# Patient Record
Sex: FEMALE | Race: BLACK OR AFRICAN AMERICAN | NOT HISPANIC OR LATINO | Employment: UNEMPLOYED | URBAN - METROPOLITAN AREA
[De-identification: names, ages, dates, MRNs, and addresses within clinical notes are randomized per-mention and may not be internally consistent; named-entity substitution may affect disease eponyms.]

---

## 2017-03-24 ENCOUNTER — HISTORICAL (OUTPATIENT)
Dept: ADMINISTRATIVE | Facility: HOSPITAL | Age: 62
End: 2017-03-24

## 2017-05-11 ENCOUNTER — HISTORICAL (OUTPATIENT)
Dept: ADMINISTRATIVE | Facility: HOSPITAL | Age: 62
End: 2017-05-11

## 2017-05-16 ENCOUNTER — HISTORICAL (OUTPATIENT)
Dept: HEMATOLOGY/ONCOLOGY | Facility: CLINIC | Age: 62
End: 2017-05-16

## 2017-08-09 ENCOUNTER — HISTORICAL (OUTPATIENT)
Dept: INTERNAL MEDICINE | Facility: CLINIC | Age: 62
End: 2017-08-09

## 2017-08-09 LAB
APPEARANCE, UA: ABNORMAL
BACTERIA #/AREA URNS AUTO: ABNORMAL /[HPF]
BILIRUB UR QL STRIP: NEGATIVE
COLOR UR: YELLOW
GLUCOSE (UA): 500 MG/DL
HGB UR QL STRIP: NEGATIVE
HYALINE CASTS #/AREA URNS LPF: ABNORMAL /[LPF]
KETONES UR QL STRIP: NEGATIVE
LEUKOCYTE ESTERASE UR QL STRIP: 75 LEU/UL
MUCOUS THREADS URNS QL MICRO: SLIGHT
NITRITE UR QL STRIP: NEGATIVE
PH UR STRIP: 6.5 [PH] (ref 4.5–8)
PROT UR QL STRIP: 100 MG/DL
RBC #/AREA URNS AUTO: ABNORMAL /[HPF]
SP GR UR STRIP: 1.01 (ref 1–1.03)
SQUAMOUS #/AREA URNS LPF: >100 /[LPF]
UROBILINOGEN UR STRIP-ACNC: NORMAL
WBC #/AREA URNS AUTO: ABNORMAL /HPF

## 2017-08-16 ENCOUNTER — HISTORICAL (OUTPATIENT)
Dept: INTERNAL MEDICINE | Facility: CLINIC | Age: 62
End: 2017-08-16

## 2017-09-14 ENCOUNTER — HISTORICAL (OUTPATIENT)
Dept: INTERNAL MEDICINE | Facility: CLINIC | Age: 62
End: 2017-09-14

## 2017-10-04 ENCOUNTER — HISTORICAL (OUTPATIENT)
Dept: INTERNAL MEDICINE | Facility: CLINIC | Age: 62
End: 2017-10-04

## 2017-11-10 ENCOUNTER — HISTORICAL (OUTPATIENT)
Dept: INTERNAL MEDICINE | Facility: CLINIC | Age: 62
End: 2017-11-10

## 2017-11-10 LAB
ABS NEUT (OLG): 3.25 X10(3)/MCL (ref 2.1–9.2)
ALBUMIN SERPL-MCNC: 3 GM/DL (ref 3.4–5)
ALBUMIN/GLOB SERPL: 1 RATIO (ref 1–2)
ALP SERPL-CCNC: 129 UNIT/L (ref 45–117)
ALT SERPL-CCNC: 28 UNIT/L (ref 12–78)
APPEARANCE, UA: ABNORMAL
AST SERPL-CCNC: 28 UNIT/L (ref 15–37)
BACTERIA #/AREA URNS AUTO: ABNORMAL /[HPF]
BASOPHILS # BLD AUTO: 0.01 X10(3)/MCL
BASOPHILS NFR BLD AUTO: 0 % (ref 0–1)
BILIRUB SERPL-MCNC: 0.4 MG/DL (ref 0.2–1)
BILIRUB UR QL STRIP: NEGATIVE
BILIRUBIN DIRECT+TOT PNL SERPL-MCNC: 0.1 MG/DL
BILIRUBIN DIRECT+TOT PNL SERPL-MCNC: 0.3 MG/DL
BUN SERPL-MCNC: 14 MG/DL (ref 7–18)
CALCIUM SERPL-MCNC: 8.6 MG/DL (ref 8.5–10.1)
CHLORIDE SERPL-SCNC: 105 MMOL/L (ref 98–107)
CO2 SERPL-SCNC: 31 MMOL/L (ref 21–32)
COLOR UR: YELLOW
CREAT SERPL-MCNC: 1.5 MG/DL (ref 0.6–1.3)
CREAT UR-MCNC: 206 MG/DL
DEPRECATED CALCIDIOL+CALCIFEROL SERPL-MC: 30.52 NG/ML (ref 30–80)
EOSINOPHIL # BLD AUTO: 0.17 X10(3)/MCL
EOSINOPHIL NFR BLD AUTO: 3 % (ref 0–5)
ERYTHROCYTE [DISTWIDTH] IN BLOOD BY AUTOMATED COUNT: 13.9 % (ref 11.5–14.5)
GLOBULIN SER-MCNC: 4.6 GM/ML (ref 2.3–3.5)
GLUCOSE (UA): NORMAL
GLUCOSE SERPL-MCNC: 195 MG/DL (ref 74–106)
HCT VFR BLD AUTO: 39.9 % (ref 35–46)
HGB BLD-MCNC: 12.1 GM/DL (ref 12–16)
HGB UR QL STRIP: 0.06 MG/DL
HYALINE CASTS #/AREA URNS LPF: ABNORMAL /[LPF]
IMM GRANULOCYTES # BLD AUTO: 0.02 10*3/UL
IMM GRANULOCYTES NFR BLD AUTO: 0 %
KETONES UR QL STRIP: NEGATIVE
LEUKOCYTE ESTERASE UR QL STRIP: 500 LEU/UL
LYMPHOCYTES # BLD AUTO: 1.43 X10(3)/MCL
LYMPHOCYTES NFR BLD AUTO: 26 % (ref 15–40)
MAGNESIUM SERPL-MCNC: 1.5 MG/DL (ref 1.8–2.4)
MCH RBC QN AUTO: 23 PG (ref 26–34)
MCHC RBC AUTO-ENTMCNC: 30.3 GM/DL (ref 31–37)
MCV RBC AUTO: 76 FL (ref 80–100)
MONOCYTES # BLD AUTO: 0.56 X10(3)/MCL
MONOCYTES NFR BLD AUTO: 10 % (ref 4–12)
NEUTROPHILS # BLD AUTO: 3.25 X10(3)/MCL
NEUTROPHILS NFR BLD AUTO: 60 X10(3)/MCL
NITRITE UR QL STRIP: NEGATIVE
PH UR STRIP: 5.5 [PH] (ref 4.5–8)
PHOSPHATE SERPL-MCNC: 2.5 MG/DL (ref 2.5–4.9)
PLATELET # BLD AUTO: 165 X10(3)/MCL (ref 130–400)
PMV BLD AUTO: 10.3 FL (ref 7.4–10.4)
POTASSIUM SERPL-SCNC: 4.2 MMOL/L (ref 3.5–5.1)
PROT SERPL-MCNC: 7.6 GM/DL (ref 6.4–8.2)
PROT UR QL STRIP: 30 MG/DL
PROT UR STRIP-MCNC: 44.9 MG/DL
PROT/CREAT UR-RTO: 218 MG/GM
PTH-INTACT SERPL-MCNC: 129.7 PG/ML (ref 13.8–85)
RBC # BLD AUTO: 5.25 X10(6)/MCL (ref 4–5.2)
RBC #/AREA URNS AUTO: ABNORMAL /[HPF]
SODIUM SERPL-SCNC: 142 MMOL/L (ref 136–145)
SP GR UR STRIP: 1.01 (ref 1–1.03)
SQUAMOUS #/AREA URNS LPF: >100 /[LPF]
UROBILINOGEN UR STRIP-ACNC: NORMAL
WBC # SPEC AUTO: 5.4 X10(3)/MCL (ref 4.5–11)
WBC #/AREA URNS AUTO: ABNORMAL /HPF

## 2017-12-06 ENCOUNTER — HISTORICAL (OUTPATIENT)
Dept: INTERNAL MEDICINE | Facility: CLINIC | Age: 62
End: 2017-12-06

## 2017-12-06 LAB
CHOLEST SERPL-MCNC: 158 MG/DL
CHOLEST/HDLC SERPL: 2.7 {RATIO} (ref 0–4.4)
EST. AVERAGE GLUCOSE BLD GHB EST-MCNC: 272 MG/DL
HBA1C MFR BLD: 11.1 % (ref 4.2–6.3)
HDLC SERPL-MCNC: 58 MG/DL
INR PPP: 1.1 (ref 0.9–1.2)
LDLC SERPL CALC-MCNC: 74 MG/DL (ref 0–130)
PROTHROMBIN TIME: 14 SECOND(S) (ref 11.9–14.4)
TRIGL SERPL-MCNC: 130 MG/DL
VLDLC SERPL CALC-MCNC: 26 MG/DL

## 2017-12-19 ENCOUNTER — HISTORICAL (OUTPATIENT)
Dept: INTERNAL MEDICINE | Facility: CLINIC | Age: 62
End: 2017-12-19

## 2017-12-19 LAB
INR PPP: 1.61 (ref 0.9–1.2)
PROTHROMBIN TIME: 18.9 SECOND(S) (ref 11.9–14.4)

## 2017-12-22 ENCOUNTER — HOSPITAL ENCOUNTER (OUTPATIENT)
Dept: MEDSURG UNIT | Facility: HOSPITAL | Age: 62
End: 2017-12-24
Attending: INTERNAL MEDICINE | Admitting: INTERNAL MEDICINE

## 2017-12-22 LAB
ABS NEUT (OLG): 4.56 X10(3)/MCL (ref 2.1–9.2)
ALBUMIN SERPL-MCNC: 3.1 GM/DL (ref 3.4–5)
ALBUMIN/GLOB SERPL: 1 RATIO (ref 1–2)
ALP SERPL-CCNC: 117 UNIT/L (ref 45–117)
ALT SERPL-CCNC: 21 UNIT/L (ref 12–78)
AMYLASE SERPL-CCNC: 172 UNIT/L (ref 25–115)
APPEARANCE, UA: ABNORMAL
APTT PPP: 37.1 SECOND(S) (ref 23.3–37)
AST SERPL-CCNC: 19 UNIT/L (ref 15–37)
BACTERIA #/AREA URNS AUTO: ABNORMAL /[HPF]
BASOPHILS # BLD AUTO: 0.02 X10(3)/MCL
BASOPHILS NFR BLD AUTO: 0 % (ref 0–1)
BILIRUB SERPL-MCNC: 0.3 MG/DL (ref 0.2–1)
BILIRUB UR QL STRIP: NEGATIVE
BILIRUBIN DIRECT+TOT PNL SERPL-MCNC: 0.1 MG/DL
BILIRUBIN DIRECT+TOT PNL SERPL-MCNC: 0.2 MG/DL
BUN SERPL-MCNC: 18 MG/DL (ref 7–18)
CALCIUM SERPL-MCNC: 9.2 MG/DL (ref 8.5–10.1)
CHLORIDE SERPL-SCNC: 98 MMOL/L (ref 98–107)
CO2 SERPL-SCNC: 32 MMOL/L (ref 21–32)
COLOR UR: YELLOW
CREAT SERPL-MCNC: 1.5 MG/DL (ref 0.6–1.3)
EOSINOPHIL # BLD AUTO: 0.24 X10(3)/MCL
EOSINOPHIL NFR BLD AUTO: 3 % (ref 0–5)
ERYTHROCYTE [DISTWIDTH] IN BLOOD BY AUTOMATED COUNT: 13.2 % (ref 11.5–14.5)
GLOBULIN SER-MCNC: 5 GM/ML (ref 2.3–3.5)
GLUCOSE (UA): 1000 MG/DL
GLUCOSE SERPL-MCNC: 354 MG/DL (ref 74–106)
HCT VFR BLD AUTO: 40.3 % (ref 35–46)
HGB BLD-MCNC: 12.4 GM/DL (ref 12–16)
HGB UR QL STRIP: 0.03 MG/DL
HYALINE CASTS #/AREA URNS LPF: 0 /[LPF]
IMM GRANULOCYTES # BLD AUTO: 0.02 10*3/UL
IMM GRANULOCYTES NFR BLD AUTO: 0 %
INR PPP: 1.97 (ref 0.9–1.2)
KETONES UR QL STRIP: NEGATIVE
LEUKOCYTE ESTERASE UR QL STRIP: 500 LEU/UL
LIPASE SERPL-CCNC: 170 UNIT/L (ref 73–393)
LYMPHOCYTES # BLD AUTO: 2.39 X10(3)/MCL
LYMPHOCYTES NFR BLD AUTO: 31 % (ref 15–40)
MCH RBC QN AUTO: 23 PG (ref 26–34)
MCHC RBC AUTO-ENTMCNC: 30.8 GM/DL (ref 31–37)
MCV RBC AUTO: 74.8 FL (ref 80–100)
MONOCYTES # BLD AUTO: 0.52 X10(3)/MCL
MONOCYTES NFR BLD AUTO: 7 % (ref 4–12)
NEUTROPHILS # BLD AUTO: 4.56 X10(3)/MCL
NEUTROPHILS NFR BLD AUTO: 59 X10(3)/MCL
NITRITE UR QL STRIP: NEGATIVE
PH UR STRIP: 5.5 [PH] (ref 4.5–8)
PLATELET # BLD AUTO: 240 X10(3)/MCL (ref 130–400)
PMV BLD AUTO: 10.4 FL (ref 7.4–10.4)
POTASSIUM SERPL-SCNC: 3.7 MMOL/L (ref 3.5–5.1)
PROT SERPL-MCNC: 8.1 GM/DL (ref 6.4–8.2)
PROT UR QL STRIP: 30 MG/DL
PROTHROMBIN TIME: 22.1 SECOND(S) (ref 11.9–14.4)
RBC # BLD AUTO: 5.39 X10(6)/MCL (ref 4–5.2)
RBC #/AREA URNS AUTO: ABNORMAL /[HPF]
SODIUM SERPL-SCNC: 136 MMOL/L (ref 136–145)
SP GR UR STRIP: 1.02 (ref 1–1.03)
SQUAMOUS #/AREA URNS LPF: >100 /[LPF]
UROBILINOGEN UR STRIP-ACNC: NORMAL
WBC # SPEC AUTO: 7.8 X10(3)/MCL (ref 4.5–11)
WBC #/AREA URNS AUTO: ABNORMAL /HPF

## 2017-12-23 LAB
ABS NEUT (OLG): 2.82 X10(3)/MCL (ref 2.1–9.2)
ALBUMIN SERPL-MCNC: 2.7 GM/DL (ref 3.4–5)
ALBUMIN/GLOB SERPL: 1 RATIO (ref 1–2)
ALP SERPL-CCNC: 98 UNIT/L (ref 45–117)
ALT SERPL-CCNC: 16 UNIT/L (ref 12–78)
AST SERPL-CCNC: 14 UNIT/L (ref 15–37)
BASOPHILS # BLD AUTO: 0.02 X10(3)/MCL
BASOPHILS NFR BLD AUTO: 0 % (ref 0–1)
BILIRUB SERPL-MCNC: 0.2 MG/DL (ref 0.2–1)
BILIRUBIN DIRECT+TOT PNL SERPL-MCNC: 0.1 MG/DL
BILIRUBIN DIRECT+TOT PNL SERPL-MCNC: 0.1 MG/DL
BUN SERPL-MCNC: 16 MG/DL (ref 7–18)
CALCIUM SERPL-MCNC: 8.3 MG/DL (ref 8.5–10.1)
CHLORIDE SERPL-SCNC: 103 MMOL/L (ref 98–107)
CO2 SERPL-SCNC: 33 MMOL/L (ref 21–32)
COLOR STL: ABNORMAL
CONSISTENCY STL: ABNORMAL
CREAT SERPL-MCNC: 1.3 MG/DL (ref 0.6–1.3)
EOSINOPHIL # BLD AUTO: 0.27 X10(3)/MCL
EOSINOPHIL NFR BLD AUTO: 5 % (ref 0–5)
ERYTHROCYTE [DISTWIDTH] IN BLOOD BY AUTOMATED COUNT: 13.2 % (ref 11.5–14.5)
GLOBULIN SER-MCNC: 4 GM/ML (ref 2.3–3.5)
GLUCOSE SERPL-MCNC: 194 MG/DL (ref 74–106)
HCT VFR BLD AUTO: 35.6 % (ref 35–46)
HEMOCCULT SP1 STL QL: POSITIVE
HGB BLD-MCNC: 10.8 GM/DL (ref 12–16)
IMM GRANULOCYTES # BLD AUTO: 0.01 10*3/UL
IMM GRANULOCYTES NFR BLD AUTO: 0 %
LYMPHOCYTES # BLD AUTO: 2.03 X10(3)/MCL
LYMPHOCYTES NFR BLD AUTO: 36 % (ref 15–40)
MCH RBC QN AUTO: 23.1 PG (ref 26–34)
MCHC RBC AUTO-ENTMCNC: 30.3 GM/DL (ref 31–37)
MCV RBC AUTO: 76.1 FL (ref 80–100)
MONOCYTES # BLD AUTO: 0.49 X10(3)/MCL
MONOCYTES NFR BLD AUTO: 9 % (ref 4–12)
NEUTROPHILS # BLD AUTO: 2.82 X10(3)/MCL
NEUTROPHILS NFR BLD AUTO: 50 X10(3)/MCL
PLATELET # BLD AUTO: 210 X10(3)/MCL (ref 130–400)
PMV BLD AUTO: 11.1 FL (ref 7.4–10.4)
POTASSIUM SERPL-SCNC: 3.9 MMOL/L (ref 3.5–5.1)
PROT SERPL-MCNC: 6.7 GM/DL (ref 6.4–8.2)
RBC # BLD AUTO: 4.68 X10(6)/MCL (ref 4–5.2)
SODIUM SERPL-SCNC: 141 MMOL/L (ref 136–145)
WBC # SPEC AUTO: 5.6 X10(3)/MCL (ref 4.5–11)

## 2017-12-24 LAB
ABS NEUT (OLG): 3.19 X10(3)/MCL (ref 2.1–9.2)
ALBUMIN SERPL-MCNC: 2.5 GM/DL (ref 3.4–5)
ALBUMIN/GLOB SERPL: 1 RATIO (ref 1–2)
ALP SERPL-CCNC: 85 UNIT/L (ref 45–117)
ALT SERPL-CCNC: 15 UNIT/L (ref 12–78)
AST SERPL-CCNC: 17 UNIT/L (ref 15–37)
BASOPHILS # BLD AUTO: 0.02 X10(3)/MCL
BASOPHILS NFR BLD AUTO: 0 % (ref 0–1)
BILIRUB SERPL-MCNC: 0.3 MG/DL (ref 0.2–1)
BILIRUBIN DIRECT+TOT PNL SERPL-MCNC: 0.1 MG/DL
BILIRUBIN DIRECT+TOT PNL SERPL-MCNC: 0.2 MG/DL
BUN SERPL-MCNC: 14 MG/DL (ref 7–18)
CALCIUM SERPL-MCNC: 8 MG/DL (ref 8.5–10.1)
CHLORIDE SERPL-SCNC: 106 MMOL/L (ref 98–107)
CO2 SERPL-SCNC: 31 MMOL/L (ref 21–32)
CREAT SERPL-MCNC: 1.3 MG/DL (ref 0.6–1.3)
EOSINOPHIL # BLD AUTO: 0.3 X10(3)/MCL
EOSINOPHIL NFR BLD AUTO: 5 % (ref 0–5)
ERYTHROCYTE [DISTWIDTH] IN BLOOD BY AUTOMATED COUNT: 13.3 % (ref 11.5–14.5)
FERRITIN SERPL-MCNC: 406.6 NG/ML (ref 8–388)
GLOBULIN SER-MCNC: 4 GM/ML (ref 2.3–3.5)
GLUCOSE SERPL-MCNC: 119 MG/DL (ref 74–106)
HCT VFR BLD AUTO: 34 % (ref 35–46)
HGB BLD-MCNC: 10.1 GM/DL (ref 12–16)
IMM GRANULOCYTES # BLD AUTO: 0.02 10*3/UL
IMM GRANULOCYTES NFR BLD AUTO: 0 %
IRON SATN MFR SERPL: 28.1 % (ref 15–50)
IRON SERPL-MCNC: 55 MCG/DL (ref 50–170)
LYMPHOCYTES # BLD AUTO: 1.85 X10(3)/MCL
LYMPHOCYTES NFR BLD AUTO: 32 % (ref 15–40)
MCH RBC QN AUTO: 22.7 PG (ref 26–34)
MCHC RBC AUTO-ENTMCNC: 29.7 GM/DL (ref 31–37)
MCV RBC AUTO: 76.6 FL (ref 80–100)
MONOCYTES # BLD AUTO: 0.5 X10(3)/MCL
MONOCYTES NFR BLD AUTO: 8 % (ref 4–12)
NEUTROPHILS # BLD AUTO: 3.19 X10(3)/MCL
NEUTROPHILS NFR BLD AUTO: 54 X10(3)/MCL
PLATELET # BLD AUTO: 204 X10(3)/MCL (ref 130–400)
PMV BLD AUTO: 11.4 FL (ref 7.4–10.4)
POTASSIUM SERPL-SCNC: 4 MMOL/L (ref 3.5–5.1)
PROT SERPL-MCNC: 6.5 GM/DL (ref 6.4–8.2)
RBC # BLD AUTO: 4.44 X10(6)/MCL (ref 4–5.2)
SODIUM SERPL-SCNC: 144 MMOL/L (ref 136–145)
TIBC SERPL-MCNC: 196 MCG/DL (ref 250–450)
TRANSFERRIN SERPL-MCNC: 158 MG/DL (ref 200–360)
WBC # SPEC AUTO: 5.9 X10(3)/MCL (ref 4.5–11)

## 2018-01-02 ENCOUNTER — HISTORICAL (OUTPATIENT)
Dept: INTERNAL MEDICINE | Facility: CLINIC | Age: 63
End: 2018-01-02

## 2018-01-02 LAB
INR PPP: 1.39 (ref 0.9–1.2)
PROTHROMBIN TIME: 16.8 SECOND(S) (ref 11.9–14.4)

## 2018-01-29 ENCOUNTER — HISTORICAL (OUTPATIENT)
Dept: INTERNAL MEDICINE | Facility: CLINIC | Age: 63
End: 2018-01-29

## 2018-01-29 LAB
INR PPP: 1.89 (ref 0.9–1.2)
PROTHROMBIN TIME: 20.6 SECOND(S) (ref 11.9–14.4)

## 2018-02-16 ENCOUNTER — HISTORICAL (OUTPATIENT)
Dept: ADMINISTRATIVE | Facility: HOSPITAL | Age: 63
End: 2018-02-16

## 2018-02-16 LAB
ABS NEUT (OLG): 3.57 X10(3)/MCL (ref 2.1–9.2)
ALBUMIN SERPL-MCNC: 3 GM/DL (ref 3.4–5)
ALBUMIN/GLOB SERPL: 1 RATIO (ref 1–2)
ALP SERPL-CCNC: 121 UNIT/L (ref 45–117)
ALT SERPL-CCNC: 15 UNIT/L (ref 12–78)
APPEARANCE, UA: ABNORMAL
AST SERPL-CCNC: 15 UNIT/L (ref 15–37)
BACTERIA #/AREA URNS AUTO: ABNORMAL /[HPF]
BASOPHILS # BLD AUTO: 0.02 X10(3)/MCL
BASOPHILS NFR BLD AUTO: 0 % (ref 0–1)
BILIRUB SERPL-MCNC: 0.4 MG/DL (ref 0.2–1)
BILIRUB UR QL STRIP: NEGATIVE
BILIRUBIN DIRECT+TOT PNL SERPL-MCNC: 0.2 MG/DL
BILIRUBIN DIRECT+TOT PNL SERPL-MCNC: 0.2 MG/DL
BUN SERPL-MCNC: 13 MG/DL (ref 7–18)
CALCIUM SERPL-MCNC: 8.9 MG/DL (ref 8.5–10.1)
CHLORIDE SERPL-SCNC: 101 MMOL/L (ref 98–107)
CO2 SERPL-SCNC: 32 MMOL/L (ref 21–32)
COLOR UR: YELLOW
CREAT SERPL-MCNC: 1.2 MG/DL (ref 0.6–1.3)
CREAT UR-MCNC: 219 MG/DL
DEPRECATED CALCIDIOL+CALCIFEROL SERPL-MC: 31.58 NG/ML (ref 30–80)
EOSINOPHIL # BLD AUTO: 0.33 X10(3)/MCL
EOSINOPHIL NFR BLD AUTO: 5 % (ref 0–5)
ERYTHROCYTE [DISTWIDTH] IN BLOOD BY AUTOMATED COUNT: 13.9 % (ref 11.5–14.5)
GLOBULIN SER-MCNC: 5.1 GM/ML (ref 2.3–3.5)
GLUCOSE (UA): 50 MG/DL
GLUCOSE SERPL-MCNC: 145 MG/DL (ref 74–106)
HCT VFR BLD AUTO: 39 % (ref 35–46)
HGB BLD-MCNC: 11.9 GM/DL (ref 12–16)
HGB UR QL STRIP: 0.1 MG/DL
HYALINE CASTS #/AREA URNS LPF: ABNORMAL /[LPF]
IMM GRANULOCYTES # BLD AUTO: 0.03 10*3/UL
IMM GRANULOCYTES NFR BLD AUTO: 0 %
INR PPP: 1.66 (ref 0.9–1.2)
KETONES UR QL STRIP: NEGATIVE
LEUKOCYTE ESTERASE UR QL STRIP: 500 LEU/UL
LYMPHOCYTES # BLD AUTO: 2.49 X10(3)/MCL
LYMPHOCYTES NFR BLD AUTO: 36 % (ref 15–40)
MAGNESIUM SERPL-MCNC: 1.8 MG/DL (ref 1.8–2.4)
MCH RBC QN AUTO: 22.8 PG (ref 26–34)
MCHC RBC AUTO-ENTMCNC: 30.5 GM/DL (ref 31–37)
MCV RBC AUTO: 74.6 FL (ref 80–100)
MONOCYTES # BLD AUTO: 0.52 X10(3)/MCL
MONOCYTES NFR BLD AUTO: 8 % (ref 4–12)
NEUTROPHILS # BLD AUTO: 3.57 X10(3)/MCL
NEUTROPHILS NFR BLD AUTO: 51 X10(3)/MCL
NITRITE UR QL STRIP: NEGATIVE
PH UR STRIP: 5.5 [PH] (ref 4.5–8)
PHOSPHATE SERPL-MCNC: 3.5 MG/DL (ref 2.5–4.9)
PLATELET # BLD AUTO: 213 X10(3)/MCL (ref 130–400)
PMV BLD AUTO: 10.9 FL (ref 7.4–10.4)
POTASSIUM SERPL-SCNC: 4 MMOL/L (ref 3.5–5.1)
PROT SERPL-MCNC: 8.1 GM/DL (ref 6.4–8.2)
PROT UR QL STRIP: 50 MG/DL
PROT UR STRIP-MCNC: 46.8 MG/DL
PROT/CREAT UR-RTO: 213.7 MG/GM
PROTHROMBIN TIME: 18.6 SECOND(S) (ref 11.9–14.4)
PTH-INTACT SERPL-MCNC: 92 PG/ML (ref 18.4–80.1)
RBC # BLD AUTO: 5.23 X10(6)/MCL (ref 4–5.2)
RBC #/AREA URNS AUTO: ABNORMAL /[HPF]
SODIUM SERPL-SCNC: 139 MMOL/L (ref 136–145)
SP GR UR STRIP: 1.02 (ref 1–1.03)
SQUAMOUS #/AREA URNS LPF: >100 /[LPF]
UROBILINOGEN UR STRIP-ACNC: NORMAL
WBC # SPEC AUTO: 7 X10(3)/MCL (ref 4.5–11)
WBC #/AREA URNS AUTO: >=100 /HPF

## 2018-02-23 ENCOUNTER — HISTORICAL (OUTPATIENT)
Dept: ENDOSCOPY | Facility: HOSPITAL | Age: 63
End: 2018-02-23

## 2018-03-01 ENCOUNTER — HISTORICAL (OUTPATIENT)
Dept: INTERNAL MEDICINE | Facility: CLINIC | Age: 63
End: 2018-03-01

## 2018-03-01 LAB
INR PPP: 2.45 (ref 0.9–1.2)
PROTHROMBIN TIME: 25.3 SECOND(S) (ref 11.9–14.4)

## 2018-04-12 ENCOUNTER — HISTORICAL (OUTPATIENT)
Dept: INTERNAL MEDICINE | Facility: CLINIC | Age: 63
End: 2018-04-12

## 2018-04-12 LAB
INR PPP: 1.69 (ref 0.9–1.2)
PROTHROMBIN TIME: 18.9 SECOND(S) (ref 11.9–14.4)

## 2018-04-18 ENCOUNTER — HISTORICAL (OUTPATIENT)
Dept: INTERNAL MEDICINE | Facility: CLINIC | Age: 63
End: 2018-04-18

## 2018-04-18 LAB
ABS NEUT (OLG): 3.44 X10(3)/MCL (ref 2.1–9.2)
ALBUMIN SERPL-MCNC: 3.1 GM/DL (ref 3.4–5)
ALBUMIN/GLOB SERPL: 1 RATIO (ref 1–2)
ALP SERPL-CCNC: 119 UNIT/L (ref 45–117)
ALT SERPL-CCNC: 16 UNIT/L (ref 12–78)
APPEARANCE, UA: ABNORMAL
AST SERPL-CCNC: 19 UNIT/L (ref 15–37)
BACTERIA #/AREA URNS AUTO: ABNORMAL /[HPF]
BASOPHILS # BLD AUTO: 0.02 X10(3)/MCL
BASOPHILS NFR BLD AUTO: 0 %
BILIRUB SERPL-MCNC: 0.4 MG/DL (ref 0.2–1)
BILIRUB UR QL STRIP: NEGATIVE
BILIRUBIN DIRECT+TOT PNL SERPL-MCNC: 0.1 MG/DL
BILIRUBIN DIRECT+TOT PNL SERPL-MCNC: 0.3 MG/DL
BUN SERPL-MCNC: 16 MG/DL (ref 7–18)
CALCIUM SERPL-MCNC: 9.1 MG/DL (ref 8.5–10.1)
CHLORIDE SERPL-SCNC: 104 MMOL/L (ref 98–107)
CO2 SERPL-SCNC: 31 MMOL/L (ref 21–32)
COLOR UR: YELLOW
CREAT SERPL-MCNC: 1.3 MG/DL (ref 0.6–1.3)
CREAT UR-MCNC: 204 MG/DL
EOSINOPHIL # BLD AUTO: 0.13 X10(3)/MCL
EOSINOPHIL NFR BLD AUTO: 2 %
ERYTHROCYTE [DISTWIDTH] IN BLOOD BY AUTOMATED COUNT: 14.9 % (ref 11.5–14.5)
EST. AVERAGE GLUCOSE BLD GHB EST-MCNC: 286 MG/DL
GLOBULIN SER-MCNC: 4.7 GM/ML (ref 2.3–3.5)
GLUCOSE (UA): 30 MG/DL
GLUCOSE SERPL-MCNC: 207 MG/DL (ref 74–106)
HBA1C MFR BLD: 11.6 % (ref 4.2–6.3)
HCT VFR BLD AUTO: 39.5 % (ref 35–46)
HGB BLD-MCNC: 11.9 GM/DL (ref 12–16)
HGB UR QL STRIP: 0.06 MG/DL
HYALINE CASTS #/AREA URNS LPF: ABNORMAL /[LPF]
IMM GRANULOCYTES # BLD AUTO: 0.02 10*3/UL
IMM GRANULOCYTES NFR BLD AUTO: 0 %
KETONES UR QL STRIP: NEGATIVE
LEUKOCYTE ESTERASE UR QL STRIP: 75 LEU/UL
LYMPHOCYTES # BLD AUTO: 1.98 X10(3)/MCL
LYMPHOCYTES NFR BLD AUTO: 33 % (ref 13–40)
MCH RBC QN AUTO: 22.4 PG (ref 26–34)
MCHC RBC AUTO-ENTMCNC: 30.1 GM/DL (ref 31–37)
MCV RBC AUTO: 74.4 FL (ref 80–100)
MICROALBUMIN UR-MCNC: 83.1 MG/L (ref 0–19)
MICROALBUMIN/CREAT RATIO PNL UR: 40.7 MCG/MG CR (ref 0–29)
MONOCYTES # BLD AUTO: 0.4 X10(3)/MCL
MONOCYTES NFR BLD AUTO: 7 % (ref 4–12)
NEUTROPHILS # BLD AUTO: 3.44 X10(3)/MCL
NEUTROPHILS NFR BLD AUTO: 57 X10(3)/MCL
NITRITE UR QL STRIP: NEGATIVE
PH UR STRIP: 5.5 [PH] (ref 4.5–8)
PLATELET # BLD AUTO: 218 X10(3)/MCL (ref 130–400)
PMV BLD AUTO: 11 FL (ref 7.4–10.4)
POTASSIUM SERPL-SCNC: 4.1 MMOL/L (ref 3.5–5.1)
PROT SERPL-MCNC: 7.8 GM/DL (ref 6.4–8.2)
PROT UR QL STRIP: 20 MG/DL
RBC # BLD AUTO: 5.31 X10(6)/MCL (ref 4–5.2)
RBC #/AREA URNS AUTO: ABNORMAL /[HPF]
SODIUM SERPL-SCNC: 142 MMOL/L (ref 136–145)
SP GR UR STRIP: 1.02 (ref 1–1.03)
SQUAMOUS #/AREA URNS LPF: >100 /[LPF]
UROBILINOGEN UR STRIP-ACNC: NORMAL
WBC # SPEC AUTO: 6 X10(3)/MCL (ref 4.5–11)
WBC #/AREA URNS AUTO: ABNORMAL /HPF

## 2018-04-25 ENCOUNTER — HISTORICAL (OUTPATIENT)
Dept: ADMINISTRATIVE | Facility: HOSPITAL | Age: 63
End: 2018-04-25

## 2018-04-25 LAB
ALBUMIN SERPL-MCNC: 3.2 GM/DL (ref 3.4–5)
ALBUMIN/GLOB SERPL: 1 RATIO (ref 1–2)
ALP SERPL-CCNC: 121 UNIT/L (ref 45–117)
ALT SERPL-CCNC: 21 UNIT/L (ref 12–78)
AST SERPL-CCNC: 18 UNIT/L (ref 15–37)
BILIRUB SERPL-MCNC: 0.3 MG/DL (ref 0.2–1)
BILIRUBIN DIRECT+TOT PNL SERPL-MCNC: 0.1 MG/DL
BILIRUBIN DIRECT+TOT PNL SERPL-MCNC: 0.2 MG/DL
BUN SERPL-MCNC: 19 MG/DL (ref 7–18)
CALCIUM SERPL-MCNC: 8.8 MG/DL (ref 8.5–10.1)
CHLORIDE SERPL-SCNC: 102 MMOL/L (ref 98–107)
CO2 SERPL-SCNC: 31 MMOL/L (ref 21–32)
CREAT SERPL-MCNC: 1.4 MG/DL (ref 0.6–1.3)
EST. AVERAGE GLUCOSE BLD GHB EST-MCNC: 283 MG/DL
GLOBULIN SER-MCNC: 4.7 GM/ML (ref 2.3–3.5)
GLUCOSE SERPL-MCNC: 246 MG/DL (ref 74–106)
HBA1C MFR BLD: 11.5 % (ref 4.2–6.3)
INR PPP: 1.94 (ref 0.9–1.2)
POTASSIUM SERPL-SCNC: 4.3 MMOL/L (ref 3.5–5.1)
PROT SERPL-MCNC: 7.9 GM/DL (ref 6.4–8.2)
PROTHROMBIN TIME: 21.1 SECOND(S) (ref 11.9–14.4)
SODIUM SERPL-SCNC: 138 MMOL/L (ref 136–145)
T4 FREE SERPL-MCNC: 0.98 NG/DL (ref 0.76–1.46)
TSH SERPL-ACNC: 1.53 MIU/L (ref 0.36–3.74)

## 2018-05-09 ENCOUNTER — HISTORICAL (OUTPATIENT)
Dept: INTERNAL MEDICINE | Facility: CLINIC | Age: 63
End: 2018-05-09

## 2018-05-09 LAB
INR PPP: 2.04
INR PPP: 2.04 (ref 0.9–1.2)
PROTHROMBIN TIME: 21.9 SECOND(S) (ref 11.9–14.4)

## 2018-05-14 ENCOUNTER — HISTORICAL (OUTPATIENT)
Dept: RADIOLOGY | Facility: HOSPITAL | Age: 63
End: 2018-05-14

## 2018-05-16 ENCOUNTER — HISTORICAL (OUTPATIENT)
Dept: ADMINISTRATIVE | Facility: HOSPITAL | Age: 63
End: 2018-05-16

## 2018-05-16 LAB
ABS NEUT (OLG): 3.88 X10(3)/MCL (ref 2.1–9.2)
ALBUMIN SERPL-MCNC: 3 GM/DL (ref 3.4–5)
ALBUMIN/GLOB SERPL: 1 RATIO (ref 1–2)
ALP SERPL-CCNC: 118 UNIT/L (ref 45–117)
ALT SERPL-CCNC: 20 UNIT/L (ref 12–78)
AST SERPL-CCNC: 19 UNIT/L (ref 15–37)
BASOPHILS # BLD AUTO: 0.02 X10(3)/MCL
BASOPHILS NFR BLD AUTO: 0 %
BILIRUB SERPL-MCNC: 0.3 MG/DL (ref 0.2–1)
BILIRUBIN DIRECT+TOT PNL SERPL-MCNC: 0.1 MG/DL
BILIRUBIN DIRECT+TOT PNL SERPL-MCNC: 0.2 MG/DL
BUN SERPL-MCNC: 20 MG/DL (ref 7–18)
CALCIUM SERPL-MCNC: 8.5 MG/DL (ref 8.5–10.1)
CEA SERPL-MCNC: 1.7 NG/ML
CHLORIDE SERPL-SCNC: 105 MMOL/L (ref 98–107)
CO2 SERPL-SCNC: 30 MMOL/L (ref 21–32)
CREAT SERPL-MCNC: 1.4 MG/DL (ref 0.6–1.3)
EOSINOPHIL # BLD AUTO: 0.18 10*3/UL
EOSINOPHIL NFR BLD AUTO: 3 %
ERYTHROCYTE [DISTWIDTH] IN BLOOD BY AUTOMATED COUNT: 15 % (ref 11.5–14.5)
GLOBULIN SER-MCNC: 4.8 GM/ML (ref 2.3–3.5)
GLUCOSE SERPL-MCNC: 211 MG/DL (ref 74–106)
HCT VFR BLD AUTO: 38.2 % (ref 35–46)
HGB BLD-MCNC: 11.4 GM/DL (ref 12–16)
IMM GRANULOCYTES # BLD AUTO: 0.02 10*3/UL
IMM GRANULOCYTES NFR BLD AUTO: 0 %
LYMPHOCYTES # BLD AUTO: 1.38 X10(3)/MCL
LYMPHOCYTES NFR BLD AUTO: 23 % (ref 13–40)
MCH RBC QN AUTO: 22.6 PG (ref 26–34)
MCHC RBC AUTO-ENTMCNC: 29.8 GM/DL (ref 31–37)
MCV RBC AUTO: 75.6 FL (ref 80–100)
MONOCYTES # BLD AUTO: 0.49 X10(3)/MCL
MONOCYTES NFR BLD AUTO: 8 % (ref 4–12)
NEUTROPHILS # BLD AUTO: 3.88 X10(3)/MCL
NEUTROPHILS NFR BLD AUTO: 65 X10(3)/MCL
PLATELET # BLD AUTO: 198 X10(3)/MCL (ref 130–400)
PMV BLD AUTO: 10.5 FL (ref 7.4–10.4)
POTASSIUM SERPL-SCNC: 3.7 MMOL/L (ref 3.5–5.1)
PROT SERPL-MCNC: 7.8 GM/DL (ref 6.4–8.2)
RBC # BLD AUTO: 5.05 X10(6)/MCL (ref 4–5.2)
SODIUM SERPL-SCNC: 142 MMOL/L (ref 136–145)
WBC # SPEC AUTO: 6 X10(3)/MCL (ref 4.5–11)

## 2018-07-05 ENCOUNTER — HISTORICAL (OUTPATIENT)
Dept: INTERNAL MEDICINE | Facility: CLINIC | Age: 63
End: 2018-07-05

## 2018-07-09 ENCOUNTER — HISTORICAL (OUTPATIENT)
Dept: INTERNAL MEDICINE | Facility: CLINIC | Age: 63
End: 2018-07-09

## 2018-09-05 ENCOUNTER — HISTORICAL (OUTPATIENT)
Dept: ADMINISTRATIVE | Facility: HOSPITAL | Age: 63
End: 2018-09-05

## 2018-09-05 LAB
ABS NEUT (OLG): 3.32 X10(3)/MCL (ref 2.1–9.2)
ALBUMIN SERPL-MCNC: 3.2 GM/DL (ref 3.4–5)
ALBUMIN/GLOB SERPL: 1 RATIO (ref 1–2)
ALP SERPL-CCNC: 129 UNIT/L (ref 45–117)
ALT SERPL-CCNC: 20 UNIT/L (ref 12–78)
APPEARANCE, UA: ABNORMAL
AST SERPL-CCNC: 14 UNIT/L (ref 15–37)
BACTERIA #/AREA URNS AUTO: ABNORMAL /[HPF]
BASOPHILS # BLD AUTO: 0.02 X10(3)/MCL
BASOPHILS NFR BLD AUTO: 0 %
BILIRUB SERPL-MCNC: 0.3 MG/DL (ref 0.2–1)
BILIRUB UR QL STRIP: NEGATIVE
BILIRUBIN DIRECT+TOT PNL SERPL-MCNC: <0.1 MG/DL
BILIRUBIN DIRECT+TOT PNL SERPL-MCNC: ABNORMAL MG/DL
BUN SERPL-MCNC: 18 MG/DL (ref 7–18)
CALCIUM SERPL-MCNC: 9.2 MG/DL (ref 8.5–10.1)
CHLORIDE SERPL-SCNC: 103 MMOL/L (ref 98–107)
CO2 SERPL-SCNC: 34 MMOL/L (ref 21–32)
COLOR UR: YELLOW
CREAT SERPL-MCNC: 1.4 MG/DL (ref 0.6–1.3)
CREAT UR-MCNC: 148 MG/DL
DEPRECATED CALCIDIOL+CALCIFEROL SERPL-MC: 24.41 NG/ML (ref 30–80)
EOSINOPHIL # BLD AUTO: 0.22 10*3/UL
EOSINOPHIL NFR BLD AUTO: 3 %
ERYTHROCYTE [DISTWIDTH] IN BLOOD BY AUTOMATED COUNT: 14.5 % (ref 11.5–14.5)
GLOBULIN SER-MCNC: 5 GM/ML (ref 2.3–3.5)
GLUCOSE (UA): 300 MG/DL
GLUCOSE SERPL-MCNC: 178 MG/DL (ref 74–106)
HCT VFR BLD AUTO: 40 % (ref 35–46)
HGB BLD-MCNC: 11.8 GM/DL (ref 12–16)
HGB UR QL STRIP: 0.03 MG/DL
HYALINE CASTS #/AREA URNS LPF: ABNORMAL /[LPF]
IMM GRANULOCYTES # BLD AUTO: 0.03 10*3/UL
IMM GRANULOCYTES NFR BLD AUTO: 0 %
KETONES UR QL STRIP: NEGATIVE
LEUKOCYTE ESTERASE UR QL STRIP: 500 LEU/UL
LYMPHOCYTES # BLD AUTO: 2.28 X10(3)/MCL
LYMPHOCYTES NFR BLD AUTO: 36 % (ref 13–40)
MAGNESIUM SERPL-MCNC: 1.7 MG/DL (ref 1.8–2.4)
MCH RBC QN AUTO: 22.7 PG (ref 26–34)
MCHC RBC AUTO-ENTMCNC: 29.5 GM/DL (ref 31–37)
MCV RBC AUTO: 77.1 FL (ref 80–100)
MONOCYTES # BLD AUTO: 0.55 X10(3)/MCL
MONOCYTES NFR BLD AUTO: 9 % (ref 4–12)
NEUTROPHILS # BLD AUTO: 3.32 X10(3)/MCL
NEUTROPHILS NFR BLD AUTO: 52 X10(3)/MCL
NITRITE UR QL STRIP: NEGATIVE
PH UR STRIP: 5.5 [PH] (ref 4.5–8)
PHOSPHATE SERPL-MCNC: 3.5 MG/DL (ref 2.5–4.9)
PLATELET # BLD AUTO: 211 X10(3)/MCL (ref 130–400)
PMV BLD AUTO: 11 FL (ref 7.4–10.4)
POTASSIUM SERPL-SCNC: 3.8 MMOL/L (ref 3.5–5.1)
PROT SERPL-MCNC: 8.2 GM/DL (ref 6.4–8.2)
PROT UR QL STRIP: 30 MG/DL
PROT UR STRIP-MCNC: 70.1 MG/DL
PROT/CREAT UR-RTO: 473.6 MG/GM
PTH-INTACT SERPL-MCNC: 63.9 PG/ML (ref 18.4–80.1)
RBC # BLD AUTO: 5.19 X10(6)/MCL (ref 4–5.2)
RBC #/AREA URNS AUTO: ABNORMAL /[HPF]
SODIUM SERPL-SCNC: 141 MMOL/L (ref 136–145)
SP GR UR STRIP: 1.02 (ref 1–1.03)
SQUAMOUS #/AREA URNS LPF: ABNORMAL /[LPF]
UROBILINOGEN UR STRIP-ACNC: NORMAL
WBC # SPEC AUTO: 6.4 X10(3)/MCL (ref 4.5–11)
WBC #/AREA URNS AUTO: ABNORMAL /HPF

## 2018-10-16 ENCOUNTER — HISTORICAL (OUTPATIENT)
Dept: ADMINISTRATIVE | Facility: HOSPITAL | Age: 63
End: 2018-10-16

## 2018-10-16 LAB
EST. AVERAGE GLUCOSE BLD GHB EST-MCNC: 283 MG/DL
HBA1C MFR BLD: 11.5 % (ref 4.2–6.3)

## 2018-11-16 ENCOUNTER — HISTORICAL (OUTPATIENT)
Dept: ADMINISTRATIVE | Facility: HOSPITAL | Age: 63
End: 2018-11-16

## 2018-11-16 LAB
ABS NEUT (OLG): 4.06 X10(3)/MCL (ref 2.1–9.2)
ALBUMIN SERPL-MCNC: 3.3 GM/DL (ref 3.4–5)
ALBUMIN/GLOB SERPL: 1 RATIO (ref 1–2)
ALP SERPL-CCNC: 126 UNIT/L (ref 45–117)
ALT SERPL-CCNC: 17 UNIT/L (ref 12–78)
AST SERPL-CCNC: 13 UNIT/L (ref 15–37)
BASOPHILS # BLD AUTO: 0.01 X10(3)/MCL
BASOPHILS NFR BLD AUTO: 0 %
BILIRUB SERPL-MCNC: 0.4 MG/DL (ref 0.2–1)
BILIRUBIN DIRECT+TOT PNL SERPL-MCNC: 0.1 MG/DL
BILIRUBIN DIRECT+TOT PNL SERPL-MCNC: 0.3 MG/DL
BUN SERPL-MCNC: 13 MG/DL (ref 7–18)
CALCIUM SERPL-MCNC: 8.8 MG/DL (ref 8.5–10.1)
CEA SERPL-MCNC: 1.8 NG/ML
CHLORIDE SERPL-SCNC: 100 MMOL/L (ref 98–107)
CO2 SERPL-SCNC: 32 MMOL/L (ref 21–32)
CREAT SERPL-MCNC: 1.3 MG/DL (ref 0.6–1.3)
EOSINOPHIL # BLD AUTO: 0.33 X10(3)/MCL
EOSINOPHIL NFR BLD AUTO: 5 %
ERYTHROCYTE [DISTWIDTH] IN BLOOD BY AUTOMATED COUNT: 14 % (ref 11.5–14.5)
GLOBULIN SER-MCNC: 5.1 GM/ML (ref 2.3–3.5)
GLUCOSE SERPL-MCNC: 250 MG/DL (ref 74–106)
HCT VFR BLD AUTO: 41.8 % (ref 35–46)
HGB BLD-MCNC: 12.6 GM/DL (ref 12–16)
IMM GRANULOCYTES # BLD AUTO: 0.02 10*3/UL
IMM GRANULOCYTES NFR BLD AUTO: 0 %
LYMPHOCYTES # BLD AUTO: 1.78 X10(3)/MCL
LYMPHOCYTES NFR BLD AUTO: 27 % (ref 13–40)
MCH RBC QN AUTO: 22.6 PG (ref 26–34)
MCHC RBC AUTO-ENTMCNC: 30.1 GM/DL (ref 31–37)
MCV RBC AUTO: 75 FL (ref 80–100)
MONOCYTES # BLD AUTO: 0.42 X10(3)/MCL
MONOCYTES NFR BLD AUTO: 6 % (ref 4–12)
NEUTROPHILS # BLD AUTO: 4.06 X10(3)/MCL
NEUTROPHILS NFR BLD AUTO: 61 X10(3)/MCL
PLATELET # BLD AUTO: 209 X10(3)/MCL (ref 130–400)
PMV BLD AUTO: 10.4 FL (ref 7.4–10.4)
POTASSIUM SERPL-SCNC: 3.7 MMOL/L (ref 3.5–5.1)
PROT SERPL-MCNC: 8.4 GM/DL (ref 6.4–8.2)
RBC # BLD AUTO: 5.57 X10(6)/MCL (ref 4–5.2)
SODIUM SERPL-SCNC: 138 MMOL/L (ref 136–145)
WBC # SPEC AUTO: 6.6 X10(3)/MCL (ref 4.5–11)

## 2018-11-21 ENCOUNTER — HISTORICAL (OUTPATIENT)
Dept: INTERNAL MEDICINE | Facility: CLINIC | Age: 63
End: 2018-11-21

## 2019-01-11 ENCOUNTER — HISTORICAL (OUTPATIENT)
Dept: RADIOLOGY | Facility: HOSPITAL | Age: 64
End: 2019-01-11

## 2019-03-01 ENCOUNTER — HOSPITAL ENCOUNTER (OUTPATIENT)
Dept: MEDSURG UNIT | Facility: HOSPITAL | Age: 64
End: 2019-03-04
Attending: INTERNAL MEDICINE | Admitting: INTERNAL MEDICINE

## 2019-03-27 ENCOUNTER — HISTORICAL (OUTPATIENT)
Dept: ADMINISTRATIVE | Facility: HOSPITAL | Age: 64
End: 2019-03-27

## 2019-05-16 ENCOUNTER — HISTORICAL (OUTPATIENT)
Dept: RADIOLOGY | Facility: HOSPITAL | Age: 64
End: 2019-05-16

## 2019-07-31 ENCOUNTER — HISTORICAL (OUTPATIENT)
Dept: RADIOLOGY | Facility: HOSPITAL | Age: 64
End: 2019-07-31

## 2019-09-20 ENCOUNTER — HISTORICAL (OUTPATIENT)
Dept: NEPHROLOGY | Facility: CLINIC | Age: 64
End: 2019-09-20

## 2019-10-18 ENCOUNTER — HISTORICAL (OUTPATIENT)
Dept: RADIOLOGY | Facility: HOSPITAL | Age: 64
End: 2019-10-18

## 2019-12-06 ENCOUNTER — HISTORICAL (OUTPATIENT)
Dept: INTERNAL MEDICINE | Facility: CLINIC | Age: 64
End: 2019-12-06

## 2020-01-21 ENCOUNTER — HISTORICAL (OUTPATIENT)
Dept: RADIOLOGY | Facility: HOSPITAL | Age: 65
End: 2020-01-21

## 2020-03-11 ENCOUNTER — HISTORICAL (OUTPATIENT)
Dept: SLEEP MEDICINE | Facility: HOSPITAL | Age: 65
End: 2020-03-11

## 2020-05-22 ENCOUNTER — HISTORICAL (OUTPATIENT)
Dept: NEPHROLOGY | Facility: CLINIC | Age: 65
End: 2020-05-22

## 2020-06-15 ENCOUNTER — HISTORICAL (OUTPATIENT)
Dept: GASTROENTEROLOGY | Facility: CLINIC | Age: 65
End: 2020-06-15

## 2020-06-18 ENCOUNTER — HISTORICAL (OUTPATIENT)
Dept: ENDOSCOPY | Facility: HOSPITAL | Age: 65
End: 2020-06-18

## 2020-09-16 ENCOUNTER — HISTORICAL (OUTPATIENT)
Dept: INTERNAL MEDICINE | Facility: CLINIC | Age: 65
End: 2020-09-16

## 2020-09-16 ENCOUNTER — HISTORICAL (OUTPATIENT)
Dept: ADMINISTRATIVE | Facility: HOSPITAL | Age: 65
End: 2020-09-16

## 2020-09-30 ENCOUNTER — HISTORICAL (OUTPATIENT)
Dept: RADIOLOGY | Facility: HOSPITAL | Age: 65
End: 2020-09-30

## 2020-10-17 LAB
LEFT EYE DM RETINOPATHY: NEGATIVE
RIGHT EYE DM RETINOPATHY: NEGATIVE

## 2020-12-07 ENCOUNTER — HISTORICAL (OUTPATIENT)
Dept: INTERNAL MEDICINE | Facility: CLINIC | Age: 65
End: 2020-12-07

## 2021-03-23 ENCOUNTER — HISTORICAL (OUTPATIENT)
Dept: NEPHROLOGY | Facility: CLINIC | Age: 66
End: 2021-03-23

## 2021-03-25 ENCOUNTER — HISTORICAL (OUTPATIENT)
Dept: INTERNAL MEDICINE | Facility: CLINIC | Age: 66
End: 2021-03-25

## 2021-04-13 ENCOUNTER — HISTORICAL (OUTPATIENT)
Dept: RADIOLOGY | Facility: HOSPITAL | Age: 66
End: 2021-04-13

## 2021-04-27 ENCOUNTER — HISTORICAL (OUTPATIENT)
Dept: RADIOLOGY | Facility: HOSPITAL | Age: 66
End: 2021-04-27

## 2021-09-20 ENCOUNTER — HISTORICAL (OUTPATIENT)
Dept: CARDIOLOGY | Facility: HOSPITAL | Age: 66
End: 2021-09-20

## 2021-09-29 ENCOUNTER — HISTORICAL (OUTPATIENT)
Dept: CARDIOLOGY | Facility: HOSPITAL | Age: 66
End: 2021-09-29

## 2021-11-11 ENCOUNTER — HISTORICAL (OUTPATIENT)
Dept: RADIOLOGY | Facility: HOSPITAL | Age: 66
End: 2021-11-11

## 2021-11-11 ENCOUNTER — HISTORICAL (OUTPATIENT)
Dept: LAB | Facility: HOSPITAL | Age: 66
End: 2021-11-11

## 2022-02-11 ENCOUNTER — HISTORICAL (OUTPATIENT)
Dept: NEPHROLOGY | Facility: CLINIC | Age: 67
End: 2022-02-11

## 2022-02-11 LAB
ALBUMIN SERPL-MCNC: 3.4 G/DL (ref 3.4–4.8)
ALBUMIN/GLOB SERPL: 0.7 {RATIO} (ref 1.1–2)
ALP SERPL-CCNC: 125 U/L (ref 40–150)
ALT SERPL-CCNC: 12 U/L (ref 0–55)
APPEARANCE, UA: CLEAR
AST SERPL-CCNC: 21 U/L (ref 5–34)
BACTERIA SPEC CULT: NORMAL
BILIRUB SERPL-MCNC: 0.5 MG/DL
BILIRUB UR QL STRIP: NEGATIVE
BILIRUBIN DIRECT+TOT PNL SERPL-MCNC: 0.2 (ref 0–0.8)
BILIRUBIN DIRECT+TOT PNL SERPL-MCNC: 0.3 (ref 0–0.5)
BUN SERPL-MCNC: 16.4 MG/DL (ref 9.8–20.1)
CALCIUM SERPL-MCNC: 9.3 MG/DL (ref 8.7–10.5)
CHLORIDE SERPL-SCNC: 104 MMOL/L (ref 98–107)
CHOLEST SERPL-MCNC: 160 MG/DL
CHOLEST/HDLC SERPL: 3 {RATIO} (ref 0–5)
CO2 SERPL-SCNC: 31 MMOL/L (ref 23–31)
COLOR UR: NORMAL
CREAT SERPL-MCNC: 1.26 MG/DL (ref 0.55–1.02)
CREAT UR-MCNC: 42.4 MG/DL (ref 45–106)
GLOBULIN SER-MCNC: 4.8 G/DL (ref 2.4–3.5)
GLUCOSE (UA): NORMAL
GLUCOSE SERPL-MCNC: 116 MG/DL (ref 82–115)
HDLC SERPL-MCNC: 48 MG/DL (ref 35–60)
HGB UR QL STRIP: NORMAL
HYALINE CASTS #/AREA URNS LPF: NORMAL /[LPF]
ICTERIC INTERF INDEX SERPL-ACNC: 0
KETONES UR QL STRIP: NEGATIVE
LDLC SERPL CALC-MCNC: 90 MG/DL (ref 50–140)
LEUKOCYTE ESTERASE UR QL STRIP: NEGATIVE
LIPEMIC INTERF INDEX SERPL-ACNC: 3
NITRITE UR QL STRIP: NEGATIVE
PH UR STRIP: 6.5 [PH] (ref 4.5–8)
POTASSIUM SERPL-SCNC: 3.9 MMOL/L (ref 3.5–5.1)
PROT SERPL-MCNC: 8.2 G/DL (ref 5.8–7.6)
PROT UR QL STRIP: NORMAL
PROT UR STRIP-MCNC: 72.6 MG/DL
PROT/CREAT UR-RTO: 1712.3
RBC #/AREA URNS HPF: NORMAL /[HPF] (ref 0–5)
SODIUM SERPL-SCNC: 141 MMOL/L (ref 136–145)
SP GR UR STRIP: 1.01 (ref 1–1.03)
SQUAMOUS EPITHELIAL, UA: NORMAL
TRIGL SERPL-MCNC: 111 MG/DL (ref 37–140)
UROBILINOGEN UR STRIP-ACNC: NORMAL
VLDLC SERPL CALC-MCNC: 22 MG/DL
WBC #/AREA URNS HPF: NORMAL /[HPF] (ref 0–5)

## 2022-02-15 ENCOUNTER — HISTORICAL (OUTPATIENT)
Dept: ADMINISTRATIVE | Facility: HOSPITAL | Age: 67
End: 2022-02-15

## 2022-02-15 LAB
LEFT EYE DM RETINOPATHY: NEGATIVE
RIGHT EYE DM RETINOPATHY: NEGATIVE

## 2022-03-07 ENCOUNTER — HISTORICAL (OUTPATIENT)
Dept: RADIOLOGY | Facility: HOSPITAL | Age: 67
End: 2022-03-07

## 2022-03-07 ENCOUNTER — HISTORICAL (OUTPATIENT)
Dept: ADMINISTRATIVE | Facility: HOSPITAL | Age: 67
End: 2022-03-07

## 2022-04-07 ENCOUNTER — HISTORICAL (OUTPATIENT)
Dept: ADMINISTRATIVE | Facility: HOSPITAL | Age: 67
End: 2022-04-07
Payer: MEDICARE

## 2022-04-23 VITALS
WEIGHT: 293 LBS | OXYGEN SATURATION: 100 % | BODY MASS INDEX: 51.91 KG/M2 | DIASTOLIC BLOOD PRESSURE: 78 MMHG | SYSTOLIC BLOOD PRESSURE: 148 MMHG | HEIGHT: 63 IN

## 2022-05-01 NOTE — HISTORICAL [facility] CERNER
This is a historical note converted from Cernathan. Formatting and pictures may have been removed.  Please reference Cernathan for original formatting and attached multimedia. Chief Complaint  f/u, refills. had fall, went to ER earlier this month  History of Present Illness  62?year old  female presents to medicine clinic for follow up visit. PMH significant for renal cell ca s/p nephrectomy in 2011 and colon adenocarcinoma stage IIA s/p right hemicolectomy in 2014 w/ adjuvant chemo completed in 4/2015. Followed by oncology in [facility], last CT scans in April 2016 stable. Patient also has h/o PE found incidentally on a scheduled CT scan for cancer re-staging in April 2015 and has been on Coumadin since.  ?   Here for f/u visit. Admitted to hospital in Dec 2017, diagnosed w/ proctitis. Seen in [facility] in Jan 2018; Lantus increased to 40 U QHS. Normal c-scope in Feb 2018. Seen by heme/onc as well; planning for repeat CT scans in May. Doing well today, does admit to urinary frequency. A1c 11.6 in April 2018. Also had fall and was seen in ER earlier this month; X-rays normal. Said pain is much improved.  Review of Systems  Constitutional: no fevers, night sweats, chills or fatigue  HEENT: no acute vision changes or photophobia, no hearing loss  CVS: no chest pain, palpitations, or orthopnea  Resp: no SOB, cough, or wheezing  GI: no abd pain, nausea, vomiting or diarrhea  : no dysuria, urinary incontinence  Musculoskeletal: no joint stiffness, pain, swelling or weakness  Skin: no rashes, lesions  Neuro: no headaches, seizures, numbness or syncope  Psych: no depression or anxiety  Physical Exam  Vitals & Measurements  T:?36.7? ?C (Oral)? HR:?69(Peripheral)? RR:?20? BP:?116/71?  HT:?160?cm? HT:?160?cm? WT:?136.7?kg? WT:?136.7?kg? BMI:?53.4?  General: AAOx3, NAD, alert and cooperative  HEENT: PERRLA, EOMI, normal conjunctiva  Neck: no LAD, no JVD, supple, no masses or thyromegaly  CVS: S1/S2 nml, RRR, no  murmurs, rubs or gallops  Resp: CTA B/L, no rhonchi, rales, or wheezing  GI: Not distended, not tender, BS+, no guarding  : no CVA tenderness  Skin: not jaundiced, warm, no rashes  Musculoskeletal: normal ROM, no joint tenderness, normal muscular development  Extremities: no peripheral edema, peripheral pulses intact  Neuro: CN II-XII grossly intact, strength and sensation symmetric and intact throughout, no focal neurological deficits  Assessment/Plan  1) Chronic kidney disease  ??- Stage III, stable, will continue to follow  ?? - Avoid NSAIDs and nephrotoxic drugs  ?? - keep f/u in renal clinic  ?   2) Hypertension  ???- controlled w/ Hctz-Losartan, clonidine, norvasc  ?   3) Colon and renal cancer  ?? - patient is s/p hemicolectomy, nephrectomy, and chemotherapy  ??? - completed chemo in april 2015  ??? - continue f/u with onc,?scheduled for?repeat CTs in May 2018  ???  4) Diabetes mellitus  ??? - A1c rising to 11.6  ??? - increasae Lantus to 44 units QHS, add novolog 5 U TIDAC  ??? -?fundus photo referral  ??? - referral to diabetes?educator  ?   4) Hyperlipidemia  ????-?continue pravastatin  ???  5)?MOHSEN  ??? - Sleep study confirms MOHSEN; needs titration study, said she will call sleep lab  ?   6) Multinodular goiter  ??? - Biopsy results reveal benign thyroid nodules  ??? - obtain TSH/T4 today  ?   7) Pulmonary embolism  ???? - Continue f/u in coumadin clinic  ???? - Diagnosed in April 2015. Will likely continue coumadin indefinitely as patient is compliant with coumadin monitoring and INR has stayed in therapeutic range. Has h/o multiple cancers. Told me she discussed w/ heme/onc and they decided on life long anticoagulation  ?   UTI  - complains of urinary frequency x 2 weeks  - bacteria on urinalysis  - obtain urine culture and treat empirically w/ Macrobid  ?   Preventative  - h/o complete hysterectomy  - Last colonoscopy in Feb 2018 NML  -?MMG ordered  - PPSV23 given 2017  ?  RTC in?3-4 months   Problem  List/Past Medical History  Ongoing  Anxiety and depression  Cecal cancer  Colonic mass  Depression  Diabetic - cooperative patient  DM - Diabetes mellitus  HTN (hypertension)  Hypercholesteremia  Kidney disease  Morbid obesity  Neuropathy  Personal history of other malignant neoplasm of kidney  SOB (shortness of breath)  Historical  Anemia  Asthma  Hernia  Renal cancer  Procedure/Surgical History  Colonoscopy (02/23/2018), Colonoscopy, flexible; diagnostic, including collection of specimen(s) by brushing or washing, when performed (separate procedure) (02/23/2018), Inspection of Lower Intestinal Tract, Via Natural or Artificial Opening Endoscopic (02/23/2018), Colonoscopy (08/24/2015), Colonoscopy (08/24/2015), Colonoscopy, flexible; diagnostic, including collection of specimen(s) by brushing or washing, when performed (separate procedure) (08/24/2015), Venous catheterization, not elsewhere classified (08/30/2014), Colectomy (Right) (08/29/2014), Open and other right hemicolectomy (08/29/2014), Other appendectomy (08/29/2014), Biopsy Gastrointestional (08/15/2014), Closed [endoscopic] biopsy of large intestine (08/15/2014), Colon Tattoo (08/15/2014), Colonoscopy (08/15/2014), Colonoscopy, flexible, proximal to splenic flexure; with biopsy, single or multiple. (08/15/2014), Colonoscopy, flexible, proximal to splenic flexure; with removal of tumor(s), polyp(s), or other lesion(s) by hot biopsy forceps or bipolar cautery. (08/15/2014), Endoscopic polypectomy of large intestine (08/15/2014), Esophagogastroduodenoscopy (08/15/2014), Polypectomy (08/15/2014), Arm (2012), History of nephrectomy (08/01/2011), Kidney (2011), Hysterectomy (2000), Tubal ligation (1985), Gallbladder (1981), Hernia (1981), BSO - Total abdominal hysterectomy and bilateral salpingo-oophorectomy, Cholecystectomy, Cholecystectomy, Excision of lipoma, Excision of lipoma of subcutaneous tissue, Right hemicolectomy, Tubal  ligation.  Medications  amLODIPine 10 mg oral tablet, 10 mg= 1 tab(s), Oral, Daily, 2 refills  AMLODIPINE BESYLATE 5MG TABLETS, 5 mg= 1 tab(s), Oral, Daily,? ?Not Taking per Prescriber  baclofen 10 mg oral tablet, 10 mg= 1 tab(s), Oral, TID, PRN, 1 refills  baclofen 10 mg oral tablet, 10 mg= 1 tab(s), Oral, TID, PRN  cloniDINE 0.2 mg oral tablet, 0.2 mg= 1 tab(s), Oral, TID, 5 refills  DICLOFENAC SOD EC 75 MG TAB, 75 mg= 1 tab(s), Oral, BID,? ?Not taking  gabapentin 300 mg oral capsule, See Instructions, 4 refills  hydrochlorothiazide-losartan 12.5 mg-50 mg oral tablet, 1 tab(s), Oral, Daily, 5 refills  Januvia 25 mg oral tablet, 25 mg= 1 tab(s), Oral, Daily, 5 refills  Lantus 100 units/mL subcutaneous solution, 40 units, Subcutaneous, Once a day (at bedtime), 5 refills,? ?Not Taking per Prescriber  Levemir 100 units/mL subcutaneous solution, 40 units, Subcutaneous, Once a day (at bedtime), 6 refills  lidocaine 1% injectable solution, 0.021 gm= 2.1 mL, MISC, Once  Lidocaine Viscous 2% mucous membrane solution, 15 mL/EA, N/A, Once  METRONIDAZOL TAB 500MG, 500 mg= 1 tab(s), Oral, BID,? ?Not taking  Multi-Day Plus Minerals oral tablet, 1 tab(s), Oral, BID  POLYETHYLENE GLYCOL 3350POW 14S,? ?Not taking  pravastatin 20 mg oral tablet, 20 mg= 1 tab(s), Oral, Daily, 3 refills  primidone 50 mg oral tablet, 25 mg= 0.5 tab(s), Oral, Once a day (at bedtime), 5 refills  ProAir HFA 90 mcg/inh inhalation aerosol with adapter, 180 mcg= 2 puff(s), INH, q4hr, PRN, 4 refills  Vitamin D3 400 intl units oral tablet, 800 IntUnit= 2 tab(s), Oral, Daily, 3 refills  warfarin 2.5 mg oral tablet, See Instructions  warfarin 2.5 mg oral tablet, See Instructions, 1 refills  Zofran 4 mg oral tablet, 4 mg= 1 tab(s), Oral, q8hr, PRN, 1 refills  Allergies  No Known Allergies  Social History  Alcohol  Never, 10/06/2015  Employment/School  disability, 05/16/2017  Work/School description: applying for disability. Highest education level: High school.  Operates hazardous equipment: No. Other: graduated high school., 03/31/2015  Exercise - Does not exercise, 01/07/2015  Self assessment: Poor condition., 10/03/2016  Home/Environment  Lives with Alone, son lives with her. Living situation: Home/Independent. Home equipment: Glucose monitoring, Walker/Cane, blood pressure machine. Alcohol abuse in household: No. Substance abuse in household: No. Smoker in household: No. Injuries/Abuse/Neglect in household: No. Feels unsafe at home: Yes. Safe place to go: Yes. Family/Friends available for support: Yes. Concern for family members at home: No. Major illness in household: No. Financial concerns: Yes., 10/03/2016  Nutrition/Health  Type of diet: ada 1800 diabetic meals. Diabetic, Caffeine intake amount: caffeine free. Wants to lose weight: Yes. Sleeping concerns: Yes. Feels highly stressed: Yes., 03/31/2015  Other  Substance Abuse  Never, 10/06/2015  Tobacco  Never smoker, 10/06/2015  Family History  Hyperlipidemia.: Mother.  Hypertension.: Mother.  Metastatic cancer: Brother.  Stroke: Father.  Immunizations  Vaccine Date Status Comments   influenza virus vaccine, inactivated 11/17/2017 Given pt tolerated well   pneumococcal 23-polyvalent vaccine 04/04/2017 Given    influenza virus vaccine, inactivated 10/03/2016 Given        I have re assessed the historical, physical, laboratory, and radiologic findings.  I have discussed the case with the residents and made recommendations.

## 2022-05-01 NOTE — HISTORICAL [facility] CERNER
This is a historical note converted from Rishi. Formatting and pictures may have been removed.  Please reference Rishi for original formatting and attached multimedia. Chief Complaint  Surveillance colonoscopy  History of Present Illness  64-year-old woman with history of HTN, DM, PE?no longer on anticoagulation,?renal cell carcinoma status post?left radical nephrectomy in 2011,?stage IIa adenocarcinoma?of the colon?now status post right hemicolectomy?in 2014.? She has undergone surveillance with oncology over the past 6 years and has been discharged from their clinic with no further?follow-ups?indicated.? She presents today for?surveillance colonoscopy.? Most recent colonoscopy was February 2018 which was negative for any abnormality.  No nausea, vomiting, hemoptysis. ?She denies any unintentional weight loss.? She denies constipation, diarrhea, hematochezia, melena.  Review of Systems  Negative except for what is stated in the HPI  Physical Exam  General: No acute distress  Cardiovascular: Regular rate  Respiratory: Normal work of breathing, no shortness of breath  Abdomen: Soft, nontender, nondistended  Assessment/Plan  64-year-old woman with history of?adenocarcinoma?of the colon status post?extended right hemicolectomy in 2014?presenting for surveillance colonoscopy  ?  -Colonoscopy today  -Written consent obtained and placed in the patients chart  -Completed prep yesterday and this morning  ?  Tres Younger MD  Surgery, PGY-2   Problem List/Past Medical History  Ongoing  Anxiety and depression  Cecal cancer  Colonic mass  Depression  Diabetic - cooperative patient  DM - Diabetes mellitus  HTN (hypertension)  Hypercholesteremia  Kidney disease  Morbid obesity  Neuropathy  Personal history of other malignant neoplasm of kidney  PHT - Pulmonary hypertension  SOB (shortness of breath)  Historical  Anemia  Asthma  Cardiac ejection fraction 50%  Hernia  Renal cancer  Procedure/Surgical History  Colonoscopy  (02/23/2018)  Colonoscopy, flexible; diagnostic, including collection of specimen(s) by brushing or washing, when performed (separate procedure) (02/23/2018)  Inspection of Lower Intestinal Tract, Via Natural or Artificial Opening Endoscopic (02/23/2018)  Colonoscopy (08/24/2015)  Colonoscopy (08/24/2015)  Colonoscopy, flexible; diagnostic, including collection of specimen(s) by brushing or washing, when performed (separate procedure) (08/24/2015)  Venous catheterization, not elsewhere classified (08/30/2014)  Colectomy (Right) (08/29/2014)  Open and other right hemicolectomy (08/29/2014)  Other appendectomy (08/29/2014)  Biopsy Gastrointestional (08/15/2014)  Closed [endoscopic] biopsy of large intestine (08/15/2014)  Colon Tattoo (08/15/2014)  Colonoscopy (08/15/2014)  Colonoscopy, flexible, proximal to splenic flexure; with biopsy, single or multiple. (08/15/2014)  Colonoscopy, flexible, proximal to splenic flexure; with removal of tumor(s), polyp(s), or other lesion(s) by hot biopsy forceps or bipolar cautery. (08/15/2014)  Endoscopic polypectomy of large intestine (08/15/2014)  Esophagogastroduodenoscopy (08/15/2014)  Polypectomy (08/15/2014)  Arm (2012)  History of nephrectomy (08/01/2011)  Kidney (2011)  Hysterectomy (2000)  Tubal ligation (1985)  Gallbladder (1981)  Hernia (1981)  BSO - Total abdominal hysterectomy and bilateral salpingo-oophorectomy  Cholecystectomy  Cholecystectomy  Excision of lipoma  Excision of lipoma of subcutaneous tissue  Tubal ligation   Medications  Inpatient  buffered lidocaine 2% - 0.5 ml syringe, 10 mg= 0.5 mL, Subcutaneous, As Directed  Lidocaine Viscous 2% mucous membrane solution, 15 mL/EA, N/A, Once  AI0929 1,000 mL, 1000 mL, IV  Home  Alcohol Swabs, See Instructions, 3 refills  allopurinol 100 mg oral tablet, 50 mg= 0.5 tab(s), Oral, Daily, 1 refills  cloniDINE 0.2 mg oral tablet, 0.2 mg= 1 tab(s), Oral, TID, 6 refills  Cosopt 2.23%-0.68% ophthalmic solution, 1 drop(s),  OPTH, BID, 11 refills  diltiazem 120 mg/24 hours oral CAPsule extended release, 120 mg= 1 cap(s), Oral, Daily, 3 refills  DULoxetine 60 mg oral delayed release capsule, 60 mg= 1 cap(s), Oral, Daily, 3 refills  furosemide 20 mg oral tablet, See Instructions, 3 refills  gabapentin 300 mg oral capsule, See Instructions, 4 refills  hydrochlorothiazide-losartan 12.5 mg-50 mg oral tablet, See Instructions, 6 refills  insulin pen needles, See Instructions, 6 refills  Januvia 25 mg oral tablet, 25 mg= 1 tab(s), Oral, Daily, 5 refills  latanoprost 0.005% ophthalmic solution, 1 drop(s), Eye-Both, qPM, 11 refills  Levemir FlexPen 100 units/mL subcutaneous solution, 45 units, Subcutaneous, Once a day (at bedtime), 6 refills  NovoLOG FlexPen 100 units/mL injectable solution, 15 units, Subcutaneous, TIDAC, 5 refills  pravastatin 20 mg oral tablet, 20 mg= 1 tab(s), Oral, Daily, 6 refills  traMADol 50 mg oral tablet, 50 mg= 1 tab(s), Oral, q6hr,? ?Not taking  Ventolin HFA 90 mcg/inh inhalation aerosol, 1 puff(s), INH, q4hr, PRN, 5 refills  Vitamin D3 400 intl units oral tablet, 800 IntUnit= 2 tab(s), Oral, Daily, 3 refills  Allergies  No Known Allergies  Social History  Abuse/Neglect  No, No, Yes, 01/07/2020  Alcohol  Never, 04/22/2019  Employment/School  disability, 05/16/2017  Work/School description: applying for disability. Highest education level: High school. Operates hazardous equipment: No. Other: graduated high school., 03/31/2015  Exercise - Does not exercise, 01/07/2015  Self assessment: Poor condition., 10/03/2016  Home/Environment  Lives with Alone, son visit on occasions. Living situation: Home/Independent. Home equipment: Glucose monitoring, Walker/Cane, blood pressure machine. Alcohol abuse in household: No. Substance abuse in household: No. Smoker in household: No. Injuries/Abuse/Neglect in household: No. Feels unsafe at home: Yes. Safe place to go: Yes. Family/Friends available for support: Yes. Concern for family  members at home: No. Major illness in household: No. Financial concerns: Yes., 12/11/2018  Nutrition/Health  Type of diet: ada 1800 diabetic meals. Regular, Caffeine intake amount: caffeine free. Wants to lose weight: Yes. Sleeping concerns: Yes. Feels highly stressed: Yes., 12/11/2018  Other  Sexual  Gender Identity Identifies as female., 12/17/2019  Spiritual/Cultural  Sikhism, 09/27/2019  Substance Use  Never, 10/06/2015  Tobacco  Never (less than 100 in lifetime), N/A, 01/07/2020  Family History  Hyperlipidemia.: Mother.  Hypertension.: Mother.  Metastatic cancer: Brother.  Stroke: Father.  Immunizations  Vaccine Date Status Comments   influenza virus vaccine, inactivated 12/10/2019 Given    zoster vaccine, inactivated 05/29/2019 Given    zoster vaccine, inactivated 12/11/2018 Given    tetanus/diphtheria/pertussis, acel(Tdap) 12/11/2018 Given    influenza virus vaccine, inactivated 12/11/2018 Given    influenza virus vaccine, inactivated 11/17/2017 Given pt tolerated well   pneumococcal 23-polyvalent vaccine 04/04/2017 Given    influenza virus vaccine, inactivated 10/03/2016 Given    influenza virus vaccine, inactivated 10/13/2010 Recorded

## 2022-05-01 NOTE — HISTORICAL [facility]
This is a historical note converted from Cernathan. Formatting and pictures may have been removed.  Please reference Rishi for original formatting and attached multimedia. PROCEDURE:??Colonoscopy  ?  INDICATION:?64-year-old woman with history of HTN, DM, PE?no longer on anticoagulation,?renal cell carcinoma status post?left radical nephrectomy in 2011,?stage IIa adenocarcinoma?of the cecum?now status post extended?right hemicolectomy?in 2014 with side-to-side anastomosis.? She has undergone surveillance with oncology over the past 6 years and has been discharged from their clinic with no further?follow-ups?indicated.? She presents today for?surveillance colonoscopy.? Most recent colonoscopy was February 2018 which was negative for any abnormality.  ?  CONSENT:?  The benefits, risks, and alternatives to the procedure were discussed and informed consent was obtained from the patient.?  ?  PREPARATION:?  EKG, pulse, pulse oximetry, and blood pressure were monitored throughout the procedure.?  ?  MEDICATIONS:?  Monitored anesthesia care per anesthesiology  ?  ATTENDING SURGEON: Clarence Mcgovern MD  RESIDENT: Tres Younger MD  ?   RECTAL EXAM:?  Normal rectal exam, good tone, no masses, external hemorrhoids present  ?  PROCEDURE:?The colonoscope was passed through the anus under direct visualization and was?advanced with ease to the ileocolonic anastomosis with visualization of the small bowel.?The anastomosis was widely patent with no masses present. The scope was withdrawn and the mucosa was carefully examined. Retroflexion was performed in the rectum. The colonoscope was straightened out and?the rectum was desufflated upon exit.?The patient tolerated the procedure well. ?The preparation was good.?  ?  FINDINGS:?  Widely patent ileocolonic anastomosis  Normal mucosa  No masses or polyps  ?   See media section for intra-operative images.  ?  UNPLANNED EVENTS:?  There were no unplanned  events.?  ?  RECOMMENDATIONS:?  Discharge home  Repeat colonoscopy in?5 years  ?   Tres Younger MD  Surgery, PGY-II

## 2022-05-01 NOTE — HISTORICAL [facility] CERNER
This is a historical note converted from Cernathan. Formatting and pictures may have been removed.  Please reference Cernathan for original formatting and attached multimedia. Chief Complaint  lab results/concern regarding weight loss/neck shoulder and back pain  History of Present Illness  ? The patient is a 64 year old  female presents to medicine clinic for follow up visit. PMH significant for renal cell ca s/p nephrectomy in 2011 and colon adenocarcinoma stage IIA s/p right hemicolectomy in 2014 w/ adjuvant chemo completed in 4/2015. Followed by oncology in [facility], last CT scans?stable, most recently seen?May 2019, all surveillance imaging has been negative, patient is no longer required to follow-up in?oncology clinic. Last seen in Dec 2019.  ?   Patient feeling down today. ?She states that she has had trouble?with her weight,?has been gaining weight since last visit. ?She states that she continues to eat solids but admits that she has been?eating more fried foods when she goes out. ?She also has not exercised?because she has no one to walk with. ?We did discuss the importance of?exercise and wise food choices?at restaurants at length today, we have come up with a plan together for?things that she can change for next time. ?Additionally her?labs were reviewed with her?face, there is concern for her rising A1c. ?Spent?extensive face-to-face time,?30 minutes, to educate her regarding?insulin titration. ?Still complaining also of left ankle pain, now willing to do imaging. ?She also states that she has neck and back pain which is?similar in nature, both are achy without radiation. ?This is been going on for a while, was responsive to Tylenol with codeine previously. ?Noted that she has an elevated uric acid, however she was started on allopurinol by renal.??UA reviewed also reflects possible UTI, however the patient expresses that she wishes to wait until the cultures grow out?with final sensitivities prior  to being treated again since she was treated for UTI last time.??Otherwise, Denies any fever/chills, chest pain, lightheadedness,?numbness/tingling, palpitations or headaches.  Review of Systems  10 point ROS performed and?is unremarkable except for above.  Physical Exam  Vitals & Measurements  T:?36.7? ?C (Oral)? HR:?84(Peripheral)? RR:?18? BP:?152/87?  HT:?160.00?cm? WT:?141.500?kg? BMI:?55.27?  Gen: No acute distress, afebrile  HEENT: Normocephalic, No scleral icterus, Oral mucosa moist  Chest: CTAB  Heart: S1, S2, no appreciable murmur,?regular rate and rhythm  Abd: BS+, soft, non tender  Extremity: warm, no LE edema. Feet examined, skin is intact b/l, no abrasions or open ulcers seen, 2+ dorsalis pedis pulses noted b/l.?Noted that she has a cyst/knot like swelling on her L ankle that is painful to palpation. No areas of fluctuation or erythema noted. She has a similar knot on her R ankle that is asymptomatic  Neurologic: alert and oriented x 3, moving all extremity with good strength  MSK: normal musculature, no clubbing or cyanosis  Assessment/Plan  L ankle pain  Neck Pain  - XR ordered  - May be gout, uric acid is elevated. Has been on allopurinol per nephrology, continue  - Cannot take steroids due to uncontrolled DM  - Rx given for Tylenol #3 x?4 weeks as well as diclofenac gel PRN  - If still symptomatic, then will revisit potential for prednisone vs. anakinra  ?   1) Diabetes mellitus  ??? - A1c?8.7 in June, now up to 10.2?  ??? - Continue Levemir 45 units QHS, extensive discussion about titration. Pt instructed to increase her?Levemir by 3 units every 3 days?until she is?consistently between ?in the morning. ?Continue novolog, have adjusted dose to 15 units with breakfast and lunch and 20 units with dinner.?Pt to bring CBG logs next visit for review.  ??? -?Seen by ophtho on 8/19, mild diabetic retinopathy; referral placed for fundus exam. ?Patient states that she saw an eye doctor about 2 weeks  ago,?will try to obtain records.  ??? - Foot exam as above  ??? -?Discussed importance of?adhering to diabetic diet and good exercise. She voiced understanding. Has recently completed?diabetic education?  ???  2) Hypertension, elevated  ???- Continue current regimen, being followed by cards and renal  ?? - Elevated in clinic, but medication recently titrated by cardiology yesterday, now on diltiazem 180mg daily  ?   3) Colon and renal cancer  ?? ?- patient is s/p hemicolectomy, nephrectomy, and chemotherapy  ??? - completed chemo in april 2015  ??? - Repeat CTs in May 2018 showed stable appearance of the chest, abdomen and pelvis. No new or worsening sites of metastatic disease identified.  ??? - Seen by?Oncology?previously but discharged from clinic  ???  4) Chronic kidney disease  ???- Stage III, stable  ?? - Avoid NSAIDs and nephrotoxic drugs  ?? - keep f/u in renal clinic  ?   5) Hyperlipidemia  ????-?continue pravastatin  ??? - FLP June 2020 wnl  ???  6)?MOHSEN  7) Suspected Pulmonary HTN  ??? - Sleep study confirms MOHSEN;?Finally using CPAP and is benefitting from it. Continue  ??? - Following with Cardiology  ?   8) Multinodular goiter  ??? - Biopsy results reveal benign thyroid nodules  ??? - TSH/T4 in August 2019 wnl, will order TFTs for next visit  ?   9) Depression  ??? - Responding really well to Duloxetine, continue 60mg daily  ?  ?   Preventative  - h/o complete hysterectomy  - Last colonoscopy in June 2020 NML, repeat 5 years  -?MMG in 7/2019, WNL. Repeat ordered  - PPSV23 given 2017  -?UTD on all?vaccines  ?  RTC in?3 months with CMP, TFTs?and HbA1c   Problem List/Past Medical History  Ongoing  Anxiety and depression  Cecal cancer  Colonic mass  Depression  Diabetic - cooperative patient  DM - Diabetes mellitus  HTN (hypertension)  Hypercholesteremia  Kidney disease  Morbid obesity  Neuropathy  Personal history of other malignant neoplasm of kidney  PHT - Pulmonary hypertension  SOB (shortness of  breath)  Historical  Anemia  Asthma  Cardiac ejection fraction 50%  Hernia  Renal cancer  Procedure/Surgical History  Colonoscopy (None) (06/18/2020)  Colorectal cancer screening; colonoscopy on individual at high risk (06/18/2020)  Inspection of Lower Intestinal Tract, Via Natural or Artificial Opening Endoscopic (06/18/2020)  Colonoscopy (02/23/2018)  Colonoscopy, flexible; diagnostic, including collection of specimen(s) by brushing or washing, when performed (separate procedure) (02/23/2018)  Inspection of Lower Intestinal Tract, Via Natural or Artificial Opening Endoscopic (02/23/2018)  Colonoscopy (08/24/2015)  Colonoscopy (08/24/2015)  Colonoscopy, flexible; diagnostic, including collection of specimen(s) by brushing or washing, when performed (separate procedure) (08/24/2015)  Venous catheterization, not elsewhere classified (08/30/2014)  Colectomy (Right) (08/29/2014)  Open and other right hemicolectomy (08/29/2014)  Other appendectomy (08/29/2014)  Biopsy Gastrointestional (08/15/2014)  Closed [endoscopic] biopsy of large intestine (08/15/2014)  Colon Tattoo (08/15/2014)  Colonoscopy (08/15/2014)  Colonoscopy, flexible, proximal to splenic flexure; with biopsy, single or multiple. (08/15/2014)  Colonoscopy, flexible, proximal to splenic flexure; with removal of tumor(s), polyp(s), or other lesion(s) by hot biopsy forceps or bipolar cautery. (08/15/2014)  Endoscopic polypectomy of large intestine (08/15/2014)  Esophagogastroduodenoscopy (08/15/2014)  Polypectomy (08/15/2014)  Arm (2012)  History of nephrectomy (08/01/2011)  Kidney (2011)  Hysterectomy (2000)  Tubal ligation (1985)  Gallbladder (1981)  Hernia (1981)  BSO - Total abdominal hysterectomy and bilateral salpingo-oophorectomy  Cholecystectomy  Cholecystectomy  Excision of lipoma  Excision of lipoma of subcutaneous tissue  Tubal ligation   Medications  acetaminophen-codeine 300 mg-30 mg oral tablet., 1 tab(s), Oral, q6hr,? ?Not taking: pt  stated  Alcohol Swabs, See Instructions, 3 refills  allopurinol 100 mg oral tablet, 50 mg= 0.5 tab(s), Oral, Daily, 1 refills  BD Narda Ultr Fin Pen Needles, See Instructions, 2 refills  cephalexin 500 mg oral capsule, 500 mg= 1 cap(s), Oral, QID,? ?Not Taking, Completed Rx: pt stated  cloniDINE 0.2 mg oral tablet, 0.2 mg= 1 tab(s), Oral, TID, 6 refills  Cosopt 2.23%-0.68% ophthalmic solution, 1 drop(s), OPTH, BID, 11 refills  diclofenac 1% topical gel, 1 luz, TOP, QID, PRN,? ?Not taking: pt stated needs refills  diltiazem 120 mg/24 hours oral CAPsule extended release, 120 mg= 1 cap(s), Oral, Daily,? ?Not taking: pt stated  diltiazem 180 mg/24 hours oral CAPsule, extended release, 180 mg= 1 cap(s), Oral, Daily, 3 refills  DULoxetine 60 mg oral delayed release capsule, 60 mg= 1 cap(s), Oral, Daily  furosemide 20 mg oral tablet, See Instructions, 3 refills  gabapentin 300 mg oral capsule, See Instructions, 4 refills  HumaLOG KwikPen 100 units/mL injectable solution, 15 units, Subcutaneous, TIDAC, 3 refills,? ?Still taking, not as prescribed: pt stated 10 units before meals  hydrochlorothiazide-losartan 12.5 mg-50 mg oral tablet, See Instructions, 6 refills  insulin pen needles, See Instructions, 6 refills  Januvia 25 mg oral tablet, 25 mg= 1 tab(s), Oral, Daily, 5 refills  latanoprost 0.005% ophthalmic solution, 1 drop(s), Eye-Both, qPM, 11 refills  Levemir FlexPen 100 units/mL subcutaneous solution, 45 units, Subcutaneous, Once a day (at bedtime), 6 refills  Lidocaine Viscous 2% mucous membrane solution, 15 mL/EA, N/A, Once  MoviPrep oral powder for reconstitution  pravastatin 20 mg oral tablet, 20 mg= 1 tab(s), Oral, Daily, 6 refills  Ventolin HFA 90 mcg/inh inhalation aerosol, 1 puff(s), INH, q4hr, PRN, 5 refills  Vitamin D3 400 intl units oral tablet, 800 IntUnit= 2 tab(s), Oral, Daily, 3 refills  Allergies  No Known Allergies  Social History  Abuse/Neglect  No, No, Yes, 09/16/2020  Alcohol  Never,  09/16/2020  Employment/School  Disabled, 09/16/2020  Exercise - Does not exercise, 01/07/2015  Exercise type: Walking., 09/16/2020  Home/Environment  Lives with Alone. Living situation: Home/Independent. Glucose monitoring, blood pressure machine, 09/16/2020  Nutrition/Health  salads, Good, Diet restrictions: pt stated shes eating salads., 09/16/2020  Other  Sexual  Gender Identity Identifies as female., 12/17/2019  Spiritual/Cultural  Adventist, 09/27/2019  Substance Use  Never, 09/16/2020  Tobacco  Never (less than 100 in lifetime), N/A, 09/16/2020  Family History  Hyperlipidemia.: Mother.  Hypertension.: Mother.  Metastatic cancer: Brother.  Stroke: Father.  Immunizations  Vaccine Date Status Comments   influenza virus vaccine, inactivated 12/10/2019 Given    zoster vaccine, inactivated 05/29/2019 Given    zoster vaccine, inactivated 12/11/2018 Given    tetanus/diphtheria/pertussis, acel(Tdap) 12/11/2018 Given    influenza virus vaccine, inactivated 12/11/2018 Given    influenza virus vaccine, inactivated 11/17/2017 Given pt tolerated well   pneumococcal 23-polyvalent vaccine 04/04/2017 Given    influenza virus vaccine, inactivated 10/03/2016 Given    influenza virus vaccine, inactivated 10/13/2010 Recorded    Health Maintenance  Health Maintenance  ???Pending?(in the next year)  ??? ??OverDue  ??? ? ? ?Diabetes Maintenance-Microalbumin due??and every?  ??? ? ? ?Influenza Vaccine due??and every?  ??? ? ? ?Zoster Vaccine due??and every?  ??? ? ? ?Aspirin Therapy for CVD Prevention due??04/21/16??and every 1??year(s)  ??? ??Due?  ??? ? ? ?Hypertension Management-Education due??09/16/20??and every 1??year(s)  ??? ? ? ?Medicare Annual Wellness Exam due??09/16/20??and every 1??year(s)  ??? ??Due In Future?  ??? ? ? ?HF-LVEF not due until??10/17/20??and every 1??year(s)  ??? ? ? ?Diabetes Maintenance-Eye Exam not due until??12/16/20??and every 1??year(s)  ??? ? ? ?Obesity Screening not due until??01/01/21??and every  1??year(s)  ??? ? ? ?Alcohol Misuse Screening not due until??01/02/21??and every 1??year(s)  ??? ? ? ?Diabetes Maintenance-Fasting Lipid Profile not due until??06/18/21??and every 1??year(s)  ??? ? ? ?Diabetes Maintenance-Foot Exam not due until??06/24/21??and every 1??year(s)  ??? ? ? ?ADL Screening not due until??06/24/21??and every 1??year(s)  ??? ? ? ?Breast Cancer Screening not due until??07/30/21??and every 2??year(s)  ??? ? ? ?HF-Heart Failure Education not due until??09/15/21??and every 1??year(s)  ???Satisfied?(in the past 1 year)  ??? ??Satisfied?  ??? ? ? ?ADL Screening on??06/24/20.??Satisfied by Charu Montaño LPN  ??? ? ? ?Alcohol Misuse Screening on??06/24/20.??Satisfied by Alyson Goldman MD  ??? ? ? ?Asthma Management-Asthma Medication Prescribed on??12/10/19.??Satisfied by Alyson Goldman MD  ??? ? ? ?Blood Pressure Screening on??09/16/20.??Satisfied by Nori Rabago RN  ??? ? ? ?Body Mass Index Check on??09/16/20.??Satisfied by Nori Rabago RN  ??? ? ? ?Colorectal Screening on??06/18/20.  ??? ? ? ?Depression Screening on??09/16/20.??Satisfied by Nori Rabago RN  ??? ? ? ?Diabetes Maintenance-Serum Creatinine on??09/16/20.??Satisfied by Ike Garcia Jr.  ??? ? ? ?Diabetes Screening on??09/16/20.??Satisfied by Ike Garcia Jr.  ??? ? ? ?Hypertension Management-Blood Pressure on??09/16/20.??Satisfied by Nori Rabago RN  ??? ? ? ?Influenza Vaccine on??12/10/19.??Satisfied by Cassandra Beckett LPN  ??? ? ? ?Lipid Screening on??06/18/20.??Satisfied by Janae Marin  ??? ? ? ?Obesity Screening on??09/16/20.??Satisfied by Nori Rabago RN  ?      Patient discussed in clinic with medicine resident  ?  Agree with above assessment and plan

## 2022-05-03 ENCOUNTER — OFFICE VISIT (OUTPATIENT)
Dept: CARDIOLOGY | Facility: CLINIC | Age: 67
End: 2022-05-03
Payer: MEDICARE

## 2022-05-03 VITALS
RESPIRATION RATE: 18 BRPM | TEMPERATURE: 98 F | OXYGEN SATURATION: 98 % | HEART RATE: 69 BPM | HEIGHT: 63 IN | BODY MASS INDEX: 51.91 KG/M2 | WEIGHT: 293 LBS | SYSTOLIC BLOOD PRESSURE: 138 MMHG | DIASTOLIC BLOOD PRESSURE: 82 MMHG

## 2022-05-03 DIAGNOSIS — I10 PRIMARY HYPERTENSION: ICD-10-CM

## 2022-05-03 DIAGNOSIS — I10 HYPERTENSION, UNSPECIFIED TYPE: ICD-10-CM

## 2022-05-03 DIAGNOSIS — E78.5 HYPERLIPIDEMIA, UNSPECIFIED HYPERLIPIDEMIA TYPE: ICD-10-CM

## 2022-05-03 DIAGNOSIS — G47.33 OSA ON CPAP: ICD-10-CM

## 2022-05-03 DIAGNOSIS — I25.9 ISCHEMIC HEART DISEASE, CHRONIC: Primary | ICD-10-CM

## 2022-05-03 DIAGNOSIS — C18.9 ADENOCARCINOMA OF COLON: ICD-10-CM

## 2022-05-03 DIAGNOSIS — E11.69 DIABETES MELLITUS TYPE 2 IN OBESE: ICD-10-CM

## 2022-05-03 DIAGNOSIS — E66.9 DIABETES MELLITUS TYPE 2 IN OBESE: ICD-10-CM

## 2022-05-03 DIAGNOSIS — E78.5 HYPERLIPIDEMIA ASSOCIATED WITH TYPE 2 DIABETES MELLITUS: ICD-10-CM

## 2022-05-03 DIAGNOSIS — E11.9 DIABETES MELLITUS WITHOUT COMPLICATION: ICD-10-CM

## 2022-05-03 DIAGNOSIS — E11.69 HYPERLIPIDEMIA ASSOCIATED WITH TYPE 2 DIABETES MELLITUS: ICD-10-CM

## 2022-05-03 DIAGNOSIS — C64.2 RENAL CELL CARCINOMA OF LEFT KIDNEY: ICD-10-CM

## 2022-05-03 PROCEDURE — 99214 OFFICE O/P EST MOD 30 MIN: CPT | Mod: PBBFAC

## 2022-05-03 RX ORDER — PEN NEEDLE, DIABETIC 32GX 5/32"
NEEDLE, DISPOSABLE MISCELLANEOUS
COMMUNITY
Start: 2022-04-16 | End: 2022-12-30 | Stop reason: SDUPTHER

## 2022-05-03 RX ORDER — LATANOPROST 50 UG/ML
1 SOLUTION/ DROPS OPHTHALMIC 2 TIMES DAILY
COMMUNITY
Start: 2022-04-18 | End: 2022-05-13 | Stop reason: SDUPTHER

## 2022-05-03 RX ORDER — DILTIAZEM HYDROCHLORIDE 180 MG/1
180 CAPSULE, COATED, EXTENDED RELEASE ORAL DAILY
COMMUNITY
Start: 2022-02-27 | End: 2022-05-13 | Stop reason: SDUPTHER

## 2022-05-03 RX ORDER — GABAPENTIN 600 MG/1
600 TABLET ORAL DAILY
COMMUNITY
Start: 2022-04-16 | End: 2022-05-13 | Stop reason: SDUPTHER

## 2022-05-03 RX ORDER — CARVEDILOL 3.12 MG/1
3.12 TABLET ORAL 2 TIMES DAILY
COMMUNITY
Start: 2022-04-16 | End: 2022-05-13 | Stop reason: SDUPTHER

## 2022-05-03 RX ORDER — INSULIN DETEMIR 100 [IU]/ML
50 INJECTION, SOLUTION SUBCUTANEOUS NIGHTLY
COMMUNITY
Start: 2022-04-16 | End: 2022-05-13 | Stop reason: SDUPTHER

## 2022-05-03 RX ORDER — LOSARTAN POTASSIUM AND HYDROCHLOROTHIAZIDE 12.5; 5 MG/1; MG/1
1 TABLET ORAL DAILY
COMMUNITY
Start: 2021-11-28 | End: 2022-05-13 | Stop reason: SDUPTHER

## 2022-05-03 RX ORDER — ALBUTEROL SULFATE 90 UG/1
1 AEROSOL, METERED RESPIRATORY (INHALATION) EVERY 4 HOURS PRN
COMMUNITY
Start: 2022-03-24 | End: 2022-05-13 | Stop reason: SDUPTHER

## 2022-05-03 RX ORDER — CHOLECALCIFEROL (VITAMIN D3) 25 MCG
2000 TABLET ORAL DAILY
COMMUNITY
End: 2022-05-13 | Stop reason: SDUPTHER

## 2022-05-03 RX ORDER — DULOXETIN HYDROCHLORIDE 60 MG/1
CAPSULE, DELAYED RELEASE ORAL
COMMUNITY
Start: 2022-04-17 | End: 2022-05-13 | Stop reason: SDUPTHER

## 2022-05-03 RX ORDER — ATORVASTATIN CALCIUM 40 MG/1
40 TABLET, FILM COATED ORAL DAILY
COMMUNITY
Start: 2022-02-15 | End: 2022-05-13 | Stop reason: SDUPTHER

## 2022-05-03 RX ORDER — CLONIDINE HYDROCHLORIDE 0.2 MG/1
0.1 TABLET ORAL 3 TIMES DAILY
COMMUNITY
End: 2022-05-13 | Stop reason: SDUPTHER

## 2022-05-03 RX ORDER — INSULIN ASPART 100 [IU]/ML
INJECTION, SOLUTION INTRAVENOUS; SUBCUTANEOUS 2 TIMES DAILY
COMMUNITY
Start: 2022-01-13 | End: 2022-05-13 | Stop reason: SDUPTHER

## 2022-05-03 RX ORDER — EMPAGLIFLOZIN 10 MG/1
10 TABLET, FILM COATED ORAL DAILY
COMMUNITY
Start: 2022-02-14 | End: 2022-05-13 | Stop reason: SDUPTHER

## 2022-05-03 NOTE — PROGRESS NOTES
Chief Complaint  3 month Follow up     HPI  Elissa Freeman is a 66 y.o.  female with past medical history of hypertension, hyperlipidemia, diabetes mellitus, renal cell carcinoma disease s/p left nephrectomy 2011), adenocarcinoma of the colon (s/p hemicolectomy 2014), involving obesity who presents for follow-up.  Since her last visit patient has no acute complaints.  She continues to exhibit significant deconditioning.  She states that she is unable to perform house chores completely normally able to walk half a block before she has tried breath.  She states that this is not new and denies worsening of dyspnea on exertion.  Otherwise, she denies any recent episodes of chest pain or chest pain with exertion.  She also states that she is compliant with her medications.  She reports that her blood pressure normally is 130s over 70s at home.    ROS  Review of Systems   Constitutional: Negative for chills and fever.   HENT: Negative.    Eyes: Negative for blurred vision and double vision.   Respiratory: Negative for cough and shortness of breath.    Cardiovascular: Positive for leg swelling. Negative for chest pain and palpitations.   Gastrointestinal: Negative for abdominal pain, constipation, diarrhea, nausea and vomiting.   Skin: Negative for rash.   Neurological: Negative for weakness and headaches.        PE  Vitals:    05/03/22 1550   BP: 138/82   Pulse:    Resp:    Temp:       Physical Exam  Vitals and nursing note reviewed.   Constitutional:       Appearance: Normal appearance.   HENT:      Head: Normocephalic and atraumatic.      Right Ear: External ear normal.      Left Ear: External ear normal.      Nose: Nose normal.      Mouth/Throat:      Mouth: Mucous membranes are moist.      Pharynx: Oropharynx is clear.   Eyes:      Extraocular Movements: Extraocular movements intact.      Conjunctiva/sclera: Conjunctivae normal.      Pupils: Pupils are equal, round, and reactive to light.    Cardiovascular:      Rate and Rhythm: Normal rate and regular rhythm.      Pulses: Normal pulses.      Heart sounds: Normal heart sounds.   Pulmonary:      Effort: Pulmonary effort is normal.      Breath sounds: Normal breath sounds.   Abdominal:      General: Bowel sounds are normal.   Musculoskeletal:      Cervical back: Normal range of motion and neck supple.   Skin:     General: Skin is warm and dry.   Neurological:      Mental Status: She is alert and oriented to person, place, and time.          Assessment/Plan  Ischemic Heart Disease  -C 9/21:  No CAD  -LexiScan 4/21:  Anterolateral and inferior ischemia  -TTE 4/21:  Left ventricular ejection fraction 50-55%  -Patient denies any recent chest pain or visits to the emergency department s/t chest pain    Hypertension  -Well controlled /82    Hyperlipidemia  -LDL 90 as of 2/2022  -Stressed importance of diet and excercise    Diabetes mellitus  -Management per PCP    MOHSEN with CPAP  -Management per PCP    RCC of Left kidney s/p Nephrectomy (2011)  Adenocarcinoma of colon s/p hemicolectomy (2014)    Return to clinic in 4 months.     Errol Soto DO  [employer] [facility] PGY 1

## 2022-05-16 RX ORDER — DILTIAZEM HYDROCHLORIDE 180 MG/1
180 CAPSULE, COATED, EXTENDED RELEASE ORAL DAILY
Qty: 90 CAPSULE | Refills: 0 | Status: SHIPPED | OUTPATIENT
Start: 2022-05-16 | End: 2023-03-27

## 2022-05-16 RX ORDER — LATANOPROST 50 UG/ML
1 SOLUTION/ DROPS OPHTHALMIC 2 TIMES DAILY
Qty: 2.5 ML | Refills: 1 | Status: SHIPPED | OUTPATIENT
Start: 2022-05-16 | End: 2023-06-02 | Stop reason: SDUPTHER

## 2022-05-16 RX ORDER — INSULIN ASPART 100 [IU]/ML
20 INJECTION, SOLUTION INTRAVENOUS; SUBCUTANEOUS 2 TIMES DAILY
Qty: 12 ML | Refills: 5 | Status: SHIPPED | OUTPATIENT
Start: 2022-05-16 | End: 2022-08-17 | Stop reason: SDUPTHER

## 2022-05-16 RX ORDER — CARVEDILOL 3.12 MG/1
3.12 TABLET ORAL 2 TIMES DAILY
Qty: 180 TABLET | Refills: 0 | Status: SHIPPED | OUTPATIENT
Start: 2022-05-16 | End: 2022-08-12 | Stop reason: SDUPTHER

## 2022-05-16 RX ORDER — ATORVASTATIN CALCIUM 40 MG/1
40 TABLET, FILM COATED ORAL DAILY
Qty: 90 TABLET | Refills: 0 | Status: SHIPPED | OUTPATIENT
Start: 2022-05-16 | End: 2023-04-12 | Stop reason: SDUPTHER

## 2022-05-16 RX ORDER — LOSARTAN POTASSIUM AND HYDROCHLOROTHIAZIDE 12.5; 5 MG/1; MG/1
1 TABLET ORAL DAILY
Qty: 90 TABLET | Refills: 0 | Status: SHIPPED | OUTPATIENT
Start: 2022-05-16 | End: 2022-12-08 | Stop reason: SDUPTHER

## 2022-05-16 RX ORDER — INSULIN DETEMIR 100 [IU]/ML
50 INJECTION, SOLUTION SUBCUTANEOUS NIGHTLY
Qty: 3 ML | Refills: 2 | Status: SHIPPED | OUTPATIENT
Start: 2022-05-16 | End: 2022-08-02 | Stop reason: SDUPTHER

## 2022-05-16 RX ORDER — CHOLECALCIFEROL (VITAMIN D3) 25 MCG
2000 TABLET ORAL DAILY
Qty: 90 TABLET | Refills: 0 | Status: SHIPPED | OUTPATIENT
Start: 2022-05-16 | End: 2023-05-11 | Stop reason: SDUPTHER

## 2022-05-16 RX ORDER — CLONIDINE HYDROCHLORIDE 0.2 MG/1
0.1 TABLET ORAL 3 TIMES DAILY
Qty: 135 TABLET | Refills: 0 | Status: SHIPPED | OUTPATIENT
Start: 2022-05-16 | End: 2023-02-16 | Stop reason: SDUPTHER

## 2022-05-16 RX ORDER — GABAPENTIN 600 MG/1
600 TABLET ORAL DAILY
Qty: 90 TABLET | Refills: 0 | Status: SHIPPED | OUTPATIENT
Start: 2022-05-16 | End: 2022-11-11 | Stop reason: SDUPTHER

## 2022-05-16 RX ORDER — DULOXETIN HYDROCHLORIDE 60 MG/1
CAPSULE, DELAYED RELEASE ORAL
Qty: 90 CAPSULE | Refills: 0 | Status: SHIPPED | OUTPATIENT
Start: 2022-05-16 | End: 2023-03-27 | Stop reason: SDUPTHER

## 2022-05-16 RX ORDER — ALBUTEROL SULFATE 90 UG/1
1 AEROSOL, METERED RESPIRATORY (INHALATION) EVERY 4 HOURS PRN
Qty: 18 G | Refills: 3 | Status: SHIPPED | OUTPATIENT
Start: 2022-05-16 | End: 2022-09-20 | Stop reason: SDUPTHER

## 2022-05-16 RX ORDER — EMPAGLIFLOZIN 10 MG/1
10 TABLET, FILM COATED ORAL DAILY
Qty: 30 TABLET | Refills: 11 | Status: SHIPPED | OUTPATIENT
Start: 2022-05-16 | End: 2022-08-17

## 2022-07-29 DIAGNOSIS — N18.9 CHRONIC KIDNEY DISEASE, UNSPECIFIED CKD STAGE: Primary | ICD-10-CM

## 2022-08-01 ENCOUNTER — OFFICE VISIT (OUTPATIENT)
Dept: INTERNAL MEDICINE | Facility: CLINIC | Age: 67
End: 2022-08-01
Payer: MEDICARE

## 2022-08-01 VITALS
BODY MASS INDEX: 51.91 KG/M2 | HEIGHT: 63 IN | RESPIRATION RATE: 24 BRPM | DIASTOLIC BLOOD PRESSURE: 73 MMHG | HEART RATE: 96 BPM | SYSTOLIC BLOOD PRESSURE: 116 MMHG | TEMPERATURE: 98 F | WEIGHT: 293 LBS

## 2022-08-01 DIAGNOSIS — Z12.39 ENCOUNTER FOR SCREENING FOR MALIGNANT NEOPLASM OF BREAST, UNSPECIFIED SCREENING MODALITY: ICD-10-CM

## 2022-08-01 DIAGNOSIS — I10 HYPERTENSION, UNSPECIFIED TYPE: ICD-10-CM

## 2022-08-01 DIAGNOSIS — M10.9 GOUT, UNSPECIFIED CAUSE, UNSPECIFIED CHRONICITY, UNSPECIFIED SITE: ICD-10-CM

## 2022-08-01 DIAGNOSIS — E11.40 TYPE 2 DIABETES MELLITUS WITH DIABETIC NEUROPATHY, WITH LONG-TERM CURRENT USE OF INSULIN: Primary | ICD-10-CM

## 2022-08-01 DIAGNOSIS — Z79.4 TYPE 2 DIABETES MELLITUS WITH DIABETIC NEUROPATHY, WITH LONG-TERM CURRENT USE OF INSULIN: Primary | ICD-10-CM

## 2022-08-01 DIAGNOSIS — E55.9 VITAMIN D DEFICIENCY: ICD-10-CM

## 2022-08-01 DIAGNOSIS — L76.82 PAIN AT SURGICAL INCISION: ICD-10-CM

## 2022-08-01 DIAGNOSIS — R53.83 FATIGUE, UNSPECIFIED TYPE: ICD-10-CM

## 2022-08-01 DIAGNOSIS — F32.A DEPRESSION, UNSPECIFIED DEPRESSION TYPE: ICD-10-CM

## 2022-08-01 DIAGNOSIS — N18.31 STAGE 3A CHRONIC KIDNEY DISEASE: ICD-10-CM

## 2022-08-01 LAB
CREAT UR-MCNC: 86.5 MG/DL (ref 47–110)
MICROALBUMIN UR-MCNC: 122.8 UG/ML
MICROALBUMIN/CREAT RATIO PNL UR: 142 MG/GM CR (ref 0–30)

## 2022-08-01 PROCEDURE — 82043 UR ALBUMIN QUANTITATIVE: CPT

## 2022-08-01 PROCEDURE — 99215 OFFICE O/P EST HI 40 MIN: CPT | Mod: PBBFAC

## 2022-08-01 RX ORDER — FLASH GLUCOSE SENSOR
1 KIT MISCELLANEOUS
Qty: 1 KIT | Refills: 0 | Status: SHIPPED | OUTPATIENT
Start: 2022-08-01 | End: 2023-09-27 | Stop reason: SDUPTHER

## 2022-08-01 RX ORDER — FLASH GLUCOSE SCANNING READER
1 EACH MISCELLANEOUS
Qty: 1 EACH | Refills: 0 | Status: SHIPPED | OUTPATIENT
Start: 2022-08-01 | End: 2023-09-27 | Stop reason: SDUPTHER

## 2022-08-01 NOTE — PROGRESS NOTES
I have reviewed and concur with the resident's history, physical, assessment, and plan.  I have discussed with him all issues related to the diagnosis, workup and treatment plan.Care provided as reasonable and necessary.    Jitendra Potts MD  [facility]

## 2022-08-01 NOTE — PROGRESS NOTES
[facility] INTERNAL MEDICINE  OUTPATIENT OFFICE VISIT NOTE    SUBJECTIVE:      HPI: Ms. Freeman is a 66 year old  female with a PMH of renal cell ca s/p nephrectomy in 2011 and colon adenocarcinoma stage IIA s/p right hemicolectomy in 2014 w/ adjuvant chemo completed in 4/2015. She has no complaints today. Patient's Humalog at dinner increased to 30 units given elevated CBGs premeal. She is to remain on her other diabetic medications as prescribed. Patient was educated on hypertensive management with exercise and DASH diet. Patient was referred for Medicare annual wellness visit. Ordered diabetic funduscopic exam. Patient to have laboratory orders completed today. Patient is CAGE negative. Ordered mammogram today. Patient return to  clinic for follow-up in 3 months.       ROS:  CONSTITUTIONAL: No weight loss, subjective fever, chills, weakness or fatigue.  HEENT: Eyes: No visual loss, blurred vision, double vision or yellow sclerae. Ears, Nose, Throat: No hearing loss, sneezing, congestion, runny nose or sore throat.  SKIN: No rash or itching.  CARDIOVASCULAR: No chest pain, chest pressure or chest discomfort. No palpitations or edema.  RESPIRATORY: No shortness of breath, cough or sputum.  GASTROINTESTINAL: No anorexia, nausea, vomiting or diarrhea. No abdominal pain or blood.  GENITOURINARY: No dysuria, hematuria, or incontinence  NEUROLOGICAL: No headache, dizziness, syncope, paralysis, ataxia, numbness or tingling in the extremities. No change in bowel or bladder control.  MUSCULOSKELETAL: no joint stiffness.  HEMATOLOGIC: No anemia, bleeding or bruising.  LYMPHATICS: No enlarged nodes.  PSYCHIATRIC: No history of depression or anxiety.  ENDOCRINOLOGIC: No reports of sweating, cold or heat intolerance. No polyuria or polydipsia.  ALLERGIES: No history of asthma, hives, eczema or rhinitis.         OBJECTIVE:     Vital signs:   /73   Pulse 96   Temp 98.3 °F (36.8 °C)   Resp (!) 24   Ht 5'  "2.99" (1.6 m)   Wt (!) 143 kg (315 lb 4.1 oz)   BMI 55.86 kg/m²      Physical Examination:  Gen: She is alert and oriented x3. Well developed, obese, NAD.  Head: Normocephalic  Eyes: PERRL, EOMI, no conjunctival injection or pallor  Nose: No nasal discharge  Throat: No pharyngeal inflammation, erythema, discharge, mucous membranes moist  Neck: Supple. No carotid bruits. No lymphadenopathy or thyromegaly.  Lungs: Clear to auscultation bilaterally  CV: Normal S1 & S2, RRR, no murmurs appreciated  Abdomen: Soft, NT/ND, bowel sounds present. No hepatosplenomegaly  Ext: No cyanosis/clubbing/edema, pulses 2+  Neuro: CN II-XII grossly intact, strength 5/5 throughout, normal sensation  Psych: Flat affect, but denies suicidal or homicidal ideations  Skin: No rashes, lesions, ulceration or induration         ASSESSMENT & PLAN:     1) Diabetes mellitus  - A1c 8.9, will repeat A1c today (08/01/22)  - Logs reviewed, fasting CBG is within goal and is on average between   -Continue Levemir 50u QHS  -continue with novolog 20u with each meal, dinner novolog of 25 units, will increase to 30 units at dinner (08/01/22)  -patient was checking CBGs after dinner  - Instructed to keep log and check CBGs prior to meal, she voiced understanding  - Continue on Jardiance 10mg daily   - Fundus clinic referral today (08/01/22)  - Foot exam today, normal pinprick sensation, dorsal pedal pulses, no skin breakdown or calluses October 2021  - Discussed importance of adhering to diabetic diet and good exercise  -continue Gabapentin due to right hand finger numbness     2) Hypertension  - Well controlled today, /74mmHg (02/15/22)  - Continue current regimen, being followed by cards and renal  - educated on healthy DASH diet and exercise (02/15/22)    3) Colon and renal cancer  - patient is s/p hemicolectomy, nephrectomy, and chemotherapy  - completed chemo in april 2015  - Repeat CT's in May 2018 showed stable appearance of the chest, " abdomen and pelvis. No new or worsening sites of metastatic disease identified.  - Seen by Oncology previously but discharged from clinic  -no new symptoms today (08/01/22)    4) Chronic kidney disease  - Stage IIIa, stable  - Avoid NSAID's and nephrotoxic drugs  - continue to follow up nephrology clinic    5) Hyperlipidemia  - continue on lipitor 40mg daily   - FLP June 2020 wnl  - reordered lipid panel today (08/01/22)    6) MOHSEN  - Sleep study confirms MOHSEN  - continue CPAP, has not been using    7) Multinodular goiter  - Biopsy results reveal benign thyroid nodules  - TSH/T4 in November 2021 wnl  -repeat TSH today (08/01/22)    8) Depression  - Responding really well to Duloxetine, continue 60mg daily  -no SI/HI or symptoms today (08/01/22)    9) Gout  - On allopurinol by Nephrology  -reports no flares since last appt (08/01/22)    10) History of Hematuria  - followed by nephrology      Health Maintenance/ Wellness  Pneumococcal vaccine: Prevnar and Pneumovax given previously, will need repeat Pneumovax at age 67  TDAP: UTD  Influenza vaccine: UTD  Shingrix vaccine: UTD  COVID 19 Vaccine: UTD  Mammogram: Nov 2021, re-ordered today (08/01/22)  Pap smear: n/a, has hx of total hysterectomy, follow up with GYN, referral sent today (08/01/22)  Screening colonoscopy:Colonoscopy done in June 2020, results wnl - repeat 5 years  Lung Cancer Screening: n/a  Hepatitis Panel: Negative in June 2020  Flu vaccine Oct 2021  Medicare annual wellness visit referral today (08/01/22)  CAGE negative (08/01/22)  Fundus examination ordered (08/01/22)      Return to clinic in 3 months.      Juan Jose Urena, DO   [facility] Internal Medicine, PGY-3

## 2022-08-02 ENCOUNTER — TELEPHONE (OUTPATIENT)
Dept: INTERNAL MEDICINE | Facility: CLINIC | Age: 67
End: 2022-08-02
Payer: MEDICARE

## 2022-08-02 RX ORDER — INSULIN DETEMIR 100 [IU]/ML
55 INJECTION, SOLUTION SUBCUTANEOUS NIGHTLY
Qty: 3 ML | Refills: 2 | Status: SHIPPED | OUTPATIENT
Start: 2022-08-02 | End: 2023-01-04 | Stop reason: SDUPTHER

## 2022-08-02 NOTE — TELEPHONE ENCOUNTER
Spoke with patient to educate on results as Ayanna Butler advised. Repeated for clarity and pt able to verbalize recent changes to regimen. Pt states she will bring CBG log next visit

## 2022-08-02 NOTE — TELEPHONE ENCOUNTER
----- Message from Juan Jose Urena DO sent at 8/2/2022  8:51 AM CDT -----  Hello,    Please inform Ms. Freeman that her urine studies show some protein in the urine which is likely caused from her diabetes. The better we control her diabetes the better health her kidneys will be in. Continue on Losartan for kidney protection from her diabetes. Her A1c is 10.5. I have increased her dinner time insulin to 30 units and increased her Levemir to 55 units at bedtime. We will continue to closely monitor her diabetes. Please bring blood sugar logs with her next visit.    Thanks,  Juan Jose Urena DO

## 2022-08-11 RX ORDER — POTASSIUM CHLORIDE 1500 MG/1
20 TABLET, EXTENDED RELEASE ORAL DAILY
COMMUNITY
Start: 2022-05-21 | End: 2022-08-24 | Stop reason: SDUPTHER

## 2022-08-11 RX ORDER — FUROSEMIDE 20 MG/1
20 TABLET ORAL DAILY
COMMUNITY
Start: 2022-02-14 | End: 2022-12-08

## 2022-08-12 DIAGNOSIS — I10 PRIMARY HYPERTENSION: Primary | ICD-10-CM

## 2022-08-12 RX ORDER — CARVEDILOL 3.12 MG/1
3.12 TABLET ORAL 2 TIMES DAILY
Qty: 180 TABLET | Refills: 0 | Status: SHIPPED | OUTPATIENT
Start: 2022-08-12 | End: 2022-11-16 | Stop reason: SDUPTHER

## 2022-08-17 ENCOUNTER — OFFICE VISIT (OUTPATIENT)
Dept: NEPHROLOGY | Facility: CLINIC | Age: 67
End: 2022-08-17
Payer: MEDICARE

## 2022-08-17 VITALS
BODY MASS INDEX: 50.02 KG/M2 | SYSTOLIC BLOOD PRESSURE: 133 MMHG | HEIGHT: 64 IN | DIASTOLIC BLOOD PRESSURE: 68 MMHG | RESPIRATION RATE: 18 BRPM | HEART RATE: 65 BPM | OXYGEN SATURATION: 98 % | WEIGHT: 293 LBS | TEMPERATURE: 98 F

## 2022-08-17 DIAGNOSIS — N18.32 CKD STAGE G3B/A2, GFR 30-44 AND ALBUMIN CREATININE RATIO 30-299 MG/G: Primary | ICD-10-CM

## 2022-08-17 DIAGNOSIS — I10 PRIMARY HYPERTENSION: ICD-10-CM

## 2022-08-17 PROBLEM — E66.01 MORBID OBESITY DUE TO EXCESS CALORIES: Status: ACTIVE | Noted: 2019-08-06

## 2022-08-17 PROBLEM — Z85.528 HISTORY OF MALIGNANT NEOPLASM OF KIDNEY: Status: ACTIVE | Noted: 2022-08-17

## 2022-08-17 PROBLEM — K63.89 MASS OF COLON: Status: ACTIVE | Noted: 2022-08-17

## 2022-08-17 PROBLEM — F32.A DEPRESSION: Status: ACTIVE | Noted: 2022-08-17

## 2022-08-17 PROBLEM — E78.00 HYPERCHOLESTEROLEMIA: Status: ACTIVE | Noted: 2022-08-17

## 2022-08-17 PROBLEM — R06.00 DYSPNEA: Status: ACTIVE | Noted: 2019-08-06

## 2022-08-17 PROBLEM — G62.9 NEUROPATHY: Status: ACTIVE | Noted: 2022-08-17

## 2022-08-17 PROBLEM — I27.20 PULMONARY HYPERTENSION: Status: ACTIVE | Noted: 2022-08-17

## 2022-08-17 PROBLEM — N28.9 KIDNEY DISORDER: Status: ACTIVE | Noted: 2022-08-17

## 2022-08-17 PROBLEM — E11.9 DIABETES MELLITUS: Status: ACTIVE | Noted: 2022-08-17

## 2022-08-17 PROCEDURE — 3008F BODY MASS INDEX DOCD: CPT | Mod: CPTII,,, | Performed by: NURSE PRACTITIONER

## 2022-08-17 PROCEDURE — 3060F PR POS MICROALBUMINURIA RESULT DOCUMENTED/REVIEW: ICD-10-PCS | Mod: CPTII,,, | Performed by: NURSE PRACTITIONER

## 2022-08-17 PROCEDURE — 3008F PR BODY MASS INDEX (BMI) DOCUMENTED: ICD-10-PCS | Mod: CPTII,,, | Performed by: NURSE PRACTITIONER

## 2022-08-17 PROCEDURE — 99214 PR OFFICE/OUTPT VISIT, EST, LEVL IV, 30-39 MIN: ICD-10-PCS | Mod: S$PBB,,, | Performed by: NURSE PRACTITIONER

## 2022-08-17 PROCEDURE — 3066F NEPHROPATHY DOC TX: CPT | Mod: CPTII,,, | Performed by: NURSE PRACTITIONER

## 2022-08-17 PROCEDURE — 1159F MED LIST DOCD IN RCRD: CPT | Mod: CPTII,,, | Performed by: NURSE PRACTITIONER

## 2022-08-17 PROCEDURE — 3060F POS MICROALBUMINURIA REV: CPT | Mod: CPTII,,, | Performed by: NURSE PRACTITIONER

## 2022-08-17 PROCEDURE — 99214 OFFICE O/P EST MOD 30 MIN: CPT | Mod: S$PBB,,, | Performed by: NURSE PRACTITIONER

## 2022-08-17 PROCEDURE — 3078F DIAST BP <80 MM HG: CPT | Mod: CPTII,,, | Performed by: NURSE PRACTITIONER

## 2022-08-17 PROCEDURE — 3066F PR DOCUMENTATION OF TREATMENT FOR NEPHROPATHY: ICD-10-PCS | Mod: CPTII,,, | Performed by: NURSE PRACTITIONER

## 2022-08-17 PROCEDURE — 3075F SYST BP GE 130 - 139MM HG: CPT | Mod: CPTII,,, | Performed by: NURSE PRACTITIONER

## 2022-08-17 PROCEDURE — 3075F PR MOST RECENT SYSTOLIC BLOOD PRESS GE 130-139MM HG: ICD-10-PCS | Mod: CPTII,,, | Performed by: NURSE PRACTITIONER

## 2022-08-17 PROCEDURE — 1159F PR MEDICATION LIST DOCUMENTED IN MEDICAL RECORD: ICD-10-PCS | Mod: CPTII,,, | Performed by: NURSE PRACTITIONER

## 2022-08-17 PROCEDURE — 3078F PR MOST RECENT DIASTOLIC BLOOD PRESSURE < 80 MM HG: ICD-10-PCS | Mod: CPTII,,, | Performed by: NURSE PRACTITIONER

## 2022-08-17 PROCEDURE — 99215 OFFICE O/P EST HI 40 MIN: CPT | Mod: PBBFAC | Performed by: NURSE PRACTITIONER

## 2022-08-17 PROCEDURE — 99499 RISK ADDL DX/OHS AUDIT: ICD-10-PCS | Mod: S$PBB,,, | Performed by: NURSE PRACTITIONER

## 2022-08-17 PROCEDURE — 99499 UNLISTED E&M SERVICE: CPT | Mod: S$PBB,,, | Performed by: NURSE PRACTITIONER

## 2022-08-17 RX ORDER — ASPIRIN 81 MG/1
81 TABLET ORAL DAILY
Status: ON HOLD | COMMUNITY
End: 2023-11-19 | Stop reason: HOSPADM

## 2022-08-17 RX ORDER — EMPAGLIFLOZIN 10 MG/1
25 TABLET, FILM COATED ORAL DAILY
Qty: 90 TABLET | Refills: 11 | Status: CANCELLED | OUTPATIENT
Start: 2022-08-17 | End: 2023-08-12

## 2022-08-17 RX ORDER — INSULIN ASPART 100 [IU]/ML
20 INJECTION, SOLUTION INTRAVENOUS; SUBCUTANEOUS
Qty: 54 ML | Refills: 1 | Status: SHIPPED | OUTPATIENT
Start: 2022-08-17 | End: 2023-05-01

## 2022-08-17 NOTE — PROGRESS NOTES
"  [facility]  Nephrology Clinic Note   Chief Complaint   Patient presents with    Chronic Kidney Disease     RTC, took meds, edema ilda le      History of Present Illness  Ms Elissa Freeman is a 66 y.o. Black or  female with past medical history of CKD , hypertension, dyslipidemia, diabetes mellitus 2, renal cell carcinoma (status post left nephrectomy in 2011), adenocarcinoma of the colon (status post hemicolectomy in 2014), and morbid obesity.  Patient presents for follow-up appointment in Nephrology Clinic.    Review of Systems  Twelve point review of systems conducted, negative except as stated in history of present illness.    Review of patient's allergies indicates:  No Known Allergies    Past Medical History:   Past Medical History:   Diagnosis Date    Asthma     Cancer     CKD (chronic kidney disease)     Coronary artery disease     Diabetes mellitus     Hyperlipidemia     Hypertension     Obesity, unspecified     MOHSEN (obstructive sleep apnea)        Procedure History:   Past Surgical History:   Procedure Laterality Date    CHOLECYSTECTOMY      COLON SURGERY      excision of lipoma      HYSTERECTOMY, VAGINAL, WITH SALPINGO-OOPHORECTOMY      NEPHRECTOMY Left     TUBAL LIGATION         Family History: family history includes Cancer in her brother; Coronary artery disease in her mother; Hyperlipidemia in her mother; Hypertension in her mother; Stroke in her father.    Social History:  reports that she has never smoked. She has never used smokeless tobacco. She reports previous alcohol use. She reports that she does not use drugs.    Physical Exam:   /68 (BP Location: Right arm, Patient Position: Sitting, BP Method: Large (Automatic))   Pulse 65   Temp 98.3 °F (36.8 °C) (Oral)   Resp 18   Ht 5' 3.98" (1.625 m)   Wt (!) 145.4 kg (320 lb 9.6 oz)   SpO2 98%   BMI 55.07 kg/m²     General appearance: Patient is in no acute distress.  Skin: No " "rashes or wounds.  HEENT: PERRLA, EOMI, no scleral icterus, no JVD. Neck is supple.  Chest: Respirations are unlabored. Lungs sounds are clear.   Heart: S1, S2.   Abdomen: Benign.  : Deferred.  Extremities: No edema, peripheral pulses are palpable.   Neuro: No focal deficits.     Home Medications:    Current Outpatient Medications:     furosemide (LASIX) 20 MG tablet, Take 20 mg by mouth once daily., Disp: , Rfl:     albuterol (PROVENTIL/VENTOLIN HFA) 90 mcg/actuation inhaler, Inhale 1 puff into the lungs every 4 (four) hours as needed for Wheezing., Disp: 18 g, Rfl: 3    atorvastatin (LIPITOR) 40 MG tablet, Take 1 tablet (40 mg total) by mouth once daily., Disp: 90 tablet, Rfl: 0    BD DAVONTE 2ND GEN PEN NEEDLE 32 gauge x 5/32" Ndle, USE TO INJECT INSULIN FOUR TIMES DAILY AS DIRECTED, Disp: , Rfl:     carvediloL (COREG) 3.125 MG tablet, Take 1 tablet (3.125 mg total) by mouth 2 (two) times daily., Disp: 180 tablet, Rfl: 0    cloNIDine (CATAPRES) 0.2 MG tablet, Take 0.5 tablets (0.1 mg total) by mouth 3 (three) times daily., Disp: 135 tablet, Rfl: 0    diltiaZEM (CARDIZEM CD) 180 MG 24 hr capsule, Take 1 capsule (180 mg total) by mouth once daily., Disp: 90 capsule, Rfl: 0    DULoxetine (CYMBALTA) 60 MG capsule, TAKE 1 CAPSULE BY MOUTH DAILY. DO NOT CRUSH OR CHEW, Disp: 90 capsule, Rfl: 0    empagliflozin (JARDIANCE) 10 mg tablet, Take 1 tablet (10 mg total) by mouth once daily at 6am., Disp: 30 tablet, Rfl: 11    exenatide microspheres (BYDUREON BCISE) 2 mg/0.85 mL AtIn, Inject 2 mg into the skin every 7 days., Disp: 1 pen, Rfl: 2    flash glucose scanning reader (FREESTYLE SMITHA 2 READER) Misc, 1 each by Misc.(Non-Drug; Combo Route) route 4 (four) times daily with meals and nightly., Disp: 1 each, Rfl: 0    flash glucose sensor (FREESTYLE SMITHA 2 SENSOR) Kit, 1 each by Misc.(Non-Drug; Combo Route) route 2 hours after meals and at bedtime., Disp: 1 kit, Rfl: 0    gabapentin (NEURONTIN) 600 MG tablet, " Take 1 tablet (600 mg total) by mouth once daily., Disp: 90 tablet, Rfl: 0    insulin aspart U-100 (NOVOLOG) 100 unit/mL (3 mL) InPn pen, Inject 20 Units into the skin 2 (two) times a day. (Patient taking differently: Inject 20 Units into the skin 2 times daily 2 hours after meal. Plus 25 units at night), Disp: 12 mL, Rfl: 5    latanoprost 0.005 % ophthalmic solution, Place 1 drop into both eyes 2 (two) times daily., Disp: 2.5 mL, Rfl: 1    LEVEMIR FLEXTOUCH U-100 INSULN 100 unit/mL (3 mL) InPn pen, Inject 55 Units into the skin every evening., Disp: 3 mL, Rfl: 2    losartan-hydrochlorothiazide 50-12.5 mg (HYZAAR) 50-12.5 mg per tablet, Take 1 tablet by mouth once daily., Disp: 90 tablet, Rfl: 0    potassium chloride (K-TAB) 20 mEq, Take 20 mEq by mouth once daily., Disp: , Rfl:     vitamin D (VITAMIN D3) 1000 units Tab, Take 2 tablets (2,000 Units total) by mouth Daily., Disp: 90 tablet, Rfl: 0     Laboratory Data:   Recent Results (from the past 504 hour(s))   MICROALBUMIN / CREATININE RATIO URINE    Collection Time: 08/01/22 11:47 AM   Result Value Ref Range    Urine Microalbumin 122.8 (H) <=30.0 ug/ml    Urine Creatinine 86.5 47.0 - 110.0 mg/dL    Microalbumin Creatinine Ratio 142.0 (H) 0.0 - 30.0 mg/gm Cr   Comprehensive Metabolic Panel    Collection Time: 08/01/22 11:47 AM   Result Value Ref Range    Sodium Level 140 136 - 145 mmol/L    Potassium Level 4.1 3.5 - 5.1 mmol/L    Chloride 99 98 - 107 mmol/L    Carbon Dioxide 32 (H) 23 - 31 mmol/L    Glucose Level 320 (H) 82 - 115 mg/dL    Blood Urea Nitrogen 14.6 9.8 - 20.1 mg/dL    Creatinine 1.58 (H) 0.55 - 1.02 mg/dL    Calcium Level Total 9.5 8.4 - 10.2 mg/dL    Protein Total 8.0 (H) 5.8 - 7.6 gm/dL    Albumin Level 3.4 3.4 - 4.8 gm/dL    Globulin 4.6 (H) 2.4 - 3.5 gm/dL    Albumin/Globulin Ratio 0.7 (L) 1.1 - 2.0 ratio    Bilirubin Total 0.7 <=1.5 mg/dL    Alkaline Phosphatase 155 (H) 40 - 150 unit/L    Alanine Aminotransferase 17 0 - 55 unit/L     Aspartate Aminotransferase 15 5 - 34 unit/L    Estimated GFR- 42 mls/min/1.73/m2   Protein/Creatinine Ratio, Urine    Collection Time: 08/01/22 11:47 AM   Result Value Ref Range    Urine Protein Level 25.1 mg/dL    Urine Creatinine 85.4 47.0 - 110.0 mg/dL    Urine Protein/Creatinine Ratio 293.9 (H) <=200.0 mg/gm Cr   Urinalysis    Collection Time: 08/01/22 11:47 AM   Result Value Ref Range    Color, UA Light-Yellow (A) Yellow, Colorless, Other, Clear    Appearance, UA Clear Clear    Specific Gravity, UA 1.030     pH, UA 5.5 5.0, 5.5, 6.0, 6.5, 7.0, 7.5, 8.0, 8.5    Protein, UA Trace (A) Negative, 300  mg/dL    Glucose, UA 4+ (A) Negative, Normal mg/dL    Ketones, UA Negative Negative, +1, +2, +3, +4, +5, >=160, >=80 mg/dL    Blood, UA Trace (A) Negative unit/L    Bilirubin, UA Negative Negative mg/dL    Urobilinogen, UA Normal 0.2, 1.0, Normal mg/dL    Nitrites, UA Negative Negative    Leukocyte Esterase,   (A) Negative, 75  unit/L    WBC, UA 6-10 (A) None Seen, 0-2, 3-5, 0-5 /HPF    Bacteria, UA None Seen None Seen /HPF    Squamous Epithelial Cells, UA Few (A) None Seen /HPF    Hyaline Casts, UA None Seen None Seen /lpf    RBC, UA 6-10 (A) None Seen, 0-2, 3-5, 0-5 /HPF   Hemoglobin A1C    Collection Time: 08/01/22 11:47 AM   Result Value Ref Range    Hemoglobin A1c 10.5 (H) <=7.0 %    Estimated Average Glucose 254.7 mg/dL   Vitamin D    Collection Time: 08/01/22 11:47 AM   Result Value Ref Range    Vit D 25 OH 34.8 30.0 - 80.0 ng/mL   TSH    Collection Time: 08/01/22 11:47 AM   Result Value Ref Range    Thyroid Stimulating Hormone 2.1901 0.3500 - 4.9400 uIU/mL   Lipid Panel    Collection Time: 08/01/22 11:47 AM   Result Value Ref Range    Cholesterol Total 125 <=200 mg/dL    HDL Cholesterol 39 35 - 60 mg/dL    Triglyceride 153 (H) 37 - 140 mg/dL    Cholesterol/HDL Ratio 3 0 - 5    Very Low Density Lipoprotein 31     LDL Cholesterol 55.00 50.00 - 140.00 mg/dL   CBC with Differential     Collection Time: 08/01/22 11:47 AM   Result Value Ref Range    WBC 7.7 4.5 - 11.5 x10(3)/mcL    RBC 5.88 (H) 4.20 - 5.40 x10(6)/mcL    Hgb 13.0 12.0 - 16.0 gm/dL    Hct 47.1 (H) 37.0 - 47.0 %    MCV 80.1 80.0 - 94.0 fL    MCH 22.1 (L) 27.0 - 31.0 pg    MCHC 27.6 (L) 33.0 - 36.0 mg/dL    RDW 14.6 11.5 - 17.0 %    Platelet 217 130 - 400 x10(3)/mcL    MPV 10.9 (H) 7.4 - 10.4 fL    Neut % 57.9 %    Lymph % 29.6 %    Mono % 9.5 %    Eos % 2.5 %    Basophil % 0.4 %    Lymph # 2.27 0.6 - 4.6 x10(3)/mcL    Neut # 4.4 2.1 - 9.2 x10(3)/mcL    Mono # 0.73 0.1 - 1.3 x10(3)/mcL    Eos # 0.19 0 - 0.9 x10(3)/mcL    Baso # 0.03 0 - 0.2 x10(3)/mcL    IG# 0.01 0 - 0.04 x10(3)/mcL    IG% 0.1 %    NRBC% 0.0 %       Imaging:  Kidney ultrasound 03/07/2022:  Right Kidney:   Length: 10.7 x 6.4 x 6.2 cm  Appearance: Normal echogenicity.    Collecting system: No hydronephrosis  Stones: None  Cyst/Mass: None     Left Kidney:   Has been surgically removed    Impression and Plan     CKD stage G3b/A2, GFR 30-44 and albumin creatinine ratio  mg/g  -     Comprehensive Metabolic Panel; Future; Expected date: 02/03/2023  -     Protein/Creatinine Ratio, Urine; Future; Expected date: 02/03/2023  -     Urinalysis, Reflex to Urine Culture Urine, Clean Catch; Future; Expected date: 02/03/2023  -     Ambulatory referral/consult to Nutrition Services; Future; Expected date: 09/17/2022  -     empagliflozin (JARDIANCE) 25 mg tablet; Take 1 tablet (25 mg total) by mouth once daily.  Dispense: 90 tablet; Refill: 3  Reduced renal mass/diabetic kidney disease.  Continue risk factor management, MAHI blockade, and SGLT2i.   A1C is above goal. Will increase empagliflozin to 25 mg daily.   Continue:  -follow 2 g a day dietary sodium restriction  -control diabetes (goal A1c less than 7%)  -control high blood pressure (goal blood pressure is less than 130/80, please check blood pressure twice a week and bring blood pressure logs to office visit)  -exercise at  least 30 minutes a day, 5 days a week  -maintain healthy weight  -decrease or stop alcohol use  -do not smoke  -stay well hydrated (drink water only, avoid juices, sweet tea, and sodas)  -ask about staying up-to-date on vaccinations (flu vaccine, pneumonia vaccine, hepatitis B vaccine)  -avoid excessive use of NSAIDs (ibuprofen, naproxen, Aleve, Advil, Toradol, Mobic), take Tylenol as needed for headache or mild pain  -take cholesterol lowering medications if prescribed (LDL goal less than 100)    Follow-up with the primary care provider as scheduled.  Return to subspecialty nephrology (kidney) clinic with routine labs in 6 months      Primary hypertension  BP is at goal.    BMI 50.0-59.9, adult  -     Ambulatory referral/consult to Nutrition Services; Future; Expected date: 09/17/2022  Lifestyle and dietary interventions discussed, patient counseled on weight loss using portion control, non sedentary lifestyle, low-carbohydrate/low fat diet.    Other orders  -     insulin aspart U-100 (NOVOLOG) 100 unit/mL (3 mL) InPn pen; Inject 20 Units into the skin 3 (three) times daily with meals. Plus 25 units at night  Dispense: 54 mL; Refill: 1

## 2022-08-24 RX ORDER — POTASSIUM CHLORIDE 1500 MG/1
20 TABLET, EXTENDED RELEASE ORAL DAILY
Qty: 90 TABLET | Refills: 0 | Status: SHIPPED | OUTPATIENT
Start: 2022-08-24 | End: 2022-10-14 | Stop reason: SDUPTHER

## 2022-09-01 ENCOUNTER — HOSPITAL ENCOUNTER (EMERGENCY)
Facility: HOSPITAL | Age: 67
Discharge: HOME | End: 2022-09-01
Attending: EMERGENCY MEDICINE
Payer: MEDICARE

## 2022-09-01 VITALS
SYSTOLIC BLOOD PRESSURE: 193 MMHG | RESPIRATION RATE: 20 BRPM | TEMPERATURE: 98 F | DIASTOLIC BLOOD PRESSURE: 99 MMHG | OXYGEN SATURATION: 95 % | HEART RATE: 75 BPM

## 2022-09-01 DIAGNOSIS — N30.00 ACUTE CYSTITIS WITHOUT HEMATURIA: Primary | ICD-10-CM

## 2022-09-01 LAB
ALBUMIN SERPL-MCNC: 3.2 GM/DL (ref 3.4–4.8)
ALBUMIN/GLOB SERPL: 0.7 RATIO (ref 1.1–2)
ALP SERPL-CCNC: 130 UNIT/L (ref 40–150)
ALT SERPL-CCNC: 10 UNIT/L (ref 0–55)
APPEARANCE UR: CLEAR
AST SERPL-CCNC: 12 UNIT/L (ref 5–34)
BACTERIA #/AREA URNS AUTO: ABNORMAL /HPF
BASOPHILS # BLD AUTO: 0.02 X10(3)/MCL (ref 0–0.2)
BASOPHILS NFR BLD AUTO: 0.3 %
BILIRUB UR QL STRIP.AUTO: NEGATIVE MG/DL
BILIRUBIN DIRECT+TOT PNL SERPL-MCNC: 0.6 MG/DL
BUN SERPL-MCNC: 16.2 MG/DL (ref 9.8–20.1)
CALCIUM SERPL-MCNC: 9.7 MG/DL (ref 8.4–10.2)
CASTS URNS MICRO: ABNORMAL /LPF
CHLORIDE SERPL-SCNC: 104 MMOL/L (ref 98–107)
CO2 SERPL-SCNC: 32 MMOL/L (ref 23–31)
COLOR UR AUTO: ABNORMAL
CREAT SERPL-MCNC: 1.47 MG/DL (ref 0.55–1.02)
EOSINOPHIL # BLD AUTO: 0.25 X10(3)/MCL (ref 0–0.9)
EOSINOPHIL NFR BLD AUTO: 3.6 %
ERYTHROCYTE [DISTWIDTH] IN BLOOD BY AUTOMATED COUNT: 15.2 % (ref 11.5–17)
GFR SERPLBLD CREATININE-BSD FMLA CKD-EPI: 40 MLS/MIN/1.73/M2
GLOBULIN SER-MCNC: 4.4 GM/DL (ref 2.4–3.5)
GLUCOSE SERPL-MCNC: 139 MG/DL (ref 82–115)
GLUCOSE UR QL STRIP.AUTO: ABNORMAL MG/DL
HCT VFR BLD AUTO: 41.1 % (ref 37–47)
HGB BLD-MCNC: 11.9 GM/DL (ref 12–16)
HYALINE CASTS #/AREA URNS LPF: ABNORMAL /LPF
IMM GRANULOCYTES # BLD AUTO: 0.02 X10(3)/MCL (ref 0–0.04)
IMM GRANULOCYTES NFR BLD AUTO: 0.3 %
KETONES UR QL STRIP.AUTO: NEGATIVE MG/DL
LEUKOCYTE ESTERASE UR QL STRIP.AUTO: 250 UNIT/L
LIPASE SERPL-CCNC: 11 U/L
LYMPHOCYTES # BLD AUTO: 1.91 X10(3)/MCL (ref 0.6–4.6)
LYMPHOCYTES NFR BLD AUTO: 27.8 %
MCH RBC QN AUTO: 22.3 PG (ref 27–31)
MCHC RBC AUTO-ENTMCNC: 29 MG/DL (ref 33–36)
MCV RBC AUTO: 77.1 FL (ref 80–94)
MONOCYTES # BLD AUTO: 0.62 X10(3)/MCL (ref 0.1–1.3)
MONOCYTES NFR BLD AUTO: 9 %
MUCOUS THREADS URNS QL MICRO: ABNORMAL /LPF
NEUTROPHILS # BLD AUTO: 4.1 X10(3)/MCL (ref 2.1–9.2)
NEUTROPHILS NFR BLD AUTO: 59 %
NITRITE UR QL STRIP.AUTO: NEGATIVE
NRBC BLD AUTO-RTO: 0 %
PH UR STRIP.AUTO: 5.5 [PH]
PLATELET # BLD AUTO: 192 X10(3)/MCL (ref 130–400)
PMV BLD AUTO: 10.2 FL (ref 7.4–10.4)
POTASSIUM SERPL-SCNC: 4.2 MMOL/L (ref 3.5–5.1)
PROT SERPL-MCNC: 7.6 GM/DL (ref 5.8–7.6)
PROT UR QL STRIP.AUTO: ABNORMAL MG/DL
RBC # BLD AUTO: 5.33 X10(6)/MCL (ref 4.2–5.4)
RBC #/AREA URNS AUTO: ABNORMAL /HPF
RBC UR QL AUTO: NEGATIVE UNIT/L
SODIUM SERPL-SCNC: 146 MMOL/L (ref 136–145)
SP GR UR STRIP.AUTO: 1.02
SQUAMOUS #/AREA URNS LPF: ABNORMAL /HPF
UROBILINOGEN UR STRIP-ACNC: NORMAL MG/DL
WBC # SPEC AUTO: 6.9 X10(3)/MCL (ref 4.5–11.5)
WBC #/AREA URNS AUTO: ABNORMAL /HPF

## 2022-09-01 PROCEDURE — 83690 ASSAY OF LIPASE: CPT | Performed by: EMERGENCY MEDICINE

## 2022-09-01 PROCEDURE — 63600175 PHARM REV CODE 636 W HCPCS: Performed by: EMERGENCY MEDICINE

## 2022-09-01 PROCEDURE — 87088 URINE BACTERIA CULTURE: CPT | Performed by: EMERGENCY MEDICINE

## 2022-09-01 PROCEDURE — 85025 COMPLETE CBC W/AUTO DIFF WBC: CPT | Performed by: EMERGENCY MEDICINE

## 2022-09-01 PROCEDURE — 96365 THER/PROPH/DIAG IV INF INIT: CPT

## 2022-09-01 PROCEDURE — 94640 AIRWAY INHALATION TREATMENT: CPT | Mod: XB

## 2022-09-01 PROCEDURE — 25000242 PHARM REV CODE 250 ALT 637 W/ HCPCS: Performed by: EMERGENCY MEDICINE

## 2022-09-01 PROCEDURE — 99285 EMERGENCY DEPT VISIT HI MDM: CPT | Mod: 25

## 2022-09-01 PROCEDURE — 36415 COLL VENOUS BLD VENIPUNCTURE: CPT | Performed by: EMERGENCY MEDICINE

## 2022-09-01 PROCEDURE — 25000003 PHARM REV CODE 250: Performed by: EMERGENCY MEDICINE

## 2022-09-01 PROCEDURE — 87040 BLOOD CULTURE FOR BACTERIA: CPT | Performed by: EMERGENCY MEDICINE

## 2022-09-01 PROCEDURE — 81001 URINALYSIS AUTO W/SCOPE: CPT | Performed by: EMERGENCY MEDICINE

## 2022-09-01 PROCEDURE — 80053 COMPREHEN METABOLIC PANEL: CPT | Performed by: EMERGENCY MEDICINE

## 2022-09-01 RX ORDER — CEPHALEXIN 500 MG/1
500 CAPSULE ORAL EVERY 12 HOURS
Qty: 20 CAPSULE | Refills: 0 | Status: SHIPPED | OUTPATIENT
Start: 2022-09-01 | End: 2022-09-11

## 2022-09-01 RX ORDER — CLONIDINE HYDROCHLORIDE 0.1 MG/1
0.2 TABLET ORAL ONCE
Status: COMPLETED | OUTPATIENT
Start: 2022-09-01 | End: 2022-09-01

## 2022-09-01 RX ORDER — CARVEDILOL 3.12 MG/1
3.12 TABLET ORAL 2 TIMES DAILY
Status: DISCONTINUED | OUTPATIENT
Start: 2022-09-01 | End: 2022-09-01

## 2022-09-01 RX ORDER — CLONIDINE HYDROCHLORIDE 0.1 MG/1
0.2 TABLET ORAL
Status: DISCONTINUED | OUTPATIENT
Start: 2022-09-01 | End: 2022-09-01

## 2022-09-01 RX ORDER — CARVEDILOL 3.12 MG/1
3.12 TABLET ORAL 2 TIMES DAILY
Status: DISCONTINUED | OUTPATIENT
Start: 2022-09-01 | End: 2022-09-01 | Stop reason: HOSPADM

## 2022-09-01 RX ORDER — IPRATROPIUM BROMIDE AND ALBUTEROL SULFATE 2.5; .5 MG/3ML; MG/3ML
3 SOLUTION RESPIRATORY (INHALATION)
Status: COMPLETED | OUTPATIENT
Start: 2022-09-01 | End: 2022-09-01

## 2022-09-01 RX ADMIN — SODIUM CHLORIDE 1000 ML: 9 INJECTION, SOLUTION INTRAVENOUS at 03:09

## 2022-09-01 RX ADMIN — CEFTRIAXONE SODIUM 1 G: 1 INJECTION, POWDER, FOR SOLUTION INTRAMUSCULAR; INTRAVENOUS at 03:09

## 2022-09-01 RX ADMIN — CLONIDINE HYDROCHLORIDE 0.2 MG: 0.1 TABLET ORAL at 04:09

## 2022-09-01 RX ADMIN — IPRATROPIUM BROMIDE AND ALBUTEROL SULFATE 3 ML: .5; 3 SOLUTION RESPIRATORY (INHALATION) at 04:09

## 2022-09-01 RX ADMIN — CARVEDILOL 3.12 MG: 3.12 TABLET, FILM COATED ORAL at 04:09

## 2022-09-01 NOTE — ED PROVIDER NOTES
Encounter Date: 9/1/2022       History     Chief Complaint   Patient presents with    Hematuria    Rectal Bleeding     PT W CO HEMATURIA W URINARY FREQ ALONG W RECTAL BLEEDING AFTER STRAINING, BRITE RED X 4 DAYS AGO. HX OF HEMORRHOIDS.  CO LOWER ABD PAIN. NO NVD  OR FEVER.        Female  Problem  Primary symptoms include dysuria.     Primary symptoms include no discharge, no pelvic pain, no fever, no vaginal bleeding.   This is a new problem. The current episode started several days ago. The problem occurs intermittently. The problem has been waxing and waning. The symptoms occur during urination. She is not pregnant. Associated symptoms include abdominal pain. Pertinent negatives include no anorexia, no diaphoresis, no abdominal swelling, no nausea and no vomiting. She has tried nothing for the symptoms. Associated medical issues do not include STD, PID, vaginosis, hypermenorrhea, gynecological surgery or terminated pregnancy.   Review of patient's allergies indicates:  No Known Allergies  Past Medical History:   Diagnosis Date    Asthma     Cancer     CKD (chronic kidney disease)     Coronary artery disease     Diabetes mellitus     Hyperlipidemia     Hypertension     Obesity, unspecified     MOHSEN (obstructive sleep apnea)      Past Surgical History:   Procedure Laterality Date    CHOLECYSTECTOMY      COLON SURGERY      excision of lipoma      HYSTERECTOMY, VAGINAL, WITH SALPINGO-OOPHORECTOMY      NEPHRECTOMY Left     TUBAL LIGATION       Family History   Problem Relation Age of Onset    Hypertension Mother     Coronary artery disease Mother     Hyperlipidemia Mother     Stroke Father     Cancer Brother      Social History     Tobacco Use    Smoking status: Never    Smokeless tobacco: Never   Substance Use Topics    Alcohol use: Yes     Comment: frozen drinks once a month    Drug use: Never     Review of Systems   Constitutional:  Negative for diaphoresis and fever.   Gastrointestinal:  Positive for abdominal  pain. Negative for anorexia, nausea and vomiting.   Genitourinary:  Positive for dysuria. Negative for pelvic pain and vaginal bleeding.   All other systems reviewed and are negative.    Physical Exam     Initial Vitals [09/01/22 1222]   BP Pulse Resp Temp SpO2   (!) 174/94 73 16 97.9 °F (36.6 °C) 97 %      MAP       --         Physical Exam    Nursing note and vitals reviewed.  Constitutional: She appears well-developed and well-nourished. She is not diaphoretic. No distress.   HENT:   Head: Normocephalic and atraumatic.   Eyes: EOM are normal. Pupils are equal, round, and reactive to light.   Neck: Neck supple. No thyromegaly present. No tracheal deviation present. No JVD present.   Normal range of motion.  Cardiovascular:  Normal rate, regular rhythm and normal heart sounds.     Exam reveals no gallop and no friction rub.       No murmur heard.  Pulmonary/Chest: Breath sounds normal. No stridor. No respiratory distress. She has no wheezes. She has no rhonchi. She has no rales. She exhibits no tenderness.   Abdominal: Abdomen is soft. Bowel sounds are normal. She exhibits no distension and no mass. There is no abdominal tenderness. There is no rebound and no guarding.   Musculoskeletal:         General: No tenderness or edema. Normal range of motion.      Cervical back: Normal range of motion and neck supple.     Lymphadenopathy:     She has no cervical adenopathy.   Neurological: She is alert and oriented to person, place, and time. She has normal strength. She displays normal reflexes. No cranial nerve deficit or sensory deficit. GCS score is 15. GCS eye subscore is 4. GCS verbal subscore is 5. GCS motor subscore is 6.   Skin: Skin is warm and dry. Capillary refill takes less than 2 seconds. No abscess noted. No erythema. No pallor.   Psychiatric: She has a normal mood and affect. Thought content normal.       ED Course   Procedures  Labs Reviewed   COMPREHENSIVE METABOLIC PANEL - Abnormal; Notable for the  following components:       Result Value    Sodium Level 146 (*)     Carbon Dioxide 32 (*)     Glucose Level 139 (*)     Creatinine 1.47 (*)     Albumin Level 3.2 (*)     Globulin 4.4 (*)     Albumin/Globulin Ratio 0.7 (*)     All other components within normal limits   URINALYSIS, REFLEX TO URINE CULTURE - Abnormal; Notable for the following components:    Color, UA Light-Yellow (*)     Protein, UA Trace (*)     Glucose, UA 4+ (*)     Leukocyte Esterase,  (*)     WBC, UA 11-20 (*)     Bacteria, UA Trace (*)     Squamous Epithelial Cells, UA Moderate (*)     Mucous, UA Trace (*)     Unclassified Cast, UA 0-2 (*)     RBC, UA 6-10 (*)     All other components within normal limits   CBC WITH DIFFERENTIAL - Abnormal; Notable for the following components:    Hgb 11.9 (*)     MCV 77.1 (*)     MCH 22.3 (*)     MCHC 29.0 (*)     All other components within normal limits   LIPASE - Normal   CULTURE, URINE   BLOOD CULTURE [facility]   BLOOD CULTURE [facility]   CBC W/ AUTO DIFFERENTIAL    Narrative:     The following orders were created for panel order CBC auto differential.  Procedure                               Abnormality         Status                     ---------                               -----------         ------                     CBC with Differential[[id number]]        Abnormal            Final result                 Please view results for these tests on the individual orders.          Imaging Results    None          Medications   sodium chloride 0.9% bolus 1,000 mL (1,000 mLs Intravenous New Bag 9/1/22 1545)   cefTRIAXone (ROCEPHIN) 1 g in sodium chloride 0.9 % 50 mL IVPB (1 g Intravenous New Bag 9/1/22 1544)     Medical Decision Making:   History:   Old Medical Records: I decided to obtain old medical records.  Old Records Summarized: other records.       <> Summary of Records: Confirms history as related by patient  Initial Assessment:   Patient presents to the emergency department today with features most  compatible with a urinary tract infection, simple.  Risk found sufficient to warrant expanded evaluation with objective data.  Differential Diagnosis:   urinary tract infection.  Objective data found compatible with urinary tract infection.  Labs are otherwise unremarkable.  Patient is hemodynamically stable in the emergency department.  Clinical Tests:   Lab Tests: Ordered and Reviewed  ED Management:  Clinical course in the emergency department remains reassuring.  Patient is dosed with IV ceftriaxone.  She is discharged home with p.o. antibiotics, anticipatory guidance, return precautions, follow-up instructions.             ED Course as of 09/01/22 1548   Thu Sep 01, 2022   1447 Reassuring hemogram ; [CT]   1448 Ua contaminated though compatible with uti ; considerable contamination ; [CT]   1450 Creatinine(!): 1.47 [CT]   1451 Reassuring chemistries ; [CT]   1451 Reassuring lipase ; [CT]      ED Course User Index  [CT] Segundo Zamorano MD             Clinical Impression:   Final diagnoses:  [N30.00] Acute cystitis without hematuria (Primary)      ED Disposition Condition    Discharge Stable          ED Prescriptions       Medication Sig Dispense Start Date End Date Auth. Provider    cephALEXin (KEFLEX) 500 MG capsule Take 1 capsule (500 mg total) by mouth every 12 (twelve) hours. for 10 days 20 capsule 9/1/2022 9/11/2022 Segundo Zamorano MD          Follow-up Information       Follow up With Specialties Details Why Contact Info    [facility] Emergency Medicine  As needed, If symptoms worsen [address] [facility] [id number] [phone]             Segundo Zamorano MD  09/01/22 4220

## 2022-09-04 LAB — BACTERIA UR CULT: NORMAL

## 2022-09-07 LAB
BACTERIA BLD CULT: NORMAL
BACTERIA BLD CULT: NORMAL

## 2022-09-15 ENCOUNTER — HISTORICAL (OUTPATIENT)
Dept: ADMINISTRATIVE | Facility: HOSPITAL | Age: 67
End: 2022-09-15
Payer: MEDICARE

## 2022-09-22 RX ORDER — ALBUTEROL SULFATE 90 UG/1
1 AEROSOL, METERED RESPIRATORY (INHALATION) EVERY 4 HOURS PRN
Qty: 18 G | Refills: 3 | Status: SHIPPED | OUTPATIENT
Start: 2022-09-22 | End: 2023-01-24 | Stop reason: SDUPTHER

## 2022-09-28 ENCOUNTER — NUTRITION (OUTPATIENT)
Dept: NUTRITION | Facility: HOSPITAL | Age: 67
End: 2022-09-28
Payer: MEDICARE

## 2022-09-28 DIAGNOSIS — N28.9 KIDNEY DISORDER: Primary | ICD-10-CM

## 2022-09-28 NOTE — PROGRESS NOTES
RENAL NUTRITION CLASS      Date: 2022    Patient Name: Elissa Freeman    : 1955        Nutrition Diagnosis     Problem:   Food & Nutrition Related Knowledge Deficit  Related to:  Medical Diagnosis   As Evidenced by:  Limited prior knowledge / health professional referral      Nutrition Prescription / Intervention     MNT Education Completed on: Renal     Instructed patient on protein food groups (right amount and type); low sodium foods; low potassium foods; low phosphorus foods; portion sizes / measuring food; reading food labels; measuring fluid; cooking healthier meals. Pt provided with estimated needs via email. All questions answered; contact information provided.    Estimated Needs:  Calorie Needs: 1049-8324 kcals (11 kcal/kg of CBW)  Carbohydrate Needs: 190g/day  Protein Needs: 50-60g (1.0 g/kg of IBW -- 54.5kg)        Level of Understanding:  __x___ GOOD  /   _____ FAIR   /   _____ POOR      Expected Compliance:  __x___ GOOD  /   _____ FAIR   /   _____ POOR        Barriers to Learning: None Evident     Appropriate Handouts Given: Renal Nutrition Class Booklet         Nutrition Prescription:  Diet order prescribed per MD / RD          Goal:  Patient will adhere to prescribed MNT meal plan as instructed by RD          Monitoring / Evaluation     R.D. will monitor: PRN             If there are any questions or concerns, contact Nutrition Services staff at [phone].    Thank you,    Tari Tucker, MS, RDN, LDN

## 2022-10-14 DIAGNOSIS — N18.32 CKD STAGE G3B/A2, GFR 30-44 AND ALBUMIN CREATININE RATIO 30-299 MG/G: ICD-10-CM

## 2022-10-14 RX ORDER — POTASSIUM CHLORIDE 1500 MG/1
20 TABLET, EXTENDED RELEASE ORAL DAILY
Qty: 90 TABLET | Refills: 0 | Status: SHIPPED | OUTPATIENT
Start: 2022-10-14 | End: 2023-02-03 | Stop reason: SDUPTHER

## 2022-10-30 ENCOUNTER — HOSPITAL ENCOUNTER (EMERGENCY)
Facility: HOSPITAL | Age: 67
Discharge: HOME | End: 2022-10-30
Attending: INTERNAL MEDICINE
Payer: MEDICARE

## 2022-10-30 VITALS
HEART RATE: 64 BPM | OXYGEN SATURATION: 96 % | RESPIRATION RATE: 16 BRPM | SYSTOLIC BLOOD PRESSURE: 193 MMHG | WEIGHT: 293 LBS | HEIGHT: 64 IN | BODY MASS INDEX: 50.02 KG/M2 | TEMPERATURE: 98 F | DIASTOLIC BLOOD PRESSURE: 107 MMHG

## 2022-10-30 DIAGNOSIS — I10 UNCONTROLLED HYPERTENSION: ICD-10-CM

## 2022-10-30 DIAGNOSIS — R10.9 ABDOMINAL DISCOMFORT: ICD-10-CM

## 2022-10-30 DIAGNOSIS — N12 PYELONEPHRITIS: Primary | ICD-10-CM

## 2022-10-30 LAB
ALBUMIN SERPL-MCNC: 3.1 GM/DL (ref 3.4–4.8)
ALBUMIN/GLOB SERPL: 1 RATIO (ref 1.1–2)
ALP SERPL-CCNC: 159 UNIT/L (ref 40–150)
ALT SERPL-CCNC: 33 UNIT/L (ref 0–55)
APPEARANCE UR: ABNORMAL
AST SERPL-CCNC: 42 UNIT/L (ref 5–34)
BACTERIA #/AREA URNS AUTO: ABNORMAL /HPF
BASOPHILS # BLD AUTO: 0.03 X10(3)/MCL (ref 0–0.2)
BASOPHILS NFR BLD AUTO: 0.6 %
BILIRUB UR QL STRIP.AUTO: NEGATIVE MG/DL
BILIRUBIN DIRECT+TOT PNL SERPL-MCNC: 0.5 MG/DL
BUN SERPL-MCNC: 14.7 MG/DL (ref 9.8–20.1)
CALCIUM SERPL-MCNC: 8.7 MG/DL (ref 8.4–10.2)
CHLORIDE SERPL-SCNC: 109 MMOL/L (ref 98–107)
CO2 SERPL-SCNC: 25 MMOL/L (ref 23–31)
COLOR UR AUTO: YELLOW
CREAT SERPL-MCNC: 1.35 MG/DL (ref 0.55–1.02)
EOSINOPHIL # BLD AUTO: 0.12 X10(3)/MCL (ref 0–0.9)
EOSINOPHIL NFR BLD AUTO: 2.3 %
ERYTHROCYTE [DISTWIDTH] IN BLOOD BY AUTOMATED COUNT: 15.4 % (ref 11.5–17)
GFR SERPLBLD CREATININE-BSD FMLA CKD-EPI: 44 MLS/MIN/1.73/M2
GLOBULIN SER-MCNC: 3.2 GM/DL (ref 2.4–3.5)
GLUCOSE SERPL-MCNC: 102 MG/DL (ref 82–115)
GLUCOSE UR QL STRIP.AUTO: ABNORMAL MG/DL
HCT VFR BLD AUTO: 41.1 % (ref 37–47)
HGB BLD-MCNC: 11.3 GM/DL (ref 12–16)
HYALINE CASTS #/AREA URNS LPF: ABNORMAL /LPF
IMM GRANULOCYTES # BLD AUTO: 0.02 X10(3)/MCL (ref 0–0.04)
IMM GRANULOCYTES NFR BLD AUTO: 0.4 %
KETONES UR QL STRIP.AUTO: NEGATIVE MG/DL
LEUKOCYTE ESTERASE UR QL STRIP.AUTO: 500 UNIT/L
LIPASE SERPL-CCNC: 9 U/L
LYMPHOCYTES # BLD AUTO: 1.8 X10(3)/MCL (ref 0.6–4.6)
LYMPHOCYTES NFR BLD AUTO: 35.2 %
MCH RBC QN AUTO: 21.6 PG (ref 27–31)
MCHC RBC AUTO-ENTMCNC: 27.5 MG/DL (ref 33–36)
MCV RBC AUTO: 78.6 FL (ref 80–94)
MONOCYTES # BLD AUTO: 0.49 X10(3)/MCL (ref 0.1–1.3)
MONOCYTES NFR BLD AUTO: 9.6 %
MUCOUS THREADS URNS QL MICRO: ABNORMAL /LPF
NEUTROPHILS # BLD AUTO: 2.7 X10(3)/MCL (ref 2.1–9.2)
NEUTROPHILS NFR BLD AUTO: 51.9 %
NITRITE UR QL STRIP.AUTO: NEGATIVE
NRBC BLD AUTO-RTO: 0 %
PH UR STRIP.AUTO: 5.5 [PH]
PLATELET # BLD AUTO: 217 X10(3)/MCL (ref 130–400)
PMV BLD AUTO: 11.1 FL (ref 7.4–10.4)
POTASSIUM SERPL-SCNC: 4.6 MMOL/L (ref 3.5–5.1)
PROT SERPL-MCNC: 6.3 GM/DL (ref 5.8–7.6)
PROT UR QL STRIP.AUTO: ABNORMAL MG/DL
RBC # BLD AUTO: 5.23 X10(6)/MCL (ref 4.2–5.4)
RBC #/AREA URNS AUTO: ABNORMAL /HPF
RBC UR QL AUTO: ABNORMAL UNIT/L
SODIUM SERPL-SCNC: 142 MMOL/L (ref 136–145)
SP GR UR STRIP.AUTO: 1.03
SQUAMOUS #/AREA URNS LPF: ABNORMAL /HPF
TROPONIN I SERPL-MCNC: 0.02 NG/ML (ref 0–0.04)
UROBILINOGEN UR STRIP-ACNC: NORMAL MG/DL
WBC # SPEC AUTO: 5.1 X10(3)/MCL (ref 4.5–11.5)
WBC #/AREA URNS AUTO: ABNORMAL /HPF

## 2022-10-30 PROCEDURE — 80053 COMPREHEN METABOLIC PANEL: CPT | Performed by: INTERNAL MEDICINE

## 2022-10-30 PROCEDURE — 85025 COMPLETE CBC W/AUTO DIFF WBC: CPT | Performed by: INTERNAL MEDICINE

## 2022-10-30 PROCEDURE — 84484 ASSAY OF TROPONIN QUANT: CPT | Performed by: INTERNAL MEDICINE

## 2022-10-30 PROCEDURE — 99285 EMERGENCY DEPT VISIT HI MDM: CPT | Mod: 25

## 2022-10-30 PROCEDURE — 93005 ELECTROCARDIOGRAM TRACING: CPT

## 2022-10-30 PROCEDURE — 36415 COLL VENOUS BLD VENIPUNCTURE: CPT | Performed by: INTERNAL MEDICINE

## 2022-10-30 PROCEDURE — 96372 THER/PROPH/DIAG INJ SC/IM: CPT | Performed by: INTERNAL MEDICINE

## 2022-10-30 PROCEDURE — 83690 ASSAY OF LIPASE: CPT | Performed by: INTERNAL MEDICINE

## 2022-10-30 PROCEDURE — 63600175 PHARM REV CODE 636 W HCPCS: Performed by: INTERNAL MEDICINE

## 2022-10-30 PROCEDURE — 81001 URINALYSIS AUTO W/SCOPE: CPT | Performed by: INTERNAL MEDICINE

## 2022-10-30 RX ORDER — KETOROLAC TROMETHAMINE 30 MG/ML
30 INJECTION, SOLUTION INTRAMUSCULAR; INTRAVENOUS
Status: COMPLETED | OUTPATIENT
Start: 2022-10-30 | End: 2022-10-30

## 2022-10-30 RX ORDER — DICLOFENAC SODIUM 50 MG/1
50 TABLET, DELAYED RELEASE ORAL 2 TIMES DAILY PRN
Qty: 12 TABLET | Refills: 0 | Status: SHIPPED | OUTPATIENT
Start: 2022-10-30 | End: 2023-09-27 | Stop reason: SDUPTHER

## 2022-10-30 RX ORDER — CIPROFLOXACIN 500 MG/1
500 TABLET ORAL 2 TIMES DAILY
Qty: 14 TABLET | Refills: 0 | Status: SHIPPED | OUTPATIENT
Start: 2022-10-30 | End: 2022-11-06

## 2022-10-30 RX ADMIN — KETOROLAC TROMETHAMINE 30 MG: 30 INJECTION, SOLUTION INTRAMUSCULAR at 02:10

## 2022-10-30 NOTE — ED PROVIDER NOTES
Encounter Date: 10/30/2022       History     Chief Complaint   Patient presents with    Abdominal Pain     RUQ intermittent pain x 3 days, pt has had cholecystectomy.    Hypertension     Incidental htn SBP> 200 in triage, denies additional sx.     Presents with RUQ abdominal pain for few days    The history is provided by the patient.   Abdominal Pain  The current episode started two days ago. The onset of the illness was gradual. The abdominal pain is located in the RUQ and epigastric region. The abdominal pain does not radiate. The severity of the abdominal pain is 6/10. The abdominal pain is relieved by nothing. The other symptoms of the illness include nausea. The other symptoms of the illness do not include fever, jaundice, shortness of breath, vomiting or dysuria.   The patient states that she believes she is currently not pregnant. The patient has not had a change in bowel habit. Symptoms associated with the illness do not include chills, diaphoresis, constipation, hematuria or back pain. Significant associated medical issues include gallstones (s/p cholecystectomy).   Review of patient's allergies indicates:  No Known Allergies  Past Medical History:   Diagnosis Date    Asthma     Cancer     CKD (chronic kidney disease)     Coronary artery disease     Diabetes mellitus     Hyperlipidemia     Hypertension     Obesity, unspecified     MOHSEN (obstructive sleep apnea)      Past Surgical History:   Procedure Laterality Date    CHOLECYSTECTOMY      COLON SURGERY      excision of lipoma      HYSTERECTOMY, VAGINAL, WITH SALPINGO-OOPHORECTOMY      NEPHRECTOMY Left     TUBAL LIGATION       Family History   Problem Relation Age of Onset    Hypertension Mother     Coronary artery disease Mother     Hyperlipidemia Mother     Stroke Father     Cancer Brother      Social History     Tobacco Use    Smoking status: Never    Smokeless tobacco: Never   Substance Use Topics    Alcohol use: Yes     Comment: frozen drinks once a  month    Drug use: Never     Review of Systems   Constitutional:  Negative for chills, diaphoresis and fever.   HENT:  Negative for sore throat.    Respiratory:  Negative for shortness of breath.    Cardiovascular:  Negative for chest pain.   Gastrointestinal:  Positive for abdominal pain and nausea. Negative for constipation, jaundice and vomiting.   Genitourinary:  Negative for dysuria and hematuria.   Musculoskeletal:  Negative for back pain.   Skin:  Negative for rash.   Neurological:  Negative for weakness.   Hematological:  Does not bruise/bleed easily.   All other systems reviewed and are negative.    Physical Exam     Initial Vitals [10/30/22 1354]   BP Pulse Resp Temp SpO2   (!) 210/93 71 16 97.7 °F (36.5 °C) 96 %      MAP       --         Physical Exam    Nursing note and vitals reviewed.  Constitutional: She appears well-developed.   HENT:   Head: Normocephalic and atraumatic.   Mouth/Throat: Oropharynx is clear and moist.   Eyes: Conjunctivae are normal. Pupils are equal, round, and reactive to light.   Neck: Neck supple.   Normal range of motion.  Cardiovascular:  Normal rate, regular rhythm, normal heart sounds and intact distal pulses.           Pulmonary/Chest: Breath sounds normal.   Abdominal: Abdomen is soft. Bowel sounds are normal. She exhibits no distension. There is abdominal tenderness (epigastric, RUQ; Negative Cason). There is no rebound and no guarding.   Musculoskeletal:         General: No edema. Normal range of motion.      Cervical back: Normal range of motion and neck supple.     Neurological: She is alert and oriented to person, place, and time. She has normal strength.   Skin: Skin is warm and dry. No rash noted.   Psychiatric: Her behavior is normal.       ED Course   Procedures  Labs Reviewed   COMPREHENSIVE METABOLIC PANEL - Abnormal; Notable for the following components:       Result Value    Chloride 109 (*)     Creatinine 1.35 (*)     Albumin Level 3.1 (*)      Albumin/Globulin Ratio 1.0 (*)     Alkaline Phosphatase 159 (*)     Aspartate Aminotransferase 42 (*)     All other components within normal limits   URINALYSIS - Abnormal; Notable for the following components:    Appearance, UA Turbid (*)     Protein, UA 1+ (*)     Glucose, UA 4+ (*)     Blood, UA Trace (*)     Leukocyte Esterase,  (*)     Bacteria, UA Trace (*)     Squamous Epithelial Cells, UA Moderate (*)     Mucous, UA Trace (*)     All other components within normal limits   CBC WITH DIFFERENTIAL - Abnormal; Notable for the following components:    Hgb 11.3 (*)     MCV 78.6 (*)     MCH 21.6 (*)     MCHC 27.5 (*)     MPV 11.1 (*)     All other components within normal limits   LIPASE - Normal   TROPONIN I - Normal   CBC W/ AUTO DIFFERENTIAL    Narrative:     The following orders were created for panel order CBC auto differential.  Procedure                               Abnormality         Status                     ---------                               -----------         ------                     CBC with Differential[[id number]]        Abnormal            Final result                 Please view results for these tests on the individual orders.   EXTRA TUBES    Narrative:     The following orders were created for panel order EXTRA TUBES.  Procedure                               Abnormality         Status                     ---------                               -----------         ------                     Light Blue Top Hold[[id number]]                              In process                   Please view results for these tests on the individual orders.   LIGHT BLUE TOP HOLD     EKG Readings: (Independently Interpreted)   Initial Reading: No STEMI. Rhythm: Normal Sinus Rhythm. Heart Rate: 70. Ectopy: No Ectopy Rare PVCs. Conduction: Normal. ST Segments: Normal ST Segments. T Waves Flipped: I and AVL. Axis: Left Axis Deviation. Clinical Impression: Normal Sinus Rhythm   ECG Results               EKG 12-lead (Final result)  Result time 11/01/22 16:03:41      Final result by Interface, Lab In [facility] (11/01/22 16:03:41)                   Narrative:    Test Reason : R10.9,    Vent. Rate : 069 BPM     Atrial Rate : 069 BPM     P-R Int : 156 ms          QRS Dur : 096 ms      QT Int : 416 ms       P-R-T Axes : 037 -47 098 degrees     QTc Int : 445 ms    Sinus rhythm with occasional Premature ventricular complexes and Premature  atrial complexes  Low voltage QRS  Incomplete right bundle branch block  Left anterior fascicular block  Possible Anterolateral infarct ,age undetermined  Abnormal ECG  No previous ECGs available  Confirmed by Wu Taylor MD ([id number]) on 11/1/2022 4:03:34 PM    Referred By: [email or web address]   SELF           Confirmed By:Wu Taylor MD                                     EKG 12-LEAD (Final result)  Result time 11/03/22 15:28:14      Final result by Unknown User (11/03/22 15:28:14)                                      Imaging Results              X-Ray Chest PA And Lateral (Final result)  Result time 10/30/22 14:51:56      Final result by Epifanio Hoffmann MD (10/30/22 14:51:56)                   Impression:      No acute cardiopulmonary process.      Electronically signed by: Epifanio Hoffmann  Date:    10/30/2022  Time:    14:51               Narrative:    EXAMINATION:  XR CHEST PA AND LATERAL    CLINICAL HISTORY:  Unspecified abdominal pain    TECHNIQUE:  Two views of the chest    COMPARISON:  03/01/2019    FINDINGS:  No focal opacification, pleural effusion, or pneumothorax.    The cardiomediastinal silhouette remains enlarged.    No acute osseous abnormality.                                       Medications   ketorolac injection 30 mg (30 mg Intramuscular Given 10/30/22 1430)                              Clinical Impression:   Final diagnoses:  [R10.9] Abdominal discomfort  [N12] Pyelonephritis (Primary)  [I10] Uncontrolled hypertension      ED Disposition Condition    Discharge Stable           ED Prescriptions       Medication Sig Dispense Start Date End Date Auth. Provider    ciprofloxacin HCl (CIPRO) 500 MG tablet () Take 1 tablet (500 mg total) by mouth 2 (two) times daily. for 7 days 14 tablet 10/30/2022 2022 John Hassan MD    diclofenac (VOLTAREN) 50 MG EC tablet Take 1 tablet (50 mg total) by mouth 2 (two) times daily as needed (Pain). 12 tablet 10/30/2022 -- John Hassan MD          Follow-up Information       Follow up With Specialties Details Why Contact Info    Juan Jose Urena, DO Internal Medicine In 1 week  [address] [facility] [address]  [phone]      [facility] Emergency Medicine  If symptoms worsen [address] [facility] [address] [phone]             John Hassan MD  10/30/22 1516       John Hassan MD  10/30/22 4086       John Hassan MD  [phone]

## 2022-11-11 ENCOUNTER — OFFICE VISIT (OUTPATIENT)
Dept: INTERNAL MEDICINE | Facility: CLINIC | Age: 67
End: 2022-11-11
Payer: MEDICARE

## 2022-11-11 VITALS
WEIGHT: 293 LBS | BODY MASS INDEX: 50.02 KG/M2 | SYSTOLIC BLOOD PRESSURE: 165 MMHG | DIASTOLIC BLOOD PRESSURE: 85 MMHG | HEART RATE: 75 BPM | RESPIRATION RATE: 20 BRPM | TEMPERATURE: 98 F | HEIGHT: 64 IN

## 2022-11-11 DIAGNOSIS — G62.9 NEUROPATHY: Primary | ICD-10-CM

## 2022-11-11 DIAGNOSIS — H26.9 CATARACT, UNSPECIFIED CATARACT TYPE, UNSPECIFIED LATERALITY: Primary | ICD-10-CM

## 2022-11-11 PROCEDURE — 99215 OFFICE O/P EST HI 40 MIN: CPT | Mod: PBBFAC

## 2022-11-11 RX ORDER — KETOROLAC TROMETHAMINE 5 MG/ML
1 SOLUTION OPHTHALMIC NIGHTLY
COMMUNITY
Start: 2022-11-09 | End: 2023-06-06

## 2022-11-11 RX ORDER — NITROGLYCERIN 0.3 MG/1
0.3 TABLET SUBLINGUAL EVERY 5 MIN PRN
COMMUNITY
End: 2023-02-16 | Stop reason: SDUPTHER

## 2022-11-11 RX ORDER — PREDNISOLONE ACETATE 10 MG/ML
1 SUSPENSION/ DROPS OPHTHALMIC DAILY
COMMUNITY
Start: 2022-11-09 | End: 2023-06-06

## 2022-11-11 RX ORDER — OFLOXACIN 3 MG/ML
SOLUTION/ DROPS OPHTHALMIC
COMMUNITY
Start: 2022-11-09 | End: 2023-06-06

## 2022-11-11 NOTE — PROGRESS NOTES
[facility] Pre-op [facility]  OUTPATIENT OFFICE VISIT NOTE       Chief Complaint: Pre-op Exam (Need clearance for cataract surgery)       HPI: Elissa Freeman is a 67 y.o. yo female with  has a past medical history of Asthma, Cancer, CKD (chronic kidney disease), Coronary artery disease, Diabetes mellitus, Hyperlipidemia, Hypertension, Obesity, unspecified, and MOHSEN (obstructive sleep apnea)., who presents for  surgery clearance for cataract surgery.  Patient reports history of HTN and asthma. She was hypertensive in the clinic today 165/85. However, patient has had previous BP readings this high during her other clinic visits. She says she keeps track of her BP at home and that it is normally in the 130s/70s. She says her BP only runs high due to nervousness of being in the clinics. She reports compliance with all of her medications. Patient also has history of asthma. She is reporting glucose normally 130s at home. Most recent reading was 102 a week ago. Reports compliance with home insulin and glucose medications. Does not have any other complaints at this time.       Past Medical History:   has a past medical history of Asthma, Cancer, CKD (chronic kidney disease), Coronary artery disease, Diabetes mellitus, Hyperlipidemia, Hypertension, Obesity, unspecified, and MOHSEN (obstructive sleep apnea).     Past Surgical History:   has a past surgical history that includes Colon surgery; Nephrectomy (Left); Cholecystectomy; hysterectomy, vaginal, with salpingo-oophorectomy; Tubal ligation; and excision of lipoma.     Family History:  family history includes Cancer in her brother; Coronary artery disease in her mother; Hyperlipidemia in her mother; Hypertension in her mother; Stroke in her father.     Social History:   reports that she has never smoked. She has never used smokeless tobacco. She reports current alcohol use. She reports that she does not use drugs.     Allergies:  has No Known Allergies.     Home Medications:  Prior to  "Admission medications    Medication Sig Start Date End Date Taking? Authorizing Provider   albuterol (PROVENTIL/VENTOLIN HFA) 90 mcg/actuation inhaler Inhale 1 puff into the lungs every 4 (four) hours as needed for Wheezing. 9/22/22  Yes Juan Jose Urena, DO   aspirin (ECOTRIN) 81 MG EC tablet Take 81 mg by mouth once daily.   Yes Historical Provider   atorvastatin (LIPITOR) 40 MG tablet Take 1 tablet (40 mg total) by mouth once daily. 5/16/22  Yes Juan Jose Urena, DO   BD DAVONTE 2ND GEN PEN NEEDLE 32 gauge x 5/32" Ndle USE TO INJECT INSULIN FOUR TIMES DAILY AS DIRECTED 4/16/22  Yes Historical Provider   carvediloL (COREG) 3.125 MG tablet Take 1 tablet (3.125 mg total) by mouth 2 (two) times daily. 8/12/22  Yes Juan Jose Urena DO   cloNIDine (CATAPRES) 0.2 MG tablet Take 0.5 tablets (0.1 mg total) by mouth 3 (three) times daily.  Patient taking differently: Take 0.1 mg by mouth 3 (three) times daily. Takes 2 times per day. Takes whole tablet 5/16/22  Yes Juan Jose Urena DO   diclofenac (VOLTAREN) 50 MG EC tablet Take 1 tablet (50 mg total) by mouth 2 (two) times daily as needed (Pain). 10/30/22  Yes John Hassan MD   diltiaZEM (CARDIZEM CD) 180 MG 24 hr capsule Take 1 capsule (180 mg total) by mouth once daily. 5/16/22  Yes Juan Jose Urena DO   DULoxetine (CYMBALTA) 60 MG capsule TAKE 1 CAPSULE BY MOUTH DAILY. DO NOT CRUSH OR CHEW 5/16/22  Yes Juan Jose Urena DO   empagliflozin (JARDIANCE) 25 mg tablet Take 1 tablet (25 mg total) by mouth once daily. 10/14/22 10/14/23 Yes HEATHER Meraz   exenatide microspheres (BYDUREON BCISE) 2 mg/0.85 mL AtIn Inject 2 mg into the skin every 7 days. 8/31/22  Yes Juan Jose Urena DO   flash glucose scanning reader (FREESTYLE SMITHA 2 READER) Misc 1 each by Misc.(Non-Drug; Combo Route) route 4 (four) times daily with meals and nightly. 8/1/22  Yes Juan Jose Urena, DO   flash glucose sensor (FREESTYLE SMITHA 2 SENSOR) Kit 1 each by " Misc.(Non-Drug; Combo Route) route 2 hours after meals and at bedtime. 8/1/22  Yes Juan Jose Urena DO   furosemide (LASIX) 20 MG tablet Take 20 mg by mouth once daily. 2/14/22 2/9/23 Yes Historical Provider   gabapentin (NEURONTIN) 600 MG tablet Take 1 tablet (600 mg total) by mouth once daily. 5/16/22  Yes Juan Jose Urena DO   insulin aspart U-100 (NOVOLOG) 100 unit/mL (3 mL) InPn pen Inject 20 Units into the skin 3 (three) times daily with meals. Plus 25 units at night 8/17/22 2/13/23 Yes HEATHER Meraz   ketorolac 0.5% (ACULAR) 0.5 % Drop Place into both eyes. 11/9/22  Yes Historical Provider   latanoprost 0.005 % ophthalmic solution Place 1 drop into both eyes 2 (two) times daily.  Patient taking differently: Place 1 drop into both eyes 2 (two) times daily. Takes at night 5/16/22  Yes Juan Jose Urena DO   LEVEMIR FLEXTOUCH U-100 INSULN 100 unit/mL (3 mL) InPn pen Inject 55 Units into the skin every evening. 8/2/22  Yes Juan Jose Urena DO   losartan-hydrochlorothiazide 50-12.5 mg (HYZAAR) 50-12.5 mg per tablet Take 1 tablet by mouth once daily. 5/16/22  Yes JuanJ ose Urena DO   nitroGLYCERIN (NITROSTAT) 0.3 MG SL tablet Place 0.3 mg under the tongue every 5 (five) minutes as needed for Chest pain.   Yes Historical Provider   ofloxacin (OCUFLOX) 0.3 % ophthalmic solution  11/9/22  Yes Historical Provider   potassium chloride (K-TAB) 20 mEq Take 1 tablet (20 mEq total) by mouth once daily. 10/14/22  Yes HEATHER Meraz   prednisoLONE acetate (PRED FORTE) 1 % DrpS Place into both eyes. 11/9/22  Yes Historical Provider   vitamin D (VITAMIN D3) 1000 units Tab Take 2 tablets (2,000 Units total) by mouth Daily. 5/16/22  Yes Juan Jose Urena DO       ROS:  Constitutional: no fever, fatigue, weakness  Eye: no vision loss, eye redness, drainage, or pain  ENMT: no sore throat, ear pain, sinus pain/congestion, nasal congestion/drainage  Respiratory: no cough, no wheezing, no shortness of  "breath  Cardiovascular: no chest pain, no palpitations, no edema  Gastrointestinal: no nausea, vomiting, or diarrhea. No abdominal pain  Genitourinary: no dysuria, no urinary frequency or urgency, no hematuria  Hema/Lymph: no abnormal bruising or bleeding  Endocrine: no heat or cold intolerance, no excessive thirst or excessive urination  Musculoskeletal: no muscle or joint pain, no joint swelling  Integumentary: no skin rash or abnormal lesion  Neurologic: no headache, no dizziness, no weakness or numbness    OBJECTIVE:     Vital signs:   BP (!) 165/85 (BP Location: Left arm, Patient Position: Sitting, BP Method: Large (Automatic))   Pulse 75   Temp 98.3 °F (36.8 °C) (Oral)   Resp 20   Ht 5' 4" (1.626 m)   Wt (!) 145.2 kg (320 lb 3.2 oz)   BMI 54.96 kg/m²      Physical Examination:  Physical Exam  Constitutional:       Appearance: Normal appearance.   HENT:      Head: Normocephalic.      Mouth/Throat:      Mouth: Mucous membranes are moist.      Pharynx: Oropharynx is clear.   Eyes:      Conjunctiva/sclera: Conjunctivae normal.      Pupils: Pupils are equal, round, and reactive to light.   Cardiovascular:      Rate and Rhythm: Normal rate and regular rhythm.      Pulses: Normal pulses.      Heart sounds: No murmur heard.  Pulmonary:      Effort: Pulmonary effort is normal. No respiratory distress.   Chest:      Chest wall: No tenderness.   Abdominal:      General: Abdomen is flat. Bowel sounds are normal. There is no distension.      Palpations: Abdomen is soft.      Tenderness: There is no abdominal tenderness.   Musculoskeletal:         General: Normal range of motion.      Cervical back: Normal range of motion.   Skin:     General: Skin is warm.   Neurological:      General: No focal deficit present.      Mental Status: She is alert and oriented to person, place, and time.        Labs:  Lab Results   Component Value Date    WBC 5.1 10/30/2022    HGB 11.3 (L) 10/30/2022    HCT 41.1 10/30/2022     " 10/30/2022    MCV 78.6 (L) 10/30/2022    RDW 15.4 10/30/2022    Lab Results   Component Value Date     10/30/2022    K 4.6 10/30/2022    CO2 25 10/30/2022    BUN 14.7 10/30/2022    CREATININE 1.35 (H) 10/30/2022    CALCIUM 8.7 10/30/2022    MG 1.7 (L) 09/05/2018    PHOS 3.5 09/05/2018      Lab Results   Component Value Date    HGBA1C 10.5 (H) 08/01/2022    .7 08/01/2022    CREATININE 1.35 (H) 10/30/2022    CREATRANDUR 86.5 08/01/2022    CREATRANDUR 85.4 08/01/2022    PROTEINURINE 25.1 08/01/2022    Lab Results   Component Value Date    TSH 2.1901 08/01/2022    IIPSNT2AWNL 0.98 04/25/2018                  ASSESSMENT & PLAN:     Cataracts  -Presents to clinic for cataract surgery clearance  -Hx of HTN, Asthma, RCC s/p nephrectomy, colon cancer s/p hemicolectomy   -Revised Cardiac Risk index score 1; Class II risk with 6% risk of 30 adverse cardiac event after surgery  -Reports white coat HTN with good BP control at home; brings logs to PCP  -Reports compliance with home insulin and diabetes medications; continue to follow with PCP for control  -Patient is medium risk for low risk procedure  -Patient is cleared for surgery at this time      Labs ordered: N/A  Imaging: N/A  Medications: reconciled, discussed and refills given.      Fam Hurtado MD  [facility] Internal Medicine, HO-1

## 2022-11-13 RX ORDER — GABAPENTIN 600 MG/1
600 TABLET ORAL DAILY
Qty: 90 TABLET | Refills: 1 | Status: SHIPPED | OUTPATIENT
Start: 2022-11-13 | End: 2023-06-06 | Stop reason: SDUPTHER

## 2022-11-16 DIAGNOSIS — I10 PRIMARY HYPERTENSION: ICD-10-CM

## 2022-11-16 RX ORDER — CARVEDILOL 3.12 MG/1
3.12 TABLET ORAL 2 TIMES DAILY
Qty: 180 TABLET | Refills: 0 | Status: SHIPPED | OUTPATIENT
Start: 2022-11-16 | End: 2023-06-06 | Stop reason: SDUPTHER

## 2022-11-17 ENCOUNTER — HOSPITAL ENCOUNTER (OUTPATIENT)
Dept: RADIOLOGY | Facility: HOSPITAL | Age: 67
Discharge: HOME | End: 2022-11-17
Attending: STUDENT IN AN ORGANIZED HEALTH CARE EDUCATION/TRAINING PROGRAM
Payer: MEDICARE

## 2022-11-17 DIAGNOSIS — Z12.39 ENCOUNTER FOR SCREENING FOR MALIGNANT NEOPLASM OF BREAST, UNSPECIFIED SCREENING MODALITY: ICD-10-CM

## 2022-11-17 PROCEDURE — 77067 SCR MAMMO BI INCL CAD: CPT | Mod: 26,,, | Performed by: RADIOLOGY

## 2022-11-17 PROCEDURE — 77067 SCR MAMMO BI INCL CAD: CPT | Mod: TC

## 2022-11-17 PROCEDURE — 77067 MAMMO DIGITAL SCREENING BILAT: ICD-10-PCS | Mod: 26,,, | Performed by: RADIOLOGY

## 2022-11-22 ENCOUNTER — TELEPHONE (OUTPATIENT)
Dept: INTERNAL MEDICINE | Facility: CLINIC | Age: 67
End: 2022-11-22
Payer: MEDICARE

## 2022-11-22 NOTE — TELEPHONE ENCOUNTER
----- Message from Juan Jose Urena,  sent at 11/21/2022  7:05 PM CST -----  Hello,    Please inform Ms. Freeman her mammogram is negative and we will repeat screening in 1 year. Thank you!    Juan Jose Urena, DO

## 2022-11-22 NOTE — TELEPHONE ENCOUNTER
Pt called, name and  verified. Pt result of mammogram , negative. Pt verbalized 100% understanding. No further questions or concerns noted. Call ended.

## 2022-11-23 ENCOUNTER — OFFICE VISIT (OUTPATIENT)
Dept: INTERNAL MEDICINE | Facility: CLINIC | Age: 67
End: 2022-11-23
Payer: MEDICARE

## 2022-11-23 ENCOUNTER — CLINICAL SUPPORT (OUTPATIENT)
Dept: INTERNAL MEDICINE | Facility: CLINIC | Age: 67
End: 2022-11-23
Attending: STUDENT IN AN ORGANIZED HEALTH CARE EDUCATION/TRAINING PROGRAM
Payer: MEDICARE

## 2022-11-23 VITALS
DIASTOLIC BLOOD PRESSURE: 93 MMHG | SYSTOLIC BLOOD PRESSURE: 154 MMHG | TEMPERATURE: 99 F | WEIGHT: 293 LBS | BODY MASS INDEX: 50.02 KG/M2 | HEIGHT: 64 IN | HEART RATE: 71 BPM | RESPIRATION RATE: 18 BRPM

## 2022-11-23 DIAGNOSIS — K92.2 GASTROINTESTINAL HEMORRHAGE, UNSPECIFIED GASTROINTESTINAL HEMORRHAGE TYPE: ICD-10-CM

## 2022-11-23 DIAGNOSIS — M85.88 OTHER SPECIFIED DISORDERS OF BONE DENSITY AND STRUCTURE, OTHER SITE: ICD-10-CM

## 2022-11-23 DIAGNOSIS — N18.31 STAGE 3A CHRONIC KIDNEY DISEASE: ICD-10-CM

## 2022-11-23 DIAGNOSIS — Z79.4 TYPE 2 DIABETES MELLITUS WITH DIABETIC NEUROPATHY, WITH LONG-TERM CURRENT USE OF INSULIN: Primary | ICD-10-CM

## 2022-11-23 DIAGNOSIS — Z00.00 WELLNESS EXAMINATION: ICD-10-CM

## 2022-11-23 DIAGNOSIS — I10 HYPERTENSION, UNSPECIFIED TYPE: ICD-10-CM

## 2022-11-23 DIAGNOSIS — G47.33 OSA ON CPAP: ICD-10-CM

## 2022-11-23 DIAGNOSIS — E11.40 TYPE 2 DIABETES MELLITUS WITH DIABETIC NEUROPATHY, WITH LONG-TERM CURRENT USE OF INSULIN: Primary | ICD-10-CM

## 2022-11-23 DIAGNOSIS — E04.2 MULTINODULAR GOITER: ICD-10-CM

## 2022-11-23 DIAGNOSIS — E78.5 HYPERLIPIDEMIA, UNSPECIFIED HYPERLIPIDEMIA TYPE: ICD-10-CM

## 2022-11-23 DIAGNOSIS — Z85.528 HISTORY OF MALIGNANT NEOPLASM OF KIDNEY: ICD-10-CM

## 2022-11-23 DIAGNOSIS — M10.9 GOUT, UNSPECIFIED CAUSE, UNSPECIFIED CHRONICITY, UNSPECIFIED SITE: ICD-10-CM

## 2022-11-23 DIAGNOSIS — Z23 FLU VACCINE NEED: ICD-10-CM

## 2022-11-23 DIAGNOSIS — Z23 NEED FOR STREPTOCOCCUS PNEUMONIAE VACCINATION: ICD-10-CM

## 2022-11-23 DIAGNOSIS — E11.40 TYPE 2 DIABETES MELLITUS WITH DIABETIC NEUROPATHY, WITH LONG-TERM CURRENT USE OF INSULIN: ICD-10-CM

## 2022-11-23 DIAGNOSIS — Z79.4 TYPE 2 DIABETES MELLITUS WITH DIABETIC NEUROPATHY, WITH LONG-TERM CURRENT USE OF INSULIN: ICD-10-CM

## 2022-11-23 DIAGNOSIS — F32.A DEPRESSION, UNSPECIFIED DEPRESSION TYPE: ICD-10-CM

## 2022-11-23 PROCEDURE — 92228 IMG RTA DETC/MNTR DS PHY/QHP: CPT | Mod: PBBFAC

## 2022-11-23 PROCEDURE — 99215 OFFICE O/P EST HI 40 MIN: CPT | Mod: PBBFAC

## 2022-11-23 RX ORDER — GUAIFENESIN/DEXTROMETHORPHAN 100-10MG/5
5 SYRUP ORAL EVERY 6 HOURS PRN
Qty: 118 ML | Refills: 0 | Status: SHIPPED | OUTPATIENT
Start: 2022-11-23 | End: 2022-12-03

## 2022-11-23 RX ORDER — AZITHROMYCIN 250 MG/1
TABLET, FILM COATED ORAL
Qty: 6 TABLET | Refills: 0 | Status: SHIPPED | OUTPATIENT
Start: 2022-11-23 | End: 2022-11-28

## 2022-11-28 NOTE — PROGRESS NOTES
Elsisa Freeman is a 67 y.o. female here for a diabetic eye screening with non-dilated fundus photos per Dr. Juan Jose Urena.    Patient cooperative?: Yes  Small pupils?: No  Last eye exam: unknown    For exam results, see Encounter Report.

## 2022-11-30 ENCOUNTER — TELEPHONE (OUTPATIENT)
Dept: INTERNAL MEDICINE | Facility: CLINIC | Age: 67
End: 2022-11-30
Payer: MEDICARE

## 2022-11-30 NOTE — TELEPHONE ENCOUNTER
Spoke with patient to inform of normal eye exam and repeat in one year per Dr Gatica, patient verbalized understanding and pleased

## 2022-11-30 NOTE — TELEPHONE ENCOUNTER
----- Message from Juan Jose Urena DO sent at 11/30/2022  1:16 PM CST -----  Hello,    Please inform Ms. Freeman that her diabetic eye exam was normal. We will repeat screening in 1 year. Thanks!    Juan Jose Urena DO

## 2022-12-07 ENCOUNTER — CLINICAL SUPPORT (OUTPATIENT)
Dept: INTERNAL MEDICINE | Facility: CLINIC | Age: 67
End: 2022-12-07
Payer: MEDICARE

## 2022-12-07 VITALS
TEMPERATURE: 98 F | HEIGHT: 64 IN | SYSTOLIC BLOOD PRESSURE: 148 MMHG | DIASTOLIC BLOOD PRESSURE: 80 MMHG | WEIGHT: 293 LBS | HEART RATE: 65 BPM | BODY MASS INDEX: 50.02 KG/M2 | RESPIRATION RATE: 18 BRPM

## 2022-12-07 DIAGNOSIS — I10 PRIMARY HYPERTENSION: Primary | ICD-10-CM

## 2022-12-07 PROCEDURE — 99215 OFFICE O/P EST HI 40 MIN: CPT | Mod: PBBFAC

## 2022-12-07 NOTE — Clinical Note
Needs Refill Losartan-hydrochlorothiazide 50 mg-12.5 mg one tablet daily.Needs ASAP she is out completely

## 2022-12-07 NOTE — PROGRESS NOTES
"  Here for BP Check per Dr LAURA Urena    BP Readin/80 manual reading    ID: [id number]    Last dose of Medications: Carvedilol 3.125 mg, Clonidine   0.2 mg one whole tablet, Diltiazem (Cardizem CD) 180 mcg all taken 22 @  0800 am. Furosemide 20 mg taken 22 @ 0800 am. Is out of losartan-hydrochlorothiazide 50 mg-12.5 mg. She has been out of this for 1 to 2 months. Please send her a refill for this ASAP       Complaints:none.   Provider sent  Blood Pressure reading.        Patient needs to a prescription sent to pharmacy for Losartan-hydrochlorothiazide  50 mg-12.5 mg one daily. As soon as she restarts this medication , I will schedule her for a blood pressure check nurse visit  in 2 weeks. Patient was not feeling well so she refused Flu and pneumonia Vaccine at this time. She will take them when she is feeling better. Patient last took her furosemide yesterday because she did not want to "stop on the road to urinate due to fluid pill " Patient is not fasting so she will come back soon in the morning and get blood drawn for labs. Patient also asked to let you knoqw she still is coughing a lot . She said she is bringing up phlegm. She asked if you would order her some more antibiotics?She took some for 5 days.  Please let me know as soon as you order the blood pressure medication. I will call Ms Bowers .                   Discharged home with instructions and information to continue with all prescribed medications,follow up appointments, drink plenty of water daily, maintain low sodium intake and to walk/ exercise daily.Patient voiced understanding of discharge instructions.  "

## 2022-12-08 RX ORDER — LOSARTAN POTASSIUM AND HYDROCHLOROTHIAZIDE 12.5; 5 MG/1; MG/1
1 TABLET ORAL DAILY
Qty: 90 TABLET | Refills: 3 | Status: SHIPPED | OUTPATIENT
Start: 2022-12-08 | End: 2023-06-06 | Stop reason: SDUPTHER

## 2022-12-12 ENCOUNTER — PATIENT OUTREACH (OUTPATIENT)
Dept: ADMINISTRATIVE | Facility: HOSPITAL | Age: 67
End: 2022-12-12
Payer: MEDICARE

## 2022-12-21 ENCOUNTER — CLINICAL SUPPORT (OUTPATIENT)
Dept: INTERNAL MEDICINE | Facility: CLINIC | Age: 67
End: 2022-12-21
Payer: MEDICARE

## 2022-12-21 VITALS
SYSTOLIC BLOOD PRESSURE: 144 MMHG | BODY MASS INDEX: 50.02 KG/M2 | WEIGHT: 293 LBS | TEMPERATURE: 99 F | HEIGHT: 64 IN | HEART RATE: 67 BPM | RESPIRATION RATE: 18 BRPM | DIASTOLIC BLOOD PRESSURE: 86 MMHG

## 2022-12-21 DIAGNOSIS — I10 PRIMARY HYPERTENSION: Primary | ICD-10-CM

## 2022-12-21 PROCEDURE — 99215 OFFICE O/P EST HI 40 MIN: CPT | Mod: PBBFAC

## 2022-12-21 NOTE — PROGRESS NOTES
Here for BP Check per DR LAURA Urena    BP Readin/86 manual reading    LA:67    Last dose of Medications: Carvedilol 3.125 mg, clonidine 0.2 mg, Diltiazem 180 mg and losartan-hydrochlorothiazide 50 mg-12.5 mg all taken 22 @ 0900 am.    Complaints:none.   Provider sent  Blood Pressure reading.        Patient told nurse she just got her losartan-hydrochlorothiazide 50 mg -12.5 mg yesterday. Her first dose was today 22 @ 0900 am. She has been out of this for several weeks. She received it from the mail order pharmacy.          Dr LAURA Urena  notified /86 manual reading LA 67. Informed provider  patient just restarted her losartan-hydrochlorothiazide today . Patient has been out for several weeks. New orders  noted.  Continue Blood pressure medications the same. RTC in 1 to 2 weeks for BP check nurse visit appointment. Patient instructed do not miss any doses of medications. Patient voiced understanding of this information.                  Discharged home with instructions and information to continue with all prescribed medications,follow up appointments, drink plenty of water daily, maintain low sodium intake and to walk/ exercise daily.Patient voiced understanding of discharge instructions.

## 2022-12-26 ENCOUNTER — HOSPITAL ENCOUNTER (EMERGENCY)
Facility: HOSPITAL | Age: 67
Discharge: HOME | End: 2022-12-27
Attending: FAMILY MEDICINE
Payer: MEDICARE

## 2022-12-26 VITALS
OXYGEN SATURATION: 94 % | WEIGHT: 293 LBS | BODY MASS INDEX: 51.91 KG/M2 | DIASTOLIC BLOOD PRESSURE: 87 MMHG | SYSTOLIC BLOOD PRESSURE: 169 MMHG | TEMPERATURE: 98 F | HEIGHT: 63 IN | RESPIRATION RATE: 19 BRPM | HEART RATE: 100 BPM

## 2022-12-26 DIAGNOSIS — J11.1 INFLUENZA: ICD-10-CM

## 2022-12-26 DIAGNOSIS — J18.9 PNEUMONIA OF RIGHT LOWER LOBE DUE TO INFECTIOUS ORGANISM: Primary | ICD-10-CM

## 2022-12-26 DIAGNOSIS — R05.9 COUGH: ICD-10-CM

## 2022-12-26 LAB
ALBUMIN SERPL-MCNC: 3.2 G/DL (ref 3.4–4.8)
ALBUMIN/GLOB SERPL: 0.7 RATIO (ref 1.1–2)
ALP SERPL-CCNC: 144 UNIT/L (ref 40–150)
ALT SERPL-CCNC: 25 UNIT/L (ref 0–55)
AST SERPL-CCNC: 41 UNIT/L (ref 5–34)
BASOPHILS # BLD AUTO: 0.01 X10(3)/MCL (ref 0–0.2)
BASOPHILS NFR BLD AUTO: 0.2 %
BILIRUBIN DIRECT+TOT PNL SERPL-MCNC: 0.3 MG/DL
BNP BLD-MCNC: 30.7 PG/ML
BUN SERPL-MCNC: 16.2 MG/DL (ref 9.8–20.1)
CALCIUM SERPL-MCNC: 9.3 MG/DL (ref 8.4–10.2)
CHLORIDE SERPL-SCNC: 103 MMOL/L (ref 98–107)
CK MB SERPL-MCNC: 1.6 NG/ML
CK SERPL-CCNC: 531 U/L (ref 29–168)
CO2 SERPL-SCNC: 30 MMOL/L (ref 23–31)
CREAT SERPL-MCNC: 1.45 MG/DL (ref 0.55–1.02)
EOSINOPHIL # BLD AUTO: 0.04 X10(3)/MCL (ref 0–0.9)
EOSINOPHIL NFR BLD AUTO: 0.9 %
ERYTHROCYTE [DISTWIDTH] IN BLOOD BY AUTOMATED COUNT: 15.8 % (ref 11–14.5)
GFR SERPLBLD CREATININE-BSD FMLA CKD-EPI: 40 MLS/MIN/1.73/M2
GLOBULIN SER-MCNC: 4.4 GM/DL (ref 2.4–3.5)
GLUCOSE SERPL-MCNC: 155 MG/DL (ref 82–115)
HCT VFR BLD AUTO: 41.6 % (ref 37–47)
HGB BLD-MCNC: 12 GM/DL (ref 12–16)
IMM GRANULOCYTES # BLD AUTO: 0.01 X10(3)/MCL (ref 0–0.04)
IMM GRANULOCYTES NFR BLD AUTO: 0.2 %
LYMPHOCYTES # BLD AUTO: 1.54 X10(3)/MCL (ref 0.6–4.6)
LYMPHOCYTES NFR BLD AUTO: 32.8 %
MCH RBC QN AUTO: 21.8 PG
MCHC RBC AUTO-ENTMCNC: 28.8 MG/DL (ref 33–36)
MCV RBC AUTO: 75.6 FL (ref 80–94)
MONOCYTES # BLD AUTO: 0.96 X10(3)/MCL (ref 0.1–1.3)
MONOCYTES NFR BLD AUTO: 20.5 %
NEUTROPHILS # BLD AUTO: 2.13 X10(3)/MCL (ref 2.1–9.2)
NEUTROPHILS NFR BLD AUTO: 45.4 %
NRBC BLD AUTO-RTO: 0 % (ref 0–1)
PLATELET # BLD AUTO: 175 X10(3)/MCL (ref 140–371)
PMV BLD AUTO: 11.4 FL (ref 9.4–12.4)
POTASSIUM SERPL-SCNC: 4.4 MMOL/L (ref 3.5–5.1)
PROT SERPL-MCNC: 7.6 GM/DL (ref 5.8–7.6)
RBC # BLD AUTO: 5.5 X10(6)/MCL (ref 4.2–5.4)
SODIUM SERPL-SCNC: 144 MMOL/L (ref 136–145)
T4 FREE SERPL-MCNC: 0.97 NG/DL (ref 0.7–1.48)
TROPONIN I SERPL-MCNC: 0.04 NG/ML (ref 0–0.04)
TSH SERPL-ACNC: 2.25 UIU/ML (ref 0.35–4.94)
WBC # SPEC AUTO: 4.7 X10(3)/MCL (ref 4.5–11.5)

## 2022-12-26 PROCEDURE — 83880 ASSAY OF NATRIURETIC PEPTIDE: CPT | Performed by: NURSE PRACTITIONER

## 2022-12-26 PROCEDURE — 99285 EMERGENCY DEPT VISIT HI MDM: CPT | Mod: 25

## 2022-12-26 PROCEDURE — 93005 ELECTROCARDIOGRAM TRACING: CPT

## 2022-12-26 PROCEDURE — 82553 CREATINE MB FRACTION: CPT | Performed by: NURSE PRACTITIONER

## 2022-12-26 PROCEDURE — 84439 ASSAY OF FREE THYROXINE: CPT | Performed by: NURSE PRACTITIONER

## 2022-12-26 PROCEDURE — 85025 COMPLETE CBC W/AUTO DIFF WBC: CPT | Performed by: NURSE PRACTITIONER

## 2022-12-26 PROCEDURE — 84484 ASSAY OF TROPONIN QUANT: CPT | Performed by: NURSE PRACTITIONER

## 2022-12-26 PROCEDURE — 25000242 PHARM REV CODE 250 ALT 637 W/ HCPCS: Performed by: NURSE PRACTITIONER

## 2022-12-26 PROCEDURE — 82550 ASSAY OF CK (CPK): CPT | Performed by: NURSE PRACTITIONER

## 2022-12-26 PROCEDURE — 84443 ASSAY THYROID STIM HORMONE: CPT | Performed by: NURSE PRACTITIONER

## 2022-12-26 PROCEDURE — 25000003 PHARM REV CODE 250: Performed by: NURSE PRACTITIONER

## 2022-12-26 PROCEDURE — 80053 COMPREHEN METABOLIC PANEL: CPT | Performed by: NURSE PRACTITIONER

## 2022-12-26 RX ORDER — CLONIDINE HYDROCHLORIDE 0.1 MG/1
0.1 TABLET ORAL
Status: COMPLETED | OUTPATIENT
Start: 2022-12-26 | End: 2022-12-26

## 2022-12-26 RX ORDER — IPRATROPIUM BROMIDE AND ALBUTEROL SULFATE 2.5; .5 MG/3ML; MG/3ML
9 SOLUTION RESPIRATORY (INHALATION) ONCE
Status: COMPLETED | OUTPATIENT
Start: 2022-12-26 | End: 2022-12-26

## 2022-12-26 RX ADMIN — IPRATROPIUM BROMIDE AND ALBUTEROL SULFATE 9 ML: 2.5; .5 SOLUTION RESPIRATORY (INHALATION) at 09:12

## 2022-12-26 RX ADMIN — CLONIDINE HYDROCHLORIDE 0.1 MG: 0.1 TABLET ORAL at 09:12

## 2022-12-27 ENCOUNTER — TELEPHONE (OUTPATIENT)
Dept: INTERNAL MEDICINE | Facility: CLINIC | Age: 67
End: 2022-12-27
Payer: MEDICARE

## 2022-12-27 RX ORDER — CETIRIZINE HYDROCHLORIDE 10 MG/1
10 TABLET ORAL DAILY
Qty: 14 TABLET | Refills: 0 | Status: SHIPPED | OUTPATIENT
Start: 2022-12-26 | End: 2023-09-27 | Stop reason: SDUPTHER

## 2022-12-27 RX ORDER — LEVOFLOXACIN 750 MG/1
750 TABLET ORAL DAILY
Qty: 5 TABLET | Refills: 0 | Status: SHIPPED | OUTPATIENT
Start: 2022-12-27 | End: 2023-01-01

## 2022-12-27 RX ORDER — ALBUTEROL SULFATE 2.5 MG/.5ML
2.5 SOLUTION RESPIRATORY (INHALATION) EVERY 6 HOURS PRN
Qty: 40 EACH | Refills: 0 | Status: SHIPPED | OUTPATIENT
Start: 2022-12-27 | End: 2023-01-06

## 2022-12-27 RX ORDER — PROMETHAZINE HYDROCHLORIDE AND DEXTROMETHORPHAN HYDROBROMIDE 6.25; 15 MG/5ML; MG/5ML
5 SYRUP ORAL EVERY 6 HOURS PRN
Qty: 100 ML | Refills: 0 | Status: SHIPPED | OUTPATIENT
Start: 2022-12-26 | End: 2022-12-31

## 2022-12-27 NOTE — DISCHARGE INSTRUCTIONS
Follow up with your primary care physician in 3-5 days for follow up evaluation.  Take medication as directed.  Return to the [facility] ED immediately for worsening chest pain, SOB, or fever.

## 2022-12-27 NOTE — ED PROVIDER NOTES
Encounter Date: 12/26/2022       History     Chief Complaint   Patient presents with    Cough    Shortness of Breath    Influenza    pneumonia     Was seen at [facility] today and told has flu and pneumonia.  Told to come to the ED.       Pt is a 67 y.o. female who presents to the [facility] ED complaining of cough, shortness of breath. Pt seen at [facility] earlier today and was diagnosed with influenza and pneumonia. Instructed to present to the nearest ED for evaluation. Pt with Hx of HTN, DM, Asthma, CKD, and CAD. Denies chest pain, nausea, vomiting, or abdominal pain. Cough symptoms x 3-4 days per pt.    Review of patient's allergies indicates:  No Known Allergies  Past Medical History:   Diagnosis Date    Asthma     Cancer     CKD (chronic kidney disease)     Coronary artery disease     Diabetes mellitus     Hyperlipidemia     Hypertension     Obesity, unspecified     MOHSEN (obstructive sleep apnea)      Past Surgical History:   Procedure Laterality Date    CHOLECYSTECTOMY      COLON SURGERY      excision of lipoma      HYSTERECTOMY, VAGINAL, WITH SALPINGO-OOPHORECTOMY      NEPHRECTOMY Left     TUBAL LIGATION       Family History   Problem Relation Age of Onset    Hypertension Mother     Coronary artery disease Mother     Hyperlipidemia Mother     Stroke Father     Cancer Brother      Social History     Tobacco Use    Smoking status: Never    Smokeless tobacco: Never   Substance Use Topics    Alcohol use: Not Currently     Comment: frozen drinks once a month    Drug use: Never     Review of Systems   Constitutional:  Negative for chills, diaphoresis, fatigue and fever.   HENT:  Positive for rhinorrhea. Negative for facial swelling, sinus pressure, sinus pain, sore throat and trouble swallowing.    Respiratory:  Positive for cough and wheezing. Negative for chest tightness and shortness of breath.    Cardiovascular:  Negative for chest pain, palpitations and leg swelling.   Gastrointestinal:  Negative for  abdominal pain, diarrhea, nausea and vomiting.   Genitourinary:  Negative for dysuria, flank pain, frequency, hematuria and urgency.   Musculoskeletal:  Negative for arthralgias, back pain, joint swelling and myalgias.   Skin:  Negative for color change and rash.   Neurological:  Negative for dizziness, syncope, weakness and light-headedness.   Hematological:  Does not bruise/bleed easily.   All other systems reviewed and are negative.    Physical Exam     Initial Vitals [12/26/22 1910]   BP Pulse Resp Temp SpO2   (!) 210/110 80 17 98.1 °F (36.7 °C) (!) 93 %      MAP       --         Physical Exam    Nursing note and vitals reviewed.  Constitutional: She appears well-developed and well-nourished.   HENT:   Head: Normocephalic and atraumatic.   Nose: Mucosal edema present.   Mouth/Throat: Oropharynx is clear and moist.   Eyes: Conjunctivae and EOM are normal. Pupils are equal, round, and reactive to light.   Neck: Neck supple.   Normal range of motion.  Cardiovascular:  Normal rate, regular rhythm, normal heart sounds and intact distal pulses.           Pulmonary/Chest: Effort normal. No respiratory distress. She has wheezes in the right middle field and the left middle field. She has no rhonchi. She has no rales. She exhibits no tenderness.   Dry cough present.     Abdominal: Abdomen is soft and flat. Bowel sounds are normal. She exhibits no distension. There is no abdominal tenderness. There is no rebound, no guarding, no tenderness at McBurney's point and negative Cason's sign. negative psoas sign  Musculoskeletal:         General: Normal range of motion.      Cervical back: Normal range of motion and neck supple.     Neurological: She is alert and oriented to person, place, and time. She has normal strength and normal reflexes.   Skin: Skin is warm and dry. Capillary refill takes less than 2 seconds.   Psychiatric: She has a normal mood and affect. Her speech is normal and behavior is normal. Judgment and  thought content normal.       ED Course   Procedures  Labs Reviewed   CK - Abnormal; Notable for the following components:       Result Value    Creatine Kinase 531 (*)     All other components within normal limits   COMPREHENSIVE METABOLIC PANEL - Abnormal; Notable for the following components:    Glucose Level 155 (*)     Creatinine 1.45 (*)     Albumin Level 3.2 (*)     Globulin 4.4 (*)     Albumin/Globulin Ratio 0.7 (*)     Aspartate Aminotransferase 41 (*)     All other components within normal limits   CBC WITH DIFFERENTIAL - Abnormal; Notable for the following components:    RBC 5.50 (*)     MCV 75.6 (*)     MCHC 28.8 (*)     RDW 15.8 (*)     All other components within normal limits   T4, FREE - Normal   CK-MB - Normal   B-TYPE NATRIURETIC PEPTIDE - Normal   TSH - Normal   TROPONIN I - Normal   CBC W/ AUTO DIFFERENTIAL    Narrative:     The following orders were created for panel order CBC Auto Differential.  Procedure                               Abnormality         Status                     ---------                               -----------         ------                     CBC with Differential[[id number]]        Abnormal            Final result                 Please view results for these tests on the individual orders.   EXTRA TUBES    Narrative:     The following orders were created for panel order EXTRA TUBES.  Procedure                               Abnormality         Status                     ---------                               -----------         ------                     Light Blue Top Hold[[id number]]                              In process                   Please view results for these tests on the individual orders.   LIGHT BLUE TOP HOLD          Imaging Results              X-Ray Chest PA And Lateral (Preliminary result)  Result time 12/26/22 23:53:42      ED Interpretation by Augusto Stokes Jr., FNP (12/26/22 23:53:42, [facility], Emergency Medicine)    Rt  lower lobe infiltrate                                     Medications   albuterol-ipratropium 2.5 mg-0.5 mg/3 mL nebulizer solution 9 mL (9 mLs Nebulization Given 12/26/22 2105)   cloNIDine tablet 0.1 mg (0.1 mg Oral Given 12/26/22 2105)     Medical Decision Making:   Differential Diagnosis:   Pneumonia  CHF  AMI    Clinical Tests:   Lab Tests: Ordered and Reviewed  Radiological Study: Ordered and Reviewed  Medical Tests: Ordered and Reviewed  ED Management:  11:53 PM Reassessed patient at this time. Reports condition has improved. Discussed with patient all pertinent ED information and results. Discussed diagnosis and treatment plan with patient. Follow up instructions and return to ED instruction have been given. All questions and concerns were addressed at this time. Patient voices understanding of information and instructions. Patient is comfortable with plan and discharge. Patient is stable for discharge.     CURB 65 score:  1 points  Low risk group: 2.7% 30-day mortality.  Consider outpatient treatment.    54 mL/min  Creatinine clearance modified for overweight patient, using adjusted body weight of 88 kg (194 lbs).                        Clinical Impression:   Final diagnoses:  [R05.9] Cough  [J18.9] Pneumonia of right lower lobe due to infectious organism (Primary)  [J11.1] Influenza        ED Disposition Condition    Discharge Stable          ED Prescriptions       Medication Sig Dispense Start Date End Date Auth. Provider    promethazine-dextromethorphan (PROMETHAZINE-DM) 6.25-15 mg/5 mL Syrp Take 5 mLs by mouth every 6 (six) hours as needed (cough). 100 mL 12/26/2022 12/31/2022 Augusto Stokes Jr., HEATHER    cetirizine (ZYRTEC) 10 MG tablet Take 1 tablet (10 mg total) by mouth once daily. for 14 days 14 tablet 12/26/2022 1/9/2023 Augusto Stokes Jr., HEATHER    levoFLOXacin (LEVAQUIN) 750 MG tablet Take 1 tablet (750 mg total) by mouth once daily. for 5 days 5 tablet 12/27/2022 1/1/2023 Augusto Stokes Jr., HEATHER     albuterol sulfate 2.5 mg/0.5 mL Nebu Take 2.5 mg by nebulization every 6 (six) hours as needed (wheezing). Rescue 40 each 12/27/2022 1/6/2023 HEATHER Puga Jr.    nebulizer accessories Kit 1 each by Misc.(Non-Drug; Combo Route) route 4 (four) times daily as needed (wheezing). 1 kit 12/27/2022 -- HEATHER Puga Jr.          Follow-up Information       Follow up With Specialties Details Why Contact Info    Juan Jose Urena, DO Internal Medicine In 1 week  [address] [facility] [address]  [phone]      [facility] Emergency Medicine In 3 days As needed, If symptoms worsen [address] [facility] [address] [phone]             HEATHER Puga Jr.  12/27/22 0002

## 2022-12-27 NOTE — TELEPHONE ENCOUNTER
Patient called Blood pressure nurse to cancel BP check nurse visit appointment for today. Patient told nurse she has the flu. Patient asked nurse to call radiology and cancel Bne density test scheduled  for today.I phoned radiology department at [phone] and notified personnel that patient has the flu. Patient asked to cancel bone density  test scheduled for today. Patient will call and reschedule when she is feeling better.

## 2022-12-30 DIAGNOSIS — E11.69 DIABETES MELLITUS TYPE 2 IN OBESE: Primary | ICD-10-CM

## 2022-12-30 DIAGNOSIS — E66.9 DIABETES MELLITUS TYPE 2 IN OBESE: Primary | ICD-10-CM

## 2022-12-30 RX ORDER — PEN NEEDLE, DIABETIC 32GX 5/32"
NEEDLE, DISPOSABLE MISCELLANEOUS
Qty: 200 EACH | Refills: 2 | Status: SHIPPED | OUTPATIENT
Start: 2022-12-30 | End: 2023-06-06

## 2023-01-04 DIAGNOSIS — E11.69 DIABETES MELLITUS TYPE 2 IN OBESE: Primary | ICD-10-CM

## 2023-01-04 DIAGNOSIS — E66.9 DIABETES MELLITUS TYPE 2 IN OBESE: Primary | ICD-10-CM

## 2023-01-05 RX ORDER — INSULIN DETEMIR 100 [IU]/ML
55 INJECTION, SOLUTION SUBCUTANEOUS NIGHTLY
Qty: 15 ML | Refills: 2 | Status: SHIPPED | OUTPATIENT
Start: 2023-01-05 | End: 2023-06-06 | Stop reason: SDUPTHER

## 2023-01-17 ENCOUNTER — HOSPITAL ENCOUNTER (OUTPATIENT)
Dept: RADIOLOGY | Facility: HOSPITAL | Age: 68
Discharge: HOME | End: 2023-01-17
Attending: STUDENT IN AN ORGANIZED HEALTH CARE EDUCATION/TRAINING PROGRAM
Payer: MEDICARE

## 2023-01-17 ENCOUNTER — CLINICAL SUPPORT (OUTPATIENT)
Dept: INTERNAL MEDICINE | Facility: CLINIC | Age: 68
End: 2023-01-17
Attending: STUDENT IN AN ORGANIZED HEALTH CARE EDUCATION/TRAINING PROGRAM
Payer: MEDICARE

## 2023-01-17 VITALS
TEMPERATURE: 98 F | BODY MASS INDEX: 51.56 KG/M2 | DIASTOLIC BLOOD PRESSURE: 91 MMHG | SYSTOLIC BLOOD PRESSURE: 156 MMHG | WEIGHT: 291 LBS | HEIGHT: 63 IN | RESPIRATION RATE: 18 BRPM | HEART RATE: 62 BPM

## 2023-01-17 DIAGNOSIS — Z00.00 WELLNESS EXAMINATION: ICD-10-CM

## 2023-01-17 DIAGNOSIS — I10 PRIMARY HYPERTENSION: Primary | ICD-10-CM

## 2023-01-17 DIAGNOSIS — M85.88 OTHER SPECIFIED DISORDERS OF BONE DENSITY AND STRUCTURE, OTHER SITE: ICD-10-CM

## 2023-01-17 PROCEDURE — 77080 DXA BONE DENSITY AXIAL: CPT | Mod: TC

## 2023-01-17 PROCEDURE — 99215 OFFICE O/P EST HI 40 MIN: CPT | Mod: PBBFAC,25

## 2023-01-17 RX ORDER — ALBUTEROL SULFATE 5 MG/ML
SOLUTION RESPIRATORY (INHALATION)
COMMUNITY
Start: 2022-12-27 | End: 2023-09-27

## 2023-01-17 NOTE — PROGRESS NOTES
Here for BP Check per DR LAURA Urena     BP Readin/91 manual reading    ID:[id number]    Last dose of Medications: Carvedilol 3.125 and  Clonidine 0.1 mg  taken 23 @ 2000 pm.Diltiazem (Cardizem CD) 180 mg  and losartan-hydrochlorothiazide 50 mg-12.5 mg both taken 23 @ 0900 am. P jaswant forgot to take her blood pressure medications this morning.     Complaints:none.   Provider sent  Blood Pressure reading..         Patient presented to [facility] for BP check nurse visit appointment . After reviewing patient medication list with patient . She informed nurse she forgot to take her medications this morning . The last doses she took of her blood pressure medications was yesterday,2023. Patient did not bring her medications to this appointment. Patient will call nurse to reschedule for a BP check nurse visit, taking all her blood pressure medications as scheduled. Patient is going out of town to visit daughter so she cannot ae an appointment to return to clinic at this moment.  Voiced understanding of this information.                     Discharged home with instructions and information to continue with all prescribed medications,follow up appointments, drink plenty of water daily, maintain low sodium intake and to walk/ exercise daily.Patient voiced understanding of discharge instructions.

## 2023-01-18 ENCOUNTER — TELEPHONE (OUTPATIENT)
Dept: INTERNAL MEDICINE | Facility: CLINIC | Age: 68
End: 2023-01-18
Payer: MEDICARE

## 2023-01-18 NOTE — TELEPHONE ENCOUNTER
Spoke w/pt. Verified name and . Gave pt Dr. Urena's message regarding results. Pt verbalized understanding and had no other questions.

## 2023-01-18 NOTE — TELEPHONE ENCOUNTER
Spoke w/pt. Verified name and . Gave pt Dr. Urena's message regarding bone density results. Pt verbalized understanding and had no other questions.

## 2023-01-18 NOTE — TELEPHONE ENCOUNTER
----- Message from Juan Jose Urena DO sent at 1/18/2023  8:49 AM CST -----  Hello,    Please inform Ms. Freeman that her DEXA (bone density) scan is normal. There are no signs of decreased bone density. Thanks!    Juan Jose Urena DO

## 2023-01-18 NOTE — TELEPHONE ENCOUNTER
----- Message from Juan Jose Urena DO sent at 1/18/2023  8:24 AM CST -----  Hello,    Please inform Ms. Freeman that her lab work shows her kidney function remains stable, A1c is improved to 8.4 from 10.5. We will continue to work on diabetic control with a goal A1c of 7.0, but we are making good progress. She has a mild anemia which has been present in the past. Thyroid function is normal, HIV screening is negative, cholesterol is at goal of <70. Next visit we will recheck iron levels given her mild anemia. Thanks!    Juan Jose Urena DO

## 2023-01-24 DIAGNOSIS — I27.20 PULMONARY HYPERTENSION: Primary | ICD-10-CM

## 2023-01-24 RX ORDER — ALBUTEROL SULFATE 90 UG/1
1 AEROSOL, METERED RESPIRATORY (INHALATION) EVERY 4 HOURS PRN
Qty: 18 G | Refills: 3 | Status: SHIPPED | OUTPATIENT
Start: 2023-01-24 | End: 2023-05-09 | Stop reason: SDUPTHER

## 2023-02-03 RX ORDER — POTASSIUM CHLORIDE 1500 MG/1
20 TABLET, EXTENDED RELEASE ORAL DAILY
Qty: 90 TABLET | Refills: 0 | Status: SHIPPED | OUTPATIENT
Start: 2023-02-03 | End: 2023-04-18

## 2023-02-09 ENCOUNTER — LAB VISIT (OUTPATIENT)
Dept: LAB | Facility: HOSPITAL | Age: 68
End: 2023-02-09
Attending: NURSE PRACTITIONER
Payer: MEDICARE

## 2023-02-09 DIAGNOSIS — N18.32 CKD STAGE G3B/A2, GFR 30-44 AND ALBUMIN CREATININE RATIO 30-299 MG/G: ICD-10-CM

## 2023-02-09 LAB
APPEARANCE UR: ABNORMAL
BACTERIA #/AREA URNS AUTO: ABNORMAL /HPF
BILIRUB UR QL STRIP.AUTO: NEGATIVE MG/DL
COLOR UR AUTO: YELLOW
CREAT UR-MCNC: 224.1 MG/DL (ref 47–110)
GLUCOSE UR QL STRIP.AUTO: ABNORMAL MG/DL
HYALINE CASTS #/AREA URNS LPF: ABNORMAL /LPF
KETONES UR QL STRIP.AUTO: NEGATIVE MG/DL
LEUKOCYTE ESTERASE UR QL STRIP.AUTO: 500 UNIT/L
MUCOUS THREADS URNS QL MICRO: ABNORMAL /LPF
NITRITE UR QL STRIP.AUTO: NEGATIVE
PH UR STRIP.AUTO: 5.5 [PH]
PROT UR QL STRIP.AUTO: ABNORMAL MG/DL
PROT UR STRIP-MCNC: 78.6 MG/DL
RBC #/AREA URNS AUTO: ABNORMAL /HPF
RBC UR QL AUTO: ABNORMAL UNIT/L
SP GR UR STRIP.AUTO: 1.03
SQUAMOUS #/AREA URNS LPF: ABNORMAL /HPF
URINE PROTEIN/CREATININE RATIO (OHS): 0.4
UROBILINOGEN UR STRIP-ACNC: NORMAL MG/DL
WBC #/AREA URNS AUTO: ABNORMAL /HPF
YEAST BUDDING URNS QL: ABNORMAL /HPF

## 2023-02-09 PROCEDURE — 87088 URINE BACTERIA CULTURE: CPT

## 2023-02-09 PROCEDURE — 81001 URINALYSIS AUTO W/SCOPE: CPT

## 2023-02-09 PROCEDURE — 84156 ASSAY OF PROTEIN URINE: CPT

## 2023-02-09 RX ORDER — FUROSEMIDE 20 MG/1
20 TABLET ORAL DAILY
COMMUNITY
Start: 2023-02-03 | End: 2023-06-06

## 2023-02-11 LAB — BACTERIA UR CULT: NO GROWTH

## 2023-02-16 ENCOUNTER — OFFICE VISIT (OUTPATIENT)
Dept: NEPHROLOGY | Facility: CLINIC | Age: 68
End: 2023-02-16
Payer: MEDICARE

## 2023-02-16 VITALS
OXYGEN SATURATION: 98 % | SYSTOLIC BLOOD PRESSURE: 146 MMHG | WEIGHT: 293 LBS | TEMPERATURE: 99 F | HEART RATE: 77 BPM | DIASTOLIC BLOOD PRESSURE: 72 MMHG | RESPIRATION RATE: 18 BRPM | HEIGHT: 63 IN | BODY MASS INDEX: 51.91 KG/M2

## 2023-02-16 DIAGNOSIS — I10 PRIMARY HYPERTENSION: ICD-10-CM

## 2023-02-16 DIAGNOSIS — N18.32 CKD STAGE G3B/A2, GFR 30-44 AND ALBUMIN CREATININE RATIO 30-299 MG/G: Primary | ICD-10-CM

## 2023-02-16 PROCEDURE — 3077F PR MOST RECENT SYSTOLIC BLOOD PRESSURE >= 140 MM HG: ICD-10-PCS | Mod: CPTII,,, | Performed by: NURSE PRACTITIONER

## 2023-02-16 PROCEDURE — 3066F NEPHROPATHY DOC TX: CPT | Mod: CPTII,,, | Performed by: NURSE PRACTITIONER

## 2023-02-16 PROCEDURE — 1160F PR REVIEW ALL MEDS BY PRESCRIBER/CLIN PHARMACIST DOCUMENTED: ICD-10-PCS | Mod: CPTII,,, | Performed by: NURSE PRACTITIONER

## 2023-02-16 PROCEDURE — 1101F PT FALLS ASSESS-DOCD LE1/YR: CPT | Mod: CPTII,,, | Performed by: NURSE PRACTITIONER

## 2023-02-16 PROCEDURE — 1101F PR PT FALLS ASSESS DOC 0-1 FALLS W/OUT INJ PAST YR: ICD-10-PCS | Mod: CPTII,,, | Performed by: NURSE PRACTITIONER

## 2023-02-16 PROCEDURE — 99214 PR OFFICE/OUTPT VISIT, EST, LEVL IV, 30-39 MIN: ICD-10-PCS | Mod: S$PBB,,, | Performed by: NURSE PRACTITIONER

## 2023-02-16 PROCEDURE — 1159F PR MEDICATION LIST DOCUMENTED IN MEDICAL RECORD: ICD-10-PCS | Mod: CPTII,,, | Performed by: NURSE PRACTITIONER

## 2023-02-16 PROCEDURE — 3288F FALL RISK ASSESSMENT DOCD: CPT | Mod: CPTII,,, | Performed by: NURSE PRACTITIONER

## 2023-02-16 PROCEDURE — 1159F MED LIST DOCD IN RCRD: CPT | Mod: CPTII,,, | Performed by: NURSE PRACTITIONER

## 2023-02-16 PROCEDURE — 3008F PR BODY MASS INDEX (BMI) DOCUMENTED: ICD-10-PCS | Mod: CPTII,,, | Performed by: NURSE PRACTITIONER

## 2023-02-16 PROCEDURE — 3077F SYST BP >= 140 MM HG: CPT | Mod: CPTII,,, | Performed by: NURSE PRACTITIONER

## 2023-02-16 PROCEDURE — 99214 OFFICE O/P EST MOD 30 MIN: CPT | Mod: S$PBB,,, | Performed by: NURSE PRACTITIONER

## 2023-02-16 PROCEDURE — 1160F RVW MEDS BY RX/DR IN RCRD: CPT | Mod: CPTII,,, | Performed by: NURSE PRACTITIONER

## 2023-02-16 PROCEDURE — 3066F PR DOCUMENTATION OF TREATMENT FOR NEPHROPATHY: ICD-10-PCS | Mod: CPTII,,, | Performed by: NURSE PRACTITIONER

## 2023-02-16 PROCEDURE — 99499 RISK ADDL DX/OHS AUDIT: ICD-10-PCS | Mod: S$PBB,,, | Performed by: NURSE PRACTITIONER

## 2023-02-16 PROCEDURE — 1126F PR PAIN SEVERITY QUANTIFIED, NO PAIN PRESENT: ICD-10-PCS | Mod: CPTII,,, | Performed by: NURSE PRACTITIONER

## 2023-02-16 PROCEDURE — 99215 OFFICE O/P EST HI 40 MIN: CPT | Mod: PBBFAC | Performed by: NURSE PRACTITIONER

## 2023-02-16 PROCEDURE — 3078F DIAST BP <80 MM HG: CPT | Mod: CPTII,,, | Performed by: NURSE PRACTITIONER

## 2023-02-16 PROCEDURE — 1126F AMNT PAIN NOTED NONE PRSNT: CPT | Mod: CPTII,,, | Performed by: NURSE PRACTITIONER

## 2023-02-16 PROCEDURE — 99499 UNLISTED E&M SERVICE: CPT | Mod: S$PBB,,, | Performed by: NURSE PRACTITIONER

## 2023-02-16 PROCEDURE — 3288F PR FALLS RISK ASSESSMENT DOCUMENTED: ICD-10-PCS | Mod: CPTII,,, | Performed by: NURSE PRACTITIONER

## 2023-02-16 PROCEDURE — 3078F PR MOST RECENT DIASTOLIC BLOOD PRESSURE < 80 MM HG: ICD-10-PCS | Mod: CPTII,,, | Performed by: NURSE PRACTITIONER

## 2023-02-16 PROCEDURE — 3008F BODY MASS INDEX DOCD: CPT | Mod: CPTII,,, | Performed by: NURSE PRACTITIONER

## 2023-02-17 RX ORDER — CLONIDINE HYDROCHLORIDE 0.2 MG/1
0.1 TABLET ORAL 2 TIMES DAILY
Qty: 90 TABLET | Refills: 3 | Status: SHIPPED | OUTPATIENT
Start: 2023-02-17 | End: 2023-02-28

## 2023-02-17 RX ORDER — NITROGLYCERIN 0.3 MG/1
0.3 TABLET SUBLINGUAL EVERY 5 MIN PRN
Qty: 10 TABLET | Refills: 0 | Status: SHIPPED | OUTPATIENT
Start: 2023-02-17 | End: 2023-04-03

## 2023-02-17 NOTE — PROGRESS NOTES
"[facility]  Nephrology Clinic Note    Chief complaint: Chronic Kidney Disease (RTC, took meds, declined flu vaccine-had the flu, w/o complaints)    History of present illness:   Elissa Freeman is a 67 y.o. Black or  female with past medical history of CKD , hypertension, dyslipidemia, diabetes mellitus 2, renal cell carcinoma (status post left nephrectomy in 2011), adenocarcinoma of the colon (status post hemicolectomy in 2014), and morbid obesity.  Patient presents for follow-up appointment in Nephrology Clinic.    Review of Systems  12 point review of systems conducted, negative except as stated in the history of present illness.    Allergies: Patient has No Known Allergies.     Past Medical History:  has a past medical history of Asthma, Cancer, CKD (chronic kidney disease), Coronary artery disease, Diabetes mellitus, Hyperlipidemia, Hypertension, Obesity, unspecified, and MOHSEN (obstructive sleep apnea).    Procedure History:  has a past surgical history that includes Colon surgery; Nephrectomy (Left); Cholecystectomy; hysterectomy, vaginal, with salpingo-oophorectomy; Tubal ligation; and excision of lipoma.    Family History: family history includes Cancer in her brother; Coronary artery disease in her mother; Hyperlipidemia in her mother; Hypertension in her mother; Stroke in her father.    Social History:  reports that she has never smoked. She has never used smokeless tobacco. She reports that she does not currently use alcohol. She reports that she does not use drugs.    Physical exam  BP (!) 146/72 (BP Location: Right arm, Patient Position: Sitting, BP Method: Large (Automatic)) Comment (BP Method): right forearm  Pulse 77   Temp 98.8 °F (37.1 °C) (Oral)   Resp 18   Ht 5' 2.99" (1.6 m)   Wt (!) 140.3 kg (309 lb 3.2 oz)   SpO2 98%   BMI 54.79 kg/m²   General appearance: Patient is in no acute distress.  Skin: No rashes or wounds.  HEENT: TREVER RAYMOND, no " "scleral icterus, no JVD. Neck is supple.  Chest: Respirations are unlabored. Lungs sounds are clear.   Heart: S1, S2.   Abdomen: Benign. Obese  : Deferred.  Extremities: No edema, peripheral pulses are palpable.   Neuro: No focal deficits.     Home Medications:    Current Outpatient Medications:     albuterol (PROVENTIL) 5 mg/mL nebulizer solution, SMARTSI.5 Milligram(s) Via Nebulizer Every 6 Hours PRN, Disp: , Rfl:     albuterol (PROVENTIL/VENTOLIN HFA) 90 mcg/actuation inhaler, Inhale 1 puff into the lungs every 4 (four) hours as needed for Wheezing., Disp: 18 g, Rfl: 3    aspirin (ECOTRIN) 81 MG EC tablet, Take 81 mg by mouth once daily., Disp: , Rfl:     atorvastatin (LIPITOR) 40 MG tablet, Take 1 tablet (40 mg total) by mouth once daily., Disp: 90 tablet, Rfl: 0    BD DAVONTE 2ND GEN PEN NEEDLE 32 gauge x 5/32" Ndle, USE TO INJECT INSULIN FOUR TIMES DAILY AS DIRECTED, Disp: 200 each, Rfl: 2    carvediloL (COREG) 3.125 MG tablet, Take 1 tablet (3.125 mg total) by mouth 2 (two) times daily., Disp: 180 tablet, Rfl: 0    cloNIDine (CATAPRES) 0.2 MG tablet, Take 0.5 tablets (0.1 mg total) by mouth 3 (three) times daily. (Patient taking differently: Take 0.1 mg by mouth 3 (three) times daily. Takes 2 times per day. Takes whole tablet), Disp: 135 tablet, Rfl: 0    diclofenac (VOLTAREN) 50 MG EC tablet, Take 1 tablet (50 mg total) by mouth 2 (two) times daily as needed (Pain)., Disp: 12 tablet, Rfl: 0    diltiaZEM (CARDIZEM CD) 180 MG 24 hr capsule, Take 1 capsule (180 mg total) by mouth once daily., Disp: 90 capsule, Rfl: 0    DULoxetine (CYMBALTA) 60 MG capsule, TAKE 1 CAPSULE BY MOUTH DAILY. DO NOT CRUSH OR CHEW, Disp: 90 capsule, Rfl: 0    empagliflozin (JARDIANCE) 25 mg tablet, Take 1 tablet (25 mg total) by mouth once daily., Disp: 90 tablet, Rfl: 3    exenatide microspheres (BYDUREON BCISE) 2 mg/0.85 mL AtIn, Inject 2 mg into the skin every 7 days., Disp: 1 pen, Rfl: 2    flash glucose scanning reader " (FREESTYLE SMITHA 2 READER) Misc, 1 each by Misc.(Non-Drug; Combo Route) route 4 (four) times daily with meals and nightly., Disp: 1 each, Rfl: 0    flash glucose sensor (FREESTYLE SMITHA 2 SENSOR) Kit, 1 each by Misc.(Non-Drug; Combo Route) route 2 hours after meals and at bedtime., Disp: 1 kit, Rfl: 0    furosemide (LASIX) 20 MG tablet, Take 20 mg by mouth Daily., Disp: , Rfl:     gabapentin (NEURONTIN) 600 MG tablet, Take 1 tablet (600 mg total) by mouth once daily., Disp: 90 tablet, Rfl: 1    ketorolac 0.5% (ACULAR) 0.5 % Drop, Place into both eyes., Disp: , Rfl:     latanoprost 0.005 % ophthalmic solution, Place 1 drop into both eyes 2 (two) times daily., Disp: 2.5 mL, Rfl: 1    LEVEMIR FLEXTOUCH U-100 INSULN 100 unit/mL (3 mL) InPn pen, Inject 55 Units into the skin every evening., Disp: 15 mL, Rfl: 2    losartan-hydrochlorothiazide 50-12.5 mg (HYZAAR) 50-12.5 mg per tablet, Take 1 tablet by mouth once daily., Disp: 90 tablet, Rfl: 3    nebulizer accessories Kit, 1 each by Misc.(Non-Drug; Combo Route) route 4 (four) times daily as needed (wheezing)., Disp: 1 kit, Rfl: 0    nitroGLYCERIN (NITROSTAT) 0.3 MG SL tablet, Place 0.3 mg under the tongue every 5 (five) minutes as needed for Chest pain., Disp: , Rfl:     ofloxacin (OCUFLOX) 0.3 % ophthalmic solution, , Disp: , Rfl:     potassium chloride (K-TAB) 20 mEq, Take 1 tablet (20 mEq total) by mouth once daily., Disp: 90 tablet, Rfl: 0    prednisoLONE acetate (PRED FORTE) 1 % DrpS, Place into both eyes., Disp: , Rfl:     vitamin D (VITAMIN D3) 1000 units Tab, Take 2 tablets (2,000 Units total) by mouth Daily., Disp: 90 tablet, Rfl: 0    cetirizine (ZYRTEC) 10 MG tablet, Take 1 tablet (10 mg total) by mouth once daily. for 14 days, Disp: 14 tablet, Rfl: 0    insulin aspart U-100 (NOVOLOG) 100 unit/mL (3 mL) InPn pen, Inject 20 Units into the skin 3 (three) times daily with meals. Plus 25 units at night, Disp: 54 mL, Rfl: 1    Laboratory data    Serum  Lab Results    Component Value Date    WBC 6.1 01/17/2023    HGB 11.4 (L) 01/17/2023    HCT 40.2 01/17/2023     01/17/2023    IRON 55 12/24/2017    TIBC 196 (L) 12/24/2017    TRANS 158.0 (L) 12/24/2017    LABIRON 28.1 12/24/2017    FERRITIN 406.6 (H) 12/24/2017     02/09/2023    K 3.9 02/09/2023    CHLORIDE 105 02/09/2023    CO2 31 02/09/2023    BUN 14.3 02/09/2023    CREATININE 1.45 (H) 02/09/2023    EGFRNORACEVR 40 02/09/2023    GLUCOSE 89 02/09/2023    CALCIUM 9.2 02/09/2023    ALKPHOS 132 02/09/2023    LABPROT 7.6 02/09/2023    ALBUMIN 3.1 (L) 02/09/2023    BILIDIR 0.3 02/11/2022    IBILI 0.20 02/11/2022    AST 14 02/09/2023    ALT 12 02/09/2023    MG 1.7 (L) 09/05/2018    PHOS 3.5 09/05/2018      Lab Results   Component Value Date    HGBA1C 8.4 (H) 01/17/2023    PTH 63.9 09/05/2018    [id number] 34.8 08/01/2022    HIV Nonreactive 01/17/2023     Urine:  Lab Results   Component Value Date    COLORUA Yellow 02/09/2023    APPEARANCEUA Turbid (A) 02/09/2023    SGUA 1.029 02/09/2023    PHUA 5.5 02/09/2023    PROTEINUA 1+ (A) 02/09/2023    GLUCOSEUA 4+ (A) 02/09/2023    KETONESUA Negative 02/09/2023    BLOODUA 1+ (A) 02/09/2023    NITRITESUA Negative 02/09/2023    LEUKOCYTESUR 500 (A) 02/09/2023    RBCUA 21-50 (A) 02/09/2023    WBCUA 51-99 (A) 02/09/2023    BACTERIA Occ (A) 02/09/2023    SQEPUA Many (A) 02/09/2023    HYALINECASTS None Seen 02/09/2023    CREATRANDUR 224.1 (H) 02/09/2023    PROTEINURINE 78.6 02/09/2023    UPROTCREA 0.4 02/09/2023         Imaging  US Retroperitoneal Limited      Right Kidney:  Length: 10.7 x 6.4 x 6.2 cm  Appearance: Normal echogenicity.  Collecting system: No hydronephrosis  Stones: None  Cyst/Mass: None    Left Kidney:  Has been surgically removed      IMPRESSION: Status post left nephrectomy.    No significant abnormality of the right kidney  Electronically Signed By: Aniyah LEA Arsalan  Date/Time Signed: 03/07/2022 12:32      Impression and plan     CKD stage G3b/A2, GFR 30-44  and albumin creatinine ratio  mg/g  -     Comprehensive Metabolic Panel; Future; Expected date: 08/09/2023  -     Protein/Creatinine Ratio, Urine; Future; Expected date: 08/09/2023  -     Urinalysis, Reflex to Urine Culture; Future; Expected date: 08/09/2023  Reduced renal mass/diabetic kidney disease.  Continue risk factor management, MAHI blockade, and SGLT2i.   A1C is above goal. Will increase empagliflozin to 25 mg daily.   Continue:  -follow 2 g a day dietary sodium restriction  -control diabetes (goal A1c less than 7%)  -control high blood pressure (goal blood pressure is less than 130/80, please check blood pressure twice a week and bring blood pressure logs to office visit)  -exercise at least 30 minutes a day, 5 days a week  -maintain healthy weight  -decrease or stop alcohol use  -do not smoke  -stay well hydrated (drink water only, avoid juices, sweet tea, and sodas)  -ask about staying up-to-date on vaccinations (flu vaccine, pneumonia vaccine, hepatitis B vaccine)  -avoid excessive use of NSAIDs (ibuprofen, naproxen, Aleve, Advil, Toradol, Mobic), take Tylenol as needed for headache or mild pain  -take cholesterol lowering medications if prescribed (LDL goal less than 100)    Follow-up with the primary care provider as scheduled.  Return to subspecialty nephrology (kidney) clinic with routine labs in 6 months    Primary hypertension  Blood pressure reading is at goal, continue current antihypertensive regimen and 2 g a day dietary sodium restriction.      BMI 50.0-59.9, adult  Lifestyle and dietary interventions discussed, patient counseled on weight loss using portion control, non- sedentary lifestyle, low-carbohydrate/low fat diet.    Other orders  -     cloNIDine (CATAPRES) 0.2 MG tablet; Take 0.5 tablets (0.1 mg total) by mouth 2 (two) times daily. Takes 2 times per day. Takes whole tablet  Dispense: 90 tablet; Refill: 3  -     nitroGLYCERIN (NITROSTAT) 0.3 MG SL tablet; Place 1 tablet (0.3 mg  total) under the tongue every 5 (five) minutes as needed for Chest pain.  Dispense: 10 tablet; Refill: 0       2/17/2023  Miranda Viveros NP  [facility]

## 2023-02-28 ENCOUNTER — TELEPHONE (OUTPATIENT)
Dept: NEPHROLOGY | Facility: CLINIC | Age: 68
End: 2023-02-28
Payer: MEDICARE

## 2023-02-28 RX ORDER — CLONIDINE HYDROCHLORIDE 0.2 MG/1
0.2 TABLET ORAL 2 TIMES DAILY
Qty: 180 TABLET | Refills: 3 | Status: SHIPPED | OUTPATIENT
Start: 2023-02-28 | End: 2023-06-06 | Stop reason: SDUPTHER

## 2023-02-28 NOTE — TELEPHONE ENCOUNTER
Pharmacy called clinic to clarify clonidine order. Order is for 0.2mg take 1/2 tablet BID patient takes whole tablet. Is patient to take 0.2mg per dose or 0.1mg per dose? Please advise. Thank you.

## 2023-02-28 NOTE — TELEPHONE ENCOUNTER
----- Message from Vero Thurman sent at 2/28/2023  7:59 AM CST -----  [facility] in [place] left voicemail on 2/27 @ 3:59 pm needing clarification on cloNIDine (CATAPRES) 0.2 MG tablet.  [phone]

## 2023-03-03 ENCOUNTER — OFFICE VISIT (OUTPATIENT)
Dept: INTERNAL MEDICINE | Facility: CLINIC | Age: 68
End: 2023-03-03
Payer: MEDICARE

## 2023-03-03 VITALS
DIASTOLIC BLOOD PRESSURE: 84 MMHG | BODY MASS INDEX: 50.02 KG/M2 | WEIGHT: 293 LBS | HEART RATE: 101 BPM | SYSTOLIC BLOOD PRESSURE: 147 MMHG | RESPIRATION RATE: 18 BRPM | HEIGHT: 64 IN | TEMPERATURE: 98 F

## 2023-03-03 DIAGNOSIS — Z01.818 PREOPERATIVE CLEARANCE: Primary | ICD-10-CM

## 2023-03-03 PROCEDURE — 99213 OFFICE O/P EST LOW 20 MIN: CPT | Mod: PBBFAC

## 2023-03-03 NOTE — PROGRESS NOTES
I have reviewed and concur with the resident's history, physical, assessment, and plan.  I have discussed with him all issues related to the diagnosis, workup and treatment plan. Care provided as reasonable and necessary. Medically stable for surgery  Prior cataract surgery uneventful  Jitendra Potts MD  [facility]

## 2023-03-03 NOTE — PROGRESS NOTES
"[facility] INTERNAL MEDICINE  PREOPERATIVE CLINIC NOTE    SUBJECTIVE:      HPI: Ms. Freeman is a 67 year old  female with a PMH of renal cell ca s/p nephrectomy in 2011 and colon adenocarcinoma stage IIA s/p right hemicolectomy in 2014 w/ adjuvant chemo completed in 4/2015, hypertension, diabetes, MOHSEN (on CPAP), CKD stage IIIB presents to preoperative clinic for clearance to undergo cataract surgery.      Patient had undergone left eye cataract removal, and and now is going to proceed with right eye cataract surgery.  Patient has no complaints, she states that her diabetes is stable and indicates that her morning glucoses are in the 100s and her prandial glucoses are in the 140s and 150s.  Of note her previous A1c was in the 10s and now they are 8.4 on 1/17 showing some improvement.  Otherwise she has no chest pain, fever, shortness of breath, dizziness, syncope, weakness.  She is compliant with her CPAP for her MOHSEN.      ROS: Negative Except for Above Stated HPI         OBJECTIVE:     Vital signs:   BP (!) 147/84 (BP Location: Left arm, Patient Position: Sitting, BP Method: Large (Automatic))   Pulse 101   Temp 98.1 °F (36.7 °C) (Oral)   Resp 18   Ht 5' 4" (1.626 m)   Wt (!) 142.2 kg (313 lb 6.4 oz)   BMI 53.79 kg/m²      Physical Examination:  General: obese w/o distress  HEENT: NC/AT; PERRLA; nasal and oral mucosa moist and clear; no sinus tenderness; no thyromegaly  Neck: Full ROM; no lymphadenopathy  Pulm: bilateral rhonchi and mild wheezing, normal work of breathing, dry cough  CV: S1, S2 w/o murmurs or gallops; no edema noted  GI: Soft with normal bowel sounds in all quadrants, no masses on palpation  MSK: Full ROM of all extremities and spine w/o limitation or discomfort  Derm: No rashes, abnormal bruising, or skin lesions  Neuro: AAOx4; CN II-XII intact; motor/sensory function intact  Psych: Cooperative; appropriate mood and affect       ASSESSMENT & PLAN:   Preoperative clearance   RCRI:  1 "   Diabetes, hypertension, CKD    - underwent previous cataract surgery with no issues  - Okay to proceed with planned right cataract removal surgery, comorbidities stable  - to discontinue aspirin 1 day prior to procedure and resume day after procedure if with no complications   - trending Improvement on A1c, defer any further changes to PCP at this time      Tariq Weinstein MD  [facility] Internal Medicine PGY-1

## 2023-03-28 RX ORDER — DULOXETIN HYDROCHLORIDE 60 MG/1
CAPSULE, DELAYED RELEASE ORAL
Qty: 90 CAPSULE | Refills: 0 | Status: SHIPPED | OUTPATIENT
Start: 2023-03-28 | End: 2023-05-23 | Stop reason: SDUPTHER

## 2023-04-05 ENCOUNTER — DOCUMENTATION ONLY (OUTPATIENT)
Dept: ADMINISTRATIVE | Facility: HOSPITAL | Age: 68
End: 2023-04-05
Payer: MEDICARE

## 2023-04-12 ENCOUNTER — OFFICE VISIT (OUTPATIENT)
Dept: GASTROENTEROLOGY | Facility: CLINIC | Age: 68
End: 2023-04-12
Payer: MEDICARE

## 2023-04-12 VITALS
RESPIRATION RATE: 18 BRPM | WEIGHT: 293 LBS | HEIGHT: 64 IN | SYSTOLIC BLOOD PRESSURE: 131 MMHG | OXYGEN SATURATION: 97 % | BODY MASS INDEX: 50.02 KG/M2 | DIASTOLIC BLOOD PRESSURE: 74 MMHG | TEMPERATURE: 98 F | HEART RATE: 74 BPM

## 2023-04-12 DIAGNOSIS — K62.5 RECTAL BLEEDING: Primary | ICD-10-CM

## 2023-04-12 DIAGNOSIS — Z85.038 PERSONAL HISTORY OF COLON CANCER: ICD-10-CM

## 2023-04-12 DIAGNOSIS — E78.5 HYPERLIPIDEMIA ASSOCIATED WITH TYPE 2 DIABETES MELLITUS: Primary | ICD-10-CM

## 2023-04-12 DIAGNOSIS — D50.9 MICROCYTIC ANEMIA: ICD-10-CM

## 2023-04-12 DIAGNOSIS — E11.69 HYPERLIPIDEMIA ASSOCIATED WITH TYPE 2 DIABETES MELLITUS: Primary | ICD-10-CM

## 2023-04-12 PROCEDURE — 3075F PR MOST RECENT SYSTOLIC BLOOD PRESS GE 130-139MM HG: ICD-10-PCS | Mod: CPTII,,, | Performed by: NURSE PRACTITIONER

## 2023-04-12 PROCEDURE — 3066F NEPHROPATHY DOC TX: CPT | Mod: CPTII,,, | Performed by: NURSE PRACTITIONER

## 2023-04-12 PROCEDURE — 1160F RVW MEDS BY RX/DR IN RCRD: CPT | Mod: CPTII,,, | Performed by: NURSE PRACTITIONER

## 2023-04-12 PROCEDURE — 1160F PR REVIEW ALL MEDS BY PRESCRIBER/CLIN PHARMACIST DOCUMENTED: ICD-10-PCS | Mod: CPTII,,, | Performed by: NURSE PRACTITIONER

## 2023-04-12 PROCEDURE — 99204 OFFICE O/P NEW MOD 45 MIN: CPT | Mod: S$PBB,,, | Performed by: NURSE PRACTITIONER

## 2023-04-12 PROCEDURE — 99214 OFFICE O/P EST MOD 30 MIN: CPT | Mod: PBBFAC | Performed by: NURSE PRACTITIONER

## 2023-04-12 PROCEDURE — 1101F PR PT FALLS ASSESS DOC 0-1 FALLS W/OUT INJ PAST YR: ICD-10-PCS | Mod: CPTII,,, | Performed by: NURSE PRACTITIONER

## 2023-04-12 PROCEDURE — 3078F PR MOST RECENT DIASTOLIC BLOOD PRESSURE < 80 MM HG: ICD-10-PCS | Mod: CPTII,,, | Performed by: NURSE PRACTITIONER

## 2023-04-12 PROCEDURE — 1159F MED LIST DOCD IN RCRD: CPT | Mod: CPTII,,, | Performed by: NURSE PRACTITIONER

## 2023-04-12 PROCEDURE — 1126F PR PAIN SEVERITY QUANTIFIED, NO PAIN PRESENT: ICD-10-PCS | Mod: CPTII,,, | Performed by: NURSE PRACTITIONER

## 2023-04-12 PROCEDURE — 99204 PR OFFICE/OUTPT VISIT, NEW, LEVL IV, 45-59 MIN: ICD-10-PCS | Mod: S$PBB,,, | Performed by: NURSE PRACTITIONER

## 2023-04-12 PROCEDURE — 3078F DIAST BP <80 MM HG: CPT | Mod: CPTII,,, | Performed by: NURSE PRACTITIONER

## 2023-04-12 PROCEDURE — 1159F PR MEDICATION LIST DOCUMENTED IN MEDICAL RECORD: ICD-10-PCS | Mod: CPTII,,, | Performed by: NURSE PRACTITIONER

## 2023-04-12 PROCEDURE — 3288F PR FALLS RISK ASSESSMENT DOCUMENTED: ICD-10-PCS | Mod: CPTII,,, | Performed by: NURSE PRACTITIONER

## 2023-04-12 PROCEDURE — 1101F PT FALLS ASSESS-DOCD LE1/YR: CPT | Mod: CPTII,,, | Performed by: NURSE PRACTITIONER

## 2023-04-12 PROCEDURE — 3066F PR DOCUMENTATION OF TREATMENT FOR NEPHROPATHY: ICD-10-PCS | Mod: CPTII,,, | Performed by: NURSE PRACTITIONER

## 2023-04-12 PROCEDURE — 3008F BODY MASS INDEX DOCD: CPT | Mod: CPTII,,, | Performed by: NURSE PRACTITIONER

## 2023-04-12 PROCEDURE — 1126F AMNT PAIN NOTED NONE PRSNT: CPT | Mod: CPTII,,, | Performed by: NURSE PRACTITIONER

## 2023-04-12 PROCEDURE — 3075F SYST BP GE 130 - 139MM HG: CPT | Mod: CPTII,,, | Performed by: NURSE PRACTITIONER

## 2023-04-12 PROCEDURE — 3288F FALL RISK ASSESSMENT DOCD: CPT | Mod: CPTII,,, | Performed by: NURSE PRACTITIONER

## 2023-04-12 PROCEDURE — 3008F PR BODY MASS INDEX (BMI) DOCUMENTED: ICD-10-PCS | Mod: CPTII,,, | Performed by: NURSE PRACTITIONER

## 2023-04-12 RX ORDER — POLYETHYLENE GLYCOL 3350, SODIUM SULFATE, SODIUM CHLORIDE, POTASSIUM CHLORIDE, SODIUM ASCORBATE, AND ASCORBIC ACID 7.5-2.691G
2000 KIT ORAL ONCE
Qty: 1 KIT | Refills: 0 | Status: SHIPPED | OUTPATIENT
Start: 2023-04-12 | End: 2023-04-12

## 2023-04-12 RX ORDER — ATORVASTATIN CALCIUM 40 MG/1
40 TABLET, FILM COATED ORAL DAILY
Qty: 90 TABLET | Refills: 2 | Status: SHIPPED | OUTPATIENT
Start: 2023-04-12 | End: 2023-06-06 | Stop reason: SDUPTHER

## 2023-04-12 NOTE — TELEPHONE ENCOUNTER
----- Message from Magnolia Marcus LPN sent at 4/12/2023  1:08 PM CDT -----  Regarding: Medication refill  Pt in clinic for GI appt. Requesting refill on Atorvastatin 40 mg daily prescribed by Dr. Urena. Pt's pharmacy is [facility].

## 2023-04-12 NOTE — PROGRESS NOTES
Subjective:       Patient ID: Elissa Freeman is a 67 y.o. female.    Chief Complaint: Rectal Bleeding (2 episodes in November 2022, denies any blood with stool since then.)    This 67-year-old  female with a history of asthma, PE, renal cell carcinoma s/p left radical nephrectomy in 2011, CKD, CAD, DM, HLD, HTN, obesity, MOHSEN, stage IIa adenocarcinoma of the cecum s/p extended right hemicolectomy in 2014 with side-to-side anastomosis, and cholecystectomy is referred for rectal bleeding.  She presents unaccompanied.  She reports a few episodes of red blood with bowel movements noted on the tissue after wiping in November 2022.  She has had no further episodes of bleeding since that time.  She did note a few episodes of hematuria last month.  Bowel habits are described as formed stools once daily without melena, hematochezia, fecal urgency, fecal incontinence, or pain with defecation.  Her appetite is good and her weight is stable.  She denies fever, chills, nausea, vomiting, hematemesis, odynophagia, dysphagia, acid reflux, pyrosis, early satiety, or abdominal pain.    She underwent colonoscopy February 28, 2018 with findings of widely patent anastomosis and completely negative colonoscopy with a recommended repeat colonoscopy in 5 years.  She underwent colonoscopy June 18, 2020 with findings of widely patent ileocolonic anastomosis, normal mucosa, and no masses or polyps with a recommended repeat colonoscopy in 5 years.    She denies ever having an EGD done. She takes aspirin 81 mg daily. She denies tobacco or alcohol use. She denies illicit drug use. She denies a family history of IBD, colon polyps, or colon cancer.    Review of patient's allergies indicates:  No Known Allergies    Past Medical History:   Diagnosis Date    Asthma     Cancer     CKD (chronic kidney disease)     Coronary artery disease     Diabetes mellitus     Hyperlipidemia     Hypertension     Obesity, unspecified     MOHSEN  (obstructive sleep apnea)     Unspecified cataract      Past Surgical History:   Procedure Laterality Date    CATARACT EXTRACTION Left 11/28/2022    CHOLECYSTECTOMY      COLON SURGERY      excision of lipoma      HYSTERECTOMY, VAGINAL, WITH SALPINGO-OOPHORECTOMY      NEPHRECTOMY Left     TUBAL LIGATION       Family History:   family history includes Cancer in her brother; Coronary artery disease in her mother; Hyperlipidemia in her mother; Hypertension in her mother; Stroke in her father.    Social History:    reports that she has never smoked. She has never used smokeless tobacco. She reports that she does not currently use alcohol. She reports that she does not use drugs.    Review of Systems  Negative except as noted in the HPI.      Objective:      Physical Exam  Constitutional:       Appearance: Normal appearance.      Comments: Ambulates with cane   HENT:      Head: Normocephalic.      Mouth/Throat:      Mouth: Mucous membranes are moist.   Eyes:      Extraocular Movements: Extraocular movements intact.      Conjunctiva/sclera: Conjunctivae normal.      Pupils: Pupils are equal, round, and reactive to light.   Cardiovascular:      Rate and Rhythm: Normal rate and regular rhythm.      Pulses: Normal pulses.      Heart sounds: Normal heart sounds.   Pulmonary:      Effort: Pulmonary effort is normal.      Breath sounds: Normal breath sounds.   Abdominal:      General: Bowel sounds are normal.      Palpations: Abdomen is soft.   Musculoskeletal:         General: Normal range of motion.      Cervical back: Normal range of motion and neck supple.   Skin:     General: Skin is warm and dry.   Neurological:      General: No focal deficit present.      Mental Status: She is alert and oriented to person, place, and time.   Psychiatric:         Mood and Affect: Mood normal.         Behavior: Behavior normal.         Thought Content: Thought content normal.         Judgment: Judgment normal.       Home Medications:  "    Current Outpatient Medications   Medication Sig    albuterol (PROVENTIL) 5 mg/mL nebulizer solution SMARTSI.5 Milligram(s) Via Nebulizer Every 6 Hours PRN    albuterol (PROVENTIL/VENTOLIN HFA) 90 mcg/actuation inhaler Inhale 1 puff into the lungs every 4 (four) hours as needed for Wheezing.    aspirin (ECOTRIN) 81 MG EC tablet Take 81 mg by mouth once daily.    atorvastatin (LIPITOR) 40 MG tablet Take 1 tablet (40 mg total) by mouth once daily.    BD DAVONTE 2ND GEN PEN NEEDLE 32 gauge x 5/32" Ndle USE TO INJECT INSULIN FOUR TIMES DAILY AS DIRECTED    carvediloL (COREG) 3.125 MG tablet Take 1 tablet (3.125 mg total) by mouth 2 (two) times daily.    cetirizine (ZYRTEC) 10 MG tablet Take 1 tablet (10 mg total) by mouth once daily. for 14 days    cloNIDine (CATAPRES) 0.2 MG tablet Take 1 tablet (0.2 mg total) by mouth 2 (two) times daily. Takes 2 times per day. Takes whole tablet    diclofenac (VOLTAREN) 50 MG EC tablet Take 1 tablet (50 mg total) by mouth 2 (two) times daily as needed (Pain).    diltiaZEM (CARDIZEM CD) 180 MG 24 hr capsule TAKE 1 CAPSULE BY MOUTH DAILY    DULoxetine (CYMBALTA) 60 MG capsule TAKE 1 CAPSULE BY MOUTH DAILY. DO NOT CRUSH OR CHEW    empagliflozin (JARDIANCE) 25 mg tablet Take 1 tablet (25 mg total) by mouth once daily.    exenatide microspheres (BYDUREON BCISE) 2 mg/0.85 mL AtIn Inject 2 mg into the skin every 7 days.    flash glucose scanning reader (FREESTYLE SMITHA 2 READER) Misc 1 each by Misc.(Non-Drug; Combo Route) route 4 (four) times daily with meals and nightly.    flash glucose sensor (FREESTYLE SMITHA 2 SENSOR) Kit 1 each by Misc.(Non-Drug; Combo Route) route 2 hours after meals and at bedtime.    furosemide (LASIX) 20 MG tablet Take 20 mg by mouth Daily.    gabapentin (NEURONTIN) 600 MG tablet Take 1 tablet (600 mg total) by mouth once daily.    insulin aspart U-100 (NOVOLOG) 100 unit/mL (3 mL) InPn pen Inject 20 Units into the skin 3 (three) times daily with meals. Plus 25 " units at night    latanoprost 0.005 % ophthalmic solution Place 1 drop into both eyes 2 (two) times daily.    LEVEMIR FLEXTOUCH U-100 INSULN 100 unit/mL (3 mL) InPn pen Inject 55 Units into the skin every evening.    losartan-hydrochlorothiazide 50-12.5 mg (HYZAAR) 50-12.5 mg per tablet Take 1 tablet by mouth once daily.    nebulizer accessories Kit 1 each by Misc.(Non-Drug; Combo Route) route 4 (four) times daily as needed (wheezing).    nitroGLYCERIN (NITROSTAT) 0.4 MG SL tablet PLACE 1 TABLET UNDER THE TONGUE EVERY 5 MINUTES AS NEEDED FOR CHEST PAIN. GO TO ER AFTER THIRD DOSE    potassium chloride (K-TAB) 20 mEq Take 1 tablet (20 mEq total) by mouth once daily.    vitamin D (VITAMIN D3) 1000 units Tab Take 2 tablets (2,000 Units total) by mouth Daily.    ketorolac 0.5% (ACULAR) 0.5 % Drop Place 1 drop into both eyes nightly.    ofloxacin (OCUFLOX) 0.3 % ophthalmic solution     prednisoLONE acetate (PRED FORTE) 1 % DrpS Place 1 drop into the left eye Daily.     Laboratory Results:     Recent Results (from the past 4032 hour(s))   Comprehensive metabolic panel    Collection Time: 10/30/22  2:19 PM   Result Value Ref Range    Sodium Level 142 136 - 145 mmol/L    Potassium Level 4.6 3.5 - 5.1 mmol/L    Chloride 109 (H) 98 - 107 mmol/L    Carbon Dioxide 25 23 - 31 mmol/L    Glucose Level 102 82 - 115 mg/dL    Blood Urea Nitrogen 14.7 9.8 - 20.1 mg/dL    Creatinine 1.35 (H) 0.55 - 1.02 mg/dL    Calcium Level Total 8.7 8.4 - 10.2 mg/dL    Protein Total 6.3 5.8 - 7.6 gm/dL    Albumin Level 3.1 (L) 3.4 - 4.8 gm/dL    Globulin 3.2 2.4 - 3.5 gm/dL    Albumin/Globulin Ratio 1.0 (L) 1.1 - 2.0 ratio    Bilirubin Total 0.5 <=1.5 mg/dL    Alkaline Phosphatase 159 (H) 40 - 150 unit/L    Alanine Aminotransferase 33 0 - 55 unit/L    Aspartate Aminotransferase 42 (H) 5 - 34 unit/L    eGFR 44 mls/min/1.73/m2   Lipase    Collection Time: 10/30/22  2:19 PM   Result Value Ref Range    Lipase Level 9 <=60 U/L   Troponin I    Collection  Time: 10/30/22  2:19 PM   Result Value Ref Range    Troponin-I 0.021 0.000 - 0.045 ng/mL   CBC with Differential    Collection Time: 10/30/22  2:19 PM   Result Value Ref Range    WBC 5.1 4.5 - 11.5 x10(3)/mcL    RBC 5.23 4.20 - 5.40 x10(6)/mcL    Hgb 11.3 (L) 12.0 - 16.0 gm/dL    Hct 41.1 37.0 - 47.0 %    MCV 78.6 (L) 80.0 - 94.0 fL    MCH 21.6 (L) 27.0 - 31.0 pg    MCHC 27.5 (L) 33.0 - 36.0 mg/dL    RDW 15.4 11.5 - 17.0 %    Platelet 217 130 - 400 x10(3)/mcL    MPV 11.1 (H) 7.4 - 10.4 fL    Neut % 51.9 %    Lymph % 35.2 %    Mono % 9.6 %    Eos % 2.3 %    Basophil % 0.6 %    Lymph # 1.80 0.6 - 4.6 x10(3)/mcL    Neut # 2.7 2.1 - 9.2 x10(3)/mcL    Mono # 0.49 0.1 - 1.3 x10(3)/mcL    Eos # 0.12 0 - 0.9 x10(3)/mcL    Baso # 0.03 0 - 0.2 x10(3)/mcL    IG# 0.02 0 - 0.04 x10(3)/mcL    IG% 0.4 %    NRBC% 0.0 %   Urinalysis Clean Catch    Collection Time: 10/30/22  2:24 PM   Result Value Ref Range    Color, UA Yellow Yellow, Light-Yellow, Dark Yellow, Tammy, Straw    Appearance, UA Turbid (A) Clear    Specific Gravity, UA 1.029     pH, UA 5.5 5.0 - 8.5    Protein, UA 1+ (A) Negative mg/dL    Glucose, UA 4+ (A) Negative, Normal mg/dL    Ketones, UA Negative Negative mg/dL    Blood, UA Trace (A) Negative unit/L    Bilirubin, UA Negative Negative mg/dL    Urobilinogen, UA Normal 0.2, 1.0, Normal mg/dL    Nitrites, UA Negative Negative    Leukocyte Esterase,  (A) Negative unit/L    WBC, UA 0-5 None Seen, 0-2, 3-5, 0-5 /HPF    Bacteria, UA Trace (A) None Seen /HPF    Squamous Epithelial Cells, UA Moderate (A) None Seen /HPF    Mucous, UA Trace (A) None Seen /LPF    Hyaline Casts, UA None Seen None Seen /lpf    RBC, UA 0-5 None Seen, 0-2, 3-5, 0-5, >100 /HPF   T4, Free    Collection Time: 12/26/22  9:16 PM   Result Value Ref Range    Thyroxine Free 0.97 0.70 - 1.48 ng/dL   CK-MB    Collection Time: 12/26/22  9:16 PM   Result Value Ref Range    Creatine Kinase MB 1.6 <=3.4 ng/mL   CK    Collection Time: 12/26/22  9:16 PM    Result Value Ref Range    Creatine Kinase 531 (H) 29 - 168 U/L   BNP    Collection Time: 12/26/22  9:16 PM   Result Value Ref Range    Natriuretic Peptide 30.7 <=100.0 pg/mL   TSH    Collection Time: 12/26/22  9:16 PM   Result Value Ref Range    Thyroid Stimulating Hormone 2.248 0.350 - 4.940 uIU/mL   Comprehensive Metabolic Panel    Collection Time: 12/26/22  9:16 PM   Result Value Ref Range    Sodium Level 144 136 - 145 mmol/L    Potassium Level 4.4 3.5 - 5.1 mmol/L    Chloride 103 98 - 107 mmol/L    Carbon Dioxide 30 23 - 31 mmol/L    Glucose Level 155 (H) 82 - 115 mg/dL    Blood Urea Nitrogen 16.2 9.8 - 20.1 mg/dL    Creatinine 1.45 (H) 0.55 - 1.02 mg/dL    Calcium Level Total 9.3 8.4 - 10.2 mg/dL    Protein Total 7.6 5.8 - 7.6 gm/dL    Albumin Level 3.2 (L) 3.4 - 4.8 g/dL    Globulin 4.4 (H) 2.4 - 3.5 gm/dL    Albumin/Globulin Ratio 0.7 (L) 1.1 - 2.0 ratio    Bilirubin Total 0.3 <=1.5 mg/dL    Alkaline Phosphatase 144 40 - 150 unit/L    Alanine Aminotransferase 25 0 - 55 unit/L    Aspartate Aminotransferase 41 (H) 5 - 34 unit/L    eGFR 40 mls/min/1.73/m2   CBC with Differential    Collection Time: 12/26/22  9:16 PM   Result Value Ref Range    WBC 4.7 4.5 - 11.5 x10(3)/mcL    RBC 5.50 (H) 4.20 - 5.40 x10(6)/mcL    Hgb 12.0 12.0 - 16.0 gm/dL    Hct 41.6 37.0 - 47.0 %    MCV 75.6 (L) 80.0 - 94.0 fL    MCH 21.8 pg    MCHC 28.8 (L) 33.0 - 36.0 mg/dL    RDW 15.8 (H) 11.0 - 14.5 %    Platelet 175 140 - 371 x10(3)/mcL    MPV 11.4 9.4 - 12.4 fL    Neut % 45.4 %    Lymph % 32.8 %    Mono % 20.5 %    Eos % 0.9 %    Basophil % 0.2 %    Lymph # 1.54 0.6 - 4.6 x10(3)/mcL    Neut # 2.13 2.1 - 9.2 x10(3)/mcL    Mono # 0.96 0.1 - 1.3 x10(3)/mcL    Eos # 0.04 0 - 0.9 x10(3)/mcL    Baso # 0.01 0 - 0.2 x10(3)/mcL    IG# 0.01 0 - 0.04 x10(3)/mcL    IG% 0.2 %    NRBC% 0.0 0 - 1 %   Troponin I    Collection Time: 12/26/22  9:17 PM   Result Value Ref Range    Troponin-I 0.036 0.000 - 0.045 ng/mL   Hemoglobin A1C    Collection Time:  01/17/23 11:58 AM   Result Value Ref Range    Hemoglobin A1c 8.4 (H) <=7.0 %    Estimated Average Glucose 194.4 mg/dL   HIV 1/2 Ag/Ab (4th Gen)    Collection Time: 01/17/23 11:58 AM   Result Value Ref Range    HIV Nonreactive Nonreactive   Comprehensive Metabolic Panel    Collection Time: 01/17/23 11:58 AM   Result Value Ref Range    Sodium Level 141 136 - 145 mmol/L    Potassium Level 3.9 3.5 - 5.1 mmol/L    Chloride 105 98 - 107 mmol/L    Carbon Dioxide 29 23 - 31 mmol/L    Glucose Level 109 82 - 115 mg/dL    Blood Urea Nitrogen 15.2 9.8 - 20.1 mg/dL    Creatinine 1.49 (H) 0.55 - 1.02 mg/dL    Calcium Level Total 9.2 8.4 - 10.2 mg/dL    Protein Total 7.4 5.8 - 7.6 gm/dL    Albumin Level 2.9 (L) 3.4 - 4.8 g/dL    Globulin 4.5 (H) 2.4 - 3.5 gm/dL    Albumin/Globulin Ratio 0.6 (L) 1.1 - 2.0 ratio    Bilirubin Total 0.6 <=1.5 mg/dL    Alkaline Phosphatase 124 40 - 150 unit/L    Alanine Aminotransferase 8 0 - 55 unit/L    Aspartate Aminotransferase 15 5 - 34 unit/L    eGFR 39 mls/min/1.73/m2   Lipid Panel    Collection Time: 01/17/23 11:58 AM   Result Value Ref Range    Cholesterol Total 108 <=200 mg/dL    HDL Cholesterol 40 35 - 60 mg/dL    Triglyceride 75 37 - 140 mg/dL    Cholesterol/HDL Ratio 3 0 - 5    Very Low Density Lipoprotein 15     LDL Cholesterol 53.00 50.00 - 140.00 mg/dL   TSH    Collection Time: 01/17/23 11:58 AM   Result Value Ref Range    Thyroid Stimulating Hormone 2.426 0.350 - 4.940 uIU/mL   T4, Free    Collection Time: 01/17/23 11:58 AM   Result Value Ref Range    Thyroxine Free 0.95 0.70 - 1.48 ng/dL   CBC with Differential    Collection Time: 01/17/23 11:58 AM   Result Value Ref Range    WBC 6.1 4.5 - 11.5 x10(3)/mcL    RBC 5.38 4.20 - 5.40 x10(6)/mcL    Hgb 11.4 (L) 12.0 - 16.0 gm/dL    Hct 40.2 37.0 - 47.0 %    MCV 74.7 (L) 80.0 - 94.0 fL    MCH 21.2 pg    MCHC 28.4 (L) 33.0 - 36.0 mg/dL    RDW 15.9 11.5 - 17.0 %    Platelet 283 130 - 400 x10(3)/mcL    MPV 10.0 7.4 - 10.4 fL    Neut % 59.4 %     Lymph % 30.1 %    Mono % 7.7 %    Eos % 2.3 %    Basophil % 0.2 %    Lymph # 1.84 0.6 - 4.6 x10(3)/mcL    Neut # 3.63 2.1 - 9.2 x10(3)/mcL    Mono # 0.47 0.1 - 1.3 x10(3)/mcL    Eos # 0.14 0 - 0.9 x10(3)/mcL    Baso # 0.01 0 - 0.2 x10(3)/mcL    IG# 0.02 0 - 0.04 x10(3)/mcL    IG% 0.3 %    NRBC% 0.0 %   Protein/Creatinine Ratio, Urine    Collection Time: 02/09/23  1:36 PM   Result Value Ref Range    Urine Protein Level 78.6 mg/dL    Urine Creatinine 224.1 (H) 47.0 - 110.0 mg/dL    Urine Protein/Creatinine Ratio 0.4    Urinalysis, Reflex to Urine Culture Urine, Clean Catch    Collection Time: 02/09/23  1:36 PM    Specimen: Urine   Result Value Ref Range    Color, UA Yellow Yellow, Light-Yellow, Dark Yellow, Tammy, Straw    Appearance, UA Turbid (A) Clear    Specific Gravity, UA 1.029     pH, UA 5.5 5.0 - 8.5    Protein, UA 1+ (A) Negative mg/dL    Glucose, UA 4+ (A) Negative, Normal mg/dL    Ketones, UA Negative Negative mg/dL    Blood, UA 1+ (A) Negative unit/L    Bilirubin, UA Negative Negative mg/dL    Urobilinogen, UA Normal 0.2, 1.0, Normal mg/dL    Nitrites, UA Negative Negative    Leukocyte Esterase,  (A) Negative unit/L    WBC, UA 51-99 (A) None Seen, 0-2, 3-5, 0-5 /HPF    Bacteria, UA Occ (A) None Seen /HPF    Budding Yeast, UA Few (A) None Seen /HPF    Squamous Epithelial Cells, UA Many (A) None Seen /HPF    Mucous, UA Trace (A) None Seen /LPF    Hyaline Casts, UA None Seen None Seen /lpf    RBC, UA 21-50 (A) None Seen, 0-2, 3-5, 0-5 /HPF   Urine culture    Collection Time: 02/09/23  1:36 PM    Specimen: Urine   Result Value Ref Range    Urine Culture No Growth    Comprehensive Metabolic Panel    Collection Time: 02/09/23  3:36 PM   Result Value Ref Range    Sodium Level 144 136 - 145 mmol/L    Potassium Level 3.9 3.5 - 5.1 mmol/L    Chloride 105 98 - 107 mmol/L    Carbon Dioxide 31 23 - 31 mmol/L    Glucose Level 89 82 - 115 mg/dL    Blood Urea Nitrogen 14.3 9.8 - 20.1 mg/dL    Creatinine 1.45 (H)  0.55 - 1.02 mg/dL    Calcium Level Total 9.2 8.4 - 10.2 mg/dL    Protein Total 7.6 5.8 - 7.6 gm/dL    Albumin Level 3.1 (L) 3.4 - 4.8 g/dL    Globulin 4.5 (H) 2.4 - 3.5 gm/dL    Albumin/Globulin Ratio 0.7 (L) 1.1 - 2.0 ratio    Bilirubin Total 0.7 <=1.5 mg/dL    Alkaline Phosphatase 132 40 - 150 unit/L    Alanine Aminotransferase 12 0 - 55 unit/L    Aspartate Aminotransferase 14 5 - 34 unit/L    eGFR 40 mls/min/1.73/m2     Assessment/Plan:     Problem List Items Addressed This Visit          Oncology    Personal history of colon cancer     See plan for primary diagnosis           Relevant Orders    Case Request Endoscopy: COLONOSCOPY (Completed)    CBC Auto Differential    Ferritin    Iron and TIBC    Microcytic anemia     See plan for primary diagnosis           Relevant Orders    Case Request Endoscopy: COLONOSCOPY (Completed)    CBC Auto Differential    Ferritin    Iron and TIBC       GI    Rectal bleeding - Primary     She underwent colonoscopy February 28, 2018 with findings of widely patent anastomosis and completely negative colonoscopy with a recommended repeat colonoscopy in 5 years.    She underwent colonoscopy June 18, 2020 with findings of widely patent ileocolonic anastomosis, normal mucosa, and no masses or polyps with a recommended repeat colonoscopy in 5 years.  Recommend soluble fiber supplementation  Avoid straining or sitting on the toilet for long periods of time  Colonoscopy  CBC, iron profile, ferritin  Call with updates  ER precautions provided         Relevant Orders    Case Request Endoscopy: COLONOSCOPY (Completed)    CBC Auto Differential    Ferritin    Iron and TIBC

## 2023-04-12 NOTE — ASSESSMENT & PLAN NOTE
She underwent colonoscopy February 28, 2018 with findings of widely patent anastomosis and completely negative colonoscopy with a recommended repeat colonoscopy in 5 years.    She underwent colonoscopy June 18, 2020 with findings of widely patent ileocolonic anastomosis, normal mucosa, and no masses or polyps with a recommended repeat colonoscopy in 5 years.  Recommend soluble fiber supplementation  Avoid straining or sitting on the toilet for long periods of time  Colonoscopy  CBC, iron profile, ferritin  Call with updates  ER precautions provided

## 2023-04-21 ENCOUNTER — LAB VISIT (OUTPATIENT)
Dept: LAB | Facility: HOSPITAL | Age: 68
End: 2023-04-21
Attending: NURSE PRACTITIONER
Payer: MEDICARE

## 2023-04-21 DIAGNOSIS — Z85.038 PERSONAL HISTORY OF COLON CANCER: ICD-10-CM

## 2023-04-21 DIAGNOSIS — D50.9 MICROCYTIC ANEMIA: ICD-10-CM

## 2023-04-21 DIAGNOSIS — K62.5 RECTAL BLEEDING: ICD-10-CM

## 2023-04-21 LAB
BASOPHILS # BLD AUTO: 0.02 X10(3)/MCL (ref 0–0.2)
BASOPHILS NFR BLD AUTO: 0.3 %
EOSINOPHIL # BLD AUTO: 0.33 X10(3)/MCL (ref 0–0.9)
EOSINOPHIL NFR BLD AUTO: 5.7 %
ERYTHROCYTE [DISTWIDTH] IN BLOOD BY AUTOMATED COUNT: 15.6 % (ref 11.5–17)
FERRITIN SERPL-MCNC: 74.91 NG/ML (ref 4.63–204)
HCT VFR BLD AUTO: 41.6 % (ref 37–47)
HGB BLD-MCNC: 12 G/DL (ref 12–16)
IMM GRANULOCYTES # BLD AUTO: 0.02 X10(3)/MCL (ref 0–0.04)
IMM GRANULOCYTES NFR BLD AUTO: 0.3 %
IRON SATN MFR SERPL: 20 % (ref 20–50)
IRON SERPL-MCNC: 47 UG/DL (ref 50–170)
LYMPHOCYTES # BLD AUTO: 1.97 X10(3)/MCL (ref 0.6–4.6)
LYMPHOCYTES NFR BLD AUTO: 33.8 %
MCH RBC QN AUTO: 21.9 PG (ref 27–31)
MCHC RBC AUTO-ENTMCNC: 28.8 G/DL (ref 33–36)
MCV RBC AUTO: 75.9 FL (ref 80–94)
MONOCYTES # BLD AUTO: 0.61 X10(3)/MCL (ref 0.1–1.3)
MONOCYTES NFR BLD AUTO: 10.5 %
NEUTROPHILS # BLD AUTO: 2.87 X10(3)/MCL (ref 2.1–9.2)
NEUTROPHILS NFR BLD AUTO: 49.4 %
NRBC BLD AUTO-RTO: 0 %
PLATELET # BLD AUTO: 180 X10(3)/MCL (ref 130–400)
PMV BLD AUTO: 10.4 FL (ref 7.4–10.4)
RBC # BLD AUTO: 5.48 X10(6)/MCL (ref 4.2–5.4)
TIBC SERPL-MCNC: 189 UG/DL (ref 70–310)
TIBC SERPL-MCNC: 236 UG/DL (ref 250–450)
TRANSFERRIN SERPL-MCNC: 213 MG/DL (ref 173–360)
WBC # SPEC AUTO: 5.8 X10(3)/MCL (ref 4.5–11.5)

## 2023-04-21 PROCEDURE — 85025 COMPLETE CBC W/AUTO DIFF WBC: CPT

## 2023-04-21 PROCEDURE — 83550 IRON BINDING TEST: CPT

## 2023-04-21 PROCEDURE — 82728 ASSAY OF FERRITIN: CPT

## 2023-04-21 PROCEDURE — 36415 COLL VENOUS BLD VENIPUNCTURE: CPT

## 2023-04-24 ENCOUNTER — TELEPHONE (OUTPATIENT)
Dept: GASTROENTEROLOGY | Facility: CLINIC | Age: 68
End: 2023-04-24
Payer: MEDICARE

## 2023-04-24 NOTE — PROGRESS NOTES
Please notify CBC stable from previous.  Iron studies have decreased slightly.  She has an upcoming colonoscopy scheduled this week and we will follow results.  Thanks

## 2023-04-24 NOTE — TELEPHONE ENCOUNTER
Results to pt, verbalized understanding.    ----- Message from HEATHER Vanegas sent at 4/24/2023 12:58 PM CDT -----  Please notify CBC stable from previous.  Iron studies have decreased slightly.  She has an upcoming colonoscopy scheduled this week and we will follow results.  Thanks

## 2023-04-25 ENCOUNTER — ANESTHESIA EVENT (OUTPATIENT)
Dept: ENDOSCOPY | Facility: HOSPITAL | Age: 68
End: 2023-04-25
Payer: MEDICARE

## 2023-04-25 NOTE — ANESTHESIA PREPROCEDURE EVALUATION
04/25/2023  Elissa Freeman is a 67 y.o., female with PMHx of morbid obesity, HTN, HLD, CAD, MOHSEN, asthma, h/o PE, CKD, h/o colon CA, DM presents for colonoscopy secondary to rectal bleeding.    Carvedilol--last dose     Active Ambulatory Problems     Diagnosis Date Noted    Ischemic heart disease, chronic 05/03/2022    Primary hypertension 05/03/2022    Hyperlipidemia associated with type 2 diabetes mellitus 05/03/2022    Diabetes mellitus type 2 in obese 05/03/2022    MOHSEN on CPAP 05/03/2022    Renal cell carcinoma of left kidney 05/03/2022    Adenocarcinoma of colon 05/03/2022    History of malignant neoplasm of kidney 08/17/2022    Depression 08/17/2022    Diabetes mellitus 08/17/2022    Hypercholesterolemia 08/17/2022    Kidney disorder 08/17/2022    Mass of colon 08/17/2022    Morbid obesity due to excess calories 08/06/2019    Neuropathy 08/17/2022    Pulmonary hypertension 08/17/2022    Dyspnea 08/06/2019    Rectal bleeding 04/12/2023    Personal history of colon cancer 04/12/2023    Microcytic anemia 04/12/2023     Resolved Ambulatory Problems     Diagnosis Date Noted    No Resolved Ambulatory Problems     Past Medical History:   Diagnosis Date    Asthma     Cancer     CKD (chronic kidney disease)     Coronary artery disease     Hyperlipidemia     Hypertension     Obesity, unspecified     MOHSEN (obstructive sleep apnea)     Unspecified cataract        Pre-op Assessment    I have reviewed the Patient Summary Reports.     I have reviewed the Nursing Notes. I have reviewed the NPO Status.   I have reviewed the Medications.     Review of Systems  Anesthesia Hx:  No problems with previous Anesthesia  History of prior surgery of interest to airway management or planning: Denies Family Hx of Anesthesia complications.   Denies Personal Hx of Anesthesia complications.    Hematology/Oncology:  Hematology Normal   Oncology Normal     EENT/Dental:EENT/Dental Normal   Cardiovascular:  Cardiovascular Normal     Pulmonary:  Pulmonary Normal    Renal/:  Renal/ Normal     Hepatic/GI:  Hepatic/GI Normal    Musculoskeletal:  Musculoskeletal Normal    Neurological:  Neurology Normal    Endocrine:  Endocrine Normal    Dermatological:  Skin Normal    Psych:  Psychiatric Normal           Physical Exam  General: Alert      Lab Results   Component Value Date    WBC 5.8 04/21/2023    HGB 12.0 04/21/2023    HCT 41.6 04/21/2023    MCV 75.9 (L) 04/21/2023     04/21/2023       CMP  Sodium Level   Date Value Ref Range Status   02/09/2023 144 136 - 145 mmol/L Final     Potassium Level   Date Value Ref Range Status   02/09/2023 3.9 3.5 - 5.1 mmol/L Final     Carbon Dioxide   Date Value Ref Range Status   02/09/2023 31 23 - 31 mmol/L Final     Blood Urea Nitrogen   Date Value Ref Range Status   02/09/2023 14.3 9.8 - 20.1 mg/dL Final     Creatinine   Date Value Ref Range Status   02/09/2023 1.45 (H) 0.55 - 1.02 mg/dL Final     Calcium Level Total   Date Value Ref Range Status   02/09/2023 9.2 8.4 - 10.2 mg/dL Final     Albumin Level   Date Value Ref Range Status   02/09/2023 3.1 (L) 3.4 - 4.8 g/dL Final     Bilirubin Total   Date Value Ref Range Status   02/09/2023 0.7 <=1.5 mg/dL Final     Alkaline Phosphatase   Date Value Ref Range Status   02/09/2023 132 40 - 150 unit/L Final     Aspartate Aminotransferase   Date Value Ref Range Status   02/09/2023 14 5 - 34 unit/L Final     Alanine Aminotransferase   Date Value Ref Range Status   02/09/2023 12 0 - 55 unit/L Final     eGFR   Date Value Ref Range Status   02/09/2023 40 mls/min/1.73/m2 Final     Lab Results   Component Value Date    HGBA1C 8.4 (H) 01/17/2023               Anesthesia Plan  Type of Anesthesia, risks & benefits discussed:    Anesthesia Type: Gen Natural Airway  Intra-op Monitoring Plan: Standard ASA Monitors  Post Op Pain Control  Plan: IV/PO Opioids PRN  (medical reason for not using multimodal pain management)  Induction:  IV  Informed Consent: Informed consent signed with the Patient and all parties understand the risks and agree with anesthesia plan.  All questions answered. Patient consented to blood products? No  ASA Score: 3  Day of Surgery Review of History & Physical: H&P Update referred to the surgeon/provider.    Ready For Surgery From Anesthesia Perspective.     .

## 2023-04-27 ENCOUNTER — ANESTHESIA (OUTPATIENT)
Dept: ENDOSCOPY | Facility: HOSPITAL | Age: 68
End: 2023-04-27
Payer: MEDICARE

## 2023-04-27 ENCOUNTER — HOSPITAL ENCOUNTER (OUTPATIENT)
Facility: HOSPITAL | Age: 68
Discharge: HOME | End: 2023-04-27
Attending: INTERNAL MEDICINE | Admitting: INTERNAL MEDICINE
Payer: MEDICARE

## 2023-04-27 DIAGNOSIS — Z85.038 PERSONAL HISTORY OF COLON CANCER: Primary | ICD-10-CM

## 2023-04-27 DIAGNOSIS — Z01.810 PREOP CARDIOVASCULAR EXAM: ICD-10-CM

## 2023-04-27 DIAGNOSIS — K62.5 RECTAL BLEEDING: ICD-10-CM

## 2023-04-27 LAB
GLUCOSE SERPL-MCNC: 97 MG/DL (ref 70–110)
POCT GLUCOSE: 130 MG/DL (ref 70–110)
POCT GLUCOSE: 97 MG/DL (ref 70–110)

## 2023-04-27 PROCEDURE — 45378 DIAGNOSTIC COLONOSCOPY: CPT | Performed by: INTERNAL MEDICINE

## 2023-04-27 PROCEDURE — 63600175 PHARM REV CODE 636 W HCPCS: Performed by: NURSE ANESTHETIST, CERTIFIED REGISTERED

## 2023-04-27 PROCEDURE — 37000009 HC ANESTHESIA EA ADD 15 MINS: Performed by: INTERNAL MEDICINE

## 2023-04-27 PROCEDURE — 82962 GLUCOSE BLOOD TEST: CPT | Performed by: INTERNAL MEDICINE

## 2023-04-27 PROCEDURE — D9220A PRA ANESTHESIA: Mod: CRNA,,, | Performed by: NURSE ANESTHETIST, CERTIFIED REGISTERED

## 2023-04-27 PROCEDURE — 93005 ELECTROCARDIOGRAM TRACING: CPT | Mod: 59

## 2023-04-27 PROCEDURE — D9220A PRA ANESTHESIA: Mod: ANES,,, | Performed by: SPECIALIST

## 2023-04-27 PROCEDURE — 25000003 PHARM REV CODE 250: Performed by: NURSE ANESTHETIST, CERTIFIED REGISTERED

## 2023-04-27 PROCEDURE — 37000008 HC ANESTHESIA 1ST 15 MINUTES: Performed by: INTERNAL MEDICINE

## 2023-04-27 PROCEDURE — 45378 DIAGNOSTIC COLONOSCOPY: CPT | Mod: ,,, | Performed by: INTERNAL MEDICINE

## 2023-04-27 PROCEDURE — D9220A PRA ANESTHESIA: ICD-10-PCS | Mod: ANES,,, | Performed by: SPECIALIST

## 2023-04-27 PROCEDURE — 63600175 PHARM REV CODE 636 W HCPCS: Performed by: ANESTHESIOLOGY

## 2023-04-27 PROCEDURE — 25000003 PHARM REV CODE 250

## 2023-04-27 PROCEDURE — D9220A PRA ANESTHESIA: ICD-10-PCS | Mod: CRNA,,, | Performed by: NURSE ANESTHETIST, CERTIFIED REGISTERED

## 2023-04-27 PROCEDURE — 45378 PR COLONOSCOPY,DIAGNOSTIC: ICD-10-PCS | Mod: ,,, | Performed by: INTERNAL MEDICINE

## 2023-04-27 RX ORDER — LIDOCAINE HYDROCHLORIDE 10 MG/ML
1 INJECTION, SOLUTION EPIDURAL; INFILTRATION; INTRACAUDAL; PERINEURAL ONCE
Status: DISCONTINUED | OUTPATIENT
Start: 2023-04-27 | End: 2023-04-27 | Stop reason: HOSPADM

## 2023-04-27 RX ORDER — LIDOCAINE HYDROCHLORIDE 20 MG/ML
INJECTION, SOLUTION EPIDURAL; INFILTRATION; INTRACAUDAL; PERINEURAL
Status: DISCONTINUED | OUTPATIENT
Start: 2023-04-27 | End: 2023-04-27

## 2023-04-27 RX ORDER — PHENYLEPHRINE HYDROCHLORIDE 10 MG/ML
INJECTION INTRAVENOUS
Status: DISCONTINUED | OUTPATIENT
Start: 2023-04-27 | End: 2023-04-27

## 2023-04-27 RX ORDER — PROPOFOL 10 MG/ML
VIAL (ML) INTRAVENOUS
Status: DISCONTINUED | OUTPATIENT
Start: 2023-04-27 | End: 2023-04-27

## 2023-04-27 RX ORDER — DEXMEDETOMIDINE HYDROCHLORIDE 100 UG/ML
INJECTION, SOLUTION INTRAVENOUS
Status: DISCONTINUED | OUTPATIENT
Start: 2023-04-27 | End: 2023-04-27

## 2023-04-27 RX ORDER — SODIUM CHLORIDE, SODIUM LACTATE, POTASSIUM CHLORIDE, CALCIUM CHLORIDE 600; 310; 30; 20 MG/100ML; MG/100ML; MG/100ML; MG/100ML
INJECTION, SOLUTION INTRAVENOUS CONTINUOUS
Status: DISCONTINUED | OUTPATIENT
Start: 2023-04-27 | End: 2023-04-27 | Stop reason: HOSPADM

## 2023-04-27 RX ADMIN — SODIUM CHLORIDE, POTASSIUM CHLORIDE, SODIUM LACTATE AND CALCIUM CHLORIDE: 600; 310; 30; 20 INJECTION, SOLUTION INTRAVENOUS at 10:04

## 2023-04-27 RX ADMIN — DEXMEDETOMIDINE 20 MCG: 200 INJECTION, SOLUTION INTRAVENOUS at 10:04

## 2023-04-27 RX ADMIN — PROPOFOL 30 MG: 10 INJECTION, EMULSION INTRAVENOUS at 10:04

## 2023-04-27 RX ADMIN — PROPOFOL 20 MG: 10 INJECTION, EMULSION INTRAVENOUS at 10:04

## 2023-04-27 RX ADMIN — SODIUM CHLORIDE, POTASSIUM CHLORIDE, SODIUM LACTATE AND CALCIUM CHLORIDE: 600; 310; 30; 20 INJECTION, SOLUTION INTRAVENOUS at 09:04

## 2023-04-27 RX ADMIN — PHENYLEPHRINE HYDROCHLORIDE 200 MCG: 10 INJECTION INTRAVENOUS at 10:04

## 2023-04-27 RX ADMIN — PROPOFOL 90 MG: 10 INJECTION, EMULSION INTRAVENOUS at 10:04

## 2023-04-27 RX ADMIN — LIDOCAINE HYDROCHLORIDE 50 MG: 20 INJECTION, SOLUTION INTRAVENOUS at 10:04

## 2023-04-27 NOTE — H&P
Colonoscopy History and Physical    Patient Name: Elissa Freeman  MRN: [id number]  : 1955  Date of Procedure:  2023  Referring Physician: Marlin Oropeza FNP  Primary Physician: Juan Jose Urena DO  Procedure Physician: Jose Miguel Rosales MD    Procedure - Colonoscopy  ASA - per anesthesia  Mallampati - per anesthesia  History of Anesthesia problems - no  Family history Anesthesia problems -  no   Plan of anesthesia - per anesthesia    Diagnosis: rectal bleeding  Chief Complaint: Same as above    HPI: Patient is an 67 y.o. female is here for the above. Has History of R sided stage IIA adenocarcinoma s/p extended R hemicolectomy.    Last colonoscopy: -normal   Family history: none  Anticoagulation: none    ROS:  Constitutional: No fevers, chills, No weight loss  CV: No chest pain  Pulm: No cough, No shortness of breath  GI: see HPI    Medical History:   Past Medical History:   Diagnosis Date    Asthma     Cancer     CKD (chronic kidney disease)     Coronary artery disease     Diabetes mellitus     Hyperlipidemia     Hypertension     Obesity, unspecified     MOHSEN (obstructive sleep apnea)     Unspecified cataract        Surgical History:   Past Surgical History:   Procedure Laterality Date    CATARACT EXTRACTION Left 2022    CHOLECYSTECTOMY      COLON SURGERY      excision of lipoma      HYSTERECTOMY, VAGINAL, WITH SALPINGO-OOPHORECTOMY      NEPHRECTOMY Left     TUBAL LIGATION         Family History:   Family History   Problem Relation Age of Onset    Hypertension Mother     Coronary artery disease Mother     Hyperlipidemia Mother     Stroke Father     Cancer Brother    .    Social History:   Social History     Socioeconomic History    Marital status:    Tobacco Use    Smoking status: Never    Smokeless tobacco: Never   Substance and Sexual Activity    Alcohol use: Not Currently     Comment: frozen drinks once a month, giovanni    Drug use: Never    Sexual activity: Not Currently      Social Determinants of Health     Financial Resource Strain: Medium Risk    Difficulty of Paying Living Expenses: Somewhat hard   Food Insecurity: Food Insecurity Present    Worried About Running Out of Food in the Last Year: Sometimes true    Ran Out of Food in the Last Year: Sometimes true   Transportation Needs: No Transportation Needs    Lack of Transportation (Medical): No    Lack of Transportation (Non-Medical): No   Physical Activity: Inactive    Days of Exercise per Week: 0 days    Minutes of Exercise per Session: 0 min   Stress: No Stress Concern Present    Feeling of Stress : Not at all   Social Connections: Moderately Isolated    Frequency of Communication with Friends and Family: More than three times a week    Frequency of Social Gatherings with Friends and Family: Once a week    Attends Ten[employer] Services: More than 4 times per year    Active Member of Clubs or Organizations: No    Attends Club or Organization Meetings: Never    Marital Status:    Housing Stability: Low Risk     Unable to Pay for Housing in the Last Year: No    Number of Places Lived in the Last Year: 1    Unstable Housing in the Last Year: No       Review of patient's allergies indicates:  No Known Allergies    Medications:   Medications Prior to Admission   Medication Sig Dispense Refill Last Dose    carvediloL (COREG) 3.125 MG tablet Take 1 tablet (3.125 mg total) by mouth 2 (two) times daily. 180 tablet 0 Taking    cloNIDine (CATAPRES) 0.2 MG tablet Take 1 tablet (0.2 mg total) by mouth 2 (two) times daily. Takes 2 times per day. Takes whole tablet 180 tablet 3 Taking    diltiaZEM (CARDIZEM CD) 180 MG 24 hr capsule TAKE 1 CAPSULE BY MOUTH DAILY 90 capsule 0 Taking    albuterol (PROVENTIL) 5 mg/mL nebulizer solution SMARTSI.5 Milligram(s) Via Nebulizer Every 6 Hours PRN       albuterol (PROVENTIL/VENTOLIN HFA) 90 mcg/actuation inhaler Inhale 1 puff into the lungs every 4 (four) hours as needed for Wheezing. 18 g 3      "aspirin (ECOTRIN) 81 MG EC tablet Take 81 mg by mouth once daily.       atorvastatin (LIPITOR) 40 MG tablet Take 1 tablet (40 mg total) by mouth once daily. 90 tablet 2     BD DAVONTE 2ND GEN PEN NEEDLE 32 gauge x 5/32" Ndle USE TO INJECT INSULIN FOUR TIMES DAILY AS DIRECTED 200 each 2     cetirizine (ZYRTEC) 10 MG tablet Take 1 tablet (10 mg total) by mouth once daily. for 14 days 14 tablet 0     diclofenac (VOLTAREN) 50 MG EC tablet Take 1 tablet (50 mg total) by mouth 2 (two) times daily as needed (Pain). 12 tablet 0     DULoxetine (CYMBALTA) 60 MG capsule TAKE 1 CAPSULE BY MOUTH DAILY. DO NOT CRUSH OR CHEW 90 capsule 0     empagliflozin (JARDIANCE) 25 mg tablet Take 1 tablet (25 mg total) by mouth once daily. 90 tablet 3     exenatide microspheres (BYDUREON BCISE) 2 mg/0.85 mL AtIn Inject 2 mg into the skin every 7 days. 1 pen 2     flash glucose scanning reader (FREESTYLE SMITHA 2 READER) Misc 1 each by Misc.(Non-Drug; Combo Route) route 4 (four) times daily with meals and nightly. 1 each 0     flash glucose sensor (FREESTYLE SMITHA 2 SENSOR) Kit 1 each by Misc.(Non-Drug; Combo Route) route 2 hours after meals and at bedtime. 1 kit 0     furosemide (LASIX) 20 MG tablet Take 20 mg by mouth Daily.       gabapentin (NEURONTIN) 600 MG tablet Take 1 tablet (600 mg total) by mouth once daily. 90 tablet 1     insulin aspart U-100 (NOVOLOG) 100 unit/mL (3 mL) InPn pen Inject 20 Units into the skin 3 (three) times daily with meals. Plus 25 units at night 54 mL 1     ketorolac 0.5% (ACULAR) 0.5 % Drop Place 1 drop into both eyes nightly.       latanoprost 0.005 % ophthalmic solution Place 1 drop into both eyes 2 (two) times daily. 2.5 mL 1     LEVEMIR FLEXTOUCH U-100 INSULN 100 unit/mL (3 mL) InPn pen Inject 55 Units into the skin every evening. 15 mL 2     losartan-hydrochlorothiazide 50-12.5 mg (HYZAAR) 50-12.5 mg per tablet Take 1 tablet by mouth once daily. 90 tablet 3     nebulizer accessories Kit 1 each by " Misc.(Non-Drug; Combo Route) route 4 (four) times daily as needed (wheezing). 1 kit 0     nitroGLYCERIN (NITROSTAT) 0.4 MG SL tablet PLACE 1 TABLET UNDER THE TONGUE EVERY 5 MINUTES AS NEEDED FOR CHEST PAIN. GO TO ER AFTER THIRD DOSE 10 tablet 0     ofloxacin (OCUFLOX) 0.3 % ophthalmic solution        potassium chloride (K-TAB) 20 mEq Take 1 tablet (20 mEq total) by mouth once daily. 90 tablet 1     prednisoLONE acetate (PRED FORTE) 1 % DrpS Place 1 drop into the left eye Daily.       vitamin D (VITAMIN D3) 1000 units Tab Take 2 tablets (2,000 Units total) by mouth Daily. 90 tablet 0        Physical Exam:    Vital Signs: There were no vitals filed for this visit.  There were no vitals taken for this visit.    General: Well appearing in no acute distress  Lungs: Clear to auscultation bilaterally, respirations unlabored  Heart: Regular rate and rhythm, S1 and S2 normal, no obvious murmurs  Abdomen: Soft, non-tender, bowel sounds normal, no masses, no organomegaly    Labs:  Lab Results   Component Value Date    WBC 5.8 04/21/2023    HGB 12.0 04/21/2023    HCT 41.6 04/21/2023    MCV 75.9 (L) 04/21/2023     04/21/2023     Lab Results   Component Value Date    INR 1.84 (H) 10/30/2018     Lab Results   Component Value Date     02/09/2023    K 3.9 02/09/2023    CO2 31 02/09/2023    BUN 14.3 02/09/2023    CREATININE 1.45 (H) 02/09/2023    LABPROT 7.6 02/09/2023    ALBUMIN 3.1 (L) 02/09/2023    BILITOT 0.7 02/09/2023    ALKPHOS 132 02/09/2023    ALT 12 02/09/2023    AST 14 02/09/2023       Assessment and Plan:  Elissa Freeman is a 67 y.o. year old female here for colonoscopy.     History reviewed, vital signs satisfactory, cardiopulmonary status satisfactory.  I have explained the sedation options, risks, benefits, and alternatives of this endoscopic procedure to the patient including but not limited to bleeding, inflammation, infection, perforation, and death.  All questions were answered and the patient  consented to proceed with procedure as planned. The patient is deemed an appropriate candidate for the sedation as planned.      Epifanio Cross MD  [employer] General Surgery, PGY-2  4/27/2023  9:14 AM

## 2023-04-27 NOTE — PLAN OF CARE
Dr. Tinoco notified of EKG and CBG results. No acute distress noted. Meets discharge criteria. Outpatient cardiology appointment set up upon MD request.

## 2023-04-27 NOTE — ANESTHESIA POSTPROCEDURE EVALUATION
Anesthesia Post Evaluation    Patient: Elissa Freeman    Procedure(s) Performed: Procedure(s) (LRB):  COLONOSCOPY (N/A)    Final Anesthesia Type: MAC      Patient location during evaluation: PACU  Patient participation: Yes- Able to Participate  Level of consciousness: awake and responds to stimulation  Post-procedure vital signs: reviewed and stable  Pain management: adequate  Airway patency: patent    PONV status at discharge: No PONV  Anesthetic complications: yes (AFIB - no treatment needed, rate controlled )      Cardiovascular status: blood pressure returned to baseline  Respiratory status: unassisted  Hydration status: euvolemic  Follow-up not needed.          Vitals Value Taken Time   /86 04/27/23 1132   Temp 36.6 04/27/23 1251   Pulse 74 04/27/23 1132   Resp 18 04/27/23 1102   SpO2 91 % 04/27/23 1132       Patient with noted AFIB on EKG monitoring intra procedure, No noted PMHx of AFIB; BP stable at all times during and post procedure.  At length discussion with patient and family members; Patient understands need for immediate follow up in event of any CP, dizziness, syncope, weakness, palpitations - Cards clinic contacted and provided outpatient follow up next week, patient would prefer this rather than immediate post procedure ED visit as she is asymptomatic .    All questions addressed and patient verbalized understanding of need for compliance and follow up .       No case tracking events are documented in the log.      Pain/Jeri Score: Jeri Score: 9 (4/27/2023 10:58 AM)

## 2023-04-27 NOTE — PLAN OF CARE
Ekg done. Dr rai reviewed. Awaiting cardio to make appt. Pt still sleepy on/off.when ready to dress asked to call for assistance.

## 2023-04-27 NOTE — TRANSFER OF CARE
"Anesthesia Transfer of Care Note    Patient: Elissa Freeman    Procedure(s) Performed: Procedure(s) (LRB):  COLONOSCOPY (N/A)    Patient location: GI    Anesthesia Type: general    Post pain: adequate analgesia    Post assessment: no apparent anesthetic complications    Post vital signs: stable    Level of consciousness: sedated    Nausea/Vomiting: no nausea/vomiting    Complications: none    Transfer of care protocol was followed      Last vitals:   Visit Vitals  BP (!) 194/107 (BP Location: Left arm, Patient Position: Lying)   Pulse 94   Temp 36.8 °C (98.2 °F) (Oral)   Resp 20   Ht 5' 4" (1.626 m)   Wt (!) 139.5 kg (307 lb 9.6 oz)   SpO2 95%   BMI 52.80 kg/m²     "

## 2023-04-27 NOTE — ANESTHESIA POSTPROCEDURE EVALUATION
Anesthesia Post Evaluation    Patient: Elissa Freeman    Procedure(s) Performed: Procedure(s) (LRB):  COLONOSCOPY (N/A)    Final Anesthesia Type: general      Patient location during evaluation: GI PACU  Patient participation: Yes- Able to Participate  Level of consciousness: awake and alert  Post-procedure vital signs: reviewed and stable  Pain management: adequate  Airway patency: patent  MOHSEN mitigation strategies: Intraoperative administration of CPAP, nasopharyngeal airway, or oral appliance during sedation and Preoperative initiation of continuous positive airway pressure (CPAP) or non-invasive positive pressure ventilation (NIPPV)  PONV status at discharge: No PONV  Anesthetic complications: no      Cardiovascular status: hemodynamically stable  Respiratory status: room air and spontaneous ventilation  Hydration status: euvolemic  Follow-up not needed.          Vitals Value Taken Time   /107 04/27/23 0926   Temp 36.8 °C (98.2 °F) 04/27/23 0926   Pulse 94 04/27/23 0926   Resp 20 04/27/23 0926   SpO2 95 % 04/27/23 0926         No case tracking events are documented in the log.      Pain/Jeri Score: No data recorded

## 2023-04-27 NOTE — PROVATION PATIENT INSTRUCTIONS
Discharge Summary/Instructions after an Endoscopic Procedure  Patient Name: Elissa Freeman  Patient MRN: [id number]  Patient YOB: 1955  Thursday, April 27, 2023  Jose Miguel Rosales MD  Dear patient,  As a result of recent federal legislation (The Federal Cures Act), you may   receive lab or pathology results from your procedure in your [facility]   account before your physician is able to contact you. Your physician or   their representative will relay the results to you with their   recommendations at their soonest availability.  Thank you,  RESTRICTIONS:  During your procedure today, you received medications for sedation.  These   medications may affect your judgment, balance and coordination.  Therefore,   for 24 hours, you have the following restrictions:   - DO NOT drive a car, operate machinery, make legal/financial decisions,   sign important papers or drink alcohol.    ACTIVITY:  Today: no heavy lifting, straining or running due to procedural   sedation/anesthesia.  The following day: return to full activity including work.  DIET:  Eat and drink normally unless instructed otherwise.     TREATMENT FOR COMMON SIDE EFFECTS:  - Mild abdominal pain, nausea, belching, bloating or excessive gas:  rest,   eat lightly and use a heating pad.  - Sore Throat: treat with throat lozenges and/or gargle with warm salt   water.  - Because air was used during the procedure, expelling large amounts of air   from your rectum or belching is normal.  - If a bowel prep was taken, you may not have a bowel movement for 1-3 days.    This is normal.  SYMPTOMS TO WATCH FOR AND REPORT TO YOUR PHYSICIAN:  1. Abdominal pain or bloating, other than gas cramps.  2. Chest pain.  3. Back pain.  4. Signs of infection such as: chills or fever occurring within 24 hours   after the procedure.  5. Rectal bleeding, which would show as bright red, maroon, or black stools.   (A tablespoon of blood from the rectum is not serious, especially  if   hemorrhoids are present.)  6. Vomiting.  7. Weakness or dizziness.  GO DIRECTLY TO THE NEAREST EMERGENCY ROOM IF YOU HAVE ANY OF THE FOLLOWING:      Difficulty breathing              Chills and/or fever over 101 F   Persistent vomiting and/or vomiting blood   Severe abdominal pain   Severe chest pain   Black, tarry stools   Bleeding- more than one tablespoon   Any other symptom or condition that you feel may need urgent attention  Your doctor recommends these additional instructions:  If any biopsies were taken, your doctors clinic will contact you in 1 to 2   weeks with any results.  - Discharge patient to home (ambulatory).   - Resume previous diet today.   - Repeat colonoscopy in 5 years for surveillance.  For questions, problems or results please call your physician - Jose Miguel Rosales MD at Work:  [phone].  [facility] , EMERGENCY ROOM PHONE NUMBER: [phone]  IF A COMPLICATION OR EMERGENCY SITUATION ARISES AND YOU ARE UNABLE TO REACH   YOUR PHYSICIAN - GO DIRECTLY TO THE EMERGENCY ROOM.  MD Jose Miguel Gaytan MD  4/27/2023 10:59:15 AM  This report has been verified and signed electronically.  Dear patient,  As a result of recent federal legislation (The Federal Cures Act), you may   receive lab or pathology results from your procedure in your [facility]   account before your physician is able to contact you. Your physician or   their representative will relay the results to you with their   recommendations at their soonest availability.  Thank you,  [employer]ATION

## 2023-04-27 NOTE — ADDENDUM NOTE
Addendum  created 04/27/23 1156 by Ariella Tinoco MD    Clinical Note Signed, Order list changed, Pharmacy for encounter modified

## 2023-04-27 NOTE — DISCHARGE SUMMARY
[facility] - Endoscopy  Discharge Note  Short Stay    Procedure(s) (LRB):  COLONOSCOPY (N/A)  The procedure of colonoscopy was explained to the patient and consent obtained.  The patient was transferred to the endoscopy suite, general IV anesthesia was provided by anesthesia Services.  Rectal exam was performed and unremarkable.  The Olympus videocolonoscope was introduced per rectum advanced around the colon to the ileocolostomy which was present proximally to the level of the hepatic flexure.  The anastomosis was normal in appearance with no obvious bleeding site identified in the 15-20 cm of the small bowel was inspected.  Careful examination of the colon also revealed no abnormalities.  The residual transverse colon, descending colon sigmoid colon examined with no obvious abnormality noted and no explanation for bleeding identified.  The same was true of the rectum.  The endoscope was withdrawn.  The procedure was well tolerated and the patient returned to the recovery area for observation.      Discharge plan the patient can resume her normal diet today and normal activities tomorrow.  A follow-up colonoscopy in 5 years is recommended.    OUTCOME: Patient tolerated treatment/procedure well without complication and is now ready for discharge.    DISPOSITION: Home or Self Care    FINAL DIAGNOSIS:  <principal problem not specified>    FOLLOWUP: In clinic    DISCHARGE INSTRUCTIONS:    Discharge Procedure Orders   Diet Adult Regular     Diet general     Call MD for:  temperature >100.4     Call MD for:  persistent nausea and vomiting     Call MD for:  severe uncontrolled pain     Call MD for:  difficulty breathing, headache or visual disturbances     Activity as tolerated     Activity as tolerated         Clinical Reference Documents Added to Patient Instructions         Document    COLONOSCOPY DISCHARGE INSTRUCTIONS (ENGLISH)            TIME SPENT ON DISCHARGE: 10 minutes

## 2023-04-28 ENCOUNTER — TELEPHONE (OUTPATIENT)
Dept: INTERNAL MEDICINE | Facility: CLINIC | Age: 68
End: 2023-04-28
Payer: MEDICARE

## 2023-04-28 VITALS
HEIGHT: 64 IN | TEMPERATURE: 98 F | SYSTOLIC BLOOD PRESSURE: 122 MMHG | DIASTOLIC BLOOD PRESSURE: 86 MMHG | RESPIRATION RATE: 18 BRPM | OXYGEN SATURATION: 91 % | BODY MASS INDEX: 50.02 KG/M2 | WEIGHT: 293 LBS | HEART RATE: 74 BPM

## 2023-04-28 NOTE — TELEPHONE ENCOUNTER
----- Message from Juan Jose Urena DO sent at 4/27/2023 11:07 AM CDT -----  Hello,    Please inform Ms. Freeman her colonoscopy was clean and it will be repeated in 5 years for screening. Thanks!    Juan Jose Urena DO

## 2023-04-28 NOTE — TELEPHONE ENCOUNTER
Contacted patient ID and  verified. Patient informed of normal colonoscopy results and recommendation to repeat colon cancer screening in 5 years. Patient verbalized complete understanding,

## 2023-05-01 RX ORDER — INSULIN ASPART 100 [IU]/ML
INJECTION, SOLUTION INTRAVENOUS; SUBCUTANEOUS
Qty: 15 ML | Refills: 11 | Status: SHIPPED | OUTPATIENT
Start: 2023-05-01 | End: 2023-06-14

## 2023-05-09 DIAGNOSIS — I27.20 PULMONARY HYPERTENSION: ICD-10-CM

## 2023-05-09 RX ORDER — ALBUTEROL SULFATE 90 UG/1
1 AEROSOL, METERED RESPIRATORY (INHALATION) EVERY 4 HOURS PRN
Qty: 18 G | Refills: 3 | Status: SHIPPED | OUTPATIENT
Start: 2023-05-09 | End: 2023-08-11 | Stop reason: SDUPTHER

## 2023-05-11 ENCOUNTER — TELEPHONE (OUTPATIENT)
Dept: INTERNAL MEDICINE | Facility: CLINIC | Age: 68
End: 2023-05-11
Payer: MEDICARE

## 2023-05-11 ENCOUNTER — OFFICE VISIT (OUTPATIENT)
Dept: CARDIOLOGY | Facility: CLINIC | Age: 68
End: 2023-05-11
Payer: MEDICARE

## 2023-05-11 VITALS
RESPIRATION RATE: 20 BRPM | BODY MASS INDEX: 50.02 KG/M2 | WEIGHT: 293 LBS | TEMPERATURE: 98 F | HEIGHT: 64 IN | DIASTOLIC BLOOD PRESSURE: 75 MMHG | OXYGEN SATURATION: 97 % | HEART RATE: 67 BPM | SYSTOLIC BLOOD PRESSURE: 121 MMHG

## 2023-05-11 DIAGNOSIS — E66.9 DIABETES MELLITUS TYPE 2 IN OBESE: ICD-10-CM

## 2023-05-11 DIAGNOSIS — E66.01 MORBID OBESITY DUE TO EXCESS CALORIES: ICD-10-CM

## 2023-05-11 DIAGNOSIS — E55.9 VITAMIN D DEFICIENCY: Primary | ICD-10-CM

## 2023-05-11 DIAGNOSIS — I25.9 ISCHEMIC HEART DISEASE, CHRONIC: ICD-10-CM

## 2023-05-11 DIAGNOSIS — E78.00 HYPERCHOLESTEROLEMIA: ICD-10-CM

## 2023-05-11 DIAGNOSIS — I10 PRIMARY HYPERTENSION: ICD-10-CM

## 2023-05-11 DIAGNOSIS — I27.20 PULMONARY HYPERTENSION: Primary | ICD-10-CM

## 2023-05-11 DIAGNOSIS — E11.69 HYPERLIPIDEMIA ASSOCIATED WITH TYPE 2 DIABETES MELLITUS: ICD-10-CM

## 2023-05-11 DIAGNOSIS — E11.69 DIABETES MELLITUS TYPE 2 IN OBESE: ICD-10-CM

## 2023-05-11 DIAGNOSIS — R06.00 DYSPNEA, UNSPECIFIED TYPE: ICD-10-CM

## 2023-05-11 DIAGNOSIS — E78.5 HYPERLIPIDEMIA ASSOCIATED WITH TYPE 2 DIABETES MELLITUS: ICD-10-CM

## 2023-05-11 PROCEDURE — 99213 OFFICE O/P EST LOW 20 MIN: CPT | Mod: PBBFAC | Performed by: INTERNAL MEDICINE

## 2023-05-11 NOTE — PROGRESS NOTES
05/11/2023 8:51 AM    Subjective:     CHIEF COMPLAINT:   Chief Complaint   Patient presents with    initial visit denies chest pain has DUBOIS                            HPI:    Ms. Elissa Freeman is a 67 y.o. female.      Today the patient comes for a follow-up appointment.    CP:  The patient has no chest discomfort.      SOB:  The patient has shortness of breath Has DUBOIS    EDEMA:  The patient has edema.  Has ankle edema      ORTHOPNEA:  The patient denies orthopnea.  No PND.      SYNCOPE:  The patient denies near syncope.  No syncope.   Denies getting lightheaded.    PALPITATIONS:  The patient has no palpitations.    LEVEL OF EXERTION:  The patient does ADL.  The patient has symptoms with this level of exertion.  The patient's level of exertion is poor.  METS:  The patient does the following activities:    watch TV (1 MET)     DATA FOR RCRI:  The patient:   Denies any upcoming procedure/surgery  Denies CAD.  (MI, Abnormal stress test, Angina, Use of NTG, MI on EKG)   Denies CHF  Denies TIA or CVA.  Is being treated with insulin.   Creatinine is not > 2 mg/dL   Lab Results   Component Value Date    CREATININE 1.45 (H) 02/09/2023       The patient's RCRI is 1.  Based on this score the patient's 30-day risk of death, MI, or cardiac arrest with surgery is 6.0%    Past Medical History    Patient Active Problem List   Diagnosis    Ischemic heart disease, chronic    Primary hypertension    Hyperlipidemia associated with type 2 diabetes mellitus    Diabetes mellitus type 2 in obese    MOHSEN on CPAP    Renal cell carcinoma of left kidney    Adenocarcinoma of colon    History of malignant neoplasm of kidney    Depression    Diabetes mellitus    Hypercholesterolemia    Kidney disorder    Mass of colon    Morbid obesity due to excess calories    Neuropathy    Pulmonary hypertension    Dyspnea    Rectal bleeding    Personal history of colon cancer    Microcytic anemia       Surgical History    Past Surgical History:   Procedure  Laterality Date    CATARACT EXTRACTION Left 11/28/2022    CHOLECYSTECTOMY      COLON SURGERY      COLONOSCOPY N/A 4/27/2023    Procedure: COLONOSCOPY;  Surgeon: Jose Miguel Rosales MD;  Location: [facility] ENDOSCOPY;  Service: Endoscopy;  Laterality: N/A;    excision of lipoma      HYSTERECTOMY, VAGINAL, WITH SALPINGO-OOPHORECTOMY      NEPHRECTOMY Left     TUBAL LIGATION         Social History     Socioeconomic History    Marital status:    Tobacco Use    Smoking status: Never    Smokeless tobacco: Never   Substance and Sexual Activity    Alcohol use: Not Currently     Comment: frozen drinks once a month, giovanni    Drug use: Never    Sexual activity: Not Currently     Social Determinants of Health     Financial Resource Strain: Medium Risk    Difficulty of Paying Living Expenses: Somewhat hard   Food Insecurity: Food Insecurity Present    Worried About Running Out of Food in the Last Year: Sometimes true    Ran Out of Food in the Last Year: Sometimes true   Transportation Needs: No Transportation Needs    Lack of Transportation (Medical): No    Lack of Transportation (Non-Medical): No   Physical Activity: Inactive    Days of Exercise per Week: 0 days    Minutes of Exercise per Session: 0 min   Stress: No Stress Concern Present    Feeling of Stress : Not at all   Social Connections: Moderately Isolated    Frequency of Communication with Friends and Family: More than three times a week    Frequency of Social Gatherings with Friends and Family: Once a week    Attends Confucianist Services: More than 4 times per year    Active Member of Clubs or Organizations: No    Attends Club or Organization Meetings: Never    Marital Status:    Housing Stability: Low Risk     Unable to Pay for Housing in the Last Year: No    Number of Places Lived in the Last Year: 1    Unstable Housing in the Last Year: No       Family History   Problem Relation Age of Onset    Hypertension Mother     Coronary artery disease Mother      "Hyperlipidemia Mother     Stroke Father     Cancer Brother      Review of patient's allergies indicates:  No Known Allergies    Current Medications    Current Outpatient Medications   Medication Instructions    albuterol (PROVENTIL) 5 mg/mL nebulizer solution SMARTSI.5 Milligram(s) Via Nebulizer Every 6 Hours PRN    albuterol (PROVENTIL/VENTOLIN HFA) 90 mcg/actuation inhaler 1 puff, Inhalation, Every 4 hours PRN    aspirin (ECOTRIN) 81 mg, Oral, Daily    atorvastatin (LIPITOR) 40 mg, Oral, Daily    BD DAVONTE 2ND GEN PEN NEEDLE 32 gauge x 5/32" Ndle USE TO INJECT INSULIN FOUR TIMES DAILY AS DIRECTED    carvediloL (COREG) 3.125 mg, Oral, 2 times daily    cetirizine (ZYRTEC) 10 mg, Oral, Daily    cloNIDine (CATAPRES) 0.2 mg, Oral, 2 times daily, Takes 2 times per day. Takes whole tablet    diclofenac (VOLTAREN) 50 mg, Oral, 2 times daily PRN    diltiaZEM (CARDIZEM CD) 180 MG 24 hr capsule TAKE 1 CAPSULE BY MOUTH DAILY    DULoxetine (CYMBALTA) 60 MG capsule TAKE 1 CAPSULE BY MOUTH DAILY. DO NOT CRUSH OR CHEW    empagliflozin (JARDIANCE) 25 mg, Oral, Daily    exenatide microspheres (BYDUREON BCISE) 2 mg, Subcutaneous, Every 7 days    flash glucose scanning reader (FREESTYLE SMITHA 2 READER) Misc 1 each, Misc.(Non-Drug; Combo Route), 4 times daily with meals & nightly    flash glucose sensor (FREESTYLE SMITHA 2 SENSOR) Kit 1 each, Misc.(Non-Drug; Combo Route), 4 times daily after meals & nightly    furosemide (LASIX) 20 mg, Oral, Daily    gabapentin (NEURONTIN) 600 mg, Oral, Daily    ketorolac 0.5% (ACULAR) 0.5 % Drop 1 drop, Both Eyes, Nightly    latanoprost 0.005 % ophthalmic solution 1 drop, Both Eyes, 2 times daily    LEVEMIR FLEXTOUCH U100 INSULIN 55 Units, Subcutaneous, Nightly    losartan-hydrochlorothiazide 50-12.5 mg (HYZAAR) 50-12.5 mg per tablet 1 tablet, Oral, Daily    nebulizer accessories Kit 1 each, Misc.(Non-Drug; Combo Route), 4 times daily PRN    nitroGLYCERIN (NITROSTAT) 0.4 MG SL tablet PLACE 1 TABLET " "UNDER THE TONGUE EVERY 5 MINUTES AS NEEDED FOR CHEST PAIN. GO TO ER AFTER THIRD DOSE    NOVOLOG FLEXPEN U-100 INSULIN 100 unit/mL (3 mL) InPn pen INJECT 20 UNITS UNDER THE SKIN BEFORE MEALS    ofloxacin (OCUFLOX) 0.3 % ophthalmic solution No dose, route, or frequency recorded.    potassium chloride (K-TAB) 20 mEq 20 mEq, Oral, Daily    prednisoLONE acetate (PRED FORTE) 1 % DrpS 1 drop, Left Eye, Daily    vitamin D (VITAMIN D3) 2,000 Units, Oral, Daily         ROS:   refer to HPI   GEN   No fever  No weakness  RESP  + SOB  + wheezing  SKIN  + pruritus  No rash  CARD  No CP  No palpitations        VASC  No cyanosis  No claudication  HEM   No adenopathy  + easy bruising   GI  No heart burn  No melena    NEURO  No dizziness  No syncope    Objective:     PE:  Blood pressure 121/75, pulse 67, temperature 98.2 °F (36.8 °C), temperature source Oral, resp. rate 20, height 5' 4" (1.626 m), weight (!) 143.2 kg (315 lb 9.6 oz), SpO2 97 %.     BP Readings from Last 3 Encounters:   05/11/23 121/75   04/27/23 122/86   04/12/23 131/74        Pulse Readings from Last 3 Encounters:   05/11/23 67   04/27/23 74   04/12/23 74        Temp Readings from Last 3 Encounters:   05/11/23 98.2 °F (36.8 °C) (Oral)   04/27/23 98.2 °F (36.8 °C) (Oral)   04/12/23 98.1 °F (36.7 °C) (Oral)       Wt Readings from Last 3 Encounters:   05/11/23 (!) 143.2 kg (315 lb 9.6 oz)   04/27/23 (!) 139.5 kg (307 lb 9.6 oz)   04/12/23 (!) 144.8 kg (319 lb 3.2 oz)         GENERAL:  Uses cane  CONSTITUTIONAL:  No acute distress.  Not ill appearing.  NECK:  No cervical adenopathy.  No carotid bruit.  CARDIOVASCULAR:  Normal rate.  Regular rhythm.  No murmur.  PULMONARY:  No respiratory distress.  No wheezing.  No crackles.  ABDOMINAL:  No distention.  No tenderness.  MUSCULOSKELETAL:  No deformity.  No edema  SKIN:  No bruising.  No rash.  NEUROLOGICAL:  Oriented x 3.  No weakness.                                                                                         "                                                                                                                                                                                                                                                                                                                                                                                       CARDIAC TESTING:  Echocardiogram  No results found for this or any previous visit.      Stress Test  No results found for this or any previous visit.      Coronary Angiogram  Results for orders placed in visit on 09/29/21    CATH LAB PROCEDURE    Last BMP  Lab Results   Component Value Date     02/09/2023    K 3.9 02/09/2023    CO2 31 02/09/2023    BUN 14.3 02/09/2023    CREATININE 1.45 (H) 02/09/2023    CALCIUM 9.2 02/09/2023    EGFRNORACEVR 40 02/09/2023       Lab Results   Component Value Date    CREATININE 1.45 (H) 02/09/2023    CREATININE 1.49 (H) 01/17/2023    CREATININE 1.45 (H) 12/26/2022     Last CBC     Lab Results   Component Value Date    WBC 5.8 04/21/2023    HGB 12.0 04/21/2023    HCT 41.6 04/21/2023    MCV 75.9 (L) 04/21/2023     04/21/2023           Last lipids    Lab Results   Component Value Date    CHOL 108 01/17/2023    CHOL 125 08/01/2022    CHOL 160 02/11/2022    HDL 40 01/17/2023    HDL 39 08/01/2022    HDL 48 02/11/2022    LDL 53.00 01/17/2023    LDL 55.00 08/01/2022    LDL 90.00 02/11/2022    TRIG 75 01/17/2023    TRIG 153 (H) 08/01/2022    TRIG 111 02/11/2022    TOTALCHOLEST 3 01/17/2023    TOTALCHOLEST 3 08/01/2022    TOTALCHOLEST 3 02/11/2022       LFT   No components found for: LFT    Assessment:     1. Pulmonary hypertension    2. Dyspnea, unspecified type    3. Ischemic heart disease, chronic    4. Primary hypertension    5. Hyperlipidemia associated with type 2 diabetes mellitus    6. Hypercholesterolemia    7. Diabetes mellitus type 2 in obese    8. Morbid obesity due to excess calories      10 Year  Cardiovascular Risk:  The ASCVD Risk score (Charanjit HAIRSTON, et al., 2019) failed to calculate for the following reasons:    The valid total cholesterol range is 130 to 320 mg/dL    BMI:  Body mass index is 54.17 kg/m².  Patient has super morbid obesity (BMI >39.9)  Tobacco:  denied  Alcohol:  social drinker  Substance abuse:  none   Last PCP visit:  Patient does not have a PCP or has not yet seen their PCP    Plan:     Medications:  refills are done by PCP     Diet:  Avoid processed foods and drinks, sugar, salt, fried/greasy foods, and fast foods    Recommend 10 servings of fruits and vegetables per day    Exercise:  activities as tolerated    Follow up:  12 months    Wu Taylor MD  Cardiology Attending     Future Appointments   Date Time Provider Department Center   6/6/2023 12:50 PM RESIDENT 9, [facility] INTERNAL MEDICINE [facility] IM SHANTANU Waters   8/16/2023  1:15 PM HEATHER Meraz [facility] NEPHHAZEL Foy Un

## 2023-05-15 RX ORDER — CHOLECALCIFEROL (VITAMIN D3) 25 MCG
2000 TABLET ORAL DAILY
Qty: 90 TABLET | Refills: 0 | Status: SHIPPED | OUTPATIENT
Start: 2023-05-15 | End: 2023-05-30 | Stop reason: SDUPTHER

## 2023-05-23 RX ORDER — DULOXETIN HYDROCHLORIDE 60 MG/1
CAPSULE, DELAYED RELEASE ORAL
Qty: 90 CAPSULE | Refills: 0 | Status: SHIPPED | OUTPATIENT
Start: 2023-05-23 | End: 2023-09-27 | Stop reason: SDUPTHER

## 2023-05-30 DIAGNOSIS — E55.9 VITAMIN D DEFICIENCY: ICD-10-CM

## 2023-05-30 RX ORDER — CHOLECALCIFEROL (VITAMIN D3) 25 MCG
2000 TABLET ORAL DAILY
Qty: 90 TABLET | Refills: 0 | Status: SHIPPED | OUTPATIENT
Start: 2023-05-30 | End: 2023-06-20 | Stop reason: SDUPTHER

## 2023-06-02 DIAGNOSIS — H26.9 CATARACT, UNSPECIFIED CATARACT TYPE, UNSPECIFIED LATERALITY: Primary | ICD-10-CM

## 2023-06-05 RX ORDER — LATANOPROST 50 UG/ML
1 SOLUTION/ DROPS OPHTHALMIC DAILY
Qty: 2.5 ML | Refills: 1 | Status: SHIPPED | OUTPATIENT
Start: 2023-06-05

## 2023-06-06 ENCOUNTER — OFFICE VISIT (OUTPATIENT)
Dept: INTERNAL MEDICINE | Facility: CLINIC | Age: 68
End: 2023-06-06
Payer: MEDICARE

## 2023-06-06 ENCOUNTER — APPOINTMENT (OUTPATIENT)
Dept: LAB | Facility: HOSPITAL | Age: 68
End: 2023-06-06
Attending: STUDENT IN AN ORGANIZED HEALTH CARE EDUCATION/TRAINING PROGRAM
Payer: MEDICARE

## 2023-06-06 VITALS
RESPIRATION RATE: 20 BRPM | SYSTOLIC BLOOD PRESSURE: 156 MMHG | BODY MASS INDEX: 50.02 KG/M2 | HEART RATE: 75 BPM | HEIGHT: 64 IN | TEMPERATURE: 99 F | WEIGHT: 293 LBS | DIASTOLIC BLOOD PRESSURE: 100 MMHG

## 2023-06-06 DIAGNOSIS — Z00.00 WELLNESS EXAMINATION: ICD-10-CM

## 2023-06-06 DIAGNOSIS — E66.9 DIABETES MELLITUS TYPE 2 IN OBESE: ICD-10-CM

## 2023-06-06 DIAGNOSIS — G62.9 NEUROPATHY: ICD-10-CM

## 2023-06-06 DIAGNOSIS — E78.5 HYPERLIPIDEMIA ASSOCIATED WITH TYPE 2 DIABETES MELLITUS: ICD-10-CM

## 2023-06-06 DIAGNOSIS — N18.32 CKD STAGE G3B/A2, GFR 30-44 AND ALBUMIN CREATININE RATIO 30-299 MG/G: ICD-10-CM

## 2023-06-06 DIAGNOSIS — I10 PRIMARY HYPERTENSION: Primary | ICD-10-CM

## 2023-06-06 DIAGNOSIS — E11.69 HYPERLIPIDEMIA ASSOCIATED WITH TYPE 2 DIABETES MELLITUS: ICD-10-CM

## 2023-06-06 DIAGNOSIS — E11.69 DIABETES MELLITUS TYPE 2 IN OBESE: ICD-10-CM

## 2023-06-06 DIAGNOSIS — Z23 PNEUMOCOCCAL VACCINATION ADMINISTERED AT CURRENT VISIT: ICD-10-CM

## 2023-06-06 LAB
CREAT UR-MCNC: 96.2 MG/DL (ref 47–110)
MICROALBUMIN UR-MCNC: 83.1 UG/ML
MICROALBUMIN/CREAT RATIO PNL UR: 86.4 MG/GM CR (ref 0–30)

## 2023-06-06 PROCEDURE — 99215 OFFICE O/P EST HI 40 MIN: CPT | Mod: PBBFAC

## 2023-06-06 PROCEDURE — 82043 UR ALBUMIN QUANTITATIVE: CPT

## 2023-06-06 PROCEDURE — 90677 PCV20 VACCINE IM: CPT | Mod: PBBFAC

## 2023-06-06 RX ORDER — TALC
6 POWDER (GRAM) TOPICAL NIGHTLY PRN
Qty: 60 TABLET | Refills: 1 | Status: SHIPPED | OUTPATIENT
Start: 2023-06-06 | End: 2023-07-14 | Stop reason: SDUPTHER

## 2023-06-06 RX ORDER — ATORVASTATIN CALCIUM 40 MG/1
40 TABLET, FILM COATED ORAL DAILY
Qty: 90 TABLET | Refills: 3 | Status: SHIPPED | OUTPATIENT
Start: 2023-06-06 | End: 2023-06-14 | Stop reason: SDUPTHER

## 2023-06-06 RX ORDER — INSULIN DETEMIR 100 [IU]/ML
55 INJECTION, SOLUTION SUBCUTANEOUS NIGHTLY
Qty: 15 ML | Refills: 2 | Status: SHIPPED | OUTPATIENT
Start: 2023-06-06 | End: 2023-06-14

## 2023-06-06 RX ORDER — DILTIAZEM HYDROCHLORIDE 180 MG/1
180 CAPSULE, COATED, EXTENDED RELEASE ORAL DAILY
Qty: 90 CAPSULE | Refills: 3 | Status: SHIPPED | OUTPATIENT
Start: 2023-06-06 | End: 2023-09-27 | Stop reason: SDUPTHER

## 2023-06-06 RX ORDER — CARVEDILOL 3.12 MG/1
3.12 TABLET ORAL 2 TIMES DAILY
Qty: 180 TABLET | Refills: 3 | Status: SHIPPED | OUTPATIENT
Start: 2023-06-06 | End: 2023-09-27 | Stop reason: SDUPTHER

## 2023-06-06 RX ORDER — CLONIDINE HYDROCHLORIDE 0.2 MG/1
0.2 TABLET ORAL 2 TIMES DAILY
Qty: 180 TABLET | Refills: 3 | Status: SHIPPED | OUTPATIENT
Start: 2023-06-06 | End: 2024-02-15 | Stop reason: SDUPTHER

## 2023-06-06 RX ORDER — LOSARTAN POTASSIUM AND HYDROCHLOROTHIAZIDE 12.5; 5 MG/1; MG/1
1 TABLET ORAL DAILY
Qty: 90 TABLET | Refills: 3 | Status: SHIPPED | OUTPATIENT
Start: 2023-06-06 | End: 2023-09-27 | Stop reason: SDUPTHER

## 2023-06-06 RX ORDER — GABAPENTIN 600 MG/1
600 TABLET ORAL DAILY
Qty: 90 TABLET | Refills: 3 | Status: SHIPPED | OUTPATIENT
Start: 2023-06-06 | End: 2023-09-27 | Stop reason: SDUPTHER

## 2023-06-06 NOTE — PROGRESS NOTES
"[facility] INTERNAL MEDICINE  OUTPATIENT OFFICE VISIT NOTE    SUBJECTIVE:      HPI: Ms. Freeman is a 67 year old  female with a PMH of renal cell ca s/p nephrectomy in 2011 and colon adenocarcinoma stage IIA s/p right hemicolectomy in 2014 w/ adjuvant chemo completed in 4/2015.  Patient to have labs completed today. Patient will bring DM logs with her next visit. Patient is CAGE negative. Patient return to IM clinic for follow-up in 3 months.      ROS:  CONSTITUTIONAL: No weight loss, subjective fever, chills, weakness or fatigue.  HEENT: Eyes: No visual loss, blurred vision, double vision or yellow sclerae. Ears, Nose, Throat: No hearing loss, + sneezing, + congestion, no runny nose or sore throat.  SKIN: No rash or itching.  CARDIOVASCULAR: No chest pain, chest pressure or chest discomfort. No palpitations or edema.  RESPIRATORY: + cough no sputum.  GASTROINTESTINAL: No anorexia, nausea, vomiting or diarrhea. No abdominal pain or blood.  GENITOURINARY: No dysuria, hematuria, or incontinence  NEUROLOGICAL: No headache, dizziness, syncope, paralysis, ataxia, numbness or tingling in the extremities. No change in bowel or bladder control.  MUSCULOSKELETAL: + left knee stiffness.  HEMATOLOGIC: No anemia, bleeding or bruising.  LYMPHATICS: No enlarged nodes.  PSYCHIATRIC: No history of depression or anxiety.  ENDOCRINOLOGIC: No reports of sweating, cold or heat intolerance. No polyuria or polydipsia.  ALLERGIES: No history of asthma, hives, eczema or rhinitis.       OBJECTIVE:     Vital signs:   BP (!) 156/100 (BP Location: Left arm, Patient Position: Sitting, BP Method: Medium (Manual))   Pulse 75   Temp 98.6 °F (37 °C) (Oral)   Resp 20   Ht 5' 4" (1.626 m)   Wt (!) 139.2 kg (306 lb 12.8 oz)   BMI 52.66 kg/m²      Physical Examination:  General: obese w/o distress  HEENT: NC/AT; PERRLA; nasal and oral mucosa moist and clear; no sinus tenderness; no thyromegaly  Neck: Full ROM; no lymphadenopathy  Pulm: " bilateral rhonchi and mild wheezing, normal work of breathing, dry cough  CV: S1, S2 w/o murmurs or gallops; no edema noted  GI: Soft with normal bowel sounds in all quadrants, no masses on palpation  MSK: Full ROM of all extremities and spine w/o limitation or discomfort  Derm: No rashes, abnormal bruising, or skin lesions  Neuro: AAOx4; CN II-XII intact; motor/sensory function intact  Psych: Cooperative; appropriate mood and affect       ASSESSMENT & PLAN:     1) Diabetes mellitus  - A1c 10.5, will repeat A1c today  - Logs reviewed, fasting CBG is within goal and is on average between   -Continue Levemir 50u QHS  -continue with novolog 20u with each meal, dinner novolog of 25 units  -patient was checking CBGs after dinner  - Instructed to keep log and check CBGs prior to meal, she voiced understanding, will need to bring logs next appt   - Continue on Jardiance 10mg daily   - Fundus exam due November 2023  - Foot exam today, normal pinprick sensation, dorsal pedal pulses, no skin breakdown or calluses November 2022  - Discussed importance of adhering to diabetic diet and good exercise  -continue Gabapentin due to right hand finger numbness     2) Hypertension  - /100mmHg today, did not take HTN meds today  - Continue current regimen, being followed by cards and renal  - educated on healthy DASH diet and exercise     3) Colon and renal cancer  - patient is s/p hemicolectomy, nephrectomy, and chemotherapy  - completed chemo in april 2015  - Repeat CT's in May 2018 showed stable appearance of the chest, abdomen and pelvis. No new or worsening sites of metastatic disease identified.  - Seen by Oncology previously but discharged from clinic    4) Chronic kidney disease  - Stage IIIa, stable  - Avoid NSAID's and nephrotoxic drugs  - continue to follow up with nephrology clinic     5) Hyperlipidemia  - continue on lipitor 40mg daily   - FLP June 2020 wnl  - reordered lipid panel today    6) MOHSEN on CPAP  -  Sleep study confirms MOHSEN  - continue CPAP    7) Multinodular goiter  - Biopsy results reveal benign thyroid nodules  - TSH/T4 in November 2021 wnl  -repeat TFTs today     8) Depression  - Responding really well to Duloxetine, continue 60mg daily  -no SI/HI or symptoms today     9) Gout  - On allopurinol by Nephrology  -reports no flares since last appt     10) History of Hematuria  - followed by nephrology      Health Maintenance:  Pneumococcal vaccine: Prevnar and Pneumovax up to date  TDAP: UTD  Influenza vaccine: UTD  Shingrix vaccine: UTD  COVID 19 Vaccine: UTD  AAA U/S screening:n/a  Bone Density Screening: November 2022  Mammogram: Nov 2022, repeat 1 year  Pap smear: n/a, has hx of total hysterectomy, follow up with GYN  Screening colonoscopy:Colonoscopy done in June 2020, results wnl - repeat 5 years  Lung Cancer Screening: n/a  Hepatitis Panel: Negative in June 2020  HIV Screening negative    Return to clinic in 3 months.     Juan Jose Urena, DO   [employer] [facility], PGY-3

## 2023-06-14 DIAGNOSIS — E11.69 DIABETES MELLITUS TYPE 2 IN OBESE: ICD-10-CM

## 2023-06-14 DIAGNOSIS — E11.69 HYPERLIPIDEMIA ASSOCIATED WITH TYPE 2 DIABETES MELLITUS: ICD-10-CM

## 2023-06-14 DIAGNOSIS — E78.5 HYPERLIPIDEMIA ASSOCIATED WITH TYPE 2 DIABETES MELLITUS: ICD-10-CM

## 2023-06-14 DIAGNOSIS — E66.9 DIABETES MELLITUS TYPE 2 IN OBESE: ICD-10-CM

## 2023-06-14 RX ORDER — INSULIN ASPART 100 [IU]/ML
18 INJECTION, SOLUTION INTRAVENOUS; SUBCUTANEOUS
Qty: 15 ML | Refills: 11 | Status: SHIPPED | OUTPATIENT
Start: 2023-06-14 | End: 2023-07-31 | Stop reason: SDUPTHER

## 2023-06-14 RX ORDER — ATORVASTATIN CALCIUM 40 MG/1
80 TABLET, FILM COATED ORAL DAILY
Qty: 90 TABLET | Refills: 3 | Status: SHIPPED | OUTPATIENT
Start: 2023-06-14 | End: 2023-09-27 | Stop reason: SDUPTHER

## 2023-06-14 RX ORDER — INSULIN DETEMIR 100 [IU]/ML
60 INJECTION, SOLUTION SUBCUTANEOUS NIGHTLY
Qty: 15 ML | Refills: 2 | Status: SHIPPED | OUTPATIENT
Start: 2023-06-14 | End: 2023-06-16 | Stop reason: SDUPTHER

## 2023-06-16 ENCOUNTER — TELEPHONE (OUTPATIENT)
Dept: INTERNAL MEDICINE | Facility: CLINIC | Age: 68
End: 2023-06-16
Payer: MEDICARE

## 2023-06-16 DIAGNOSIS — E66.9 DIABETES MELLITUS TYPE 2 IN OBESE: ICD-10-CM

## 2023-06-16 DIAGNOSIS — E11.69 DIABETES MELLITUS TYPE 2 IN OBESE: ICD-10-CM

## 2023-06-16 RX ORDER — INSULIN DETEMIR 100 [IU]/ML
60 INJECTION, SOLUTION SUBCUTANEOUS NIGHTLY
Qty: 15 ML | Refills: 2 | Status: SHIPPED | OUTPATIENT
Start: 2023-06-16 | End: 2023-06-22

## 2023-06-16 NOTE — TELEPHONE ENCOUNTER
----- Message from Juan Jose Urena DO sent at 6/14/2023 10:52 AM CDT -----  Hello,    Please inform Ms. Freeman that her lab work shows her A1c is 10.3. I have increased her Levemir from 55 units to 60 units at night and mealtime insulin to 18 units. Cholesterol is above goal of 70, I have increased her atorvastatin to 80mg from 40mg. Her chronic kidney disease is fairly stable. Thyroid function is normal. Thanks!    Juan Jose Urena DO

## 2023-06-16 NOTE — TELEPHONE ENCOUNTER
----- Message from Juan Jose Urena DO sent at 6/14/2023 10:48 AM CDT -----  Hello,    Please inform Ms. Freeman that her lab work shows there is some protein in her urine, but this is improved from last year. Continue on Losartan. Thanks!    Juan Jose Urena DO

## 2023-06-16 NOTE — TELEPHONE ENCOUNTER
Spoke w/pt and gave her message regarding lab results. Pt verbalized understanding and had no questions.

## 2023-06-16 NOTE — TELEPHONE ENCOUNTER
Spoke w/pt. Gave her message regarding lab results and changes in meds. Pt verbalized understanding and had no questions.

## 2023-06-20 DIAGNOSIS — E55.9 VITAMIN D DEFICIENCY: ICD-10-CM

## 2023-06-20 NOTE — TELEPHONE ENCOUNTER
GOOD MORNING [facility] CALLED TO LET YOU KNOW THAT LEVEMIR FLEX TOUCH IS DISCONTINUED.  A NEW MED NEED TO BE ORDERED.  AN ALTERNATIVE WAS NOT GIVEN.  PLEASE ADVISE.

## 2023-06-22 DIAGNOSIS — Z79.4 TYPE 2 DIABETES MELLITUS WITHOUT COMPLICATION, WITH LONG-TERM CURRENT USE OF INSULIN: Primary | ICD-10-CM

## 2023-06-22 DIAGNOSIS — E11.9 TYPE 2 DIABETES MELLITUS WITHOUT COMPLICATION, WITH LONG-TERM CURRENT USE OF INSULIN: Primary | ICD-10-CM

## 2023-06-22 RX ORDER — CHOLECALCIFEROL (VITAMIN D3) 25 MCG
2000 TABLET ORAL DAILY
Qty: 90 TABLET | Refills: 0 | Status: SHIPPED | OUTPATIENT
Start: 2023-06-22 | End: 2023-07-14 | Stop reason: SDUPTHER

## 2023-06-22 RX ORDER — INSULIN GLARGINE 100 [IU]/ML
60 INJECTION, SOLUTION SUBCUTANEOUS NIGHTLY
Qty: 18 ML | Refills: 11 | Status: SHIPPED | OUTPATIENT
Start: 2023-06-22 | End: 2023-09-27 | Stop reason: SDUPTHER

## 2023-07-13 RX ORDER — FUROSEMIDE 20 MG/1
TABLET ORAL
Qty: 90 TABLET | Refills: 5 | Status: SHIPPED | OUTPATIENT
Start: 2023-07-13 | End: 2024-02-15 | Stop reason: SDUPTHER

## 2023-07-14 DIAGNOSIS — G47.00 INSOMNIA, UNSPECIFIED TYPE: Primary | ICD-10-CM

## 2023-07-14 DIAGNOSIS — E55.9 VITAMIN D DEFICIENCY: ICD-10-CM

## 2023-07-14 RX ORDER — TALC
6 POWDER (GRAM) TOPICAL NIGHTLY PRN
Qty: 60 TABLET | Refills: 1 | Status: SHIPPED | OUTPATIENT
Start: 2023-07-14 | End: 2023-10-13 | Stop reason: SDUPTHER

## 2023-07-14 RX ORDER — CHOLECALCIFEROL (VITAMIN D3) 25 MCG
2000 TABLET ORAL DAILY
Qty: 90 TABLET | Refills: 0 | Status: SHIPPED | OUTPATIENT
Start: 2023-07-14 | End: 2023-09-27 | Stop reason: SDUPTHER

## 2023-07-17 PROBLEM — K62.5 RECTAL BLEEDING: Status: RESOLVED | Noted: 2023-04-12 | Resolved: 2023-07-17

## 2023-07-31 RX ORDER — INSULIN ASPART 100 [IU]/ML
18 INJECTION, SOLUTION INTRAVENOUS; SUBCUTANEOUS
Qty: 15 ML | Refills: 11 | Status: SHIPPED | OUTPATIENT
Start: 2023-07-31 | End: 2023-08-04 | Stop reason: SDUPTHER

## 2023-08-04 RX ORDER — INSULIN ASPART 100 [IU]/ML
18 INJECTION, SOLUTION INTRAVENOUS; SUBCUTANEOUS
Qty: 15 ML | Refills: 11 | Status: SHIPPED | OUTPATIENT
Start: 2023-08-04 | End: 2023-09-27 | Stop reason: SDUPTHER

## 2023-08-04 NOTE — TELEPHONE ENCOUNTER
Pt requesting to speak with whomever called her and told her about her med change. Pt not sure of the nurse name she spoke with but she is requesting an urgent call back concerning her medication change.

## 2023-08-11 DIAGNOSIS — I27.20 PULMONARY HYPERTENSION: ICD-10-CM

## 2023-08-11 RX ORDER — ALBUTEROL SULFATE 90 UG/1
1 AEROSOL, METERED RESPIRATORY (INHALATION) EVERY 4 HOURS PRN
Qty: 18 G | Refills: 3 | Status: SHIPPED | OUTPATIENT
Start: 2023-08-11 | End: 2023-09-01 | Stop reason: SDUPTHER

## 2023-08-16 DIAGNOSIS — E66.9 DIABETES MELLITUS TYPE 2 IN OBESE: ICD-10-CM

## 2023-08-16 DIAGNOSIS — E11.69 DIABETES MELLITUS TYPE 2 IN OBESE: ICD-10-CM

## 2023-08-24 ENCOUNTER — LAB VISIT (OUTPATIENT)
Dept: LAB | Facility: HOSPITAL | Age: 68
End: 2023-08-24
Attending: NURSE PRACTITIONER
Payer: MEDICARE

## 2023-08-24 DIAGNOSIS — N18.32 CKD STAGE G3B/A2, GFR 30-44 AND ALBUMIN CREATININE RATIO 30-299 MG/G: ICD-10-CM

## 2023-08-24 LAB
APPEARANCE UR: ABNORMAL
BACTERIA #/AREA URNS AUTO: ABNORMAL /HPF
BILIRUB UR QL STRIP.AUTO: NEGATIVE
COLOR UR: YELLOW
CREAT UR-MCNC: 240.3 MG/DL (ref 45–106)
GLUCOSE UR QL STRIP.AUTO: ABNORMAL
HYALINE CASTS #/AREA URNS LPF: ABNORMAL /LPF
KETONES UR QL STRIP.AUTO: NEGATIVE
LEUKOCYTE ESTERASE UR QL STRIP.AUTO: 75
MUCOUS THREADS URNS QL MICRO: ABNORMAL /LPF
NITRITE UR QL STRIP.AUTO: NEGATIVE
PH UR STRIP.AUTO: 5 [PH]
PROT UR QL STRIP.AUTO: ABNORMAL
PROT UR STRIP-MCNC: 33.2 MG/DL
RBC #/AREA URNS AUTO: ABNORMAL /HPF
RBC UR QL AUTO: NEGATIVE
SP GR UR STRIP.AUTO: 1.02
SQUAMOUS #/AREA URNS LPF: ABNORMAL /HPF
URINE PROTEIN/CREATININE RATIO (OHS): 0.1
UROBILINOGEN UR STRIP-ACNC: NORMAL
WBC #/AREA URNS AUTO: ABNORMAL /HPF

## 2023-08-24 PROCEDURE — 80053 COMPREHEN METABOLIC PANEL: CPT

## 2023-08-24 PROCEDURE — 36415 COLL VENOUS BLD VENIPUNCTURE: CPT

## 2023-08-24 PROCEDURE — 81001 URINALYSIS AUTO W/SCOPE: CPT

## 2023-08-24 PROCEDURE — 84156 ASSAY OF PROTEIN URINE: CPT

## 2023-08-25 LAB
ALBUMIN SERPL-MCNC: 3.2 G/DL (ref 3.4–4.8)
ALBUMIN/GLOB SERPL: 0.7 RATIO (ref 1.1–2)
ALP SERPL-CCNC: 116 UNIT/L (ref 40–150)
ALT SERPL-CCNC: 12 UNIT/L (ref 0–55)
AST SERPL-CCNC: 14 UNIT/L (ref 5–34)
BILIRUB SERPL-MCNC: 0.4 MG/DL
BUN SERPL-MCNC: 24.5 MG/DL (ref 9.8–20.1)
CALCIUM SERPL-MCNC: 9.2 MG/DL (ref 8.4–10.2)
CHLORIDE SERPL-SCNC: 106 MMOL/L (ref 98–107)
CO2 SERPL-SCNC: 28 MMOL/L (ref 23–31)
CREAT SERPL-MCNC: 1.76 MG/DL (ref 0.55–1.02)
GFR SERPLBLD CREATININE-BSD FMLA CKD-EPI: 32 MLS/MIN/1.73/M2
GLOBULIN SER-MCNC: 4.3 GM/DL (ref 2.4–3.5)
GLUCOSE SERPL-MCNC: 74 MG/DL (ref 82–115)
POTASSIUM SERPL-SCNC: 3.7 MMOL/L (ref 3.5–5.1)
PROT SERPL-MCNC: 7.5 GM/DL (ref 5.8–7.6)
SODIUM SERPL-SCNC: 144 MMOL/L (ref 136–145)

## 2023-08-30 ENCOUNTER — OFFICE VISIT (OUTPATIENT)
Dept: NEPHROLOGY | Facility: CLINIC | Age: 68
End: 2023-08-30
Payer: MEDICARE

## 2023-08-30 VITALS
RESPIRATION RATE: 18 BRPM | BODY MASS INDEX: 50.02 KG/M2 | DIASTOLIC BLOOD PRESSURE: 80 MMHG | TEMPERATURE: 98 F | OXYGEN SATURATION: 100 % | SYSTOLIC BLOOD PRESSURE: 128 MMHG | HEIGHT: 64 IN | HEART RATE: 61 BPM | WEIGHT: 293 LBS

## 2023-08-30 DIAGNOSIS — N18.32 CKD STAGE G3B/A2, GFR 30-44 AND ALBUMIN CREATININE RATIO 30-299 MG/G: Primary | ICD-10-CM

## 2023-08-30 DIAGNOSIS — I10 PRIMARY HYPERTENSION: ICD-10-CM

## 2023-08-30 PROCEDURE — 3074F SYST BP LT 130 MM HG: CPT | Mod: CPTII,,, | Performed by: NURSE PRACTITIONER

## 2023-08-30 PROCEDURE — 1125F AMNT PAIN NOTED PAIN PRSNT: CPT | Mod: CPTII,,, | Performed by: NURSE PRACTITIONER

## 2023-08-30 PROCEDURE — 3066F NEPHROPATHY DOC TX: CPT | Mod: CPTII,,, | Performed by: NURSE PRACTITIONER

## 2023-08-30 PROCEDURE — 1125F PR PAIN SEVERITY QUANTIFIED, PAIN PRESENT: ICD-10-PCS | Mod: CPTII,,, | Performed by: NURSE PRACTITIONER

## 2023-08-30 PROCEDURE — 3008F BODY MASS INDEX DOCD: CPT | Mod: CPTII,,, | Performed by: NURSE PRACTITIONER

## 2023-08-30 PROCEDURE — 99215 OFFICE O/P EST HI 40 MIN: CPT | Mod: PBBFAC | Performed by: NURSE PRACTITIONER

## 2023-08-30 PROCEDURE — 3060F POS MICROALBUMINURIA REV: CPT | Mod: CPTII,,, | Performed by: NURSE PRACTITIONER

## 2023-08-30 PROCEDURE — 3079F DIAST BP 80-89 MM HG: CPT | Mod: CPTII,,, | Performed by: NURSE PRACTITIONER

## 2023-08-30 PROCEDURE — 1159F PR MEDICATION LIST DOCUMENTED IN MEDICAL RECORD: ICD-10-PCS | Mod: CPTII,,, | Performed by: NURSE PRACTITIONER

## 2023-08-30 PROCEDURE — 1159F MED LIST DOCD IN RCRD: CPT | Mod: CPTII,,, | Performed by: NURSE PRACTITIONER

## 2023-08-30 PROCEDURE — 1101F PT FALLS ASSESS-DOCD LE1/YR: CPT | Mod: CPTII,,, | Performed by: NURSE PRACTITIONER

## 2023-08-30 PROCEDURE — 3288F FALL RISK ASSESSMENT DOCD: CPT | Mod: CPTII,,, | Performed by: NURSE PRACTITIONER

## 2023-08-30 PROCEDURE — 3079F PR MOST RECENT DIASTOLIC BLOOD PRESSURE 80-89 MM HG: ICD-10-PCS | Mod: CPTII,,, | Performed by: NURSE PRACTITIONER

## 2023-08-30 PROCEDURE — 3046F HEMOGLOBIN A1C LEVEL >9.0%: CPT | Mod: CPTII,,, | Performed by: NURSE PRACTITIONER

## 2023-08-30 PROCEDURE — 1101F PR PT FALLS ASSESS DOC 0-1 FALLS W/OUT INJ PAST YR: ICD-10-PCS | Mod: CPTII,,, | Performed by: NURSE PRACTITIONER

## 2023-08-30 PROCEDURE — 99214 OFFICE O/P EST MOD 30 MIN: CPT | Mod: S$PBB,,, | Performed by: NURSE PRACTITIONER

## 2023-08-30 PROCEDURE — 3074F PR MOST RECENT SYSTOLIC BLOOD PRESSURE < 130 MM HG: ICD-10-PCS | Mod: CPTII,,, | Performed by: NURSE PRACTITIONER

## 2023-08-30 PROCEDURE — 3008F PR BODY MASS INDEX (BMI) DOCUMENTED: ICD-10-PCS | Mod: CPTII,,, | Performed by: NURSE PRACTITIONER

## 2023-08-30 PROCEDURE — 3046F PR MOST RECENT HEMOGLOBIN A1C LEVEL > 9.0%: ICD-10-PCS | Mod: CPTII,,, | Performed by: NURSE PRACTITIONER

## 2023-08-30 PROCEDURE — 99214 PR OFFICE/OUTPT VISIT, EST, LEVL IV, 30-39 MIN: ICD-10-PCS | Mod: S$PBB,,, | Performed by: NURSE PRACTITIONER

## 2023-08-30 PROCEDURE — 3066F PR DOCUMENTATION OF TREATMENT FOR NEPHROPATHY: ICD-10-PCS | Mod: CPTII,,, | Performed by: NURSE PRACTITIONER

## 2023-08-30 PROCEDURE — 3060F PR POS MICROALBUMINURIA RESULT DOCUMENTED/REVIEW: ICD-10-PCS | Mod: CPTII,,, | Performed by: NURSE PRACTITIONER

## 2023-08-30 PROCEDURE — 3288F PR FALLS RISK ASSESSMENT DOCUMENTED: ICD-10-PCS | Mod: CPTII,,, | Performed by: NURSE PRACTITIONER

## 2023-08-30 RX ORDER — PEN NEEDLE, DIABETIC 30 GX3/16"
1 NEEDLE, DISPOSABLE MISCELLANEOUS 4 TIMES DAILY
Qty: 100 EACH | Refills: 5 | Status: SHIPPED | OUTPATIENT
Start: 2023-08-30 | End: 2023-09-27 | Stop reason: SDUPTHER

## 2023-08-30 RX ORDER — MELOXICAM 7.5 MG/1
7.5 TABLET ORAL DAILY
Qty: 15 TABLET | Refills: 0 | Status: SHIPPED | OUTPATIENT
Start: 2023-08-30 | End: 2023-09-27

## 2023-08-30 NOTE — PROGRESS NOTES
"[facility]  Nephrology Clinic Note    Chief complaint: Chronic Kidney Disease (RTC, took meds, c/o left hand wrist pain since 8/29, edema)    History of present illness:   Elissa Freeman is a 67 y.o. Black or  female with past medical history of CKD , hypertension, dyslipidemia, diabetes mellitus 2, renal cell carcinoma (status post left nephrectomy in 2011), adenocarcinoma of the colon (status post hemicolectomy in 2014), and morbid obesity.  Patient presents for follow-up appointment in Nephrology Clinic. C/o left wrist pain.    Review of Systems  12 point review of systems conducted, negative except as stated in the history of present illness.    Allergies: Patient has No Known Allergies.     Past Medical History:  has a past medical history of Asthma, Cancer, CKD (chronic kidney disease), Coronary artery disease, Diabetes mellitus, Hyperlipidemia, Hypertension, Obesity, unspecified, MOHSEN (obstructive sleep apnea), and Unspecified cataract.    Procedure History:  has a past surgical history that includes Colon surgery; Nephrectomy (Left); Cholecystectomy; hysterectomy, vaginal, with salpingo-oophorectomy; Tubal ligation; excision of lipoma; Cataract extraction (Left, 11/28/2022); and Colonoscopy (N/A, 4/27/2023).    Family History: family history includes Cancer in her brother; Coronary artery disease in her mother; Hyperlipidemia in her mother; Hypertension in her mother; Stroke in her father.    Social History:  reports that she has never smoked. She has never used smokeless tobacco. She reports current alcohol use. She reports that she does not use drugs.    Physical exam  /80 (BP Location: Right arm, Patient Position: Sitting, BP Method: Large (Automatic))   Pulse 61   Temp 98.3 °F (36.8 °C) (Oral)   Resp 18   Ht 5' 3.78" (1.62 m)   Wt (!) 141.2 kg (311 lb 3.2 oz)   SpO2 100%   BMI 53.79 kg/m²   General appearance: Patient is in no acute " distress.  Skin: No rashes or wounds.  HEENT: PERRLA, EOMI, no scleral icterus, no JVD. Neck is supple.  Chest: Respirations are unlabored. Lungs sounds are clear.   Heart: S1, S2.   Abdomen: Benign. Obese.  : Deferred.  Extremities: No edema, peripheral pulses are palpable.   Neuro: No focal deficits.     Home Medications:    Current Outpatient Medications:     albuterol (PROVENTIL) 5 mg/mL nebulizer solution, SMARTSI.5 Milligram(s) Via Nebulizer Every 6 Hours PRN, Disp: , Rfl:     albuterol (PROVENTIL/VENTOLIN HFA) 90 mcg/actuation inhaler, Inhale 1 puff into the lungs every 4 (four) hours as needed for Wheezing., Disp: 18 g, Rfl: 3    aspirin (ECOTRIN) 81 MG EC tablet, Take 81 mg by mouth once daily., Disp: , Rfl:     atorvastatin (LIPITOR) 40 MG tablet, Take 2 tablets (80 mg total) by mouth once daily., Disp: 90 tablet, Rfl: 3    carvediloL (COREG) 3.125 MG tablet, Take 1 tablet (3.125 mg total) by mouth 2 (two) times daily., Disp: 180 tablet, Rfl: 3    cetirizine (ZYRTEC) 10 MG tablet, Take 1 tablet (10 mg total) by mouth once daily. for 14 days, Disp: 14 tablet, Rfl: 0    cloNIDine (CATAPRES) 0.2 MG tablet, Take 1 tablet (0.2 mg total) by mouth 2 (two) times daily. Takes 2 times per day. Takes whole tablet, Disp: 180 tablet, Rfl: 3    diclofenac (VOLTAREN) 50 MG EC tablet, Take 1 tablet (50 mg total) by mouth 2 (two) times daily as needed (Pain)., Disp: 12 tablet, Rfl: 0    diltiaZEM (CARDIZEM CD) 180 MG 24 hr capsule, Take 1 capsule (180 mg total) by mouth once daily., Disp: 90 capsule, Rfl: 3    DULoxetine (CYMBALTA) 60 MG capsule, TAKE 1 CAPSULE BY MOUTH DAILY. DO NOT CRUSH OR CHEW, Disp: 90 capsule, Rfl: 0    empagliflozin (JARDIANCE) 25 mg tablet, Take 1 tablet (25 mg total) by mouth once daily., Disp: 90 tablet, Rfl: 3    exenatide microspheres (BYDUREON BCISE) 2 mg/0.85 mL AtIn, Inject 2 mg into the skin every 7 days., Disp: 4 pen , Rfl: 3    flash glucose scanning reader ([employer] SMITHA 2 READER)  "Misc, 1 each by Misc.(Non-Drug; Combo Route) route 4 (four) times daily with meals and nightly., Disp: 1 each, Rfl: 0    flash glucose sensor (FREESTYLE SMITHA 2 SENSOR) Kit, 1 each by Misc.(Non-Drug; Combo Route) route 2 hours after meals and at bedtime., Disp: 1 kit, Rfl: 0    furosemide (LASIX) 20 MG tablet, TAKE ONE TABLET BY MOUTH DAILY AT 9 AM (VIAL), Disp: 90 tablet, Rfl: 5    gabapentin (NEURONTIN) 600 MG tablet, Take 1 tablet (600 mg total) by mouth once daily., Disp: 90 tablet, Rfl: 3    insulin (LANTUS SOLOSTAR U-100 INSULIN) glargine 100 units/mL SubQ pen, Inject 60 Units into the skin every evening., Disp: 18 mL, Rfl: 11    insulin aspart U-100 (NOVOLOG FLEXPEN U-100 INSULIN) 100 unit/mL (3 mL) InPn pen, Inject 18 Units into the skin 3 (three) times daily with meals., Disp: 15 mL, Rfl: 11    latanoprost 0.005 % ophthalmic solution, Place 1 drop into both eyes once daily., Disp: 2.5 mL, Rfl: 1    losartan-hydrochlorothiazide 50-12.5 mg (HYZAAR) 50-12.5 mg per tablet, Take 1 tablet by mouth once daily., Disp: 90 tablet, Rfl: 3    melatonin (MELATIN) 3 mg tablet, Take 2 tablets (6 mg total) by mouth nightly as needed for Insomnia., Disp: 60 tablet, Rfl: 1    nebulizer accessories Kit, 1 each by Misc.(Non-Drug; Combo Route) route 4 (four) times daily as needed (wheezing)., Disp: 1 kit, Rfl: 0    nitroGLYCERIN (NITROSTAT) 0.4 MG SL tablet, PLACE 1 TABLET UNDER THE TONGUE EVERY 5 MINUTES AS NEEDED FOR CHEST PAIN. GO TO ER AFTER THIRD DOSE, Disp: 10 tablet, Rfl: 0    potassium chloride (K-TAB) 20 mEq, Take 1 tablet (20 mEq total) by mouth once daily., Disp: 90 tablet, Rfl: 1    vitamin D (VITAMIN D3) 1000 units Tab, Take 2 tablets (2,000 Units total) by mouth Daily., Disp: 90 tablet, Rfl: 0    meloxicam (MOBIC) 7.5 MG tablet, Take 1 tablet (7.5 mg total) by mouth once daily. As needed for pain for 15 days, Disp: 15 tablet, Rfl: 0    pen needle, diabetic 32 gauge x 5/32" Ndle, 1 each by Misc.(Non-Drug; Combo " Route) route 4 (four) times daily., Disp: 100 each, Rfl: 5    Laboratory data    Serum  Lab Results   Component Value Date    WBC 6.33 06/06/2023    HGB 13.7 06/06/2023    HCT 47.1 (H) 06/06/2023     06/06/2023    IRON 47 (L) 04/21/2023    TIBC 236 (L) 04/21/2023    TRANS 213 04/21/2023    LABIRON 20 04/21/2023    FERRITIN 74.91 04/21/2023     08/24/2023    K 3.7 08/24/2023    CHLORIDE 106 08/24/2023    CO2 28 08/24/2023    BUN 24.5 (H) 08/24/2023    CREATININE 1.76 (H) 08/24/2023    EGFRNORACEVR 32 08/24/2023    GLUCOSE 74 (L) 08/24/2023    CALCIUM 9.2 08/24/2023    ALKPHOS 116 08/24/2023    LABPROT 7.5 08/24/2023    ALBUMIN 3.2 (L) 08/24/2023    BILIDIR 0.3 02/11/2022    IBILI 0.20 02/11/2022    AST 14 08/24/2023    ALT 12 08/24/2023    MG 1.7 (L) 09/05/2018    PHOS 3.5 09/05/2018      Lab Results   Component Value Date    HGBA1C 10.3 (H) 06/06/2023    PTH 63.9 09/05/2018    WEUGIPOM67OW 34.8 08/01/2022    HIV Nonreactive 01/17/2023    HEPCAB Nonreactive 06/06/2023     Urine:  Lab Results   Component Value Date    COLORUA Yellow 08/24/2023    APPEARANCEUA Turbid (A) 08/24/2023    SGUA 1.021 08/24/2023    PHUA 5.0 08/24/2023    PROTEINUA 1+ (A) 08/24/2023    GLUCOSEUA 4+ (A) 08/24/2023    KETONESUA Negative 08/24/2023    BLOODUA Negative 08/24/2023    NITRITESUA Negative 08/24/2023    LEUKOCYTESUR 75 (A) 08/24/2023    RBCUA 0-5 08/24/2023    WBCUA 0-5 08/24/2023    BACTERIA Trace (A) 08/24/2023    SQEPUA Many (A) 08/24/2023    HYALINECASTS None Seen 08/24/2023    CREATRANDUR 240.3 (H) 08/24/2023    PROTEINURINE 33.2 08/24/2023    UPROTCREA 0.1 08/24/2023         Imaging  US Retroperitoneal Limited      Length: 10.7 x 6.4 x 6.2 cm  Appearance: Normal echogenicity.  Collecting system: No hydronephrosis  Stones: None  Cyst/Mass: None    Left Kidney:  Has been surgically removed      IMPRESSION: Status post left nephrectomy.    No significant abnormality of the right kidney  Electronically Signed By:  Arsalan Dill MD  Date/Time Signed: 03/07/2022 12:32      Impression and plan     CKD stage G3b/A2, GFR 30-44 and albumin creatinine ratio  mg/g  -     Comprehensive Metabolic Panel; Future; Expected date: 02/20/2024  -     Protein/Creatinine Ratio, Urine; Future; Expected date: 02/20/2024  -     Urinalysis, Reflex to Urine Culture; Future; Expected date: 02/20/2024  -     PTH, Intact; Future; Expected date: 02/20/2024  -     Phosphorus; Future; Expected date: 02/20/2024  Diabetic kidney disease/reduced renal mass.  Continue aggressive risk factor management, MAHI blockade, and SGLT 2 inhibitor.  Consider GLP-1 for better glycemic control.    Continue renal sparing activities:  -2 g a day dietary sodium restriction  -optimize glycemic control (goal A1c is less than 7%)  -control high blood pressure (goal blood pressure is less than 130/80, patient was advised to check blood pressure once or twice a week and bring blood pressure logs to next office visit)  -exercise at least 30 minutes a day, 5 days a week  -maintain healthy weight  -stay well hydrated (drink water only, avoid juices, sweet tea, and sodas)  -ask about staying up-to-date on vaccinations (flu vaccine, pneumonia vaccine, hepatitis B vaccine)  -avoid excessive use of NSAIDs (ibuprofen, naproxen, Aleve, Advil, Toradol, Mobic), take Tylenol as needed for headache or mild pain  -take cholesterol lowering medications if prescribed (LDL goal less than 100)  Follow up in about 6 months (around 2/29/2024).     Primary hypertension  Blood pressure reading is at goal, continue current antihypertensive regimen and 2 g a day dietary sodium restriction.      BMI 50.0-59.9, adult  Lifestyle and dietary interventions discussed, patient encouraged to maintain non-sedentary lifestyle and well-balanced diet.     Other orders  -     meloxicam (MOBIC) 7.5 MG tablet; Take 1 tablet (7.5 mg total) by mouth once daily. As needed for pain for 15 days  Dispense: 15  "tablet; Refill: 0  -     pen needle, diabetic 32 gauge x 5/32" Ndle; 1 each by Misc.(Non-Drug; Combo Route) route 4 (four) times daily.  Dispense: 100 each; Refill: 5          8/30/2023  Miranda Viveros NP  [facility]      "

## 2023-09-01 DIAGNOSIS — I27.20 PULMONARY HYPERTENSION: ICD-10-CM

## 2023-09-01 RX ORDER — ALBUTEROL SULFATE 90 UG/1
1 AEROSOL, METERED RESPIRATORY (INHALATION) EVERY 4 HOURS PRN
Qty: 18 G | Refills: 3 | Status: SHIPPED | OUTPATIENT
Start: 2023-09-01 | End: 2023-09-27

## 2023-09-27 ENCOUNTER — OFFICE VISIT (OUTPATIENT)
Dept: INTERNAL MEDICINE | Facility: CLINIC | Age: 68
End: 2023-09-27
Payer: MEDICARE

## 2023-09-27 VITALS
OXYGEN SATURATION: 100 % | TEMPERATURE: 98 F | RESPIRATION RATE: 18 BRPM | DIASTOLIC BLOOD PRESSURE: 81 MMHG | HEIGHT: 63 IN | SYSTOLIC BLOOD PRESSURE: 128 MMHG | HEART RATE: 87 BPM | WEIGHT: 293 LBS | BODY MASS INDEX: 51.91 KG/M2

## 2023-09-27 DIAGNOSIS — J45.909 ASTHMA, UNSPECIFIED ASTHMA SEVERITY, UNSPECIFIED WHETHER COMPLICATED, UNSPECIFIED WHETHER PERSISTENT: ICD-10-CM

## 2023-09-27 DIAGNOSIS — N18.32 CKD STAGE G3B/A2, GFR 30-44 AND ALBUMIN CREATININE RATIO 30-299 MG/G: ICD-10-CM

## 2023-09-27 DIAGNOSIS — K59.00 CONSTIPATION, UNSPECIFIED CONSTIPATION TYPE: ICD-10-CM

## 2023-09-27 DIAGNOSIS — Z79.4 TYPE 2 DIABETES MELLITUS WITHOUT COMPLICATION, WITH LONG-TERM CURRENT USE OF INSULIN: ICD-10-CM

## 2023-09-27 DIAGNOSIS — I10 PRIMARY HYPERTENSION: ICD-10-CM

## 2023-09-27 DIAGNOSIS — E04.2 MULTINODULAR GOITER: ICD-10-CM

## 2023-09-27 DIAGNOSIS — Z00.00 HEALTHCARE MAINTENANCE: ICD-10-CM

## 2023-09-27 DIAGNOSIS — K92.1 HEMATOCHEZIA: ICD-10-CM

## 2023-09-27 DIAGNOSIS — E66.9 DIABETES MELLITUS TYPE 2 IN OBESE: ICD-10-CM

## 2023-09-27 DIAGNOSIS — Z79.4 TYPE 2 DIABETES MELLITUS WITH STAGE 3 CHRONIC KIDNEY DISEASE, WITH LONG-TERM CURRENT USE OF INSULIN, UNSPECIFIED WHETHER STAGE 3A OR 3B CKD: Primary | ICD-10-CM

## 2023-09-27 DIAGNOSIS — N18.30 TYPE 2 DIABETES MELLITUS WITH STAGE 3 CHRONIC KIDNEY DISEASE, WITH LONG-TERM CURRENT USE OF INSULIN, UNSPECIFIED WHETHER STAGE 3A OR 3B CKD: Primary | ICD-10-CM

## 2023-09-27 DIAGNOSIS — E55.9 VITAMIN D DEFICIENCY: ICD-10-CM

## 2023-09-27 DIAGNOSIS — E78.5 HYPERLIPIDEMIA ASSOCIATED WITH TYPE 2 DIABETES MELLITUS: ICD-10-CM

## 2023-09-27 DIAGNOSIS — E11.9 TYPE 2 DIABETES MELLITUS WITHOUT COMPLICATION, WITH LONG-TERM CURRENT USE OF INSULIN: ICD-10-CM

## 2023-09-27 DIAGNOSIS — E11.69 HYPERLIPIDEMIA ASSOCIATED WITH TYPE 2 DIABETES MELLITUS: ICD-10-CM

## 2023-09-27 DIAGNOSIS — T78.40XD ALLERGY, SUBSEQUENT ENCOUNTER: ICD-10-CM

## 2023-09-27 DIAGNOSIS — I27.20 PULMONARY HYPERTENSION: ICD-10-CM

## 2023-09-27 DIAGNOSIS — E11.22 TYPE 2 DIABETES MELLITUS WITH STAGE 3 CHRONIC KIDNEY DISEASE, WITH LONG-TERM CURRENT USE OF INSULIN, UNSPECIFIED WHETHER STAGE 3A OR 3B CKD: Primary | ICD-10-CM

## 2023-09-27 DIAGNOSIS — E11.69 DIABETES MELLITUS TYPE 2 IN OBESE: ICD-10-CM

## 2023-09-27 DIAGNOSIS — G62.9 NEUROPATHY: ICD-10-CM

## 2023-09-27 LAB — HBA1C MFR BLD: 10.8 %

## 2023-09-27 PROCEDURE — 99214 OFFICE O/P EST MOD 30 MIN: CPT | Mod: PBBFAC,25

## 2023-09-27 PROCEDURE — 90694 VACC AIIV4 NO PRSRV 0.5ML IM: CPT | Mod: PBBFAC

## 2023-09-27 PROCEDURE — G0008 ADMIN INFLUENZA VIRUS VAC: HCPCS | Mod: PBBFAC

## 2023-09-27 PROCEDURE — 83036 HEMOGLOBIN GLYCOSYLATED A1C: CPT | Mod: PBBFAC

## 2023-09-27 RX ORDER — LOSARTAN POTASSIUM AND HYDROCHLOROTHIAZIDE 12.5; 5 MG/1; MG/1
1 TABLET ORAL DAILY
Qty: 90 TABLET | Refills: 3 | Status: ON HOLD | OUTPATIENT
Start: 2023-09-27 | End: 2023-11-19 | Stop reason: HOSPADM

## 2023-09-27 RX ORDER — INSULIN ASPART 100 [IU]/ML
20 INJECTION, SOLUTION INTRAVENOUS; SUBCUTANEOUS
Qty: 15 ML | Refills: 11 | Status: SHIPPED | OUTPATIENT
Start: 2023-09-27

## 2023-09-27 RX ORDER — DICLOFENAC SODIUM 50 MG/1
50 TABLET, DELAYED RELEASE ORAL 2 TIMES DAILY PRN
Qty: 12 TABLET | Refills: 0 | Status: SHIPPED | OUTPATIENT
Start: 2023-09-27 | End: 2024-02-15

## 2023-09-27 RX ORDER — CETIRIZINE HYDROCHLORIDE 10 MG/1
10 TABLET ORAL DAILY
Qty: 14 TABLET | Refills: 0 | Status: SHIPPED | OUTPATIENT
Start: 2023-09-27 | End: 2023-10-19 | Stop reason: SDUPTHER

## 2023-09-27 RX ORDER — CHOLECALCIFEROL (VITAMIN D3) 25 MCG
2000 TABLET ORAL DAILY
Qty: 90 TABLET | Refills: 0 | Status: SHIPPED | OUTPATIENT
Start: 2023-09-27 | End: 2023-10-12 | Stop reason: SDUPTHER

## 2023-09-27 RX ORDER — MELOXICAM 15 MG/1
15 TABLET ORAL DAILY
COMMUNITY
End: 2023-09-27

## 2023-09-27 RX ORDER — DILTIAZEM HYDROCHLORIDE 180 MG/1
180 CAPSULE, COATED, EXTENDED RELEASE ORAL DAILY
Qty: 90 CAPSULE | Refills: 3 | Status: ON HOLD | OUTPATIENT
Start: 2023-09-27 | End: 2023-11-19 | Stop reason: HOSPADM

## 2023-09-27 RX ORDER — DULOXETIN HYDROCHLORIDE 60 MG/1
CAPSULE, DELAYED RELEASE ORAL
Qty: 90 CAPSULE | Refills: 0 | Status: SHIPPED | OUTPATIENT
Start: 2023-09-27 | End: 2023-10-12 | Stop reason: SDUPTHER

## 2023-09-27 RX ORDER — ALBUTEROL SULFATE 90 UG/1
1 AEROSOL, METERED RESPIRATORY (INHALATION) EVERY 4 HOURS PRN
Qty: 18 G | Refills: 3 | Status: SHIPPED | OUTPATIENT
Start: 2023-09-27

## 2023-09-27 RX ORDER — PEN NEEDLE, DIABETIC 30 GX3/16"
1 NEEDLE, DISPOSABLE MISCELLANEOUS 4 TIMES DAILY
Qty: 100 EACH | Refills: 5 | Status: SHIPPED | OUTPATIENT
Start: 2023-09-27 | End: 2024-09-26

## 2023-09-27 RX ORDER — NITROGLYCERIN 0.4 MG/1
TABLET SUBLINGUAL
Qty: 10 TABLET | Refills: 0 | Status: SHIPPED | OUTPATIENT
Start: 2023-09-27 | End: 2023-12-20 | Stop reason: SDUPTHER

## 2023-09-27 RX ORDER — GABAPENTIN 600 MG/1
600 TABLET ORAL DAILY
Qty: 90 TABLET | Refills: 3 | Status: SHIPPED | OUTPATIENT
Start: 2023-09-27 | End: 2024-02-15

## 2023-09-27 RX ORDER — INSULIN GLARGINE 100 [IU]/ML
65 INJECTION, SOLUTION SUBCUTANEOUS NIGHTLY
Qty: 18 ML | Refills: 11 | Status: SHIPPED | OUTPATIENT
Start: 2023-09-27 | End: 2024-09-26

## 2023-09-27 RX ORDER — ATORVASTATIN CALCIUM 40 MG/1
80 TABLET, FILM COATED ORAL DAILY
Qty: 90 TABLET | Refills: 3 | Status: SHIPPED | OUTPATIENT
Start: 2023-09-27 | End: 2024-01-09 | Stop reason: SDUPTHER

## 2023-09-27 RX ORDER — CARVEDILOL 3.12 MG/1
3.12 TABLET ORAL 2 TIMES DAILY
Qty: 180 TABLET | Refills: 3 | Status: ON HOLD | OUTPATIENT
Start: 2023-09-27 | End: 2023-11-19 | Stop reason: HOSPADM

## 2023-09-27 RX ORDER — FLASH GLUCOSE SCANNING READER
1 EACH MISCELLANEOUS
Qty: 1 EACH | Refills: 0 | Status: SHIPPED | OUTPATIENT
Start: 2023-09-27

## 2023-09-27 RX ORDER — POLYETHYLENE GLYCOL 3350 17 G/17G
17 POWDER, FOR SOLUTION ORAL DAILY
Qty: 100 EACH | Refills: 0 | Status: SHIPPED | OUTPATIENT
Start: 2023-09-27 | End: 2024-02-15

## 2023-09-27 RX ORDER — FLASH GLUCOSE SENSOR
1 KIT MISCELLANEOUS
Qty: 1 KIT | Refills: 0 | Status: SHIPPED | OUTPATIENT
Start: 2023-09-27

## 2023-09-27 RX ADMIN — INFLUENZA A VIRUS A/VICTORIA/4897/2022 IVR-238 (H1N1) ANTIGEN (FORMALDEHYDE INACTIVATED), INFLUENZA A VIRUS A/DARWIN/6/2021 IVR-227 (H3N2) ANTIGEN (FORMALDEHYDE INACTIVATED), INFLUENZA B VIRUS B/AUSTRIA/1359417/2021 BVR-26 ANTIGEN (FORMALDEHYDE INACTIVATED), INFLUENZA B VIRUS B/PHUKET/3073/2013 BVR-1B ANTIGEN (FORMALDEHYDE INACTIVATED) 0.5 ML: 15; 15; 15; 15 INJECTION, SUSPENSION INTRAMUSCULAR at 03:09

## 2023-09-27 NOTE — PROGRESS NOTES
[facility] INTERNAL MEDICINE CLINIC NOTE    Patient Name: Elissa Freeman  YOB: 1955   MRN: [id number]  Appointment Date: 9/27/2023  AppointmentTime: 12:55 PM    Subjective     HPI: Elissa Freeman is a 67 y.o.  female with PMH positive for obesity, MOHSEN, multinodular goiter, hypertension, hyperlipidemia, diabetes mellitus type 2, CKD III, adenocarcinoma of the colon stage IIA status post hemicolectomy (2014) and adjuvant chemotherapy (2015), renal cell carcinoma status post nephrectomy (2011), who presented to  clinic today for follow-up.  Patient today notes a single episode of painless, large volume bright red blood per rectum approximally 1 month ago which she associates with passing firm stool after experiencing constipation for multiple days.  Patient states she is had episodes like this in the past but recently underwent colonoscopy (04/2023) which was normal.  Denies abdominal pain, nausea, vomiting, diarrhea.  Denies interaction with any sick contacts with similar symptoms. Denies any other acute complaints at this time.    Past Medical History:  Past Medical History:   Diagnosis Date    Asthma     Cancer     CKD (chronic kidney disease)     Coronary artery disease     Diabetes mellitus     Hyperlipidemia     Hypertension     Obesity, unspecified     MOHSEN (obstructive sleep apnea)     Unspecified cataract      Past Surgical History:  Past Surgical History:   Procedure Laterality Date    CATARACT EXTRACTION Left 11/28/2022    CHOLECYSTECTOMY      COLON SURGERY      COLONOSCOPY N/A 4/27/2023    Procedure: COLONOSCOPY;  Surgeon: Jose Miguel Rosales MD;  Location: [facility] ENDOSCOPY;  Service: Endoscopy;  Laterality: N/A;    excision of lipoma      HYSTERECTOMY, VAGINAL, WITH SALPINGO-OOPHORECTOMY      NEPHRECTOMY Left     TUBAL LIGATION       Family History:  Family History   Problem Relation Age of Onset    Hypertension Mother     Coronary artery disease Mother     Hyperlipidemia Mother      Stroke Father     Cancer Brother      Social History:  Social History     Tobacco Use    Smoking status: Never    Smokeless tobacco: Never   Substance Use Topics    Alcohol use: Yes     Comment: frozen drinks once a month, giovanni    Drug use: Never     Allergies:  Review of patient's allergies indicates:  No Known Allergies  Home Medications:  Prior to Admission medications    Medication Sig Start Date End Date Taking? Authorizing Provider   albuterol (PROVENTIL) 5 mg/mL nebulizer solution SMARTSI.5 Milligram(s) Via Nebulizer Every 6 Hours PRN 22  Yes Provider, Historical   albuterol (PROVENTIL/VENTOLIN HFA) 90 mcg/actuation inhaler Inhale 1 puff into the lungs every 4 (four) hours as needed for Wheezing. 23  Yes Jeronimo Bird MD   aspirin (ECOTRIN) 81 MG EC tablet Take 81 mg by mouth once daily.   Yes Provider, Historical   atorvastatin (LIPITOR) 40 MG tablet Take 2 tablets (80 mg total) by mouth once daily. 23  Yes Juan Jose Urena, DO   carvediloL (COREG) 3.125 MG tablet Take 1 tablet (3.125 mg total) by mouth 2 (two) times daily. 23  Yes Juan Jose Urena, DO   cetirizine (ZYRTEC) 10 MG tablet Take 1 tablet (10 mg total) by mouth once daily. for 14 days 22 Yes Augusto Stokes Jr., ARSENIOP   cloNIDine (CATAPRES) 0.2 MG tablet Take 1 tablet (0.2 mg total) by mouth 2 (two) times daily. Takes 2 times per day. Takes whole tablet 23 Yes Juan Jose Urena, DO   diclofenac (VOLTAREN) 50 MG EC tablet Take 1 tablet (50 mg total) by mouth 2 (two) times daily as needed (Pain). 10/30/22  Yes John Plaza MD   diltiaZEM (CARDIZEM CD) 180 MG 24 hr capsule Take 1 capsule (180 mg total) by mouth once daily. 23  Yes Juan Jose Urena, DO   DULoxetine (CYMBALTA) 60 MG capsule TAKE 1 CAPSULE BY MOUTH DAILY. DO NOT CRUSH OR CHEW 23  Yes Juan Jose Urena, DO   empagliflozin (JARDIANCE) 25 mg tablet Take 1 tablet (25 mg total) by mouth once daily.  6/6/23 6/5/24 Yes Juan Jose Urena, DO   exenatide microspheres (BYDUREON BCISE) 2 mg/0.85 mL AtIn Inject 2 mg into the skin every 7 days. 8/16/23  Yes Estrella Burnette MD   flash glucose scanning reader ([employer] SMITHA 2 READER) Misc 1 each by Misc.(Non-Drug; Combo Route) route 4 (four) times daily with meals and nightly. 8/1/22  Yes Juan Jose Urena, DO   flash glucose sensor (FREESTYLE SMITHA 2 SENSOR) Kit 1 each by Misc.(Non-Drug; Combo Route) route 2 hours after meals and at bedtime. 8/1/22  Yes Juan Jose Urena, DO   furosemide (LASIX) 20 MG tablet TAKE ONE TABLET BY MOUTH DAILY AT 9 AM (VIAL) 7/13/23  Yes Miranda Viveros FNP   gabapentin (NEURONTIN) 600 MG tablet Take 1 tablet (600 mg total) by mouth once daily. 6/6/23  Yes Juan Jose Urena,    insulin (LANTUS SOLOSTAR U-100 INSULIN) glargine 100 units/mL SubQ pen Inject 60 Units into the skin every evening. 6/22/23 6/21/24 Yes Juan Jose Urena, DO   insulin aspart U-100 (NOVOLOG FLEXPEN U-100 INSULIN) 100 unit/mL (3 mL) InPn pen Inject 18 Units into the skin 3 (three) times daily with meals. 8/4/23  Yes Estrella Burnette MD   latanoprost 0.005 % ophthalmic solution Place 1 drop into both eyes once daily. 6/5/23  Yes Juan Jose Urena,    losartan-hydrochlorothiazide 50-12.5 mg (HYZAAR) 50-12.5 mg per tablet Take 1 tablet by mouth once daily. 6/6/23  Yes Juan Jose Urena, DO   melatonin (MELATIN) 3 mg tablet Take 2 tablets (6 mg total) by mouth nightly as needed for Insomnia. 7/14/23  Yes Estrella Burnette MD   meloxicam (MOBIC) 15 MG tablet Take 15 mg by mouth once daily.   Yes Provider, Historical   nebulizer accessories Kit 1 each by Misc.(Non-Drug; Combo Route) route 4 (four) times daily as needed (wheezing). 12/27/22  Yes Augusto Stokes Jr., HEATHER   nitroGLYCERIN (NITROSTAT) 0.4 MG SL tablet PLACE 1 TABLET UNDER THE TONGUE EVERY 5 MINUTES AS NEEDED FOR CHEST PAIN. GO TO ER AFTER THIRD DOSE 4/3/23 9/27/23 Yes Miranda Viveros FNP  "  pen needle, diabetic 32 gauge x 5/32" Ndle 1 each by Misc.(Non-Drug; Combo Route) route 4 (four) times daily. 8/30/23 8/29/24 Yes Miranda Viveros FNP   potassium chloride (K-TAB) 20 mEq Take 1 tablet (20 mEq total) by mouth once daily. 4/18/23 10/15/23 Yes Miranda Viveros FNP   vitamin D (VITAMIN D3) 1000 units Tab Take 2 tablets (2,000 Units total) by mouth Daily. 7/14/23  Yes Estrella Burnette MD     Review of Systems:  Review of Systems   Constitutional:  Negative for chills, diaphoresis, fatigue and fever.   HENT:  Negative for ear pain, hearing loss, rhinorrhea, sore throat, tinnitus and trouble swallowing.    Eyes:  Negative for pain, redness and visual disturbance.   Respiratory:  Negative for cough, chest tightness and shortness of breath.    Cardiovascular:  Negative for chest pain and palpitations.   Gastrointestinal:  Positive for blood in stool. Negative for abdominal pain, constipation, diarrhea, nausea and vomiting.   Genitourinary:  Negative for difficulty urinating, dysuria, frequency, hematuria and urgency.   Musculoskeletal:  Negative for arthralgias and myalgias.   Skin:  Negative for rash and wound.   Neurological:  Negative for dizziness, seizures, syncope, weakness, light-headedness, numbness and headaches.   Psychiatric/Behavioral:  Negative for confusion.        Objective     Vitals:   Vitals:    09/27/23 1316   BP: 128/81   Pulse: 87   Resp: 18   Temp: 98 °F (36.7 °C)       Physical Examination:   Physical Exam  Vitals reviewed.   Constitutional:       General: She is not in acute distress.     Appearance: Normal appearance. She is obese. She is not ill-appearing, toxic-appearing or diaphoretic.   HENT:      Head: Normocephalic and atraumatic.      Right Ear: External ear normal.      Left Ear: External ear normal.   Eyes:      General: No scleral icterus.        Right eye: No discharge.         Left eye: No discharge.      Extraocular Movements: Extraocular movements intact.      " Conjunctiva/sclera: Conjunctivae normal.      Pupils: Pupils are equal, round, and reactive to light.   Cardiovascular:      Rate and Rhythm: Normal rate and regular rhythm.      Pulses: Normal pulses.      Heart sounds: Normal heart sounds. No murmur heard.     No friction rub. No gallop.   Pulmonary:      Effort: Pulmonary effort is normal. No respiratory distress.      Breath sounds: Normal breath sounds. No wheezing, rhonchi or rales.   Abdominal:      General: Abdomen is flat. Bowel sounds are normal. There is no distension.      Palpations: Abdomen is soft.      Tenderness: There is no abdominal tenderness. There is no guarding.   Musculoskeletal:         General: No swelling, tenderness, deformity or signs of injury. Normal range of motion.      Right lower leg: No edema.      Left lower leg: No edema.   Skin:     General: Skin is warm and dry.      Capillary Refill: Capillary refill takes less than 2 seconds.      Coloration: Skin is not jaundiced.      Findings: No bruising, erythema or rash.   Neurological:      Mental Status: She is alert.      Comments: AAO to person, place, time, and situation; CN II-XII grossly intact         PREVENTIVE CARE:  Lab Work:    DM (age>45 or HTN):  Lab Results   Component Value Date    HGBA1C 10.3 (H) 06/06/2023    MICALBCREAT 86.4 (H) 06/06/2023     Lipid :  Lab Results   Component Value Date    CHOL 216 (H) 06/06/2023    TRIG 178 (H) 06/06/2023    HDL 46 06/06/2023    .00 06/06/2023     Thyroid:  Lab Results   Component Value Date    TSH 1.993 06/06/2023     Screening:    Mammo:   DEXA (age>65 or FRAX > 9.3%):  DEXA (01/17/2023) shows no sign of decreased bone density  Lung Ca (30PY smoker age 55-79 or quit in last 15y):  Not indicated  Colon Ca: (age 45-75-annual FOBT, 5y flex + FOBTq3 or 10y c-scope):  Colonoscopy (04/28/2023) normal  AAA (smoker 65-76):  Not indicated    ID Titers and Vaccinations:    Immunization History   Administered Date(s) Administered     "COVID-19, MRNA, LN-S, PF (Pfizer) (Purple Cap) 02/10/2021, 03/03/2021, 12/17/2021    Influenza 10/13/2010, 11/17/2017    Influenza - Quadrivalent 10/03/2016, 12/10/2019, 12/07/2020, 10/27/2021    Influenza - Quadrivalent - PF (6-35 months) 12/11/2018    Influenza - Quadrivalent - PF *Preferred* (6 months and older) 12/07/2020    Influenza - Trivalent - PF (ADULT) 10/13/2010    Pneumococcal Conjugate - 13 Valent 12/07/2020    Pneumococcal Conjugate - 20 Valent 06/06/2023    Pneumococcal Polysaccharide - 23 Valent 04/04/2017    Tdap 12/11/2018    Zoster Recombinant 12/11/2018, 05/29/2019      HIV (once age 15-65):  No results found for: "HIV1X2", "[id number]"     Hepatitis Screening:   Lab Results   Component Value Date    HEPCAB Nonreactive 06/06/2023      Pneumococcal vaccine (65 and over or high risk): UTD  Influenza Vaccine: UTD  Tetanus: UTD  Zoster vaccination (> 50y.o):  UTD  Covid vaccine:  UTD    ASSESSMENT/PLAN:    Obesity  Diabetes mellitus type 2  Diabetic neuropathy  POCT A1c 10.8  -patient did not bring logs this visit  -instructed patient to keep log and check CBGs prior to meal, she voiced understanding, will need to bring logs next appt  -discussed importance of adhering to diabetic diet and good exercise  -diabetic foot exam showed palpable dorsalis pedis pulses, normal sensation to bilateral feet, in no evidence of skin breakdown/ulceration/infection  -fundus exam due November 2023  -increase Levemir to 60 units q.h.s.  -continue NovoLog 20 units t.i.d. with meals, exenatide microspheres, and Jardiance 25 mg q.day  -continue Gabapentin 600 mg t.i.d. and duloxetine 60 mg q.day    Hypertension  /81  -educated on healthy DASH diet and exercise  -continue carvedilol 3.125 b.i.d., diltiazem 180 mg q.d., clonidine 0.2 mg b.i.d., losartan-hydrochlorothiazide 50-12.5 q.d.  -will plan to wean patient off clonidine next visit    Chronic kidney disease  BUN 24.5  Creatinine 1.76  EGFR 32  -kidney function " slowly declining since earlier this year  -patient reports being recently started on NSAIDs which are now contraindicated at this time  -discontinued Mobic  -will repeat CMP before next visit to evaluate kidney function after discontinuation of NSAID  -continue losartan-hydrochlorothiazide for renal protection  -continue to avoid excessive use of NSAIDs (ibuprofen, naproxen, Aleve, Advil, Toradol, Mobic) and nephrotoxic drugs  -continue to follow up with nephrology clinic    Renal cell carcinoma status post nephrectomy (2011)   Adenocarcinoma of the colon stage IIA status post hemicolectomy (2014) and adjuvant chemotherapy (2015)  -patient is s/p hemicolectomy, nephrectomy, and chemotherapy  -completed chemo in April 2015  -seen by Oncology previously but discharged from clinic    Hyperlipidemia  CAD  Ischemic heart disease  Total cholesterol 216    Triglycerides 170  -continue on lipitor 80 mg q.d.    Constipation  Hematochezia  -initiate MiraLax q.d. p.r.n. for constipation  -referred to GI for evaluation of hematochezia as patient may need EGD to evaluate for upper GI bleed in the setting of possible rapid colonic transport secondary to hemicolectomy    MOHSEN on CPAP  - continue CPAP    Multinodular goiter  TSH 1.993  Free T4 0.95  -thyroid biopsy results showed benign thyroid nodules    Depression  -no SI/HI or symptoms today   -continue duloxetine 60 mg q.d.    Gout  -on allopurinol by Nephrology  -reports no flares since last appt     History of Hematuria  -followed by nephrology     Health Maintenance:  -we will repeat CMP, CBC, lipid panel, TSH, diabetic urine studies,    Follow-up in 1 month    Future Appointments   Date Time Provider Department Center   2/29/2024  1:00 PM Miranda Viveros FNP [facility] NEPHR [place]   5/9/2024  8:30 AM Danuta Harrell MD [facility] CARD [place]        The patient was seen with and plan was discussed with Dr. Fan, who agrees.     Jeronimo Bird MD  [employer] [facility]  Medicine PGY-1

## 2023-10-05 ENCOUNTER — OFFICE VISIT (OUTPATIENT)
Dept: GASTROENTEROLOGY | Facility: CLINIC | Age: 68
End: 2023-10-05
Payer: MEDICARE

## 2023-10-05 VITALS
OXYGEN SATURATION: 100 % | WEIGHT: 293 LBS | HEART RATE: 59 BPM | SYSTOLIC BLOOD PRESSURE: 112 MMHG | BODY MASS INDEX: 50.02 KG/M2 | DIASTOLIC BLOOD PRESSURE: 63 MMHG | HEIGHT: 64 IN | TEMPERATURE: 99 F

## 2023-10-05 DIAGNOSIS — Z85.038 PERSONAL HISTORY OF COLON CANCER: ICD-10-CM

## 2023-10-05 DIAGNOSIS — K62.5 RECTAL BLEEDING: Primary | ICD-10-CM

## 2023-10-05 DIAGNOSIS — D50.9 MICROCYTIC ANEMIA: ICD-10-CM

## 2023-10-05 PROCEDURE — 3066F NEPHROPATHY DOC TX: CPT | Mod: CPTII,,, | Performed by: NURSE PRACTITIONER

## 2023-10-05 PROCEDURE — 3008F BODY MASS INDEX DOCD: CPT | Mod: CPTII,,, | Performed by: NURSE PRACTITIONER

## 2023-10-05 PROCEDURE — 3288F PR FALLS RISK ASSESSMENT DOCUMENTED: ICD-10-PCS | Mod: CPTII,,, | Performed by: NURSE PRACTITIONER

## 2023-10-05 PROCEDURE — 3066F PR DOCUMENTATION OF TREATMENT FOR NEPHROPATHY: ICD-10-PCS | Mod: CPTII,,, | Performed by: NURSE PRACTITIONER

## 2023-10-05 PROCEDURE — 3074F SYST BP LT 130 MM HG: CPT | Mod: CPTII,,, | Performed by: NURSE PRACTITIONER

## 2023-10-05 PROCEDURE — 1101F PT FALLS ASSESS-DOCD LE1/YR: CPT | Mod: CPTII,,, | Performed by: NURSE PRACTITIONER

## 2023-10-05 PROCEDURE — 1160F RVW MEDS BY RX/DR IN RCRD: CPT | Mod: CPTII,,, | Performed by: NURSE PRACTITIONER

## 2023-10-05 PROCEDURE — 3060F PR POS MICROALBUMINURIA RESULT DOCUMENTED/REVIEW: ICD-10-PCS | Mod: CPTII,,, | Performed by: NURSE PRACTITIONER

## 2023-10-05 PROCEDURE — 99215 OFFICE O/P EST HI 40 MIN: CPT | Mod: PBBFAC | Performed by: NURSE PRACTITIONER

## 2023-10-05 PROCEDURE — 3060F POS MICROALBUMINURIA REV: CPT | Mod: CPTII,,, | Performed by: NURSE PRACTITIONER

## 2023-10-05 PROCEDURE — 99214 OFFICE O/P EST MOD 30 MIN: CPT | Mod: S$PBB,,, | Performed by: NURSE PRACTITIONER

## 2023-10-05 PROCEDURE — 3078F DIAST BP <80 MM HG: CPT | Mod: CPTII,,, | Performed by: NURSE PRACTITIONER

## 2023-10-05 PROCEDURE — 3288F FALL RISK ASSESSMENT DOCD: CPT | Mod: CPTII,,, | Performed by: NURSE PRACTITIONER

## 2023-10-05 PROCEDURE — 3074F PR MOST RECENT SYSTOLIC BLOOD PRESSURE < 130 MM HG: ICD-10-PCS | Mod: CPTII,,, | Performed by: NURSE PRACTITIONER

## 2023-10-05 PROCEDURE — 3008F PR BODY MASS INDEX (BMI) DOCUMENTED: ICD-10-PCS | Mod: CPTII,,, | Performed by: NURSE PRACTITIONER

## 2023-10-05 PROCEDURE — 1126F AMNT PAIN NOTED NONE PRSNT: CPT | Mod: CPTII,,, | Performed by: NURSE PRACTITIONER

## 2023-10-05 PROCEDURE — 99214 PR OFFICE/OUTPT VISIT, EST, LEVL IV, 30-39 MIN: ICD-10-PCS | Mod: S$PBB,,, | Performed by: NURSE PRACTITIONER

## 2023-10-05 PROCEDURE — 1101F PR PT FALLS ASSESS DOC 0-1 FALLS W/OUT INJ PAST YR: ICD-10-PCS | Mod: CPTII,,, | Performed by: NURSE PRACTITIONER

## 2023-10-05 PROCEDURE — 1159F MED LIST DOCD IN RCRD: CPT | Mod: CPTII,,, | Performed by: NURSE PRACTITIONER

## 2023-10-05 PROCEDURE — 3046F HEMOGLOBIN A1C LEVEL >9.0%: CPT | Mod: CPTII,,, | Performed by: NURSE PRACTITIONER

## 2023-10-05 PROCEDURE — 1159F PR MEDICATION LIST DOCUMENTED IN MEDICAL RECORD: ICD-10-PCS | Mod: CPTII,,, | Performed by: NURSE PRACTITIONER

## 2023-10-05 PROCEDURE — 3078F PR MOST RECENT DIASTOLIC BLOOD PRESSURE < 80 MM HG: ICD-10-PCS | Mod: CPTII,,, | Performed by: NURSE PRACTITIONER

## 2023-10-05 PROCEDURE — 1160F PR REVIEW ALL MEDS BY PRESCRIBER/CLIN PHARMACIST DOCUMENTED: ICD-10-PCS | Mod: CPTII,,, | Performed by: NURSE PRACTITIONER

## 2023-10-05 PROCEDURE — 1126F PR PAIN SEVERITY QUANTIFIED, NO PAIN PRESENT: ICD-10-PCS | Mod: CPTII,,, | Performed by: NURSE PRACTITIONER

## 2023-10-05 PROCEDURE — 3046F PR MOST RECENT HEMOGLOBIN A1C LEVEL > 9.0%: ICD-10-PCS | Mod: CPTII,,, | Performed by: NURSE PRACTITIONER

## 2023-10-05 NOTE — ASSESSMENT & PLAN NOTE
History of stage IIa adenocarcinoma of the cecum s/p extended right hemicolectomy in 2014 with side-to-side anastomosis.  She underwent colonoscopy February 28, 2018 with findings of widely patent anastomosis and completely negative colonoscopy with a recommended repeat colonoscopy in 5 years.    She underwent colonoscopy June 18, 2020 with findings of widely patent ileocolonic anastomosis, normal mucosa, and no masses or polyps with a recommended repeat colonoscopy in 5 years.  She underwent colonoscopy April 27, 2023 which was normal and no specimens were collected with a recommended recall of 5 years.

## 2023-10-05 NOTE — ASSESSMENT & PLAN NOTE
History of stage IIa adenocarcinoma of the cecum s/p extended right hemicolectomy in 2014 with side-to-side anastomosis.  She underwent colonoscopy February 28, 2018 with findings of widely patent anastomosis and completely negative colonoscopy with a recommended repeat colonoscopy in 5 years.    She underwent colonoscopy June 18, 2020 with findings of widely patent ileocolonic anastomosis, normal mucosa, and no masses or polyps with a recommended repeat colonoscopy in 5 years.  She underwent colonoscopy April 27, 2023 which was normal and no specimens were collected with a recommended recall of 5 years.  Recommend soluble fiber supplementation  Avoid straining or sitting on the toilet for long periods of time  Hemorrhoidal banding discussed  CBC, iron profile, ferritin  Call with updates  ER precautions provided  F/u clinic visit or telemedicine visit with NP in 6 months

## 2023-10-05 NOTE — PROGRESS NOTES
Subjective:       Patient ID: Elissa Freeman is a 67 y.o. female.    Chief Complaint: Rectal Bleeding (States the last time she had blood with her BM was on 09/08/23. Denies any pain with any BM. She is having BM on a regular. )    This 67-year-old  female with a history of asthma, PE, renal cell carcinoma s/p left radical nephrectomy in 2011, CKD, CAD, DM, HLD, HTN, obesity, MOHSEN, stage IIa adenocarcinoma of the cecum s/p extended right hemicolectomy in 2014 with side-to-side anastomosis, and cholecystectomy presents unaccompanied for a follow-up visit.  She was referred for rectal bleeding and seen April 12, 2023.  She reported a few episodes of red blood with bowel movements noted on the tissue after wiping in November 2022.  She had no further episodes of bleeding since that time.  She did note a few episodes of hematuria the previous month.  Bowel habits were described as formed stools once daily without melena, hematochezia, fecal urgency, fecal incontinence, or pain with defecation.  Her appetite was good and her weight was stable.  She denied fever, chills, nausea, vomiting, hematemesis, odynophagia, dysphagia, acid reflux, pyrosis, early satiety, or abdominal pain.     She underwent colonoscopy February 28, 2018 with findings of widely patent anastomosis and completely negative colonoscopy with a recommended repeat colonoscopy in 5 years.  She underwent colonoscopy June 18, 2020 with findings of widely patent ileocolonic anastomosis, normal mucosa, and no masses or polyps with a recommended repeat colonoscopy in 5 years.    She underwent colonoscopy April 27, 2023 which was normal and no specimens were collected with a recommended recall of 5 years.    She presents for a follow-up visit.  She reports two episodes of rectal bleeding June 4, 2023 and September 8, 2023.  She reports feeling well at this time.  She denies nocturnal symptoms, appetite changes, unintentional weight loss, fever,  chills, nausea, vomiting, hematemesis, odynophagia, dysphagia, acid reflux, pyrosis, early satiety, or abdominal pain.  Bowel habits are described as formed stools once daily without melena, hematochezia, fecal urgency, fecal incontinence, or pain with defecation.  She is not on oral iron supplementation.     She denies ever having an EGD done. She takes aspirin 81 mg daily. She denies tobacco or alcohol use. She denies illicit drug use. She denies a family history of IBD, colon polyps, or colon cancer.    Review of patient's allergies indicates:  No Known Allergies    Past Medical History:   Diagnosis Date    Asthma     Cancer     CKD (chronic kidney disease)     Coronary artery disease     Diabetes mellitus     Hyperlipidemia     Hypertension     Obesity, unspecified     MOHSEN (obstructive sleep apnea)     Unspecified cataract      Past Surgical History:   Procedure Laterality Date    CATARACT EXTRACTION Left 11/28/2022    CHOLECYSTECTOMY      COLON SURGERY      COLONOSCOPY N/A 4/27/2023    Procedure: COLONOSCOPY;  Surgeon: Jose Miguel Rosales MD;  Location: [facility] ENDOSCOPY;  Service: Endoscopy;  Laterality: N/A;    excision of lipoma      HYSTERECTOMY, VAGINAL, WITH SALPINGO-OOPHORECTOMY      NEPHRECTOMY Left     TUBAL LIGATION       Family History:   family history includes Cancer in her brother; Coronary artery disease in her mother; Hyperlipidemia in her mother; Hypertension in her mother; Stroke in her father.    Social History:    reports that she has never smoked. She has never used smokeless tobacco. She reports current alcohol use. She reports that she does not use drugs.    Review of Systems  Negative except as noted in the HPI.      Objective:      Physical Exam  Constitutional:       Appearance: Normal appearance.   HENT:      Head: Normocephalic.      Mouth/Throat:      Mouth: Mucous membranes are moist.   Eyes:      Extraocular Movements: Extraocular movements intact.      Conjunctiva/sclera:  Conjunctivae normal.      Pupils: Pupils are equal, round, and reactive to light.   Cardiovascular:      Rate and Rhythm: Normal rate and regular rhythm.      Pulses: Normal pulses.      Heart sounds: Normal heart sounds.   Pulmonary:      Effort: Pulmonary effort is normal.      Breath sounds: Normal breath sounds.   Abdominal:      General: Bowel sounds are normal.      Palpations: Abdomen is soft.   Musculoskeletal:         General: Normal range of motion.      Cervical back: Normal range of motion and neck supple.   Skin:     General: Skin is warm and dry.   Neurological:      General: No focal deficit present.      Mental Status: She is alert and oriented to person, place, and time.   Psychiatric:         Mood and Affect: Mood normal.         Behavior: Behavior normal.         Thought Content: Thought content normal.         Judgment: Judgment normal.         Home Medications:     Current Outpatient Medications   Medication Sig    albuterol (PROVENTIL/VENTOLIN HFA) 90 mcg/actuation inhaler Inhale 1 puff into the lungs every 4 (four) hours as needed for Wheezing.    aspirin (ECOTRIN) 81 MG EC tablet Take 81 mg by mouth once daily.    atorvastatin (LIPITOR) 40 MG tablet Take 2 tablets (80 mg total) by mouth once daily.    carvediloL (COREG) 3.125 MG tablet Take 1 tablet (3.125 mg total) by mouth 2 (two) times daily.    cetirizine (ZYRTEC) 10 MG tablet Take 1 tablet (10 mg total) by mouth once daily. for 14 days    cloNIDine (CATAPRES) 0.2 MG tablet Take 1 tablet (0.2 mg total) by mouth 2 (two) times daily. Takes 2 times per day. Takes whole tablet    diclofenac (VOLTAREN) 50 MG EC tablet Take 1 tablet (50 mg total) by mouth 2 (two) times daily as needed (Pain).    diltiaZEM (CARDIZEM CD) 180 MG 24 hr capsule Take 1 capsule (180 mg total) by mouth once daily.    DULoxetine (CYMBALTA) 60 MG capsule TAKE 1 CAPSULE BY MOUTH DAILY. DO NOT CRUSH OR CHEW    empagliflozin (JARDIANCE) 25 mg tablet Take 1 tablet (25 mg  "total) by mouth once daily.    exenatide microspheres (BYDUREON BCISE) 2 mg/0.85 mL AtIn Inject 2 mg into the skin every 7 days.    flash glucose scanning reader (FREESTYLE SMITHA 2 READER) Misc 1 each by Misc.(Non-Drug; Combo Route) route 4 (four) times daily with meals and nightly.    flash glucose sensor (FREESTYLE SMITHA 2 SENSOR) Kit 1 each by Misc.(Non-Drug; Combo Route) route 2 hours after meals and at bedtime.    furosemide (LASIX) 20 MG tablet TAKE ONE TABLET BY MOUTH DAILY AT 9 AM (VIAL)    gabapentin (NEURONTIN) 600 MG tablet Take 1 tablet (600 mg total) by mouth once daily.    insulin (LANTUS SOLOSTAR U-100 INSULIN) glargine 100 units/mL SubQ pen Inject 65 Units into the skin every evening.    insulin aspart U-100 (NOVOLOG FLEXPEN U-100 INSULIN) 100 unit/mL (3 mL) InPn pen Inject 20 Units into the skin 3 (three) times daily with meals.    latanoprost 0.005 % ophthalmic solution Place 1 drop into both eyes once daily.    losartan-hydrochlorothiazide 50-12.5 mg (HYZAAR) 50-12.5 mg per tablet Take 1 tablet by mouth once daily.    melatonin (MELATIN) 3 mg tablet Take 2 tablets (6 mg total) by mouth nightly as needed for Insomnia.    nebulizer accessories Kit 1 each by Misc.(Non-Drug; Combo Route) route 4 (four) times daily as needed (wheezing).    nitroGLYCERIN (NITROSTAT) 0.4 MG SL tablet PLACE 1 TABLET UNDER THE TONGUE EVERY 5 MINUTES AS NEEDED FOR CHEST PAIN. GO TO ER AFTER THIRD DOSE    pen needle, diabetic 32 gauge x 5/32" Ndle 1 each by Misc.(Non-Drug; Combo Route) route 4 (four) times daily.    polyethylene glycol (GLYCOLAX) 17 gram PwPk Take 17 g by mouth once daily.    potassium chloride (K-TAB) 20 mEq Take 1 tablet (20 mEq total) by mouth once daily.    vitamin D (VITAMIN D3) 1000 units Tab Take 2 tablets (2,000 Units total) by mouth Daily.     Laboratory Results:     Recent Results (from the past 2016 hour(s))   Comprehensive Metabolic Panel    Collection Time: 08/24/23  1:16 PM   Result Value Ref " Range    Sodium Level 144 136 - 145 mmol/L    Potassium Level 3.7 3.5 - 5.1 mmol/L    Chloride 106 98 - 107 mmol/L    Carbon Dioxide 28 23 - 31 mmol/L    Glucose Level 74 (L) 82 - 115 mg/dL    Blood Urea Nitrogen 24.5 (H) 9.8 - 20.1 mg/dL    Creatinine 1.76 (H) 0.55 - 1.02 mg/dL    Calcium Level Total 9.2 8.4 - 10.2 mg/dL    Protein Total 7.5 5.8 - 7.6 gm/dL    Albumin Level 3.2 (L) 3.4 - 4.8 g/dL    Globulin 4.3 (H) 2.4 - 3.5 gm/dL    Albumin/Globulin Ratio 0.7 (L) 1.1 - 2.0 ratio    Bilirubin Total 0.4 <=1.5 mg/dL    Alkaline Phosphatase 116 40 - 150 unit/L    Alanine Aminotransferase 12 0 - 55 unit/L    Aspartate Aminotransferase 14 5 - 34 unit/L    eGFR 32 mls/min/1.73/m2   Protein/Creatinine Ratio, Urine    Collection Time: 08/24/23  1:21 PM   Result Value Ref Range    Urine Protein Level 33.2 mg/dL    Urine Creatinine 240.3 (H) 45.0 - 106.0 mg/dL    Urine Protein/Creatinine Ratio 0.1    Urinalysis, Reflex to Urine Culture    Collection Time: 08/24/23  1:21 PM    Specimen: Urine   Result Value Ref Range    Color, UA Yellow Yellow, Light-Yellow, Dark Yellow, Tammy, Straw    Appearance, UA Turbid (A) Clear    Specific Gravity, UA 1.021     pH, UA 5.0 5.0 - 8.5    Protein, UA 1+ (A) Negative    Glucose, UA 4+ (A) Negative, Normal    Ketones, UA Negative Negative    Blood, UA Negative Negative    Bilirubin, UA Negative Negative    Urobilinogen, UA Normal 0.2, 1.0, Normal    Nitrites, UA Negative Negative    Leukocyte Esterase, UA 75 (A) Negative    WBC, UA 0-5 None Seen, 0-2, 3-5, 0-5 /HPF    Bacteria, UA Trace (A) None Seen /HPF    Squamous Epithelial Cells, UA Many (A) None Seen /HPF    Mucous, UA Trace (A) None Seen /LPF    Hyaline Casts, UA None Seen None Seen /lpf    RBC, UA 0-5 None Seen, 0-2, 3-5, 0-5 /HPF   POCT HEMOGLOBIN A1C    Collection Time: 09/27/23  2:35 PM   Result Value Ref Range    Hemoglobin A1C, POC 10.8 %     Assessment/Plan:     Problem List Items Addressed This Visit          Oncology     Personal history of colon cancer     History of stage IIa adenocarcinoma of the cecum s/p extended right hemicolectomy in 2014 with side-to-side anastomosis.  She underwent colonoscopy February 28, 2018 with findings of widely patent anastomosis and completely negative colonoscopy with a recommended repeat colonoscopy in 5 years.    She underwent colonoscopy June 18, 2020 with findings of widely patent ileocolonic anastomosis, normal mucosa, and no masses or polyps with a recommended repeat colonoscopy in 5 years.  She underwent colonoscopy April 27, 2023 which was normal and no specimens were collected with a recommended recall of 5 years.         Microcytic anemia     See plan for primary diagnosis         Relevant Orders    CBC Auto Differential    Ferritin    Iron and TIBC       GI    Rectal bleeding - Primary     History of stage IIa adenocarcinoma of the cecum s/p extended right hemicolectomy in 2014 with side-to-side anastomosis.  She underwent colonoscopy February 28, 2018 with findings of widely patent anastomosis and completely negative colonoscopy with a recommended repeat colonoscopy in 5 years.    She underwent colonoscopy June 18, 2020 with findings of widely patent ileocolonic anastomosis, normal mucosa, and no masses or polyps with a recommended repeat colonoscopy in 5 years.  She underwent colonoscopy April 27, 2023 which was normal and no specimens were collected with a recommended recall of 5 years.  Recommend soluble fiber supplementation  Avoid straining or sitting on the toilet for long periods of time  Hemorrhoidal banding discussed  CBC, iron profile, ferritin  Call with updates  ER precautions provided  F/u clinic visit or telemedicine visit with NP in 6 months         Relevant Orders    CBC Auto Differential    Ferritin    Iron and TIBC

## 2023-10-06 ENCOUNTER — TELEPHONE (OUTPATIENT)
Dept: INTERNAL MEDICINE | Facility: CLINIC | Age: 68
End: 2023-10-06
Payer: MEDICARE

## 2023-10-06 NOTE — TELEPHONE ENCOUNTER
----- Message from Jeronimo Bird MD sent at 10/5/2023  4:55 PM CDT -----  Please call to inform patient that I have reviewed her lab work. Lab results have improved since her last set of labs were drawn. Her kidney function is beginning to improve after discontinuing the mobic. Please continue to take her medication regimen as prescribed and I will see again at our next visit. Should she need an earlier appointment for any reason please feel free to call our office. Thank you.

## 2023-10-11 RX ORDER — POTASSIUM CHLORIDE 1500 MG/1
TABLET, EXTENDED RELEASE ORAL
Qty: 90 TABLET | Refills: 11 | Status: ON HOLD | OUTPATIENT
Start: 2023-10-11 | End: 2023-11-19 | Stop reason: HOSPADM

## 2023-10-12 DIAGNOSIS — G47.00 INSOMNIA, UNSPECIFIED TYPE: ICD-10-CM

## 2023-10-12 DIAGNOSIS — E55.9 VITAMIN D DEFICIENCY: ICD-10-CM

## 2023-10-12 DIAGNOSIS — E11.9 TYPE 2 DIABETES MELLITUS WITHOUT COMPLICATION, WITH LONG-TERM CURRENT USE OF INSULIN: ICD-10-CM

## 2023-10-12 DIAGNOSIS — Z79.4 TYPE 2 DIABETES MELLITUS WITHOUT COMPLICATION, WITH LONG-TERM CURRENT USE OF INSULIN: ICD-10-CM

## 2023-10-13 NOTE — TELEPHONE ENCOUNTER
----- Message from Aurora Steinberg sent at 10/13/2023  2:13 PM CDT -----  Regarding: Dr. Jeronimo Bird       Provider:  Dr. Jeronimo Bird    Preferred Pharmacy:     Last Visit:  Sept. 27, 2023  Next Visit:  Nov. 14, 2023  Patient's Contact Number:  [phone]       1. Name of Medication: melatonin (MELATIN)   Dosage:  3 mg tablet    Comments: Sammi with [facility] requesting refill for Pt.                      Phone: [phone]                      Fax: [phone]    Please Advise.    Thanks  Aurora

## 2023-10-18 RX ORDER — TALC
6 POWDER (GRAM) TOPICAL NIGHTLY PRN
Qty: 60 TABLET | Refills: 1 | Status: SHIPPED | OUTPATIENT
Start: 2023-10-18 | End: 2024-01-02 | Stop reason: SDUPTHER

## 2023-10-18 RX ORDER — DULOXETIN HYDROCHLORIDE 60 MG/1
CAPSULE, DELAYED RELEASE ORAL
Qty: 90 CAPSULE | Refills: 0 | Status: SHIPPED | OUTPATIENT
Start: 2023-10-18 | End: 2024-02-15 | Stop reason: SDUPTHER

## 2023-10-18 RX ORDER — CHOLECALCIFEROL (VITAMIN D3) 25 MCG
2000 TABLET ORAL DAILY
Qty: 90 TABLET | Refills: 0 | Status: SHIPPED | OUTPATIENT
Start: 2023-10-18 | End: 2023-11-10 | Stop reason: SDUPTHER

## 2023-10-19 DIAGNOSIS — T78.40XD ALLERGY, SUBSEQUENT ENCOUNTER: ICD-10-CM

## 2023-10-22 RX ORDER — CETIRIZINE HYDROCHLORIDE 10 MG/1
10 TABLET ORAL DAILY
Qty: 14 TABLET | Refills: 0 | Status: SHIPPED | OUTPATIENT
Start: 2023-10-22 | End: 2023-11-08 | Stop reason: SDUPTHER

## 2023-11-08 DIAGNOSIS — T78.40XD ALLERGY, SUBSEQUENT ENCOUNTER: ICD-10-CM

## 2023-11-08 RX ORDER — CETIRIZINE HYDROCHLORIDE 10 MG/1
10 TABLET ORAL DAILY
Qty: 30 TABLET | Refills: 0 | Status: SHIPPED | OUTPATIENT
Start: 2023-11-08 | End: 2023-11-30 | Stop reason: SDUPTHER

## 2023-11-10 DIAGNOSIS — E55.9 VITAMIN D DEFICIENCY: ICD-10-CM

## 2023-11-13 RX ORDER — CHOLECALCIFEROL (VITAMIN D3) 25 MCG
2000 TABLET ORAL DAILY
Qty: 60 TABLET | Refills: 11 | Status: SHIPPED | OUTPATIENT
Start: 2023-11-13 | End: 2024-01-29 | Stop reason: SDUPTHER

## 2023-11-15 ENCOUNTER — HOSPITAL ENCOUNTER (EMERGENCY)
Facility: HOSPITAL | Age: 68
Discharge: ANOTHER HOSPITAL | End: 2023-11-16
Attending: FAMILY MEDICINE
Payer: MEDICARE

## 2023-11-15 DIAGNOSIS — I62.9 INTRACRANIAL HEMORRHAGE: Primary | ICD-10-CM

## 2023-11-15 DIAGNOSIS — I10 UNCONTROLLED HYPERTENSION: ICD-10-CM

## 2023-11-15 DIAGNOSIS — I10 HYPERTENSION: ICD-10-CM

## 2023-11-15 LAB
ALBUMIN SERPL-MCNC: 3.1 G/DL (ref 3.4–4.8)
ALBUMIN/GLOB SERPL: 0.7 RATIO (ref 1.1–2)
ALP SERPL-CCNC: 126 UNIT/L (ref 40–150)
ALT SERPL-CCNC: 23 UNIT/L (ref 0–55)
AMMONIA PLAS-MSCNC: 28.5 UMOL/L (ref 18–72)
AST SERPL-CCNC: 30 UNIT/L (ref 5–34)
BASOPHILS # BLD AUTO: 0.02 X10(3)/MCL
BASOPHILS NFR BLD AUTO: 0.3 %
BILIRUB SERPL-MCNC: 0.7 MG/DL
BNP BLD-MCNC: 78.5 PG/ML
BUN SERPL-MCNC: 15.9 MG/DL (ref 9.8–20.1)
CALCIUM SERPL-MCNC: 9 MG/DL (ref 8.4–10.2)
CHLORIDE SERPL-SCNC: 101 MMOL/L (ref 98–107)
CK MB SERPL-MCNC: 8.7 NG/ML
CK SERPL-CCNC: 876 U/L (ref 29–168)
CO2 SERPL-SCNC: 30 MMOL/L (ref 23–31)
CREAT SERPL-MCNC: 1.43 MG/DL (ref 0.55–1.02)
EOSINOPHIL # BLD AUTO: 0.09 X10(3)/MCL (ref 0–0.9)
EOSINOPHIL NFR BLD AUTO: 1.3 %
ERYTHROCYTE [DISTWIDTH] IN BLOOD BY AUTOMATED COUNT: 15.6 % (ref 11.5–17)
GFR SERPLBLD CREATININE-BSD FMLA CKD-EPI: 41 MLS/MIN/1.73/M2
GLOBULIN SER-MCNC: 4.3 GM/DL (ref 2.4–3.5)
GLUCOSE SERPL-MCNC: 71 MG/DL (ref 82–115)
HCT VFR BLD AUTO: 42.9 % (ref 37–47)
HGB BLD-MCNC: 12.3 G/DL (ref 12–16)
IMM GRANULOCYTES # BLD AUTO: 0.02 X10(3)/MCL (ref 0–0.04)
IMM GRANULOCYTES NFR BLD AUTO: 0.3 %
LYMPHOCYTES # BLD AUTO: 1.52 X10(3)/MCL (ref 0.6–4.6)
LYMPHOCYTES NFR BLD AUTO: 22.3 %
MAGNESIUM SERPL-MCNC: 1.8 MG/DL (ref 1.6–2.6)
MCH RBC QN AUTO: 21.7 PG (ref 27–31)
MCHC RBC AUTO-ENTMCNC: 28.7 G/DL (ref 33–36)
MCV RBC AUTO: 75.7 FL (ref 80–94)
MONOCYTES # BLD AUTO: 0.66 X10(3)/MCL (ref 0.1–1.3)
MONOCYTES NFR BLD AUTO: 9.7 %
NEUTROPHILS # BLD AUTO: 4.5 X10(3)/MCL (ref 2.1–9.2)
NEUTROPHILS NFR BLD AUTO: 66.1 %
NRBC BLD AUTO-RTO: 0 %
PLATELET # BLD AUTO: 221 X10(3)/MCL (ref 130–400)
PMV BLD AUTO: 10.5 FL (ref 7.4–10.4)
POTASSIUM SERPL-SCNC: 3.1 MMOL/L (ref 3.5–5.1)
PROT SERPL-MCNC: 7.4 GM/DL (ref 5.8–7.6)
RBC # BLD AUTO: 5.67 X10(6)/MCL (ref 4.2–5.4)
SODIUM SERPL-SCNC: 143 MMOL/L (ref 136–145)
TROPONIN I SERPL-MCNC: 0.03 NG/ML (ref 0–0.04)
WBC # SPEC AUTO: 6.81 X10(3)/MCL (ref 4.5–11.5)

## 2023-11-15 PROCEDURE — 85025 COMPLETE CBC W/AUTO DIFF WBC: CPT | Performed by: FAMILY MEDICINE

## 2023-11-15 PROCEDURE — 83880 ASSAY OF NATRIURETIC PEPTIDE: CPT | Performed by: FAMILY MEDICINE

## 2023-11-15 PROCEDURE — 93005 ELECTROCARDIOGRAM TRACING: CPT

## 2023-11-15 PROCEDURE — 82553 CREATINE MB FRACTION: CPT | Performed by: FAMILY MEDICINE

## 2023-11-15 PROCEDURE — 99291 CRITICAL CARE FIRST HOUR: CPT

## 2023-11-15 PROCEDURE — 82140 ASSAY OF AMMONIA: CPT | Performed by: FAMILY MEDICINE

## 2023-11-15 PROCEDURE — 80053 COMPREHEN METABOLIC PANEL: CPT | Performed by: FAMILY MEDICINE

## 2023-11-15 PROCEDURE — 83735 ASSAY OF MAGNESIUM: CPT | Performed by: FAMILY MEDICINE

## 2023-11-15 PROCEDURE — 63600175 PHARM REV CODE 636 W HCPCS: Performed by: FAMILY MEDICINE

## 2023-11-15 PROCEDURE — 84484 ASSAY OF TROPONIN QUANT: CPT | Performed by: FAMILY MEDICINE

## 2023-11-15 PROCEDURE — 82550 ASSAY OF CK (CPK): CPT | Performed by: FAMILY MEDICINE

## 2023-11-15 RX ADMIN — SODIUM CHLORIDE, POTASSIUM CHLORIDE, SODIUM LACTATE AND CALCIUM CHLORIDE 500 ML: 600; 310; 30; 20 INJECTION, SOLUTION INTRAVENOUS at 11:11

## 2023-11-16 ENCOUNTER — HOSPITAL ENCOUNTER (INPATIENT)
Facility: HOSPITAL | Age: 68
LOS: 3 days | Discharge: HOME | DRG: 304 | End: 2023-11-19
Attending: EMERGENCY MEDICINE | Admitting: INTERNAL MEDICINE
Payer: MEDICARE

## 2023-11-16 VITALS
SYSTOLIC BLOOD PRESSURE: 176 MMHG | HEART RATE: 79 BPM | OXYGEN SATURATION: 91 % | HEIGHT: 64 IN | BODY MASS INDEX: 50.02 KG/M2 | RESPIRATION RATE: 23 BRPM | DIASTOLIC BLOOD PRESSURE: 108 MMHG | WEIGHT: 293 LBS

## 2023-11-16 DIAGNOSIS — I61.8 OTHER NONTRAUMATIC INTRACEREBRAL HEMORRHAGE, UNSPECIFIED LATERALITY: ICD-10-CM

## 2023-11-16 DIAGNOSIS — I16.1 HYPERTENSIVE EMERGENCY: ICD-10-CM
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
## 2023-11-16 DIAGNOSIS — I62.9 INTRACRANIAL HEMORRHAGE: Primary | ICD-10-CM
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
## 2023-11-16 LAB
ANION GAP SERPL CALC-SCNC: 10 MEQ/L
ANION GAP SERPL CALC-SCNC: 11 MEQ/L
BUN SERPL-MCNC: 15.7 MG/DL (ref 9.8–20.1)
BUN SERPL-MCNC: 17.2 MG/DL (ref 9.8–20.1)
CALCIUM SERPL-MCNC: 9.2 MG/DL (ref 8.4–10.2)
CALCIUM SERPL-MCNC: 9.2 MG/DL (ref 8.4–10.2)
CHLORIDE SERPL-SCNC: 101 MMOL/L (ref 98–107)
CHLORIDE SERPL-SCNC: 101 MMOL/L (ref 98–107)
CO2 SERPL-SCNC: 28 MMOL/L (ref 23–31)
CO2 SERPL-SCNC: 30 MMOL/L (ref 23–31)
CREAT SERPL-MCNC: 1.32 MG/DL (ref 0.55–1.02)
CREAT SERPL-MCNC: 1.59 MG/DL (ref 0.55–1.02)
CREAT/UREA NIT SERPL: 11
CREAT/UREA NIT SERPL: 12
EST. AVERAGE GLUCOSE BLD GHB EST-MCNC: 171.4 MG/DL
GFR SERPLBLD CREATININE-BSD FMLA CKD-EPI: 36 MLS/MIN/1.73/M2
GFR SERPLBLD CREATININE-BSD FMLA CKD-EPI: 45 MLS/MIN/1.73/M2
GLUCOSE SERPL-MCNC: 212 MG/DL (ref 82–115)
GLUCOSE SERPL-MCNC: 83 MG/DL (ref 82–115)
HBA1C MFR BLD: 7.6 %
MAGNESIUM SERPL-MCNC: 1.9 MG/DL (ref 1.6–2.6)
POCT GLUCOSE: 199 MG/DL (ref 70–110)
POCT GLUCOSE: 81 MG/DL (ref 70–110)
POCT GLUCOSE: 82 MG/DL (ref 70–110)
POTASSIUM SERPL-SCNC: 3.1 MMOL/L (ref 3.5–5.1)
POTASSIUM SERPL-SCNC: 3.7 MMOL/L (ref 3.5–5.1)
SODIUM SERPL-SCNC: 139 MMOL/L (ref 136–145)
SODIUM SERPL-SCNC: 142 MMOL/L (ref 136–145)

## 2023-11-16 PROCEDURE — C9248 INJ, CLEVIDIPINE BUTYRATE: HCPCS | Mod: JZ,JG | Performed by: FAMILY MEDICINE

## 2023-11-16 PROCEDURE — 93010 EKG 12-LEAD: ICD-10-PCS | Mod: ,,, | Performed by: INTERNAL MEDICINE

## 2023-11-16 PROCEDURE — 63600175 PHARM REV CODE 636 W HCPCS: Mod: JZ,JG | Performed by: STUDENT IN AN ORGANIZED HEALTH CARE EDUCATION/TRAINING PROGRAM

## 2023-11-16 PROCEDURE — 83036 HEMOGLOBIN GLYCOSYLATED A1C: CPT | Performed by: STUDENT IN AN ORGANIZED HEALTH CARE EDUCATION/TRAINING PROGRAM

## 2023-11-16 PROCEDURE — 80048 BASIC METABOLIC PNL TOTAL CA: CPT | Performed by: INTERNAL MEDICINE

## 2023-11-16 PROCEDURE — 80048 BASIC METABOLIC PNL TOTAL CA: CPT | Performed by: STUDENT IN AN ORGANIZED HEALTH CARE EDUCATION/TRAINING PROGRAM

## 2023-11-16 PROCEDURE — 96365 THER/PROPH/DIAG IV INF INIT: CPT

## 2023-11-16 PROCEDURE — 63600175 PHARM REV CODE 636 W HCPCS: Performed by: INTERNAL MEDICINE

## 2023-11-16 PROCEDURE — 25000003 PHARM REV CODE 250: Performed by: STUDENT IN AN ORGANIZED HEALTH CARE EDUCATION/TRAINING PROGRAM

## 2023-11-16 PROCEDURE — C9248 INJ, CLEVIDIPINE BUTYRATE: HCPCS | Mod: JZ,JG | Performed by: STUDENT IN AN ORGANIZED HEALTH CARE EDUCATION/TRAINING PROGRAM

## 2023-11-16 PROCEDURE — 63600175 PHARM REV CODE 636 W HCPCS: Mod: JZ,JG | Performed by: FAMILY MEDICINE

## 2023-11-16 PROCEDURE — 11000001 HC ACUTE MED/SURG PRIVATE ROOM

## 2023-11-16 PROCEDURE — 93005 ELECTROCARDIOGRAM TRACING: CPT

## 2023-11-16 PROCEDURE — 99291 CRITICAL CARE FIRST HOUR: CPT

## 2023-11-16 PROCEDURE — 96361 HYDRATE IV INFUSION ADD-ON: CPT

## 2023-11-16 PROCEDURE — 96374 THER/PROPH/DIAG INJ IV PUSH: CPT

## 2023-11-16 PROCEDURE — 93010 ELECTROCARDIOGRAM REPORT: CPT | Mod: ,,, | Performed by: INTERNAL MEDICINE

## 2023-11-16 PROCEDURE — 83735 ASSAY OF MAGNESIUM: CPT | Performed by: INTERNAL MEDICINE

## 2023-11-16 PROCEDURE — 25000003 PHARM REV CODE 250: Performed by: INTERNAL MEDICINE

## 2023-11-16 PROCEDURE — 63600175 PHARM REV CODE 636 W HCPCS: Performed by: STUDENT IN AN ORGANIZED HEALTH CARE EDUCATION/TRAINING PROGRAM

## 2023-11-16 RX ORDER — DULOXETIN HYDROCHLORIDE 30 MG/1
60 CAPSULE, DELAYED RELEASE ORAL DAILY
Status: DISCONTINUED | OUTPATIENT
Start: 2023-11-16 | End: 2023-11-19 | Stop reason: HOSPADM

## 2023-11-16 RX ORDER — CARVEDILOL 3.12 MG/1
3.12 TABLET ORAL 2 TIMES DAILY
Status: DISCONTINUED | OUTPATIENT
Start: 2023-11-16 | End: 2023-11-16

## 2023-11-16 RX ORDER — INSULIN ASPART 100 [IU]/ML
0-10 INJECTION, SOLUTION INTRAVENOUS; SUBCUTANEOUS
Status: DISCONTINUED | OUTPATIENT
Start: 2023-11-16 | End: 2023-11-19 | Stop reason: HOSPADM

## 2023-11-16 RX ORDER — CLONIDINE HYDROCHLORIDE 0.2 MG/1
0.2 TABLET ORAL 2 TIMES DAILY
Status: DISCONTINUED | OUTPATIENT
Start: 2023-11-16 | End: 2023-11-19 | Stop reason: HOSPADM

## 2023-11-16 RX ORDER — TALC
6 POWDER (GRAM) TOPICAL NIGHTLY PRN
Status: DISCONTINUED | OUTPATIENT
Start: 2023-11-16 | End: 2023-11-19 | Stop reason: HOSPADM

## 2023-11-16 RX ORDER — LATANOPROST 50 UG/ML
1 SOLUTION/ DROPS OPHTHALMIC DAILY
Status: DISCONTINUED | OUTPATIENT
Start: 2023-11-17 | End: 2023-11-19 | Stop reason: HOSPADM

## 2023-11-16 RX ORDER — IPRATROPIUM BROMIDE 0.5 MG/2.5ML
0.5 SOLUTION RESPIRATORY (INHALATION) EVERY 4 HOURS PRN
Status: DISCONTINUED | OUTPATIENT
Start: 2023-11-16 | End: 2023-11-19 | Stop reason: HOSPADM

## 2023-11-16 RX ORDER — HYDRALAZINE HYDROCHLORIDE 20 MG/ML
20 INJECTION INTRAMUSCULAR; INTRAVENOUS EVERY 8 HOURS PRN
Status: DISCONTINUED | OUTPATIENT
Start: 2023-11-16 | End: 2023-11-19 | Stop reason: HOSPADM

## 2023-11-16 RX ORDER — POTASSIUM CHLORIDE 20 MEQ/1
40 TABLET, EXTENDED RELEASE ORAL ONCE
Status: DISCONTINUED | OUTPATIENT
Start: 2023-11-16 | End: 2023-11-16

## 2023-11-16 RX ORDER — IBUPROFEN 200 MG
24 TABLET ORAL
Status: DISCONTINUED | OUTPATIENT
Start: 2023-11-16 | End: 2023-11-19 | Stop reason: HOSPADM

## 2023-11-16 RX ORDER — ATORVASTATIN CALCIUM 40 MG/1
80 TABLET, FILM COATED ORAL DAILY
Status: DISCONTINUED | OUTPATIENT
Start: 2023-11-16 | End: 2023-11-19 | Stop reason: HOSPADM

## 2023-11-16 RX ORDER — HYDROCHLOROTHIAZIDE 12.5 MG/1
12.5 TABLET ORAL DAILY
Status: DISCONTINUED | OUTPATIENT
Start: 2023-11-16 | End: 2023-11-17

## 2023-11-16 RX ORDER — CARVEDILOL 12.5 MG/1
12.5 TABLET ORAL 2 TIMES DAILY
Status: DISCONTINUED | OUTPATIENT
Start: 2023-11-17 | End: 2023-11-17

## 2023-11-16 RX ORDER — AMLODIPINE BESYLATE 5 MG/1
5 TABLET ORAL 2 TIMES DAILY
Status: DISCONTINUED | OUTPATIENT
Start: 2023-11-16 | End: 2023-11-17

## 2023-11-16 RX ORDER — MAGNESIUM SULFATE HEPTAHYDRATE 40 MG/ML
2 INJECTION, SOLUTION INTRAVENOUS ONCE
Status: COMPLETED | OUTPATIENT
Start: 2023-11-16 | End: 2023-11-16

## 2023-11-16 RX ORDER — IBUPROFEN 200 MG
16 TABLET ORAL
Status: DISCONTINUED | OUTPATIENT
Start: 2023-11-16 | End: 2023-11-19 | Stop reason: HOSPADM

## 2023-11-16 RX ORDER — DILTIAZEM HYDROCHLORIDE 180 MG/1
180 CAPSULE, COATED, EXTENDED RELEASE ORAL DAILY
Status: DISCONTINUED | OUTPATIENT
Start: 2023-11-16 | End: 2023-11-16

## 2023-11-16 RX ORDER — GLUCAGON 1 MG
1 KIT INJECTION
Status: DISCONTINUED | OUTPATIENT
Start: 2023-11-16 | End: 2023-11-19 | Stop reason: HOSPADM

## 2023-11-16 RX ORDER — LOSARTAN POTASSIUM 50 MG/1
50 TABLET ORAL DAILY
Status: DISCONTINUED | OUTPATIENT
Start: 2023-11-16 | End: 2023-11-17

## 2023-11-16 RX ORDER — ENOXAPARIN SODIUM 100 MG/ML
40 INJECTION SUBCUTANEOUS EVERY 24 HOURS
Status: DISCONTINUED | OUTPATIENT
Start: 2023-11-18 | End: 2023-11-17

## 2023-11-16 RX ORDER — MUPIROCIN 20 MG/G
OINTMENT TOPICAL 2 TIMES DAILY
Status: DISCONTINUED | OUTPATIENT
Start: 2023-11-16 | End: 2023-11-19 | Stop reason: HOSPADM

## 2023-11-16 RX ORDER — LABETALOL HYDROCHLORIDE 5 MG/ML
20 INJECTION, SOLUTION INTRAVENOUS
Status: ACTIVE | OUTPATIENT
Start: 2023-11-16 | End: 2023-11-16

## 2023-11-16 RX ADMIN — POTASSIUM BICARBONATE 50 MEQ: 977.5 TABLET, EFFERVESCENT ORAL at 07:11

## 2023-11-16 RX ADMIN — INSULIN ASPART 1 UNITS: 100 INJECTION, SOLUTION INTRAVENOUS; SUBCUTANEOUS at 09:11

## 2023-11-16 RX ADMIN — LOSARTAN POTASSIUM 50 MG: 50 TABLET, FILM COATED ORAL at 08:11

## 2023-11-16 RX ADMIN — CLONIDINE HYDROCHLORIDE 0.2 MG: 0.2 TABLET ORAL at 08:11

## 2023-11-16 RX ADMIN — MAGNESIUM SULFATE 2 G: 2 INJECTION INTRAVENOUS at 08:11

## 2023-11-16 RX ADMIN — CLEVIPIDINE 1 MG/HR: 0.5 EMULSION INTRAVENOUS at 01:11

## 2023-11-16 RX ADMIN — CLEVIPIDINE 14 MG/HR: 0.5 EMULSION INTRAVENOUS at 08:11

## 2023-11-16 RX ADMIN — DULOXETINE HYDROCHLORIDE 60 MG: 30 CAPSULE, DELAYED RELEASE ORAL at 08:11

## 2023-11-16 RX ADMIN — CLEVIPIDINE 13 MG/HR: 0.5 EMULSION INTRAVENOUS at 09:11

## 2023-11-16 RX ADMIN — HYDROCHLOROTHIAZIDE 12.5 MG: 12.5 TABLET ORAL at 08:11

## 2023-11-16 RX ADMIN — ATORVASTATIN CALCIUM 80 MG: 40 TABLET, FILM COATED ORAL at 08:11

## 2023-11-16 RX ADMIN — CARVEDILOL 3.12 MG: 3.12 TABLET, FILM COATED ORAL at 08:11

## 2023-11-16 RX ADMIN — CLEVIPIDINE 12 MG/HR: 0.5 EMULSION INTRAVENOUS at 05:11

## 2023-11-16 RX ADMIN — MUPIROCIN: 20 OINTMENT TOPICAL at 09:11

## 2023-11-16 RX ADMIN — CLEVIPIDINE 6 MG/HR: 0.5 EMULSION INTRAVENOUS at 02:11

## 2023-11-16 RX ADMIN — AMLODIPINE BESYLATE 5 MG: 5 TABLET ORAL at 10:11

## 2023-11-16 RX ADMIN — HYDRALAZINE HYDROCHLORIDE 20 MG: 20 INJECTION INTRAMUSCULAR; INTRAVENOUS at 10:11

## 2023-11-16 NOTE — NURSING
Nurses Note -- 4 Eyes      11/16/2023   6:50 AM      Skin assessed during: Daily Assessment      [x] No Altered Skin Integrity Present    [x]Prevention Measures Documented      [] Yes- Altered Skin Integrity Present or Discovered   [] LDA Added if Not in Epic (Describe Wound)   [] New Altered Skin Integrity was Present on Admit and Documented in LDA   [] Wound Image Taken    Wound Care Consulted? No    Attending Nurse:  Patrica Maldonado RN/Staff Member:   MONICA Swain

## 2023-11-16 NOTE — ED PROVIDER NOTES
Encounter Date: 11/15/2023       History     Chief Complaint   Patient presents with    Dizziness    Altered Mental Status     Son  called  the  ambulance  because  he  said  that  patient  was  slightly  confused  and  dizzy.  Patient  did  fall this  morning and woke up on the floor about 0830am     Patient is a 68-year-old female brought in by ambulance for evaluation due to reported confusion.  Patient reports feeling in her normal state of health.  She reports this morning that she woke up from sleep around 9:00 a.m., and was on the ground.  Patient does not remember rolling out of the bed.  Reports of right shoulder pain, but was moving right arm throughout the day today without any difficulty.  Patient had an episode tonight per EMS where the patient appeared dizzy and confused therefore the son became concerned and called EMS.  Patient reports she does not remember the episode, and reports feeling in her normal state of health.  Denies chest pain or shortness of breath.  Denies fever chills.  Denies nausea or vomiting.  Currently awake alert oriented and pleasant.    The history is provided by the patient.     Review of patient's allergies indicates:  No Known Allergies  Past Medical History:   Diagnosis Date    Asthma     Cancer     CKD (chronic kidney disease)     Coronary artery disease     Diabetes mellitus     Hyperlipidemia     Hypertension     Obesity, unspecified     MOHSEN (obstructive sleep apnea)     Unspecified cataract      Past Surgical History:   Procedure Laterality Date    CATARACT EXTRACTION Left 11/28/2022    CHOLECYSTECTOMY      COLON SURGERY      COLONOSCOPY N/A 4/27/2023    Procedure: COLONOSCOPY;  Surgeon: Jose Miguel Rosales MD;  Location: [facility] ENDOSCOPY;  Service: Endoscopy;  Laterality: N/A;    excision of lipoma      HYSTERECTOMY, VAGINAL, WITH SALPINGO-OOPHORECTOMY      NEPHRECTOMY Left     TUBAL LIGATION       Family History   Problem Relation Age of Onset    Hypertension Mother      Coronary artery disease Mother     Hyperlipidemia Mother     Stroke Father     Cancer Brother      Social History     Tobacco Use    Smoking status: Never    Smokeless tobacco: Never   Substance Use Topics    Alcohol use: Yes     Comment: frozen drinks once a month, giovanni    Drug use: Never     Review of Systems   Constitutional:  Negative for chills, fatigue and fever.   HENT:  Negative for ear pain, rhinorrhea and sore throat.    Eyes:  Negative for photophobia and pain.   Respiratory:  Negative for cough, shortness of breath and wheezing.    Cardiovascular:  Negative for chest pain.   Gastrointestinal:  Negative for abdominal pain, diarrhea, nausea and vomiting.   Genitourinary:  Negative for dysuria.   Neurological:  Negative for dizziness, weakness and headaches.   All other systems reviewed and are negative.      Physical Exam     Initial Vitals [11/15/23 2229]   BP Pulse Resp Temp SpO2   (!) 200/110 69 20 -- 97 %      MAP       --         Physical Exam    Nursing note and vitals reviewed.  Constitutional: She appears well-developed and well-nourished. No distress.   HENT:   Head: Normocephalic and atraumatic.   Eyes: Conjunctivae and EOM are normal. Pupils are equal, round, and reactive to light.   Neck: Neck supple.   Normal range of motion.  Cardiovascular:  Normal rate, regular rhythm, normal heart sounds and intact distal pulses.     Exam reveals no gallop and no friction rub.       No murmur heard.  Pulmonary/Chest: Breath sounds normal. No respiratory distress. She has no wheezes. She has no rhonchi. She has no rales.   Abdominal: Abdomen is soft. Bowel sounds are normal. She exhibits no distension. There is no abdominal tenderness. There is no rebound and no guarding.   Musculoskeletal:         General: Normal range of motion.      Cervical back: Normal range of motion and neck supple.      Comments: Full range of motion of the right arm without pain or restriction.  No ecchymoses or bruising  appreciated.     Neurological: She is alert and oriented to person, place, and time.   5/5 strength bilateral upper and lower extremities.  Patient was able to perform finger-to-nose, but has jerking movements while performing.  Negative asterixis.   Skin: Skin is warm and dry. Capillary refill takes less than 2 seconds. No erythema.   Psychiatric: She has a normal mood and affect. Her behavior is normal. Judgment and thought content normal.         ED Course   Critical Care    Date/Time: 11/16/2023 1:02 AM    Performed by: Javier Chu MD  Authorized by: Javier Chu MD  Direct patient critical care time: 10 minutes  Additional history critical care time: 10 minutes  Ordering / reviewing critical care time: 10 minutes  Documentation critical care time: 15 minutes  Consulting other physicians critical care time: 5 minutes  Total critical care time (exclusive of procedural time) : 50 minutes  Critical care time was exclusive of separately billable procedures and treating other patients and teaching time.  Critical care was necessary to treat or prevent imminent or life-threatening deterioration of the following conditions: CNS failure or compromise.  Critical care was time spent personally by me on the following activities: examination of patient, ordering and review of laboratory studies, pulse oximetry, re-evaluation of patient's condition, review of old charts, ordering and review of radiographic studies and ordering and performing treatments and interventions.        Labs Reviewed   COMPREHENSIVE METABOLIC PANEL - Abnormal; Notable for the following components:       Result Value    Potassium Level 3.1 (*)     Glucose Level 71 (*)     Creatinine 1.43 (*)     Albumin Level 3.1 (*)     Globulin 4.3 (*)     Albumin/Globulin Ratio 0.7 (*)     All other components within normal limits   CK - Abnormal; Notable for the following components:    Creatine Kinase 876 (*)     All other components within  normal limits   CK-MB - Abnormal; Notable for the following components:    Creatine Kinase MB 8.7 (*)     All other components within normal limits   CBC WITH DIFFERENTIAL - Abnormal; Notable for the following components:    RBC 5.67 (*)     MCV 75.7 (*)     MCH 21.7 (*)     MCHC 28.7 (*)     MPV 10.5 (*)     All other components within normal limits   TROPONIN I - Normal   B-TYPE NATRIURETIC PEPTIDE - Normal   AMMONIA - Normal   MAGNESIUM - Normal   CBC W/ AUTO DIFFERENTIAL    Narrative:     The following orders were created for panel order CBC Auto Differential.  Procedure                               Abnormality         Status                     ---------                               -----------         ------                     CBC with Differential[[id number]]       Abnormal            Final result                 Please view results for these tests on the individual orders.     EKG Readings: (Independently Interpreted)   My Independent EKG Interpretation  11/15/2023 11:15 PM  Rate: 77 bpm  Rhythm: Sinus  Axis: Normal  Intervals: Normal  ST Changes: Nonspecific  Impression: Normal sinus rhythm with nonspecific ST changes           Imaging Results              CT Head Without Contrast (Preliminary result)  Result time 11/16/23 00:31:28      Preliminary result by Scooter Lindsay Jr., MD (11/16/23 00:31:28)                   Narrative:    START OF REPORT:  Technique: CT of the head was performed without intravenous contrast with axial as well as coronal and sagittal images.    Comparison: None.    Dosage Information: Automated exposure control was utilized.    Clinical history: Altered mental status.    Findings:  CSF spaces: The ventricles, sulci and basal cisterns all appear mildly prominent consistent with global cerebral atrophy.  Brain parenchyma: There is preservation of the grey white junction throughout. No acute infarct is identified. Minimal scattered microvascular change is seen in portions of  the periventricular and deep white matter tracts. There is a small, faint hyperdense focus in the right insular region, seen on Series 2, Image 15.  Cerebellum: Unremarkable.  Vascular: Mild atheromatous calcification of the intracranial arteries is seen.  Sella and skull base: The sella appears to be within normal limits for age.  Cerebellopontine angles: Within normal limits.  Herniation: None.  Intracranial calcifications: Incidental note is made of some pineal region calcification.  Calvarium: No acute linear or depressed skull fracture is seen.    Maxillofacial Structures:  Paranasal sinuses: The visualized paranasal sinuses appear clear with no significant mucoperiosteal thickening or air fluid levels identified.  Orbits: The orbits appear unremarkable.  Zygomatic arches: The zygomatic arches are intact and unremarkable.  Temporal bones and mastoids: The temporal bones and mastoids appear unremarkable.  TMJ: The mandibular condyles appear normally placed with respect to the mandibular fossa.  Nasal Bones: The nasal septum is midline. No displaced nasal bone fracture is seen.    Visualized upper cervical spine: The visualized cervical spine appears unremarkable.    Notifications: The results were discussed with the emergency room physician Dr. Chu prior to dictation at 2023-11-16 00:30:45 CST.      Impression:  1. There is a small, faint hyperdense focus in the right insular region, seen on Series 2, Image 15. This may represent microhemorrhage or non-specific calcification. MRI should be considered for further evaluation.  2. Details and other findings as noted above.                                         X-Ray Chest 1 View (Preliminary result)  Result time 11/15/23 23:37:21      Wet Read by Javier Chu MD (11/15/23 23:37:21, [facility] - Emergency Dept, Emergency Medicine)    No acute abnormality appreciated.                                     Medications   lactated ringers bolus  500 mL ( Intravenous Stopped 11/16/23 0100)     Medical Decision Making  Patient is a 68-year-old female with a past medical history notable for chronic kidney disease status post nephrectomy, coronary artery disease, diabetes, hypertension, morbidly obesity.  Patient had episode of confusion this evening but appears neurologically intact at this time.  Will obtain laboratory evaluation including cardiac workup.  Will also obtain CT scan of the head due to fall yesterday and episode of confusion tonight.  Will continue to monitor    Differential diagnosis:  Electrolyte abnormality, AYSHA, CVA, intracranial hemorrhage, hepatic encephalopathy     Amount and/or Complexity of Data Reviewed  Labs: ordered.  Radiology: ordered and independent interpretation performed.    Risk  Prescription drug management.               ED Course as of 11/16/23 0205   Thu Nov 16, 2023   0027 Renal function shows creatinine of 1.43, currently at patient's baseline.  Potassium slightly low at 3.1.  Magnesium of 1.8.  Stable hemoglobin and hematocrit at 12.3 and 42.9.  No leukocytosis.  No acute abnormality noted on chest x-ray. [MW]   0045 Able to talk to the patient's daughter who had received report from the son.  Around 8:00 p.m. on 11/15/2023, patient began yelling out and screaming.  Patient's son went to check on her, and patient reported that she was not screaming and does not recall the episode.  This happened a 2nd time.  Patient then had an episode of walking to the refrigerator to try and get an 8 to take into her bed but patient did not have any airbags, and was very confused by this.  Currently patient appears neurologically intact.  CT scan of the head shows an area with concerns for a faint acute hemorrhage, but could be secondary to the calcification.  Currently patient is NIH scale is 0, the only abnormality is slight tremors when performing finger to nose, but patient is able to perform correctly.  Due to abnormal findings  on CT scan, will transfer to a facility with neurosurgical capabilities as well as MRI capabilities.  Patient is hypertensive, current blood pressure is 166/73.  Will begin on club Cleviprex drip for blood pressure titration to systolic blood pressure of 140 mmHg.  Discussed with patient and family regarding the findings. [MW]   0101 Patient has bee accepted to [facility] with accepting physician Dr. Valladares. [MW]   0139 Titrating up Cleviprex.  EMS has arrived for transfer.  Currently at increasing from 4 to 6 mg/hr.   [MW]      ED Course User Index  [MW] Javier Chu MD                    Clinical Impression:   Final diagnoses:  [I10] Hypertension  [I62.9] Intracranial hemorrhage (Primary)  [I10] Uncontrolled hypertension        ED Disposition Condition    Transfer to Another Facility Stable                Javier Chu MD  11/16/23 5152

## 2023-11-16 NOTE — NURSING
Nurses Note -- 4 Eyes      11/16/2023   3:41 PM      Skin assessed during: Q Shift Change      [x] No Altered Skin Integrity Present    [x]Prevention Measures Documented      [] Yes- Altered Skin Integrity Present or Discovered   [] LDA Added if Not in Epic (Describe Wound)   [] New Altered Skin Integrity was Present on Admit and Documented in LDA   [] Wound Image Taken    Wound Care Consulted? No    Attending Nurse:  Rosalia Maldonado RN/Staff Member:   MONICA Corona

## 2023-11-16 NOTE — HPI
Patient is a 68 year old female who presented as a transfer from [facility] for neurosurgical evaluation of an intracranial hemorrhage. She reports she had a fall from her bed this AM, does not recall hitting her head and denies any pain or tenderness to palpation of her scalp. Upon arrival to ED her BP was 200/110 and she has been started on cleviprex. She states she did not take her BP meds last night. Family not present but reported she was acting strangely and having verbal outburst, patient denies this.

## 2023-11-16 NOTE — CONSULTS
[facility] - Emergency Dept  Trauma Surgery  Consult Note    Patient Name: Elissa Freeman  MRN: [id number]  Code Status: No Order  Admission Date: 11/16/2023  Hospital Length of Stay: 0 days  Attending Physician: Pippa Valladares MD  Primary Care Provider: Jeronimo Bird MD    Patient information was obtained from patient and ER records.     Consults: Neurosurgery  Subjective:     Principal Problem: hypertensive emergency    History of Present Illness: Patient is a 68 year old female who presented as a transfer from [facility] for neurosurgical evaluation of an intracranial hemorrhage. She reports she had a fall from her bed this AM, does not recall hitting her head and denies any pain or tenderness to palpation of her scalp. Upon arrival to ED her BP was 200/110 and she has been started on cleviprex. She states she did not take her BP meds last night. Family not present but reported she was acting strangely and having verbal outburst, patient denies this.     Current Facility-Administered Medications on File Prior to Encounter   Medication    [COMPLETED] lactated ringers bolus 500 mL    [DISCONTINUED] clevidipine (CLEVIPREX) 50 mg/100 mL infusion    [DISCONTINUED] lactated ringers bolus 1,000 mL     Current Outpatient Medications on File Prior to Encounter   Medication Sig    albuterol (PROVENTIL/VENTOLIN HFA) 90 mcg/actuation inhaler Inhale 1 puff into the lungs every 4 (four) hours as needed for Wheezing.    aspirin (ECOTRIN) 81 MG EC tablet Take 81 mg by mouth once daily.    atorvastatin (LIPITOR) 40 MG tablet Take 2 tablets (80 mg total) by mouth once daily.    carvediloL (COREG) 3.125 MG tablet Take 1 tablet (3.125 mg total) by mouth 2 (two) times daily.    cetirizine (ZYRTEC) 10 MG tablet Take 1 tablet (10 mg total) by mouth once daily.    cloNIDine (CATAPRES) 0.2 MG tablet Take 1 tablet (0.2 mg total) by mouth 2 (two) times daily. Takes 2 times per day. Takes whole tablet    diclofenac (VOLTAREN)  "50 MG EC tablet Take 1 tablet (50 mg total) by mouth 2 (two) times daily as needed (Pain).    diltiaZEM (CARDIZEM CD) 180 MG 24 hr capsule Take 1 capsule (180 mg total) by mouth once daily.    DULoxetine (CYMBALTA) 60 MG capsule TAKE 1 CAPSULE BY MOUTH DAILY. DO NOT CRUSH OR CHEW    empagliflozin (JARDIANCE) 25 mg tablet Take 1 tablet (25 mg total) by mouth once daily.    exenatide microspheres (BYDUREON BCISE) 2 mg/0.85 mL AtIn Inject 2 mg into the skin every 7 days.    flash glucose scanning reader (FREESTYLE SMITHA 2 READER) Misc 1 each by Misc.(Non-Drug; Combo Route) route 4 (four) times daily with meals and nightly.    flash glucose sensor (FREESTYLE SMITHA 2 SENSOR) Kit 1 each by Misc.(Non-Drug; Combo Route) route 2 hours after meals and at bedtime.    furosemide (LASIX) 20 MG tablet TAKE ONE TABLET BY MOUTH DAILY AT 9 AM (VIAL)    gabapentin (NEURONTIN) 600 MG tablet Take 1 tablet (600 mg total) by mouth once daily.    insulin (LANTUS SOLOSTAR U-100 INSULIN) glargine 100 units/mL SubQ pen Inject 65 Units into the skin every evening.    insulin aspart U-100 (NOVOLOG FLEXPEN U-100 INSULIN) 100 unit/mL (3 mL) InPn pen Inject 20 Units into the skin 3 (three) times daily with meals.    latanoprost 0.005 % ophthalmic solution Place 1 drop into both eyes once daily.    losartan-hydrochlorothiazide 50-12.5 mg (HYZAAR) 50-12.5 mg per tablet Take 1 tablet by mouth once daily.    melatonin (MELATIN) 3 mg tablet Take 2 tablets (6 mg total) by mouth nightly as needed for Insomnia.    nebulizer accessories Kit 1 each by Misc.(Non-Drug; Combo Route) route 4 (four) times daily as needed (wheezing).    nitroGLYCERIN (NITROSTAT) 0.4 MG SL tablet PLACE 1 TABLET UNDER THE TONGUE EVERY 5 MINUTES AS NEEDED FOR CHEST PAIN. GO TO ER AFTER THIRD DOSE    pen needle, diabetic 32 gauge x 5/32" Ndle 1 each by Misc.(Non-Drug; Combo Route) route 4 (four) times daily.    polyethylene glycol (GLYCOLAX) 17 gram PwPk Take 17 g by mouth once " daily.    potassium chloride (K-TAB) 20 mEq TAKE ONE TABLET BY MOUTH DAILY AT 9 AM (VIAL)    vitamin D (VITAMIN D3) 1000 units Tab Take 2 tablets (2,000 Units total) by mouth Daily.       Review of patient's allergies indicates:  No Known Allergies    Past Medical History:   Diagnosis Date    Asthma     Cancer     CKD (chronic kidney disease)     Coronary artery disease     Diabetes mellitus     Hyperlipidemia     Hypertension     Obesity, unspecified     MOHSEN (obstructive sleep apnea)     Unspecified cataract      Past Surgical History:   Procedure Laterality Date    CATARACT EXTRACTION Left 11/28/2022    CHOLECYSTECTOMY      COLON SURGERY      COLONOSCOPY N/A 4/27/2023    Procedure: COLONOSCOPY;  Surgeon: Jose Miguel Rosales MD;  Location: [facility] ENDOSCOPY;  Service: Endoscopy;  Laterality: N/A;    excision of lipoma      HYSTERECTOMY, VAGINAL, WITH SALPINGO-OOPHORECTOMY      NEPHRECTOMY Left     TUBAL LIGATION       Family History       Problem Relation (Age of Onset)    Cancer Brother    Coronary artery disease Mother    Hyperlipidemia Mother    Hypertension Mother    Stroke Father          Tobacco Use    Smoking status: Never    Smokeless tobacco: Never   Substance and Sexual Activity    Alcohol use: Yes     Comment: frozen drinks once a month, giovanni    Drug use: Never    Sexual activity: Not Currently     Review of Systems   Constitutional: Negative.    HENT: Negative.     Eyes:  Positive for redness.   Respiratory: Negative.     Cardiovascular: Negative.    Gastrointestinal: Negative.    Endocrine: Negative.    Genitourinary: Negative.    Musculoskeletal: Negative.    Neurological: Negative.    Psychiatric/Behavioral: Negative.       Objective:     Vital Signs (Most Recent):  Temp: 98.5 °F (36.9 °C) (11/16/23 0206)  Pulse: 96 (11/16/23 0312)  Resp: (!) 37 (11/16/23 0312)  BP: (!) 157/97 (11/16/23 0312)  SpO2: (!) 74 % (11/16/23 0307) Vital Signs (24h Range):  Temp:  [98.5 °F (36.9 °C)] 98.5 °F (36.9  °C)  Pulse:  [] 96  Resp:  [15-58] 37  SpO2:  [74 %-97 %] 74 %  BP: (147-200)/() 157/97     Weight: (!) 139.7 kg (308 lb)  Body mass index is 52.87 kg/m².     Physical Exam  Constitutional:       General: She is not in acute distress.     Appearance: Normal appearance. She is obese.   HENT:      Head: Normocephalic and atraumatic.   Eyes:      Conjunctiva/sclera:      Right eye: Right conjunctiva is injected.      Left eye: Left conjunctiva is injected.      Pupils: Pupils are equal, round, and reactive to light.   Cardiovascular:      Rate and Rhythm: Normal rate and regular rhythm.      Pulses: Normal pulses.   Pulmonary:      Effort: Pulmonary effort is normal. No respiratory distress.   Abdominal:      Palpations: Abdomen is soft.      Tenderness: There is no abdominal tenderness. There is no guarding.   Musculoskeletal:         General: Normal range of motion.      Cervical back: Normal range of motion and neck supple. No tenderness.   Skin:     General: Skin is warm and dry.   Neurological:      General: No focal deficit present.      Mental Status: She is alert and oriented to person, place, and time.   Psychiatric:         Mood and Affect: Mood normal.            I have reviewed all pertinent lab results within the past 24 hours.  CBC:   Recent Labs   Lab 11/15/23  2249   WBC 6.81   RBC 5.67*   HGB 12.3   HCT 42.9      MCV 75.7*   MCH 21.7*   MCHC 28.7*     CMP:   Recent Labs   Lab 11/15/23  2249   CALCIUM 9.0   ALBUMIN 3.1*      K 3.1*   CO2 30   BUN 15.9   CREATININE 1.43*   ALKPHOS 126   ALT 23   AST 30   BILITOT 0.7     Coagulation:   Recent Labs   Lab 11/15/23  2249   LABPROT 7.4     Cardiac markers:   Recent Labs   Lab 11/15/23  2249   CPKMB 8.7*   TROPONINI 0.027       Significant Diagnostics:  CT Head:  Small, faint hyperdense focus in right insular region may represent microhemorrhage or non-specific calcification. MRI should be considered for further evaluation.      Assessment/Plan:   Patient is a 68 year old female who presented in hypertensive emergency with findings of small intracranial hemorrhage. Patient reports fall from bed but does not recall hitting her head. On assessment she has no tenderness or signs of trauma to head or neck. She denies any pain. EM physician reviewed imaging with Neurosurgery who recommends Stroke consultation. Given imaging findings and patient presentation discussed case with Dr. Boss who agrees this appears to be a non-traumatic hemorrhage.     VTE Risk Mitigation (From admission, onward)      None            Thank you for your consult. I will sign off. Please contact us if you have any additional questions.    Tere Wilson, AGACNP-BC, FNP-BC  Trauma Surgery  [facility]  C: [phone]\

## 2023-11-16 NOTE — H&P
[facility] - Emergency Dept  Pulmonary Critical Care Note    Patient Name: Elissa Freeman  MRN: [id number]  Admission Date: 11/16/2023  Hospital Length of Stay: 0 days  Code Status: No Order  Attending Provider: Pippa Valladares MD  Primary Care Provider: Jeronimo Bird MD     Subjective:     HPI:   Elissa Freeman is a 68 y.o. female with significant PMH of obesity, HTN, HLD, DM II, who was transferred to [facility] from [facility] on 11/16/2023 after she was found to have intracranial hemorrhage. She states that she had a fall at home in the morning of 11/15/2023 but did not remember sustaining any head injuries. She was brought to [facility] ED in the evening of 11/15/2023 after having intermittent episodes of confusion and dizziness according to her son. While in the ED, she was found to have hypertensive crisis (highest BP in the 190's) and cleviprex was initiated. Per ED provider, trauma surgery team did not recommend any acute intervention; neurosurgery team was consulted. Admitted to the ICU for close monitoring.     Hospital Course/Significant events:  11/16/2023 - Admitted to ICU for hypertensive emergency and intracranial hemorrhage    24 Hour Interval History:  Pending    Past Medical History:   Diagnosis Date    Asthma     Cancer     CKD (chronic kidney disease)     Coronary artery disease     Diabetes mellitus     Hyperlipidemia     Hypertension     Obesity, unspecified     MOHSEN (obstructive sleep apnea)     Unspecified cataract        Past Surgical History:   Procedure Laterality Date    CATARACT EXTRACTION Left 11/28/2022    CHOLECYSTECTOMY      COLON SURGERY      COLONOSCOPY N/A 4/27/2023    Procedure: COLONOSCOPY;  Surgeon: Jose Miguel Rosales MD;  Location: [facility] ENDOSCOPY;  Service: Endoscopy;  Laterality: N/A;    excision of lipoma      HYSTERECTOMY, VAGINAL, WITH SALPINGO-OOPHORECTOMY      NEPHRECTOMY Left     TUBAL LIGATION         Social History     Socioeconomic History    Marital status:     Tobacco Use    Smoking status: Never    Smokeless tobacco: Never   Substance and Sexual Activity    Alcohol use: Yes     Comment: frozen drinks once a month, giovanni    Drug use: Never    Sexual activity: Not Currently     Social Determinants of Health     Financial Resource Strain: Medium Risk (11/23/2022)    Overall Financial Resource Strain (CARDIA)     Difficulty of Paying Living Expenses: Somewhat hard   Food Insecurity: Food Insecurity Present (11/23/2022)    Hunger Vital Sign     Worried About Running Out of Food in the Last Year: Sometimes true     Ran Out of Food in the Last Year: Sometimes true   Transportation Needs: No Transportation Needs (11/23/2022)    PRAPARE - Transportation     Lack of Transportation (Medical): No     Lack of Transportation (Non-Medical): No   Physical Activity: Inactive (11/23/2022)    Exercise Vital Sign     Days of Exercise per Week: 0 days     Minutes of Exercise per Session: 0 min   Stress: No Stress Concern Present (11/23/2022)    [employer] [place] [employer] - Occupational Stress Questionnaire     Feeling of Stress : Not at all   Social Connections: Moderately Isolated (11/23/2022)    Social Connection and Isolation Panel [NHANES]     Frequency of Communication with Friends and Family: More than three times a week     Frequency of Social Gatherings with Friends and Family: Once a week     Attends Alevism Services: More than 4 times per year     Active Member of Clubs or Organizations: No     Attends Club or Organization Meetings: Never     Marital Status:    Housing Stability: Low Risk  (11/23/2022)    Housing Stability Vital Sign     Unable to Pay for Housing in the Last Year: No     Number of Places Lived in the Last Year: 1     Unstable Housing in the Last Year: No       Current Outpatient Medications   Medication Instructions    albuterol (PROVENTIL/VENTOLIN HFA) 90 mcg/actuation inhaler 1 puff, Inhalation, Every 4 hours PRN    aspirin (ECOTRIN)  "81 mg, Oral, Daily    atorvastatin (LIPITOR) 80 mg, Oral, Daily    carvediloL (COREG) 3.125 mg, Oral, 2 times daily    cetirizine (ZYRTEC) 10 mg, Oral, Daily    cloNIDine (CATAPRES) 0.2 mg, Oral, 2 times daily, Takes 2 times per day. Takes whole tablet    diclofenac (VOLTAREN) 50 mg, Oral, 2 times daily PRN    diltiaZEM (CARDIZEM CD) 180 mg, Oral, Daily    DULoxetine (CYMBALTA) 60 MG capsule TAKE 1 CAPSULE BY MOUTH DAILY. DO NOT CRUSH OR CHEW    empagliflozin (JARDIANCE) 25 mg, Oral, Daily    exenatide microspheres (BYDUREON BCISE) 2 mg, Subcutaneous, Every 7 days    flash glucose scanning reader (FREESTYLE SMITHA 2 READER) Misc 1 each, Misc.(Non-Drug; Combo Route), 4 times daily with meals & nightly    flash glucose sensor (FREESTYLE SMITHA 2 SENSOR) Kit 1 each, Misc.(Non-Drug; Combo Route), 4 times daily after meals & nightly    furosemide (LASIX) 20 MG tablet TAKE ONE TABLET BY MOUTH DAILY AT 9 AM (VIAL)    gabapentin (NEURONTIN) 600 mg, Oral, Daily    insulin (LANTUS SOLOSTAR U-100 INSULIN) 65 Units, Subcutaneous, Nightly    insulin aspart U-100 (NOVOLOG FLEXPEN U-100 INSULIN) 20 Units, Subcutaneous, 3 times daily with meals    latanoprost 0.005 % ophthalmic solution 1 drop, Both Eyes, Daily    losartan-hydrochlorothiazide 50-12.5 mg (HYZAAR) 50-12.5 mg per tablet 1 tablet, Oral, Daily    melatonin (MELATIN) 6 mg, Oral, Nightly PRN    nebulizer accessories Kit 1 each, Misc.(Non-Drug; Combo Route), 4 times daily PRN    nitroGLYCERIN (NITROSTAT) 0.4 MG SL tablet PLACE 1 TABLET UNDER THE TONGUE EVERY 5 MINUTES AS NEEDED FOR CHEST PAIN. GO TO ER AFTER THIRD DOSE    pen needle, diabetic 32 gauge x 5/32" Ndle 1 each, Misc.(Non-Drug; Combo Route), 4 times daily    polyethylene glycol (GLYCOLAX) 17 g, Oral, Daily    potassium chloride (K-TAB) 20 mEq TAKE ONE TABLET BY MOUTH DAILY AT 9 AM (VIAL)    vitamin D (VITAMIN D3) 2,000 Units, Oral, Daily     Current Inpatient Medications   labetaloL  20 mg Intravenous ED 1 Time " "    Current Intravenous Infusions   clevidipine       Review of Systems   Cardiovascular:  Negative for chest pain and palpitations.   Musculoskeletal:  Positive for joint pain. Negative for neck pain.   Neurological:  Positive for headaches. Negative for focal weakness.        Objective:   No intake or output data in the 24 hours ending 11/16/23 0335  Vital Signs (Most Recent):  Temp: 98.5 °F (36.9 °C) (11/16/23 0206)  Pulse: 96 (11/16/23 0312)  Resp: (!) 37 (11/16/23 0312)  BP: (!) 157/97 (11/16/23 0312)  SpO2: (!) 74 % (11/16/23 0307)  Body mass index is 52.87 kg/m².  Weight: (!) 139.7 kg (308 lb) Vital Signs (24h Range):  Temp:  [98.5 °F (36.9 °C)] 98.5 °F (36.9 °C)  Pulse:  [] 96  Resp:  [15-58] 37  SpO2:  [74 %-97 %] 74 %  BP: (147-200)/() 157/97     Physical Exam  General: Obese w/ mild distress  HEENT: NC/AT; PERRL; nasal and oral mucosa moist and clear  Pulm: CTA bilaterally, normal work of breathing on room air  CV: S1, S2 w/o murmurs or gallops; no edema noted  GI: Bowel sound present in all quadrants, abdomen soft to palpation  MSK: Full ROM of all extremities   Neuro: AAOx4; motor/sensory function intact  Psych: Cooperative; appropriate mood and affect    Lines/Drains/Airways       Peripheral Intravenous Line  Duration                  Peripheral IV - Single Lumen 11/16/23 0200 20 G Right Antecubital <1 day                  Significant Labs:  Lab Results   Component Value Date    WBC 6.81 11/15/2023    HGB 12.3 11/15/2023    HCT 42.9 11/15/2023    MCV 75.7 (L) 11/15/2023     11/15/2023       BMP  Lab Results   Component Value Date     11/15/2023    K 3.1 (L) 11/15/2023    CHLORIDE 101 11/15/2023    CO2 30 11/15/2023    BUN 15.9 11/15/2023    CREATININE 1.43 (H) 11/15/2023    CALCIUM 9.0 11/15/2023    EGFRNONAA 45 02/11/2022     ABG  No results for input(s): "PH", "PO2", "PCO2", "HCO3", "BE" in the last 168 hours.  Mechanical Ventilation Support:       Significant Imaging:  I " have reviewed the pertinent imaging within the past 24 hours.    Assessment/Plan:     Assessment  Intracranial hemorrhage   CT head w/o contrast 11/16/2023: Small, faint hyperdense focus in the right insular region representing micro-hemorrhage   Hypertensive emergency  CKD II  Baseline creatinine 1.3-1.7  HX of obesity, HTN, HLD, DM II, colon cancer s/p hemicolectomy (2014), renal cell carcinoma s/p nephrectomy (2011), MOHSEN on CPAP    Plan  -Admitted to ICU for ongoing monitoring and medical management  -Will keep SBP <140 mmHg and titrate down on cleviprex drip as tolerated; resuming home antihypertensives (losartan 50mg daily, HCTZ 12.5mg daily, clonidine 0.2mg BID); holding home diltiazem 180mg daily for now as patient is on cleviprex drip  -Started Atrovent PRN for wheezing due to tachycardia; CPAP at night  -Started moderate intensity insulin sliding scale  -Holding home ASA 81mg daily for now  -Neurosurgery team following, appreciate their assistance; will be repeating CT head w/o contrast later in the morning    DVT Prophylaxis: SCD  GI Prophylaxis: None     32 minutes of critical care was time spent personally by me on the following activities: development of treatment plan with patient or surrogate and bedside caregivers, discussions with consultants, evaluation of patient's response to treatment, examination of patient, ordering and performing treatments and interventions, ordering and review of laboratory studies, ordering and review of radiographic studies, pulse oximetry, re-evaluation of patient's condition.  This critical care time did not overlap with that of any other provider or involve time for any procedures.     Angy Sandhu DO, PGY-II  Pulmonary Critical Care Medicine  [facility] - Emergency Dept

## 2023-11-16 NOTE — ED PROVIDER NOTES
"Encounter Date: 11/16/2023    SCRIBE #1 NOTE: I, Nara Peck, am scribing for, and in the presence of,  Pippa Valladares MD. I have scribed the entire note.       History     Chief Complaint   Patient presents with    Fall     Trauma Tx from [facility]: fall around 0830 yesterday am out of the bed. Pt doesn't remember falling but remembers waking up on the floor. Pt states she doesn't think she hit her head. Went to [facility] around 2224 due to son stating pt was dizzy and confused. Dx "Small micro hemorrhage". . Currently on Cleviprex 6mg/hr     68 year old female with a hx of cancer, CKD, CAD, DM, and HTN presents to the ED as a transfer from [facility] via EMS following a fall. Pt reports that she fell at around 0830 on 11/15. Pt was brought to [facility] because the son reports the pt was dizzy and confused. Pt reportedly had multiple random outbursts while at [facility]. Pt had scans done revealing a small intracranial hemorrhage. Pt was started on 6 mg/hr Cleviprex to get her blood pressure under control and was transferred here for neurosurgery consult. Upon arrival, pt still states that she does not think she hit her head yesterday morning. Pt has no current complaints. Pt takes a baby ASA daily.     The history is provided by the patient. No  was used.     Review of patient's allergies indicates:  No Known Allergies  Past Medical History:   Diagnosis Date    Asthma     Cancer     CKD (chronic kidney disease)     Coronary artery disease     Diabetes mellitus     Hyperlipidemia     Hypertension     Obesity, unspecified     MOHSEN (obstructive sleep apnea)     Unspecified cataract      Past Surgical History:   Procedure Laterality Date    CATARACT EXTRACTION Left 11/28/2022    CHOLECYSTECTOMY      COLON SURGERY      COLONOSCOPY N/A 4/27/2023    Procedure: COLONOSCOPY;  Surgeon: Jose Miguel Rosales MD;  Location: [facility] ENDOSCOPY;  Service: Endoscopy;  Laterality: N/A;    excision of lipoma      HYSTERECTOMY, VAGINAL, " WITH SALPINGO-OOPHORECTOMY      NEPHRECTOMY Left     TUBAL LIGATION       Family History   Problem Relation Age of Onset    Hypertension Mother     Coronary artery disease Mother     Hyperlipidemia Mother     Stroke Father     Cancer Brother      Social History     Tobacco Use    Smoking status: Never    Smokeless tobacco: Never   Substance Use Topics    Alcohol use: Yes     Comment: frozen drinks once a month, giovanni    Drug use: Never     Review of Systems   Constitutional:  Negative for chills, diaphoresis and fever.   HENT:  Negative for congestion, ear pain, sinus pain and sore throat.    Eyes:  Negative for pain, discharge and visual disturbance.   Respiratory:  Negative for cough, shortness of breath, wheezing and stridor.    Cardiovascular:  Negative for chest pain and palpitations.   Gastrointestinal:  Negative for abdominal pain, constipation, diarrhea, nausea, rectal pain and vomiting.   Genitourinary:  Negative for dysuria and hematuria.   Musculoskeletal:  Negative for back pain and myalgias.   Skin:  Negative for rash.   Neurological:  Positive for dizziness. Negative for syncope and numbness.   Hematological: Negative.    Psychiatric/Behavioral:  Positive for confusion.    All other systems reviewed and are negative.      Physical Exam     Initial Vitals [11/16/23 0206]   BP Pulse Resp Temp SpO2   (!) 147/86 (!) 115 17 98.5 °F (36.9 °C) (!) 90 %      MAP       --         Physical Exam    Nursing note and vitals reviewed.  Constitutional: She is Obese . No distress.   HENT:   Head: Normocephalic and atraumatic.   Eyes: Conjunctivae and EOM are normal. Pupils are equal, round, and reactive to light.   Neck: Trachea normal. Neck supple.   Normal range of motion.  Cardiovascular:  Normal rate and regular rhythm.           No murmur heard.  Pulmonary/Chest: Breath sounds normal. No respiratory distress. She exhibits no tenderness.   Abdominal: Abdomen is soft. Bowel sounds are normal. There is no  abdominal tenderness.   Musculoskeletal:         General: Normal range of motion.      Cervical back: Normal range of motion and neck supple.      Lumbar back: Normal. Normal range of motion.     Neurological: She is alert and oriented to person, place, and time. She has normal strength. She is not disoriented. No cranial nerve deficit or sensory deficit.   Skin: Skin is warm and dry.   Psychiatric: She has a normal mood and affect.         ED Course   Critical Care    Date/Time: 11/16/2023 3:19 AM    Performed by: Pippa Valladares MD  Authorized by: Pippa Valladares MD  Direct patient critical care time: 30 minutes  Total critical care time (exclusive of procedural time) : 30 minutes  Critical care was necessary to treat or prevent imminent or life-threatening deterioration of the following conditions: CNS failure or compromise.  Critical care was time spent personally by me on the following activities: development of treatment plan with patient or surrogate, discussions with consultants, evaluation of patient's response to treatment, examination of patient, obtaining history from patient or surrogate, ordering and performing treatments and interventions, ordering and review of laboratory studies, ordering and review of radiographic studies, pulse oximetry, re-evaluation of patient's condition and review of old charts.  Subsequent provider of critical care: I assumed direction of critical care for this patient from another provider of my specialty.        Labs Reviewed   HEMOGLOBIN A1C   POCT GLUCOSE MONITORING CONTINUOUS     EKG Readings: (Independently Interpreted)   Rhythm: Atrial Fibrillation. Heart Rate: 95. Ectopy: PVCs. Conduction: LAFB.   0340  Inferior q waves       Imaging Results    None          Medications   clevidipine (CLEVIPREX) 25 mg/50 mL infusion (has no administration in time range)   labetaloL injection 20 mg (has no administration in time range)   atorvastatin tablet 80 mg (has no  administration in time range)   carvediloL tablet 3.125 mg (has no administration in time range)   cloNIDine tablet 0.2 mg (has no administration in time range)   losartan tablet 50 mg (has no administration in time range)   hydroCHLOROthiazide tablet 12.5 mg (has no administration in time range)   melatonin tablet 6 mg (has no administration in time range)   DULoxetine DR capsule 60 mg (has no administration in time range)   ipratropium 0.02 % nebulizer solution 0.5 mg (has no administration in time range)   glucose chewable tablet 16 g (has no administration in time range)   glucose chewable tablet 24 g (has no administration in time range)   glucagon (human recombinant) injection 1 mg (has no administration in time range)   insulin aspart U-100 injection 0-10 Units (has no administration in time range)   dextrose 10% bolus 125 mL 125 mL (has no administration in time range)   dextrose 10% bolus 250 mL 250 mL (has no administration in time range)     Medical Decision Making  The differential diagnosis includes, but is not limited to, intracranial hemorrhage.   Reviewed labs and imaging from sending facility, maintained and increased antihypertensive, consulted neurosurgery and trauma and admitted to trauma icu  Ich does not appear traumatic in nature    Problems Addressed:  Fall: acute illness or injury that poses a threat to life or bodily functions  Fall, initial encounter: acute illness or injury that poses a threat to life or bodily functions  Hypertensive emergency: acute illness or injury that poses a threat to life or bodily functions  Other nontraumatic intracerebral hemorrhage, unspecified laterality: acute illness or injury that poses a threat to life or bodily functions    Amount and/or Complexity of Data Reviewed  Independent Historian: EMS  External Data Reviewed: labs, radiology and notes.  Radiology: ordered.  ECG/medicine tests: ordered and independent interpretation performed.    Risk  OTC  drugs.  Prescription drug management.  Drug therapy requiring intensive monitoring for toxicity.  Decision regarding hospitalization.    Critical Care  Total time providing critical care: 30 minutes            Scribe Attestation:   Scribe #1: I performed the above scribed service and the documentation accurately describes the services I performed. I attest to the accuracy of the note.  Comments: Attending:   Physician Attestation Statement for Scribe #1: Pippa WHEATLEY MD, personally performed the services described in this documentation. All medical record entries made by the scribe were at my direction and in my presence.  I have reviewed the chart and agree that the record reflects my personal performance and is accurate and complete.        Attending Attestation:           Physician Attestation for Scribe:  Physician Attestation Statement for Scribe #1: I, Pippa Valladares MD, reviewed documentation, as scribed by Nara Peck in my presence, and it is both accurate and complete.             ED Course as of 11/16/23 0347   Thu Nov 16, 2023 0223 Discussed with trauma surgery [BS]   0224 Dr [email or web address] agrees with plan, very small [BS]      ED Course User Index  [BS] Pippa Valladares MD                      Clinical Impression:   Final diagnoses:  [I16.1] Hypertensive emergency (Primary)  [W19.XXXA] Fall, initial encounter  [W19.XXXA] Fall  [I61.8] Other nontraumatic intracerebral hemorrhage, unspecified laterality        ED Disposition Condition    Admit Stable                Pippa Valladares MD  11/16/23 0333       Pippa Valladares MD  11/16/23 0347

## 2023-11-16 NOTE — CONSULTS
[facility] - [facility] ICU  Neurosurgery  Consult Note    Inpatient consult to Neurosurgery  Consult performed by: Jie King PA  Consult ordered by: Pippa Valladares MD        Subjective:     Chief Complaint/Reason for Admission: Abnormal CT head    History of Present Illness: Patient is a 68 y.o. female with significant PMH of obesity, HTN, HLD, and DM II, who was transferred to [facility] from [facility] on 11/16/2023 after she was found to have intracranial hemorrhage. She states that she had a fall at home on the morning of 11/15/2023 after rolling out of bed but denies hitting her head. She was brought to [facility] ED on the evening of 11/15/2023 after having intermittent episodes of confusion and dizziness according to her son. While in the ED, she was found to have hypertensive crisis (highest BP in the 190's) and cleviprex was initiated. Per ED provider, trauma surgery team did not recommend any acute intervention; Dr. Renteria was consulted. Admitted to the ICU for close monitoring.      On PE this am she is sitting up in bed, NAD. She denies HA, blurred vision and N/V. She has no other complaints. She is on cleviprex for BP control.    PTA Medications   Medication Sig    albuterol (PROVENTIL/VENTOLIN HFA) 90 mcg/actuation inhaler Inhale 1 puff into the lungs every 4 (four) hours as needed for Wheezing.    aspirin (ECOTRIN) 81 MG EC tablet Take 81 mg by mouth once daily.    atorvastatin (LIPITOR) 40 MG tablet Take 2 tablets (80 mg total) by mouth once daily.    carvediloL (COREG) 3.125 MG tablet Take 1 tablet (3.125 mg total) by mouth 2 (two) times daily.    cetirizine (ZYRTEC) 10 MG tablet Take 1 tablet (10 mg total) by mouth once daily.    cloNIDine (CATAPRES) 0.2 MG tablet Take 1 tablet (0.2 mg total) by mouth 2 (two) times daily. Takes 2 times per day. Takes whole tablet    diclofenac (VOLTAREN) 50 MG EC tablet Take 1 tablet (50 mg total) by mouth 2 (two) times daily as needed (Pain).     "diltiaZEM (CARDIZEM CD) 180 MG 24 hr capsule Take 1 capsule (180 mg total) by mouth once daily.    DULoxetine (CYMBALTA) 60 MG capsule TAKE 1 CAPSULE BY MOUTH DAILY. DO NOT CRUSH OR CHEW    empagliflozin (JARDIANCE) 25 mg tablet Take 1 tablet (25 mg total) by mouth once daily.    exenatide microspheres (BYDUREON BCISE) 2 mg/0.85 mL AtIn Inject 2 mg into the skin every 7 days.    flash glucose scanning reader (FREESTYLE SMITHA 2 READER) Misc 1 each by Misc.(Non-Drug; Combo Route) route 4 (four) times daily with meals and nightly.    flash glucose sensor (FREESTYLE SMITHA 2 SENSOR) Kit 1 each by Misc.(Non-Drug; Combo Route) route 2 hours after meals and at bedtime.    furosemide (LASIX) 20 MG tablet TAKE ONE TABLET BY MOUTH DAILY AT 9 AM (VIAL)    gabapentin (NEURONTIN) 600 MG tablet Take 1 tablet (600 mg total) by mouth once daily.    insulin (LANTUS SOLOSTAR U-100 INSULIN) glargine 100 units/mL SubQ pen Inject 65 Units into the skin every evening.    insulin aspart U-100 (NOVOLOG FLEXPEN U-100 INSULIN) 100 unit/mL (3 mL) InPn pen Inject 20 Units into the skin 3 (three) times daily with meals.    latanoprost 0.005 % ophthalmic solution Place 1 drop into both eyes once daily.    losartan-hydrochlorothiazide 50-12.5 mg (HYZAAR) 50-12.5 mg per tablet Take 1 tablet by mouth once daily.    melatonin (MELATIN) 3 mg tablet Take 2 tablets (6 mg total) by mouth nightly as needed for Insomnia.    nebulizer accessories Kit 1 each by Misc.(Non-Drug; Combo Route) route 4 (four) times daily as needed (wheezing).    nitroGLYCERIN (NITROSTAT) 0.4 MG SL tablet PLACE 1 TABLET UNDER THE TONGUE EVERY 5 MINUTES AS NEEDED FOR CHEST PAIN. GO TO ER AFTER THIRD DOSE    pen needle, diabetic 32 gauge x 5/32" Ndle 1 each by Misc.(Non-Drug; Combo Route) route 4 (four) times daily.    polyethylene glycol (GLYCOLAX) 17 gram PwPk Take 17 g by mouth once daily.    potassium chloride (K-TAB) 20 mEq TAKE ONE TABLET BY MOUTH DAILY AT 9 AM (VIAL)    " vitamin D (VITAMIN D3) 1000 units Tab Take 2 tablets (2,000 Units total) by mouth Daily.       Review of patient's allergies indicates:  No Known Allergies    Past Medical History:   Diagnosis Date    Asthma     Cancer     CKD (chronic kidney disease)     Coronary artery disease     Diabetes mellitus     Hyperlipidemia     Hypertension     Obesity, unspecified     MOHSEN (obstructive sleep apnea)     Unspecified cataract      Past Surgical History:   Procedure Laterality Date    CATARACT EXTRACTION Left 11/28/2022    CHOLECYSTECTOMY      COLON SURGERY      COLONOSCOPY N/A 4/27/2023    Procedure: COLONOSCOPY;  Surgeon: Jose Miguel Rosales MD;  Location: [facility];  Service: Endoscopy;  Laterality: N/A;    excision of lipoma      HYSTERECTOMY, VAGINAL, WITH SALPINGO-OOPHORECTOMY      NEPHRECTOMY Left     TUBAL LIGATION       Family History       Problem Relation (Age of Onset)    Cancer Brother    Coronary artery disease Mother    Hyperlipidemia Mother    Hypertension Mother    Stroke Father          Tobacco Use    Smoking status: Never    Smokeless tobacco: Never   Substance and Sexual Activity    Alcohol use: Yes     Comment: frozen drinks once a month, giovanni    Drug use: Never    Sexual activity: Not Currently     Review of Systems  Objective:     Weight: (!) 145 kg (319 lb 10.7 oz)  Body mass index is 54.87 kg/m².  Vital Signs (Most Recent):  Temp: 98.5 °F (36.9 °C) (11/16/23 0800)  Pulse: 83 (11/16/23 1015)  Resp: 19 (11/16/23 1015)  BP: (!) 141/80 (11/16/23 1015)  SpO2: 100 % (11/16/23 1015) Vital Signs (24h Range):  Temp:  [98.4 °F (36.9 °C)-98.5 °F (36.9 °C)] 98.5 °F (36.9 °C)  Pulse:  [] 83  Resp:  [14-37] 19  SpO2:  [89 %-100 %] 100 %  BP: (118-200)/() 141/80     Date 11/16/23 0700 - 11/17/23 0659   Shift 1152-5919 4557-5778 5438-1564 24 Hour Total   INTAKE   I.V.(mL/kg) 165.4(1.1)   165.4(1.1)   Shift Total(mL/kg) 165.4(1.1)   165.4(1.1)   OUTPUT   Shift Total(mL/kg)       Weight (kg) 145  "145 145 145                            Physical Exam:  Nursing note and vitals reviewed.    Constitutional: She appears well-developed and well-nourished. No distress.     Eyes: Pupils are equal, round, and reactive to light. EOM are normal.     Cardiovascular: Intact distal pulses.     Abdominal: Soft.     Psych/Behavior: She is alert. She is oriented to person, place, and time. She has a normal mood and affect.     Musculoskeletal:        Neck: Range of motion is full.        Back: Range of motion is full.        Right Upper Extremities: Range of motion is full. Muscle strength is 5/5.        Left Upper Extremities: Range of motion is full. Muscle strength is 5/5.       Right Lower Extremities: Range of motion is full. Muscle strength is 5/5.        Left Lower Extremities: Range of motion is full. Muscle strength is 5/5.     Neurological:        Sensory: There is no sensory deficit in the trunk. There is no sensory deficit in the extremities.        DTRs: DTRs are DTRS NORMAL AND SYMMETRICnormal and symmetric. She displays no Babinski's sign on the right side. She displays no Babinski's sign on the left side.        Cranial nerves: Cranial nerve(s) II, III, IV, V, VI, VII, VIII, IX, X, XI and XII are intact.       Significant Labs:  Recent Labs   Lab 11/15/23  2249 11/16/23  0427    139   K 3.1* 3.1*   CO2 30 28   BUN 15.9 15.7   CREATININE 1.43* 1.32*   CALCIUM 9.0 9.2   MG 1.80  --      Recent Labs   Lab 11/15/23  2249   WBC 6.81   HGB 12.3   HCT 42.9        No results for input(s): "LABPT", "INR", "APTT" in the last 48 hours.  Microbiology Results (last 7 days)       ** No results found for the last 168 hours. **            Significant Diagnostics:  CT Head Without Contrast [[id number]] Resulted: 11/16/23 0854   Order Status: Completed Updated: 11/16/23 0856   Narrative:     EXAMINATION:  CT HEAD WITHOUT CONTRAST    CLINICAL HISTORY:  Subarachnoid hemorrhage (SAH) suspected;    TECHNIQUE:  Low dose " axial images were obtained through the head.  Coronal and sagittal reformations were also performed. Contrast was not administered.  DLP 1126.  Automated exposure control used.    COMPARISON:  11/15/2023 and 10/25/2016    FINDINGS:  Small focus of subtle increased density in the right posterior insular cortex region, is essentially unchanged in the interval.  No new areas of increased density or hemorrhage.  No intra or extra-axial mass.  No midline shift.  Ventricles, sulci, cisterns normal.  Partial persistent cavum septum pellucidum incidentally noted.  Normal gray-white differentiation.  Suspected mild chronic microvascular white matter change.  Cranium and extracranial structures unremarkable.   Impression:       Focus of subtle increased density involving the right posterior insular cortex and subinsular brain, essentially identical to the prior exam from 5 hours earlier.  Focal area of hemorrhage not excluded.  This finding was not present on the previous exam from 2016.        Assessment/Plan:     She is GCS 15 with no c/o HA.  CT this am stable with subtle right posterior insular cortex increased density  No neurosurgical intervention planned  OK to downgrade with Q4 hour neuro exams  BP parameters below 160/90  SCDs for DVT prophylaxis; ok for lovenox  Please call with any neuro decline  We will sign off. No f/u needed    Thank you for your consult.   HONG Du  Neurosurgery  [facility] - [facility] ICU

## 2023-11-16 NOTE — SUBJECTIVE & OBJECTIVE
Current Facility-Administered Medications on File Prior to Encounter   Medication    [COMPLETED] lactated ringers bolus 500 mL    [DISCONTINUED] clevidipine (CLEVIPREX) 50 mg/100 mL infusion    [DISCONTINUED] lactated ringers bolus 1,000 mL     Current Outpatient Medications on File Prior to Encounter   Medication Sig    albuterol (PROVENTIL/VENTOLIN HFA) 90 mcg/actuation inhaler Inhale 1 puff into the lungs every 4 (four) hours as needed for Wheezing.    aspirin (ECOTRIN) 81 MG EC tablet Take 81 mg by mouth once daily.    atorvastatin (LIPITOR) 40 MG tablet Take 2 tablets (80 mg total) by mouth once daily.    carvediloL (COREG) 3.125 MG tablet Take 1 tablet (3.125 mg total) by mouth 2 (two) times daily.    cetirizine (ZYRTEC) 10 MG tablet Take 1 tablet (10 mg total) by mouth once daily.    cloNIDine (CATAPRES) 0.2 MG tablet Take 1 tablet (0.2 mg total) by mouth 2 (two) times daily. Takes 2 times per day. Takes whole tablet    diclofenac (VOLTAREN) 50 MG EC tablet Take 1 tablet (50 mg total) by mouth 2 (two) times daily as needed (Pain).    diltiaZEM (CARDIZEM CD) 180 MG 24 hr capsule Take 1 capsule (180 mg total) by mouth once daily.    DULoxetine (CYMBALTA) 60 MG capsule TAKE 1 CAPSULE BY MOUTH DAILY. DO NOT CRUSH OR CHEW    empagliflozin (JARDIANCE) 25 mg tablet Take 1 tablet (25 mg total) by mouth once daily.    exenatide microspheres (BYDUREON BCISE) 2 mg/0.85 mL AtIn Inject 2 mg into the skin every 7 days.    flash glucose scanning reader (FREESTYLE SMITHA 2 READER) Misc 1 each by Misc.(Non-Drug; Combo Route) route 4 (four) times daily with meals and nightly.    flash glucose sensor (FREESTYLE SMITHA 2 SENSOR) Kit 1 each by Misc.(Non-Drug; Combo Route) route 2 hours after meals and at bedtime.    furosemide (LASIX) 20 MG tablet TAKE ONE TABLET BY MOUTH DAILY AT 9 AM (VIAL)    gabapentin (NEURONTIN) 600 MG tablet Take 1 tablet (600 mg total) by mouth once daily.    insulin (LANTUS SOLOSTAR U-100 INSULIN) glargine  "100 units/mL SubQ pen Inject 65 Units into the skin every evening.    insulin aspart U-100 (NOVOLOG FLEXPEN U-100 INSULIN) 100 unit/mL (3 mL) InPn pen Inject 20 Units into the skin 3 (three) times daily with meals.    latanoprost 0.005 % ophthalmic solution Place 1 drop into both eyes once daily.    losartan-hydrochlorothiazide 50-12.5 mg (HYZAAR) 50-12.5 mg per tablet Take 1 tablet by mouth once daily.    melatonin (MELATIN) 3 mg tablet Take 2 tablets (6 mg total) by mouth nightly as needed for Insomnia.    nebulizer accessories Kit 1 each by Misc.(Non-Drug; Combo Route) route 4 (four) times daily as needed (wheezing).    nitroGLYCERIN (NITROSTAT) 0.4 MG SL tablet PLACE 1 TABLET UNDER THE TONGUE EVERY 5 MINUTES AS NEEDED FOR CHEST PAIN. GO TO ER AFTER THIRD DOSE    pen needle, diabetic 32 gauge x 5/32" Ndle 1 each by Misc.(Non-Drug; Combo Route) route 4 (four) times daily.    polyethylene glycol (GLYCOLAX) 17 gram PwPk Take 17 g by mouth once daily.    potassium chloride (K-TAB) 20 mEq TAKE ONE TABLET BY MOUTH DAILY AT 9 AM (VIAL)    vitamin D (VITAMIN D3) 1000 units Tab Take 2 tablets (2,000 Units total) by mouth Daily.       Review of patient's allergies indicates:  No Known Allergies    Past Medical History:   Diagnosis Date    Asthma     Cancer     CKD (chronic kidney disease)     Coronary artery disease     Diabetes mellitus     Hyperlipidemia     Hypertension     Obesity, unspecified     MOHSEN (obstructive sleep apnea)     Unspecified cataract      Past Surgical History:   Procedure Laterality Date    CATARACT EXTRACTION Left 11/28/2022    CHOLECYSTECTOMY      COLON SURGERY      COLONOSCOPY N/A 4/27/2023    Procedure: COLONOSCOPY;  Surgeon: Jose Miguel Rosales MD;  Location: [facility] ENDOSCOPY;  Service: Endoscopy;  Laterality: N/A;    excision of lipoma      HYSTERECTOMY, VAGINAL, WITH SALPINGO-OOPHORECTOMY      NEPHRECTOMY Left     TUBAL LIGATION       Family History       Problem Relation (Age of Onset)    Cancer " Brother    Coronary artery disease Mother    Hyperlipidemia Mother    Hypertension Mother    Stroke Father          Tobacco Use    Smoking status: Never    Smokeless tobacco: Never   Substance and Sexual Activity    Alcohol use: Yes     Comment: frozen drinks once a month, giovanni    Drug use: Never    Sexual activity: Not Currently     Review of Systems   Constitutional: Negative.    HENT: Negative.     Eyes:  Positive for redness.   Respiratory: Negative.     Cardiovascular: Negative.    Gastrointestinal: Negative.    Endocrine: Negative.    Genitourinary: Negative.    Musculoskeletal: Negative.    Neurological: Negative.    Psychiatric/Behavioral: Negative.       Objective:     Vital Signs (Most Recent):  Temp: 98.5 °F (36.9 °C) (11/16/23 0206)  Pulse: 96 (11/16/23 0312)  Resp: (!) 37 (11/16/23 0312)  BP: (!) 157/97 (11/16/23 0312)  SpO2: (!) 74 % (11/16/23 0307) Vital Signs (24h Range):  Temp:  [98.5 °F (36.9 °C)] 98.5 °F (36.9 °C)  Pulse:  [] 96  Resp:  [15-58] 37  SpO2:  [74 %-97 %] 74 %  BP: (147-200)/() 157/97     Weight: (!) 139.7 kg (308 lb)  Body mass index is 52.87 kg/m².     Physical Exam  Constitutional:       General: She is not in acute distress.     Appearance: Normal appearance. She is obese.   HENT:      Head: Normocephalic and atraumatic.   Eyes:      Conjunctiva/sclera:      Right eye: Right conjunctiva is injected.      Left eye: Left conjunctiva is injected.      Pupils: Pupils are equal, round, and reactive to light.   Cardiovascular:      Rate and Rhythm: Normal rate and regular rhythm.      Pulses: Normal pulses.   Pulmonary:      Effort: Pulmonary effort is normal. No respiratory distress.   Abdominal:      Palpations: Abdomen is soft.      Tenderness: There is no abdominal tenderness. There is no guarding.   Musculoskeletal:         General: Normal range of motion.      Cervical back: Normal range of motion and neck supple. No tenderness.   Skin:     General: Skin is warm  and dry.   Neurological:      General: No focal deficit present.      Mental Status: She is alert and oriented to person, place, and time.   Psychiatric:         Mood and Affect: Mood normal.            I have reviewed all pertinent lab results within the past 24 hours.  CBC:   Recent Labs   Lab 11/15/23  2249   WBC 6.81   RBC 5.67*   HGB 12.3   HCT 42.9      MCV 75.7*   MCH 21.7*   MCHC 28.7*     CMP:   Recent Labs   Lab 11/15/23  2249   CALCIUM 9.0   ALBUMIN 3.1*      K 3.1*   CO2 30   BUN 15.9   CREATININE 1.43*   ALKPHOS 126   ALT 23   AST 30   BILITOT 0.7     Coagulation:   Recent Labs   Lab 11/15/23  2249   LABPROT 7.4     Cardiac markers:   Recent Labs   Lab 11/15/23  2249   CPKMB 8.7*   TROPONINI 0.027       Significant Diagnostics:  CT Head:  Small, faint hyperdense focus in right insular region may represent microhemorrhage or non-specific calcification. MRI should be considered for further evaluation.

## 2023-11-16 NOTE — ED NOTES
Bed: 21  Expected date:   Expected time:   Means of arrival:   Comments:  [facility]

## 2023-11-16 NOTE — H&P
"[facility] Medicine History & Physical Examination       Patient Name: Elissa Freeman  MRN: [id number]  Patient Class: IP- Inpatient   Admission Date: 11/16/2023   Admitting Physician: YUNIER Ramos MD  Length of Stay: 0  Attending Physician: Fadia Howe MD   Primary Care Provider: Jeronimo Bird MD  Face-to-Face encounter date: 11/16/2023  Code Status: Full code   Chief Complaint: Fall (Trauma Tx from OU: fall around 0830 yesterday am out of the bed. Pt doesn't remember falling but remembers waking up on the floor. Pt states she doesn't think she hit her head. Went to OU around 2224 due to son stating pt was dizzy and confused. Dx "Small micro hemorrhage". . Currently on Cleviprex 6mg/hr)        Patient information was obtained from patient, patient's family, past medical records and ER records.     MD addendum-  69 yo female with morbid obesity BMI 54.8, MOHSEN on C-PAP , DM 2 with HbA1c  7.6 and other multiple co morbidities mentioned below admitted to ICU on Cleviprex gtt for hypertensive emergency with /100 and CT head evidence possible  small focus hemorrhage at the right insular region and hypertensive encephalopathy with episode of confusion and dizziness. Pt was weaned off Claviprex gtt and initiated on oral antihypertensives . Mental status improved to baseline and no focal neuro deficit appreciated on exam today. Pt;s care  downgraded to  service today on 11/16/23. Labs showed mild Microcytic anemia , serum creatinine is stable at baseline 1.5, HbA1c 7.6, . Home antihypertensives are reviewed and resumed as appropriate with BP goal below 160/90 per Neurosurgery as well as SCDs for DVT prophylaxis however  ok for lovenox.       A/P   Hypertensive emergency   Hypertensive encephalopathy   Morbid Obesity, BMI 54.8  Microcytic anemia, mild   Hypokalemia, POA  CKD IIIb- stable at baseline   Mild CPK elevation   MOHSEN, on C-PAP  Diabetes Mellitus , " type 2- with HbA1c 7.6    Hx-essential hypertension, hyperlipidemia, CAD, diabetes mellitus type 2, CKD, colon cancer s/p hemicolectomy, renal cell carcinoma s/p nephrectomy, asthma,    Plan-   Continue q4h Neuro check  Oral antihypertensives are resumed   BP goal under 160/90 per Neurosurgery   Monitor BP trend and adjust meds accordingly   Mental status improved to baselien  PT/OT consult     Critical care note:  Critical care diagnosis: Hypertensive emergency required IV medicine   Critical care interventions: Hands-on evaluation, review of labs/radiographs/records and discussion with patient and family if present  Critical care time spent: 35 minutes      HISTORY OF PRESENT ILLNESS:   Elissa Freeman is a 68 y.o. female with a past medical history of essential hypertension, hyperlipidemia, CAD, diabetes mellitus type 2, CKD, colon cancer s/p hemicolectomy, renal cell carcinoma s/p nephrectomy, MOHSEN, asthma, and cataracts who originally presented to [facility] on 11/16/2023 via EMS for altered mental status.  Patient had a fall on day of presentation with dizziness and confusion.  Initial blood pressure on arrival was 200/110.  CT head without contrast revealed small, faint hyperdense focus in the right insular region which may represent microhemorrhage or nonspecific calcification.  Patient was placed on Cleviprex drip and transferred to [facility] ED for higher level of care.  Neurosurgery was consulted without surgical intervention.  Patient was admitted to ICU for close monitoring.  Repeat CT head on 11/16 revealed focus of subtle increased density involving the right posterior insular cortex and subinsular brain, essentially identical to the prior exam from 5 hours earlier with focal area of hemorrhage not excluded.  Cleviprex was discontinued in the early morning hours of 11/16 and patient was started on home anti hypersensitive.  Patient was cleared for downgrade out of ICU on 11/16/2023.  Neurosurgery recommended q.4  hours neurochecks and BP less than 160/90.  Hospital medicine was consulted for transition of care and further medical management.    PAST MEDICAL HISTORY:   Essential hypertension  Hyperlipidemia  CAD  Diabetes mellitus type 2   CKD   Colon cancer s/p hemicolectomy   Renal cell carcinoma s/p nephrectomy  MOHSEN  Asthma   Cataract    PAST SURGICAL HISTORY:     Past Surgical History:   Procedure Laterality Date    CATARACT EXTRACTION Left 11/28/2022    CHOLECYSTECTOMY      COLON SURGERY      COLONOSCOPY N/A 4/27/2023    Procedure: COLONOSCOPY;  Surgeon: Jose Miguel Rosales MD;  Location: [facility] END[facility]COPY;  Service: Endoscopy;  Laterality: N/A;    excision of lipoma      HYSTERECTOMY, VAGINAL, WITH SALPINGO-OOPHORECTOMY      NEPHRECTOMY Left     TUBAL LIGATION         ALLERGIES:   Patient has no known allergies.    FAMILY HISTORY:   Reviewed and negative    SOCIAL HISTORY:   Denies tobacco, drug, and alcohol use    HOME MEDICATIONS:     Prior to Admission medications    Medication Sig Start Date End Date Taking? Authorizing Provider   albuterol (PROVENTIL/VENTOLIN HFA) 90 mcg/actuation inhaler Inhale 1 puff into the lungs every 4 (four) hours as needed for Wheezing. 9/27/23   Jeronimo Bird MD   aspirin (ECOTRIN) 81 MG EC tablet Take 81 mg by mouth once daily.    Provider, Historical   atorvastatin (LIPITOR) 40 MG tablet Take 2 tablets (80 mg total) by mouth once daily. 9/27/23   Jeronimo Bird MD   carvediloL (COREG) 3.125 MG tablet Take 1 tablet (3.125 mg total) by mouth 2 (two) times daily. 9/27/23   Jeronimo Bird MD   cetirizine (ZYRTEC) 10 MG tablet Take 1 tablet (10 mg total) by mouth once daily. 11/8/23 12/8/23  Jeronimo Bird MD   cloNIDine (CATAPRES) 0.2 MG tablet Take 1 tablet (0.2 mg total) by mouth 2 (two) times daily. Takes 2 times per day. Takes whole tablet 6/6/23 6/5/24  Juan Jose Urena DO   diclofenac (VOLTAREN) 50 MG EC tablet Take 1 tablet (50 mg total) by mouth 2 (two) times daily as needed (Pain).  9/27/23   Jeronimo Bird MD   diltiaZEM (CARDIZEM CD) 180 MG 24 hr capsule Take 1 capsule (180 mg total) by mouth once daily. 9/27/23   Jeronimo Bird MD   DULoxetine (CYMBALTA) 60 MG capsule TAKE 1 CAPSULE BY MOUTH DAILY. DO NOT CRUSH OR CHEW 10/18/23   Jeronimo Bird MD   empagliflozin (JARDIANCE) 25 mg tablet Take 1 tablet (25 mg total) by mouth once daily. 9/27/23 9/26/24  Jeronimo Bird MD   exenatide microspheres (BYDUREON BCISE) 2 mg/0.85 mL AtIn Inject 2 mg into the skin every 7 days. 8/16/23   Estrella Burnette MD   flash glucose scanning reader (FREESTYLE SMITHA 2 READER) Misc 1 each by Misc.(Non-Drug; Combo Route) route 4 (four) times daily with meals and nightly. 9/27/23   Jeronimo Bird MD   flash glucose sensor (FREESTYLE SMITHA 2 SENSOR) Kit 1 each by Misc.(Non-Drug; Combo Route) route 2 hours after meals and at bedtime. 9/27/23   Jeronimo Bird MD   furosemide (LASIX) 20 MG tablet TAKE ONE TABLET BY MOUTH DAILY AT 9 AM (VIAL) 7/13/23   Miranda Viveros, HEATHER   gabapentin (NEURONTIN) 600 MG tablet Take 1 tablet (600 mg total) by mouth once daily. 9/27/23   Jeronimo Bird MD   insulin (LANTUS SOLOSTAR U-100 INSULIN) glargine 100 units/mL SubQ pen Inject 65 Units into the skin every evening. 9/27/23 9/26/24  Jeronimo Bird MD   insulin aspart U-100 (NOVOLOG FLEXPEN U-100 INSULIN) 100 unit/mL (3 mL) InPn pen Inject 20 Units into the skin 3 (three) times daily with meals. 9/27/23   Jeronimo Bird MD   latanoprost 0.005 % ophthalmic solution Place 1 drop into both eyes once daily. 6/5/23   Juan Jose Urena,    losartan-hydrochlorothiazide 50-12.5 mg (HYZAAR) 50-12.5 mg per tablet Take 1 tablet by mouth once daily. 9/27/23   Jeronimo Bird MD   melatonin (MELATIN) 3 mg tablet Take 2 tablets (6 mg total) by mouth nightly as needed for Insomnia. 10/18/23   Jeronimo Bird MD   nebulizer accessories Kit 1 each by Misc.(Non-Drug; Combo Route) route 4 (four) times daily as needed (wheezing). 9/27/23   Pelon  "MD Jeronimo   nitroGLYCERIN (NITROSTAT) 0.4 MG SL tablet PLACE 1 TABLET UNDER THE TONGUE EVERY 5 MINUTES AS NEEDED FOR CHEST PAIN. GO TO ER AFTER THIRD DOSE 9/27/23   Jeronimo Bird MD   pen needle, diabetic 32 gauge x 5/32" Ndle 1 each by Misc.(Non-Drug; Combo Route) route 4 (four) times daily. 9/27/23 9/26/24  Jeronimo Bird MD   polyethylene glycol (GLYCOLAX) 17 gram PwPk Take 17 g by mouth once daily. 9/27/23   Jeronimo Bird MD   potassium chloride (K-TAB) 20 mEq TAKE ONE TABLET BY MOUTH DAILY AT 9 AM (VIAL) 10/11/23   Miranda Viveros FNP   vitamin D (VITAMIN D3) 1000 units Tab Take 2 tablets (2,000 Units total) by mouth Daily. 11/13/23 11/12/24  Jeronimo Bird MD       REVIEW OF SYSTEMS:   Except as documented, all other systems reviewed and negative     PHYSICAL EXAM:     VITAL SIGNS: 24 HRS MIN & MAX LAST   Temp  Min: 98.4 °F (36.9 °C)  Max: 98.5 °F (36.9 °C) 98.4 °F (36.9 °C)   BP  Min: 118/80  Max: 200/110 (!) 145/91   Pulse  Min: 69  Max: 116  88   Resp  Min: 11  Max: 37 17   SpO2  Min: 89 %  Max: 100 % 100 %       General appearance:  Obese female in no apparent distress.  HEENNT: Atraumatic head.   Lungs: Clear to auscultation bilaterally.   Heart: Regular rate and rhythm.    Abdomen: Soft, non-distended, non-tender. Bowel sounds are normal.   Extremities:  No deformities.   Skin: No Rash. Warm and dry.   Neuro: Awake, alert, and oriented. Motor and sensory exams grossly intact.  No facial droop.  No slurred speech.  Strength equal in bilateral upper and lower extremities 5/5 muscle strength  Psych/mental status: Appropriate mood and affect. Responds appropriately to questions.     LABS AND IMAGING:     Recent Labs   Lab 11/15/23  2249   WBC 6.81   RBC 5.67*   HGB 12.3   HCT 42.9   MCV 75.7*   MCH 21.7*   MCHC 28.7*   RDW 15.6      MPV 10.5*       Recent Labs   Lab 11/15/23  2249 11/16/23  0427    139   K 3.1* 3.1*   CO2 30 28   BUN 15.9 15.7   CREATININE 1.43* 1.32*   CALCIUM 9.0 9.2   MG " "1.80  --    ALBUMIN 3.1*  --    ALKPHOS 126  --    ALT 23  --    AST 30  --    BILITOT 0.7  --        Microbiology Results (last 7 days)       ** No results found for the last 168 hours. **             CT Head Without Contrast  Narrative: EXAMINATION:  CT HEAD WITHOUT CONTRAST    CLINICAL HISTORY:  Syncope;    TECHNIQUE:  Low dose axial CT images obtained throughout the head without intravenous contrast.  Axial, sagittal and coronal reconstructions were performed and interpreted.    DLP: 1000 mGycm    All CT scans at this location are performed using dose optimization techniques as appropriate to a performed exam including the following automated exposure control, adjustment of the mA and/or kV according to patient size and/or use of iterative reconstruction technique    COMPARISON:  CT head 10/25/2016    FINDINGS:  BRAIN: There is a small, 5-6 mm hyperdensity at the inferior insular cortex on the right new from 2016 exam but otherwise age indeterminate.  Gray-white differentiation is maintained.  Periventricular and subcortical white matter changes most compatible with chronic small vessel ischemic disease.  Aortic atherosclerosis.  No hemorrhage. No edema. No mass effect or midline shift.  The posterior fossa and midline structures are unremarkable.    VENTRICLES: Normal in size and configuration.    EXTRA-AXIAL: No abnormal extra-axial collections.    BONES: Calvarium is intact.    SINUSES AND MASTOIDS: Visualized paranasal sinuses and mastoid air cells are clear.  Impression: Small, faint hyperdensity at the right insular region.  This may represent small focus of hemorrhage or other nonspecific calcification.  This can be evaluated further with MRI of the brain.    The preliminary and final reports are concordant.    Per Dr. Lindsay's preliminary report: "Notifications: The results were discussed with the emergency room physician Dr. Chu prior to dictation at 2023-11-16 at 00:30 " "hours."    Electronically signed by: Brianna Ramires  Date:    11/16/2023  Time:    09:40  CT Head Without Contrast  Narrative: EXAMINATION:  CT HEAD WITHOUT CONTRAST    CLINICAL HISTORY:  Subarachnoid hemorrhage (SAH) suspected;    TECHNIQUE:  Low dose axial images were obtained through the head.  Coronal and sagittal reformations were also performed. Contrast was not administered.  DLP 1126.  Automated exposure control used.    COMPARISON:  11/15/2023 and 10/25/2016    FINDINGS:  Small focus of subtle increased density in the right posterior insular cortex region, is essentially unchanged in the interval.  No new areas of increased density or hemorrhage.  No intra or extra-axial mass.  No midline shift.  Ventricles, sulci, cisterns normal.  Partial persistent cavum septum pellucidum incidentally noted.  Normal gray-white differentiation.  Suspected mild chronic microvascular white matter change.  Cranium and extracranial structures unremarkable.  Impression: Focus of subtle increased density involving the right posterior insular cortex and subinsular brain, essentially identical to the prior exam from 5 hours earlier.  Focal area of hemorrhage not excluded.  This finding was not present on the previous exam from 2016.  Continued surveillance recommended.    Electronically signed by: Gerardo Tinoco MD  Date:    11/16/2023  Time:    08:54  X-Ray Chest 1 View  Narrative: EXAMINATION:  XR CHEST 1 VIEW    CLINICAL HISTORY:  Hypertension;    TECHNIQUE:  Single frontal view of the chest was performed.    COMPARISON:  12/26/2022    FINDINGS:  LINES AND TUBES: EKG/telemetry leads overlie the chest.    MEDIASTINUM AND CHELE: Cardiac silhouette is enlarged.    LUNGS: Vascular congestion without overt edema.    PLEURA:No pleural effusion. No pneumothorax.    OTHER: No acute osseous abnormality.  Impression: Vascular congestion without overt edema.    Electronically signed by: Brianna" Ike  Date:    11/16/2023  Time:    06:00        ASSESSMENT & PLAN:   Assessment:  Intracranial hemorrhage   Hypertensive emergency, improved   AYSHA on CKD  History of essential hypertension, hyperlipidemia, CAD, diabetes mellitus type 2, CKD, colon cancer s/p hemicolectomy, renal cell carcinoma s/p nephrectomy, MOHSEN, asthma, and cataracts      Plan:  Neurochecks q.4   BP less than 160/90 per neurosurgery   Home anti-hypersensitives resumed   Insulin sliding scale with Accu-checks  Continue appropriate home medications once med rec updated   Labs in a.m.    VTE Prophylaxis:  SCDs    __________________________________________________________________________  INPATIENT LIST OF MEDICATIONS     Scheduled Meds:   atorvastatin  80 mg Oral Daily    carvediloL  3.125 mg Oral BID    cloNIDine  0.2 mg Oral BID    DULoxetine  60 mg Oral Daily    hydroCHLOROthiazide  12.5 mg Oral Daily    labetaloL  20 mg Intravenous ED 1 Time    losartan  50 mg Oral Daily    potassium chloride  40 mEq Oral Once     Continuous Infusions:   clevidipine Stopped (11/16/23 0945)     PRN Meds:.dextrose 10%, dextrose 10%, glucagon (human recombinant), glucose, glucose, insulin aspart U-100, ipratropium, melatonin      I, [place] PA-C, have reviewed and discussed the case with Dr. Fadia Howe MD   Please see the following addendum for further assessment and plan from there attending MD.    11/16/2023    ________________________________________________________________________________    MD Addendum:  I, Dr. Fadia Howe MD assumed care of this patient today  For the patient encounter, I performed the substantive portion of the visit, I reviewed the PA documentation, treatment plan, and medical decision making.  I had face to face time with this patient       Discharge Planning and Disposition: No mobility needs. Ambulating well. Good social support system.   Anticipated discharge    All diagnosis and differential diagnosis have been  reviewed; assessment and plan has been documented; I have personally reviewed the labs and test results that are presently available; I have reviewed the patients medication list; I have reviewed the consulting providers response and recommendations. I have reviewed or attempted to review medical records based upon their availability.    All of the patient and family questions have been addressed and answered. Patient's is agreeable to the above stated plan. I will continue to monitor closely and make adjustments to medical management as needed.      11/16/2023

## 2023-11-17 LAB
ALBUMIN SERPL-MCNC: 2.9 G/DL (ref 3.4–4.8)
ALBUMIN/GLOB SERPL: 0.7 RATIO (ref 1.1–2)
ALP SERPL-CCNC: 119 UNIT/L (ref 40–150)
ALT SERPL-CCNC: 19 UNIT/L (ref 0–55)
AST SERPL-CCNC: 22 UNIT/L (ref 5–34)
BASOPHILS # BLD AUTO: 0.02 X10(3)/MCL
BASOPHILS NFR BLD AUTO: 0.3 %
BILIRUB SERPL-MCNC: 0.8 MG/DL
BUN SERPL-MCNC: 17 MG/DL (ref 9.8–20.1)
CALCIUM SERPL-MCNC: 9.1 MG/DL (ref 8.4–10.2)
CHLORIDE SERPL-SCNC: 102 MMOL/L (ref 98–107)
CK SERPL-CCNC: 432 U/L (ref 29–168)
CO2 SERPL-SCNC: 28 MMOL/L (ref 23–31)
CREAT SERPL-MCNC: 1.51 MG/DL (ref 0.55–1.02)
EOSINOPHIL # BLD AUTO: 0.15 X10(3)/MCL (ref 0–0.9)
EOSINOPHIL NFR BLD AUTO: 2.1 %
ERYTHROCYTE [DISTWIDTH] IN BLOOD BY AUTOMATED COUNT: 16.1 % (ref 11.5–17)
GFR SERPLBLD CREATININE-BSD FMLA CKD-EPI: 38 MLS/MIN/1.73/M2
GLOBULIN SER-MCNC: 4.1 GM/DL (ref 2.4–3.5)
GLUCOSE SERPL-MCNC: 210 MG/DL (ref 82–115)
HCT VFR BLD AUTO: 40.9 % (ref 37–47)
HGB BLD-MCNC: 12.1 G/DL (ref 12–16)
IMM GRANULOCYTES # BLD AUTO: 0.03 X10(3)/MCL (ref 0–0.04)
IMM GRANULOCYTES NFR BLD AUTO: 0.4 %
LYMPHOCYTES # BLD AUTO: 2 X10(3)/MCL (ref 0.6–4.6)
LYMPHOCYTES NFR BLD AUTO: 28.5 %
MAGNESIUM SERPL-MCNC: 2.1 MG/DL (ref 1.6–2.6)
MCH RBC QN AUTO: 22.2 PG (ref 27–31)
MCHC RBC AUTO-ENTMCNC: 29.6 G/DL (ref 33–36)
MCV RBC AUTO: 75.2 FL (ref 80–94)
MONOCYTES # BLD AUTO: 0.81 X10(3)/MCL (ref 0.1–1.3)
MONOCYTES NFR BLD AUTO: 11.5 %
NEUTROPHILS # BLD AUTO: 4.01 X10(3)/MCL (ref 2.1–9.2)
NEUTROPHILS NFR BLD AUTO: 57.2 %
NRBC BLD AUTO-RTO: 0 %
PHOSPHATE SERPL-MCNC: 3.1 MG/DL (ref 2.3–4.7)
PLATELET # BLD AUTO: 198 X10(3)/MCL (ref 130–400)
PMV BLD AUTO: 10.6 FL (ref 7.4–10.4)
POCT GLUCOSE: 143 MG/DL (ref 70–110)
POCT GLUCOSE: 146 MG/DL (ref 70–110)
POCT GLUCOSE: 156 MG/DL (ref 70–110)
POCT GLUCOSE: 156 MG/DL (ref 70–110)
POTASSIUM SERPL-SCNC: 4 MMOL/L (ref 3.5–5.1)
PROT SERPL-MCNC: 7 GM/DL (ref 5.8–7.6)
RBC # BLD AUTO: 5.44 X10(6)/MCL (ref 4.2–5.4)
SODIUM SERPL-SCNC: 141 MMOL/L (ref 136–145)
WBC # SPEC AUTO: 7.02 X10(3)/MCL (ref 4.5–11.5)

## 2023-11-17 PROCEDURE — 82550 ASSAY OF CK (CPK): CPT | Performed by: INTERNAL MEDICINE

## 2023-11-17 PROCEDURE — 11000001 HC ACUTE MED/SURG PRIVATE ROOM

## 2023-11-17 PROCEDURE — 27000190 HC CPAP FULL FACE MASK W/VALVE

## 2023-11-17 PROCEDURE — 25000003 PHARM REV CODE 250: Performed by: STUDENT IN AN ORGANIZED HEALTH CARE EDUCATION/TRAINING PROGRAM

## 2023-11-17 PROCEDURE — 99900035 HC TECH TIME PER 15 MIN (STAT)

## 2023-11-17 PROCEDURE — 85025 COMPLETE CBC W/AUTO DIFF WBC: CPT | Performed by: PHYSICIAN ASSISTANT

## 2023-11-17 PROCEDURE — 63600175 PHARM REV CODE 636 W HCPCS: Performed by: STUDENT IN AN ORGANIZED HEALTH CARE EDUCATION/TRAINING PROGRAM

## 2023-11-17 PROCEDURE — 25000003 PHARM REV CODE 250: Performed by: NURSE PRACTITIONER

## 2023-11-17 PROCEDURE — 84100 ASSAY OF PHOSPHORUS: CPT | Performed by: INTERNAL MEDICINE

## 2023-11-17 PROCEDURE — 97166 OT EVAL MOD COMPLEX 45 MIN: CPT

## 2023-11-17 PROCEDURE — 27100171 HC OXYGEN HIGH FLOW UP TO 24 HOURS

## 2023-11-17 PROCEDURE — 83735 ASSAY OF MAGNESIUM: CPT | Performed by: INTERNAL MEDICINE

## 2023-11-17 PROCEDURE — 63600175 PHARM REV CODE 636 W HCPCS: Performed by: INTERNAL MEDICINE

## 2023-11-17 PROCEDURE — 97162 PT EVAL MOD COMPLEX 30 MIN: CPT

## 2023-11-17 PROCEDURE — 94660 CPAP INITIATION&MGMT: CPT

## 2023-11-17 PROCEDURE — 94761 N-INVAS EAR/PLS OXIMETRY MLT: CPT

## 2023-11-17 PROCEDURE — 25000003 PHARM REV CODE 250: Performed by: INTERNAL MEDICINE

## 2023-11-17 PROCEDURE — 99900031 HC PATIENT EDUCATION (STAT)

## 2023-11-17 PROCEDURE — 80053 COMPREHEN METABOLIC PANEL: CPT | Performed by: PHYSICIAN ASSISTANT

## 2023-11-17 PROCEDURE — 63600175 PHARM REV CODE 636 W HCPCS

## 2023-11-17 RX ORDER — HYDROCHLOROTHIAZIDE 25 MG/1
25 TABLET ORAL DAILY
Status: DISCONTINUED | OUTPATIENT
Start: 2023-11-18 | End: 2023-11-17

## 2023-11-17 RX ORDER — ONDANSETRON 2 MG/ML
INJECTION INTRAMUSCULAR; INTRAVENOUS
Status: COMPLETED
Start: 2023-11-17 | End: 2023-11-17

## 2023-11-17 RX ORDER — HYDRALAZINE HYDROCHLORIDE 25 MG/1
25 TABLET, FILM COATED ORAL EVERY 8 HOURS
Status: DISCONTINUED | OUTPATIENT
Start: 2023-11-17 | End: 2023-11-17

## 2023-11-17 RX ORDER — ENOXAPARIN SODIUM 100 MG/ML
40 INJECTION SUBCUTANEOUS EVERY 12 HOURS
Status: DISCONTINUED | OUTPATIENT
Start: 2023-11-17 | End: 2023-11-19 | Stop reason: HOSPADM

## 2023-11-17 RX ORDER — HYDRALAZINE HYDROCHLORIDE 50 MG/1
50 TABLET, FILM COATED ORAL EVERY 8 HOURS
Status: DISCONTINUED | OUTPATIENT
Start: 2023-11-17 | End: 2023-11-19 | Stop reason: HOSPADM

## 2023-11-17 RX ORDER — GABAPENTIN 300 MG/1
600 CAPSULE ORAL NIGHTLY
Status: DISCONTINUED | OUTPATIENT
Start: 2023-11-17 | End: 2023-11-17

## 2023-11-17 RX ORDER — AMLODIPINE BESYLATE 5 MG/1
10 TABLET ORAL DAILY
Status: DISCONTINUED | OUTPATIENT
Start: 2023-11-17 | End: 2023-11-19 | Stop reason: HOSPADM

## 2023-11-17 RX ORDER — HYDROCHLOROTHIAZIDE 12.5 MG/1
12.5 TABLET ORAL ONCE
Status: DISCONTINUED | OUTPATIENT
Start: 2023-11-17 | End: 2023-11-17

## 2023-11-17 RX ORDER — ONDANSETRON 2 MG/ML
4 INJECTION INTRAMUSCULAR; INTRAVENOUS EVERY 6 HOURS PRN
Status: DISCONTINUED | OUTPATIENT
Start: 2023-11-17 | End: 2023-11-19 | Stop reason: HOSPADM

## 2023-11-17 RX ORDER — CARVEDILOL 12.5 MG/1
25 TABLET ORAL 2 TIMES DAILY
Status: DISCONTINUED | OUTPATIENT
Start: 2023-11-17 | End: 2023-11-19 | Stop reason: HOSPADM

## 2023-11-17 RX ORDER — LOSARTAN POTASSIUM 50 MG/1
50 TABLET ORAL DAILY
Status: DISCONTINUED | OUTPATIENT
Start: 2023-11-18 | End: 2023-11-18

## 2023-11-17 RX ORDER — HYDROCHLOROTHIAZIDE 12.5 MG/1
12.5 TABLET ORAL DAILY
Status: DISCONTINUED | OUTPATIENT
Start: 2023-11-18 | End: 2023-11-18

## 2023-11-17 RX ADMIN — MUPIROCIN: 20 OINTMENT TOPICAL at 09:11

## 2023-11-17 RX ADMIN — CARVEDILOL 25 MG: 12.5 TABLET, FILM COATED ORAL at 08:11

## 2023-11-17 RX ADMIN — CLONIDINE HYDROCHLORIDE 0.2 MG: 0.2 TABLET ORAL at 08:11

## 2023-11-17 RX ADMIN — HYDRALAZINE HYDROCHLORIDE 25 MG: 25 TABLET, FILM COATED ORAL at 11:11

## 2023-11-17 RX ADMIN — INSULIN DETEMIR 12 UNITS: 100 INJECTION, SOLUTION SUBCUTANEOUS at 08:11

## 2023-11-17 RX ADMIN — ONDANSETRON 4 MG: 2 INJECTION INTRAMUSCULAR; INTRAVENOUS at 01:11

## 2023-11-17 RX ADMIN — HYDRALAZINE HYDROCHLORIDE 50 MG: 50 TABLET, FILM COATED ORAL at 10:11

## 2023-11-17 RX ADMIN — DULOXETINE HYDROCHLORIDE 60 MG: 30 CAPSULE, DELAYED RELEASE ORAL at 09:11

## 2023-11-17 RX ADMIN — HYDRALAZINE HYDROCHLORIDE 20 MG: 20 INJECTION INTRAMUSCULAR; INTRAVENOUS at 11:11

## 2023-11-17 RX ADMIN — INSULIN ASPART 2 UNITS: 100 INJECTION, SOLUTION INTRAVENOUS; SUBCUTANEOUS at 11:11

## 2023-11-17 RX ADMIN — HYDROCHLOROTHIAZIDE 12.5 MG: 12.5 TABLET ORAL at 09:11

## 2023-11-17 RX ADMIN — ENOXAPARIN SODIUM 40 MG: 40 INJECTION SUBCUTANEOUS at 09:11

## 2023-11-17 RX ADMIN — ATORVASTATIN CALCIUM 80 MG: 40 TABLET, FILM COATED ORAL at 09:11

## 2023-11-17 RX ADMIN — LATANOPROST 1 DROP: 50 SOLUTION OPHTHALMIC at 09:11

## 2023-11-17 RX ADMIN — MUPIROCIN: 20 OINTMENT TOPICAL at 08:11

## 2023-11-17 RX ADMIN — CARVEDILOL 12.5 MG: 12.5 TABLET, FILM COATED ORAL at 09:11

## 2023-11-17 RX ADMIN — LOSARTAN POTASSIUM 50 MG: 50 TABLET, FILM COATED ORAL at 09:11

## 2023-11-17 RX ADMIN — INSULIN DETEMIR 12 UNITS: 100 INJECTION, SOLUTION SUBCUTANEOUS at 09:11

## 2023-11-17 RX ADMIN — CLONIDINE HYDROCHLORIDE 0.2 MG: 0.2 TABLET ORAL at 09:11

## 2023-11-17 RX ADMIN — AMLODIPINE BESYLATE 10 MG: 5 TABLET ORAL at 09:11

## 2023-11-17 RX ADMIN — ENOXAPARIN SODIUM 40 MG: 40 INJECTION SUBCUTANEOUS at 08:11

## 2023-11-17 NOTE — PT/OT/SLP EVAL
Occupational Therapy  Evaluation    Name: Elissa Freeman  MRN: [id number]  Recent Surgery: * No surgery found *      Recommendations:     Discharge therapy intensity: Low Intensity Therapy   Discharge Equipment Recommendations:  none  Barriers to discharge:   (high BP)    Assessment:     Elissa Freeman is a 68 y.o. female with a medical diagnosis of fall with ICH, hypertensive emergency. Pt with high blood pressure somewhat limiting session today. Pt required SBA for bed mobility and scooting to HOB. RN provided BP medicine on therapy arrival but BP remained high, ok per RN to sit EOB and scoot to HOB. Will further assess ADLs in future session pending BP control. Per pt report she required MIN-MOD A from son for ADLs prior to admission. She presents with the following performance deficits affecting function: impaired self care skills, impaired functional mobility, weakness, impaired endurance, decreased upper extremity function.     Rehab Prognosis: Good; patient would benefit from acute skilled OT services to address these deficits and reach maximum level of function.       Plan:     Patient to be seen 4 x/week to address the above listed problems via self-care/home management, therapeutic activities, therapeutic exercises  Plan of Care Expires: 12/17/23  Plan of Care Reviewed with: patient, daughter    Subjective     Chief Complaint: I want to get out of bed  Patient/Family Comments/goals: PLOF, controlled BP    Occupational Profile:  Living Environment: Pt lives with son in [facility] [address] and a rail on both sides per pt. Pt has tub shower with no DME other than quad cane for ambulation.  Previous level of function: Pt required MIN MOD A for ADLs from her son like LBD, bathing.  Roles and Routines: Mother  Equipment Used at Home: cane, quad  Assistance upon Discharge: Son    Pain/Comfort:  Pain Rating 1: 0/10    Patients cultural, spiritual, Oriental orthodox conflicts given the current situation: no    Objective:     OT  communicated with RN prior to session.      Patient was found supine with pulse ox (continuous), telemetry, blood pressure cuff, peripheral IV upon OT entry to room.    General Precautions: Standard,    Orthopedic Precautions: N/A  Braces: N/A    Vital Signs: Blood Pressure: 178/108 on arrival after meds 179/124 ok per RN to sit EOB  HR: 96  Sp02: 100%  Respiratory Status: on room air    Bed Mobility:    Patient completed Scooting/Bridging with stand by assistance  Patient completed Supine to Sit with stand by assistance    Functional Mobility/Transfers:  Not performed 2/2 BP outside of parameters    Activities of Daily Living:  Lower Body Dressing: maximal assistance don socks at bed level      Functional Cognition:  Intact  Pot performed opposition testing and finger to nose testing with no deficits noted    Visual Perceptual Skills:  Intact  Pt able to track in all four directions    Upper Extremity Function:  Right Upper Extremity:   WFL    Left Upper Extremity:  WFL except MMT shoulder flexion 3/5 elbow flexion/extension 3/5    Balance:   Sitting EOB intact standing not assessed 2/2 BP out of parameters    Therapeutic Positioning  Risk for acquired pressure injuries is increased due to impaired mobility and obesity.    OT interventions performed during the course of today's session:   Education was provided on benefits of and recommendations for therapeutic positioning    Skin assessment: all bony prominences were assessed    Findings: no redness or breakdown noted    OT recommendations for therapeutic positioning throughout hospitalization:   Follow [facility] Pressure Injury Prevention Protocol      Patient Education:  Patient and daughter/s were provided with verbal education education regarding OT role/goals/POC and fall prevention.  Understanding was verbalized.     Patient left HOB elevated with all lines intact, call button in reach, RN Rosalia notified, and daughter present    GOALS:   Multidisciplinary  Problems       Occupational Therapy Goals          Problem: Occupational Therapy    Goal Priority Disciplines Outcome Interventions   Occupational Therapy Goal     OT, PT/OT Ongoing, Progressing    Description: Goals to be met by: 12/17/2023     Patient will increase functional independence with ADLs by performing:    UE Dressing with Modified [place].  LE Dressing with Minimal Assistance.  Toileting from toilet with Modified [place] for hygiene and clothing management.   Bathing from  shower chair/bench with Modified [place].  Toilet transfer to toilet with Modified [place].  Increased functional strength to 4/5 for increased ind in ADLs.                         History:     Past Medical History:   Diagnosis Date    Asthma     Cancer     CKD (chronic kidney disease)     Coronary artery disease     Diabetes mellitus     Hyperlipidemia     Hypertension     Obesity, unspecified     MOHSEN (obstructive sleep apnea)     Unspecified cataract          Past Surgical History:   Procedure Laterality Date    CATARACT EXTRACTION Left 11/28/2022    CHOLECYSTECTOMY      COLON SURGERY      COLONOSCOPY N/A 4/27/2023    Procedure: COLONOSCOPY;  Surgeon: Jose Miguel Rosales MD;  Location: [facility] ENDOSCOPY;  Service: Endoscopy;  Laterality: N/A;    excision of lipoma      HYSTERECTOMY, VAGINAL, WITH SALPINGO-OOPHORECTOMY      NEPHRECTOMY Left     TUBAL LIGATION         Time Tracking:     OT Date of Treatment:    OT Start Time: 1133  OT Stop Time: 1152  OT Total Time (min): 19 min    Billable Minutes:Evaluation MOD    11/17/2023

## 2023-11-17 NOTE — PLAN OF CARE
Problem: Physical Therapy  Goal: Physical Therapy Goal  Description: Goals to be met by: 23     Patient will increase functional independence with mobility by performin. Supine to sit with [place]  2. Sit to supine with [place]  3. Sit to stand transfer with Modified [place]  4. Gait  x 150 feet with Modified [place] using Quad Cane.   5. Ascend/descend 5 stair with bilateral Handrails Stand-by Assistance using No Assistive Device.     Outcome: Ongoing, Progressing

## 2023-11-17 NOTE — NURSING
Nurses Note -- 4 Eyes      11/16/2023   9:39 PM      Skin assessed during: Q Shift Change      [x] No Altered Skin Integrity Present    [x]Prevention Measures Documented      [] Yes- Altered Skin Integrity Present or Discovered   [] LDA Added if Not in Epic (Describe Wound)   [] New Altered Skin Integrity was Present on Admit and Documented in LDA   [] Wound Image Taken    Wound Care Consulted? No    Attending Nurse:  Karina Maldonado RN/Staff Member:   Anthony ANDERSON

## 2023-11-17 NOTE — PT/OT/SLP EVAL
Physical Therapy Evaluation    Patient Name:  Elissa Freeman   MRN:  [id number]    Recommendations:     Discharge therapy intensity: Low Intensity Therapy   Discharge Equipment Recommendations: none   Barriers to discharge:  medical dx, impaired mobility    Assessment:     Elissa Freeman is a 68 y.o. female admitted with a medical diagnosis of HTN emergency, encephalopathy, ICH. Pt fell OOB.  She presents with the following impairments/functional limitations: weakness, gait instability, impaired endurance, impaired balance, decreased lower extremity function, impaired self care skills, impaired functional mobility. Patient with fair tolerance to PT eval. Pt with increased BP, RN notified and became present. RN administered IV meds. BP still slightly increased but RN ok'd mobility while closely monitoring BP. Pt req Kaitlyn for bed mobility and sit<>stand. BP remained increased therefore returned b2b. Will progress as able/as appropriate.     Rehab Prognosis: Good; patient would benefit from acute skilled PT services to address these deficits and reach maximum level of function.    Recent Surgery: * No surgery found *      Plan:     During this hospitalization, patient to be seen 6 x/week to address the identified rehab impairments via gait training, therapeutic activities, therapeutic exercises and progress toward the following goals:    Plan of Care Expires:  12/17/23    Subjective     Chief Complaint: n/a  Patient/Family Comments/goals: to go home   Pain/Comfort:  Pain Rating 1: 0/10    Patients cultural, spiritual, Holiness conflicts given the current situation: no    Living Environment:  Pt lives with son in a [facility]  Prior to admission, patients level of function was independent with mobility, assistance req for some ADLs.    Equipment used at home: cane, quad.  DME owned (not currently used):  3 pt and 4 pt quad cane .    Upon discharge, patient will have assistance from family.    Objective:     Communicated with  NSG prior to session.  Patient found HOB elevated with blood pressure cuff, pulse ox (continuous), telemetry  upon PT entry to room.    General Precautions: Standard,  (BP<160/90)  Orthopedic Precautions:N/A   Braces: N/A  Respiratory Status: Room air  Blood Pressure: 178/108 prior to ax, 174/100 prior to ax after IV meds administered, 179/124 with ax   *pt asymptomatic throughout  RN present and aware      Exams:  Cognitive Exam:  Patient is oriented to Person, Place, Time, and Situation  RLE Strength: WFL  LLE Strength: WFL  Skin integrity: Visible skin intact      Functional Mobility:  Bed Mobility:     Supine to Sit: minimum assistance  Sit to Supine: moderate assistance  Transfers:     Sit to Stand:  minimum assistance with hand-held assist  Pre-gait/Gait: pt able to take lateral steps along EOB with B HHA; good foot clearance; limited 2/2 increased BP and needing to return b2b      Treatment & Education:    Patient and daughter/s were provided with verbal education  regarding POC, mobility, safety.  Understanding was verbalized.     Patient left HOB elevated with all lines intact, call button in reach, and daughter, MD, and RN present.    GOALS:   Multidisciplinary Problems       Physical Therapy Goals          Problem: Physical Therapy    Goal Priority Disciplines Outcome Goal Variances Interventions   Physical Therapy Goal     PT, PT/OT Ongoing, Progressing     Description: Goals to be met by: 23     Patient will increase functional independence with mobility by performin. Supine to sit with [place]  2. Sit to supine with [place]  3. Sit to stand transfer with Modified [place]  4. Gait  x 150 feet with Modified [place] using Quad Cane.   5. Ascend/descend 5 stair with bilateral Handrails Stand-by Assistance using No Assistive Device.                          History:     Past Medical History:   Diagnosis Date    Asthma     Cancer     CKD (chronic kidney disease)     Coronary  artery disease     Diabetes mellitus     Hyperlipidemia     Hypertension     Obesity, unspecified     MOHSEN (obstructive sleep apnea)     Unspecified cataract        Past Surgical History:   Procedure Laterality Date    CATARACT EXTRACTION Left 11/28/2022    CHOLECYSTECTOMY      COLON SURGERY      COLONOSCOPY N/A 4/27/2023    Procedure: COLONOSCOPY;  Surgeon: Jose Miguel Rosales MD;  Location: [facility] ENDOSCOPY;  Service: Endoscopy;  Laterality: N/A;    excision of lipoma      HYSTERECTOMY, VAGINAL, WITH SALPINGO-OOPHORECTOMY      NEPHRECTOMY Left     TUBAL LIGATION         Time Tracking:     PT Received On: 11/17/23  PT Start Time: 1135     PT Stop Time: 1154  PT Total Time (min): 19 min     Billable Minutes: Evaluation mod      11/17/2023

## 2023-11-17 NOTE — CONSULTS
Inpatient consult to Cardiology  Consult performed by: Juliocesar Don FNP  Consult ordered by: Fadia Howe MD  Reason for consult: Hypertension        [facility] - [facility]    Cardiology  Consult Note    Patient Name: Elissa Freeman  MRN: [id number]  Admission Date: 11/16/2023  Hospital Length of Stay: 1 days  Code Status: No Order   Attending Provider: Fadia Howe MD   Consulting Provider: HEATHER Swann  Primary Care Physician: Jeronimo Bird MD  Principal Problem:<principal problem not specified>    Patient information was obtained from patient, past medical records, ER records, and primary team.     Subjective:   Consultation Reason: Hypertension    HPI:   Ms. Freeman is a 68 year old female, known to [facility] at [facility], who presented to the hospital due to elevated BP. Patient noted to be in hypertensive emergency with BP in and around the 100/100 range. She was admitted to ICU and placed on Cleviprex infusion for acute BP Control. CT Head revealed evidence of of possible small focus hemorrhage at the right insular region and hypertensive encephalopathy with confusion and dizziness. Patient with multiple co-morbidities including MOHSEN/CPAP, Morbid Obesity, DM II, Hypertension, and CKD. [facility] is consulted to assist with BP Regulation.    PMH: Hypertension, Elevated BMI/Morbid Obesity, CKD Stage IIIB, MOHSEN/CPAP, Anemia, DM II, Asthma, Hyperlipidemia, CAD  PSH: Cataract Extraction, Cholecystectomy, Colon Surgery, Excision Lipoma, Nephrectomy, Tubal Ligation, Hysterectomy  Family History: None  Social History: Tobacco- Negative, Alcohol- Negative, Substance Abuse- Negative    Previous Cardiac Diagnostics:   Coronary Angiogram (9.29.21):  LM- Negative/Patent, LAD- 20-30% Proximal to Mid LAD, D1- LI (Small), LCX- Large LI, OM1- LI, LPDA- LI, RCA- LI with 50% Mid Stenosis, PLB- Small Vessel  Nonobstructive CAD, Tortuous Vessels.    Echocardiogram (4.27.21):  EF 50-55%    Review of patient's allergies  indicates:  No Known Allergies    No current facility-administered medications on file prior to encounter.     Current Outpatient Medications on File Prior to Encounter   Medication Sig    albuterol (PROVENTIL/VENTOLIN HFA) 90 mcg/actuation inhaler Inhale 1 puff into the lungs every 4 (four) hours as needed for Wheezing.    aspirin (ECOTRIN) 81 MG EC tablet Take 81 mg by mouth once daily.    atorvastatin (LIPITOR) 40 MG tablet Take 2 tablets (80 mg total) by mouth once daily.    carvediloL (COREG) 3.125 MG tablet Take 1 tablet (3.125 mg total) by mouth 2 (two) times daily.    cetirizine (ZYRTEC) 10 MG tablet Take 1 tablet (10 mg total) by mouth once daily.    cloNIDine (CATAPRES) 0.2 MG tablet Take 1 tablet (0.2 mg total) by mouth 2 (two) times daily. Takes 2 times per day. Takes whole tablet    diclofenac (VOLTAREN) 50 MG EC tablet Take 1 tablet (50 mg total) by mouth 2 (two) times daily as needed (Pain).    diltiaZEM (CARDIZEM CD) 180 MG 24 hr capsule Take 1 capsule (180 mg total) by mouth once daily.    DULoxetine (CYMBALTA) 60 MG capsule TAKE 1 CAPSULE BY MOUTH DAILY. DO NOT CRUSH OR CHEW    empagliflozin (JARDIANCE) 25 mg tablet Take 1 tablet (25 mg total) by mouth once daily.    exenatide microspheres (BYDUREON BCISE) 2 mg/0.85 mL AtIn Inject 2 mg into the skin every 7 days.    flash glucose scanning reader (FREESTYLE SMITHA 2 READER) Misc 1 each by Misc.(Non-Drug; Combo Route) route 4 (four) times daily with meals and nightly.    flash glucose sensor (FREESTYLE SMITHA 2 SENSOR) Kit 1 each by Misc.(Non-Drug; Combo Route) route 2 hours after meals and at bedtime.    furosemide (LASIX) 20 MG tablet TAKE ONE TABLET BY MOUTH DAILY AT 9 AM (VIAL)    gabapentin (NEURONTIN) 600 MG tablet Take 1 tablet (600 mg total) by mouth once daily.    insulin (LANTUS SOLOSTAR U-100 INSULIN) glargine 100 units/mL SubQ pen Inject 65 Units into the skin every evening.    insulin aspart U-100 (NOVOLOG FLEXPEN U-100 INSULIN) 100  "unit/mL (3 mL) InPn pen Inject 20 Units into the skin 3 (three) times daily with meals.    latanoprost 0.005 % ophthalmic solution Place 1 drop into both eyes once daily.    losartan-hydrochlorothiazide 50-12.5 mg (HYZAAR) 50-12.5 mg per tablet Take 1 tablet by mouth once daily.    melatonin (MELATIN) 3 mg tablet Take 2 tablets (6 mg total) by mouth nightly as needed for Insomnia.    nebulizer accessories Kit 1 each by Misc.(Non-Drug; Combo Route) route 4 (four) times daily as needed (wheezing).    nitroGLYCERIN (NITROSTAT) 0.4 MG SL tablet PLACE 1 TABLET UNDER THE TONGUE EVERY 5 MINUTES AS NEEDED FOR CHEST PAIN. GO TO ER AFTER THIRD DOSE    pen needle, diabetic 32 gauge x 5/32" Ndle 1 each by Misc.(Non-Drug; Combo Route) route 4 (four) times daily.    polyethylene glycol (GLYCOLAX) 17 gram PwPk Take 17 g by mouth once daily.    potassium chloride (K-TAB) 20 mEq TAKE ONE TABLET BY MOUTH DAILY AT 9 AM (VIAL)    vitamin D (VITAMIN D3) 1000 units Tab Take 2 tablets (2,000 Units total) by mouth Daily.     Review of Systems   Respiratory:  Negative for chest tightness and shortness of breath.    Cardiovascular:  Negative for chest pain.   Neurological:  Negative for weakness.   All other systems reviewed and are negative.    Objective:     Vital Signs (Most Recent):  Temp: 98.2 °F (36.8 °C) (11/17/23 1200)  Pulse: 107 (11/17/23 1200)  Resp: 20 (11/17/23 1200)  BP: (!) 150/84 (11/17/23 1200)  SpO2: 99 % (11/17/23 1145) Vital Signs (24h Range):  Temp:  [98.2 °F (36.8 °C)-99.4 °F (37.4 °C)] 98.2 °F (36.8 °C)  Pulse:  [] 107  Resp:  [13-20] 20  SpO2:  [97 %-100 %] 99 %  BP: (124-179)/() 150/84     Weight: (!) 145 kg (319 lb 10.7 oz)  Body mass index is 54.87 kg/m².    SpO2: 99 %         Intake/Output Summary (Last 24 hours) at 11/17/2023 1235  Last data filed at 11/17/2023 0532  Gross per 24 hour   Intake 501.29 ml   Output 600 ml   Net -98.71 ml       Lines/Drains/Airways       Drain  Duration             Female " External Urinary Catheter 11/16/23 2000 <1 day              Peripheral Intravenous Line  Duration                  Peripheral IV - Single Lumen 11/16/23 0200 20 G Right Antecubital 1 day         Peripheral IV - Single Lumen 11/16/23 0300 20 G Anterior;Left;Proximal Forearm 1 day                  Significant Labs:  Recent Results (from the past 72 hour(s))   Comprehensive Metabolic Panel    Collection Time: 11/15/23 10:49 PM   Result Value Ref Range    Sodium Level 143 136 - 145 mmol/L    Potassium Level 3.1 (L) 3.5 - 5.1 mmol/L    Chloride 101 98 - 107 mmol/L    Carbon Dioxide 30 23 - 31 mmol/L    Glucose Level 71 (L) 82 - 115 mg/dL    Blood Urea Nitrogen 15.9 9.8 - 20.1 mg/dL    Creatinine 1.43 (H) 0.55 - 1.02 mg/dL    Calcium Level Total 9.0 8.4 - 10.2 mg/dL    Protein Total 7.4 5.8 - 7.6 gm/dL    Albumin Level 3.1 (L) 3.4 - 4.8 g/dL    Globulin 4.3 (H) 2.4 - 3.5 gm/dL    Albumin/Globulin Ratio 0.7 (L) 1.1 - 2.0 ratio    Bilirubin Total 0.7 <=1.5 mg/dL    Alkaline Phosphatase 126 40 - 150 unit/L    Alanine Aminotransferase 23 0 - 55 unit/L    Aspartate Aminotransferase 30 5 - 34 unit/L    eGFR 41 mls/min/1.73/m2   CK    Collection Time: 11/15/23 10:49 PM   Result Value Ref Range    Creatine Kinase 876 (H) 29 - 168 U/L   CK-MB    Collection Time: 11/15/23 10:49 PM   Result Value Ref Range    Creatine Kinase MB 8.7 (H) <=3.4 ng/mL   Troponin I    Collection Time: 11/15/23 10:49 PM   Result Value Ref Range    Troponin-I 0.027 0.000 - 0.045 ng/mL   BNP    Collection Time: 11/15/23 10:49 PM   Result Value Ref Range    Natriuretic Peptide 78.5 <=100.0 pg/mL   Ammonia    Collection Time: 11/15/23 10:49 PM   Result Value Ref Range    Ammonia Level 28.5 18.0 - 72.0 umol/L   CBC with Differential    Collection Time: 11/15/23 10:49 PM   Result Value Ref Range    WBC 6.81 4.50 - 11.50 x10(3)/mcL    RBC 5.67 (H) 4.20 - 5.40 x10(6)/mcL    Hgb 12.3 12.0 - 16.0 g/dL    Hct 42.9 37.0 - 47.0 %    MCV 75.7 (L) 80.0 - 94.0 fL    MCH  21.7 (L) 27.0 - 31.0 pg    MCHC 28.7 (L) 33.0 - 36.0 g/dL    RDW 15.6 11.5 - 17.0 %    Platelet 221 130 - 400 x10(3)/mcL    MPV 10.5 (H) 7.4 - 10.4 fL    Neut % 66.1 %    Lymph % 22.3 %    Mono % 9.7 %    Eos % 1.3 %    Basophil % 0.3 %    Lymph # 1.52 0.6 - 4.6 x10(3)/mcL    Neut # 4.50 2.1 - 9.2 x10(3)/mcL    Mono # 0.66 0.1 - 1.3 x10(3)/mcL    Eos # 0.09 0 - 0.9 x10(3)/mcL    Baso # 0.02 <=0.2 x10(3)/mcL    IG# 0.02 0 - 0.04 x10(3)/mcL    IG% 0.3 %    NRBC% 0.0 %   Magnesium    Collection Time: 11/15/23 10:49 PM   Result Value Ref Range    Magnesium Level 1.80 1.60 - 2.60 mg/dL   POCT glucose    Collection Time: 11/16/23  4:25 AM   Result Value Ref Range    POCT Glucose 81 70 - 110 mg/dL   Hemoglobin A1c if not done in past 3 months    Collection Time: 11/16/23  4:27 AM   Result Value Ref Range    Hemoglobin A1c 7.6 (H) <=7.0 %    Estimated Average Glucose 171.4 mg/dL   Basic Metabolic Panel    Collection Time: 11/16/23  4:27 AM   Result Value Ref Range    Sodium Level 139 136 - 145 mmol/L    Potassium Level 3.1 (L) 3.5 - 5.1 mmol/L    Chloride 101 98 - 107 mmol/L    Carbon Dioxide 28 23 - 31 mmol/L    Glucose Level 83 82 - 115 mg/dL    Blood Urea Nitrogen 15.7 9.8 - 20.1 mg/dL    Creatinine 1.32 (H) 0.55 - 1.02 mg/dL    BUN/Creatinine Ratio 12     Calcium Level Total 9.2 8.4 - 10.2 mg/dL    Anion Gap 10.0 mEq/L    eGFR 45 mls/min/1.73/m2   Magnesium    Collection Time: 11/16/23  4:27 AM   Result Value Ref Range    Magnesium Level 1.90 1.60 - 2.60 mg/dL   POCT glucose    Collection Time: 11/16/23  8:05 AM   Result Value Ref Range    POCT Glucose 82 70 - 110 mg/dL   Basic Metabolic Panel    Collection Time: 11/16/23  8:45 PM   Result Value Ref Range    Sodium Level 142 136 - 145 mmol/L    Potassium Level 3.7 3.5 - 5.1 mmol/L    Chloride 101 98 - 107 mmol/L    Carbon Dioxide 30 23 - 31 mmol/L    Glucose Level 212 (H) 82 - 115 mg/dL    Blood Urea Nitrogen 17.2 9.8 - 20.1 mg/dL    Creatinine 1.59 (H) 0.55 - 1.02  mg/dL    BUN/Creatinine Ratio 11     Calcium Level Total 9.2 8.4 - 10.2 mg/dL    Anion Gap 11.0 mEq/L    eGFR 36 mls/min/1.73/m2   POCT glucose    Collection Time: 11/16/23  9:52 PM   Result Value Ref Range    POCT Glucose 199 (H) 70 - 110 mg/dL   Comprehensive Metabolic Panel    Collection Time: 11/17/23  1:52 AM   Result Value Ref Range    Sodium Level 141 136 - 145 mmol/L    Potassium Level 4.0 3.5 - 5.1 mmol/L    Chloride 102 98 - 107 mmol/L    Carbon Dioxide 28 23 - 31 mmol/L    Glucose Level 210 (H) 82 - 115 mg/dL    Blood Urea Nitrogen 17.0 9.8 - 20.1 mg/dL    Creatinine 1.51 (H) 0.55 - 1.02 mg/dL    Calcium Level Total 9.1 8.4 - 10.2 mg/dL    Protein Total 7.0 5.8 - 7.6 gm/dL    Albumin Level 2.9 (L) 3.4 - 4.8 g/dL    Globulin 4.1 (H) 2.4 - 3.5 gm/dL    Albumin/Globulin Ratio 0.7 (L) 1.1 - 2.0 ratio    Bilirubin Total 0.8 <=1.5 mg/dL    Alkaline Phosphatase 119 40 - 150 unit/L    Alanine Aminotransferase 19 0 - 55 unit/L    Aspartate Aminotransferase 22 5 - 34 unit/L    eGFR 38 mls/min/1.73/m2   Magnesium    Collection Time: 11/17/23  1:52 AM   Result Value Ref Range    Magnesium Level 2.10 1.60 - 2.60 mg/dL   Phosphorus    Collection Time: 11/17/23  1:52 AM   Result Value Ref Range    Phosphorus Level 3.1 2.3 - 4.7 mg/dL   CK    Collection Time: 11/17/23  1:52 AM   Result Value Ref Range    Creatine Kinase 432 (H) 29 - 168 U/L   CBC with Differential    Collection Time: 11/17/23  1:52 AM   Result Value Ref Range    WBC 7.02 4.50 - 11.50 x10(3)/mcL    RBC 5.44 (H) 4.20 - 5.40 x10(6)/mcL    Hgb 12.1 12.0 - 16.0 g/dL    Hct 40.9 37.0 - 47.0 %    MCV 75.2 (L) 80.0 - 94.0 fL    MCH 22.2 (L) 27.0 - 31.0 pg    MCHC 29.6 (L) 33.0 - 36.0 g/dL    RDW 16.1 11.5 - 17.0 %    Platelet 198 130 - 400 x10(3)/mcL    MPV 10.6 (H) 7.4 - 10.4 fL    Neut % 57.2 %    Lymph % 28.5 %    Mono % 11.5 %    Eos % 2.1 %    Basophil % 0.3 %    Lymph # 2.00 0.6 - 4.6 x10(3)/mcL    Neut # 4.01 2.1 - 9.2 x10(3)/mcL    Mono # 0.81 0.1 - 1.3  x10(3)/mcL    Eos # 0.15 0 - 0.9 x10(3)/mcL    Baso # 0.02 <=0.2 x10(3)/mcL    IG# 0.03 0 - 0.04 x10(3)/mcL    IG% 0.4 %    NRBC% 0.0 %   POCT glucose    Collection Time: 11/17/23 11:23 AM   Result Value Ref Range    POCT Glucose 156 (H) 70 - 110 mg/dL       Significant Imaging:  Imaging Results              CT Head Without Contrast (Final result)  Result time 11/16/23 08:54:13      Final result by Matthew Tinoco MD (11/16/23 08:54:13)                   Impression:      Focus of subtle increased density involving the right posterior insular cortex and subinsular brain, essentially identical to the prior exam from 5 hours earlier.  Focal area of hemorrhage not excluded.  This finding was not present on the previous exam from 2016.  Continued surveillance recommended.      Electronically signed by: Gerardo Tinoco MD  Date:    11/16/2023  Time:    08:54               Narrative:    EXAMINATION:  CT HEAD WITHOUT CONTRAST    CLINICAL HISTORY:  Subarachnoid hemorrhage (SAH) suspected;    TECHNIQUE:  Low dose axial images were obtained through the head.  Coronal and sagittal reformations were also performed. Contrast was not administered.  DLP 1126.  Automated exposure control used.    COMPARISON:  11/15/2023 and 10/25/2016    FINDINGS:  Small focus of subtle increased density in the right posterior insular cortex region, is essentially unchanged in the interval.  No new areas of increased density or hemorrhage.  No intra or extra-axial mass.  No midline shift.  Ventricles, sulci, cisterns normal.  Partial persistent cavum septum pellucidum incidentally noted.  Normal gray-white differentiation.  Suspected mild chronic microvascular white matter change.  Cranium and extracranial structures unremarkable.                        Preliminary result by Scooter Lindsay Jr., MD (11/16/23 04:41:01)                   Impression:    1. No acute intracranial process identified. Details and other findings as noted above.                Narrative:    START OF REPORT:  Technique: CT of the head was performed without intravenous contrast with axial as well as coronal and sagittal images.    Comparison: None.    Dosage Information: Automated exposure control was utilized.    Clinical history: Subarachnoid hemorrhage (SAH) suspected.    Findings:  Hemorrhage: No acute intracranial hemorrhage is seen.  CSF spaces: The ventricles, sulci and basal cisterns all appear mildly prominent consistent with global cerebral atrophy. Incidental note is made of cavum velum interpositum, a normal anatomic variant.  Brain parenchyma: There is preservation of the grey white junction throughout. Mild microvascular change is seen in portions of the periventricular and deep white matter tracts.  Cerebellum: Unremarkable.  Vascular: Mild atheromatous calcification of the intracranial arteries is seen.  Sella and skull base: The sella appears to be within normal limits for age.  Herniation: None.  Intracranial calcifications: Incidental note is made of bilateral choroid plexus calcification. Incidental note is made of some pineal region calcification. Incidental note is made of some calcification of the falx.  Calvarium: No acute linear or depressed skull fracture is seen.    Maxillofacial Structures:  Paranasal sinuses: The visualized paranasal sinuses appear clear with no mucoperiosteal thickening or air fluid levels identified.  Orbits: The orbits appear unremarkable.  Zygomatic arches: The zygomatic arches are intact and unremarkable.  Temporal bones and mastoids: The temporal bones and mastoids appear unremarkable.  TMJ: The mandibular condyles appear normally placed with respect to the mandibular fossa.                                      Telemetry:  ST    Physical Exam  Vitals and nursing note reviewed.   Constitutional:       General: She is not in acute distress.     Appearance: Normal appearance. She is obese. She is not ill-appearing.   HENT:       Head: Normocephalic.   Cardiovascular:      Rate and Rhythm: Regular rhythm. Tachycardia present.      Heart sounds: Normal heart sounds.   Pulmonary:      Effort: Pulmonary effort is normal. No respiratory distress.      Breath sounds: Normal breath sounds.   Abdominal:      Palpations: Abdomen is soft.   Musculoskeletal:         General: Normal range of motion.      Cervical back: Neck supple.   Skin:     General: Skin is warm and dry.   Neurological:      Mental Status: She is alert. Mental status is at baseline.   Psychiatric:         Behavior: Behavior normal.       Home Medications:   No current facility-administered medications on file prior to encounter.     Current Outpatient Medications on File Prior to Encounter   Medication Sig Dispense Refill    albuterol (PROVENTIL/VENTOLIN HFA) 90 mcg/actuation inhaler Inhale 1 puff into the lungs every 4 (four) hours as needed for Wheezing. 18 g 3    aspirin (ECOTRIN) 81 MG EC tablet Take 81 mg by mouth once daily.      atorvastatin (LIPITOR) 40 MG tablet Take 2 tablets (80 mg total) by mouth once daily. 90 tablet 3    carvediloL (COREG) 3.125 MG tablet Take 1 tablet (3.125 mg total) by mouth 2 (two) times daily. 180 tablet 3    cetirizine (ZYRTEC) 10 MG tablet Take 1 tablet (10 mg total) by mouth once daily. 30 tablet 0    cloNIDine (CATAPRES) 0.2 MG tablet Take 1 tablet (0.2 mg total) by mouth 2 (two) times daily. Takes 2 times per day. Takes whole tablet 180 tablet 3    diclofenac (VOLTAREN) 50 MG EC tablet Take 1 tablet (50 mg total) by mouth 2 (two) times daily as needed (Pain). 12 tablet 0    diltiaZEM (CARDIZEM CD) 180 MG 24 hr capsule Take 1 capsule (180 mg total) by mouth once daily. 90 capsule 3    DULoxetine (CYMBALTA) 60 MG capsule TAKE 1 CAPSULE BY MOUTH DAILY. DO NOT CRUSH OR CHEW 90 capsule 0    empagliflozin (JARDIANCE) 25 mg tablet Take 1 tablet (25 mg total) by mouth once daily. 90 tablet 3    exenatide microspheres (BYDUREON BCISE) 2 mg/0.85 mL  "AtIn Inject 2 mg into the skin every 7 days. 4 pen 3    flash glucose scanning reader (FREESTYLE SMITHA 2 READER) Misc 1 each by Misc.(Non-Drug; Combo Route) route 4 (four) times daily with meals and nightly. 1 each 0    flash glucose sensor (FREESTYLE SMITHA 2 SENSOR) Kit 1 each by Misc.(Non-Drug; Combo Route) route 2 hours after meals and at bedtime. 1 kit 0    furosemide (LASIX) 20 MG tablet TAKE ONE TABLET BY MOUTH DAILY AT 9 AM (VIAL) 90 tablet 5    gabapentin (NEURONTIN) 600 MG tablet Take 1 tablet (600 mg total) by mouth once daily. 90 tablet 3    insulin (LANTUS SOLOSTAR U-100 INSULIN) glargine 100 units/mL SubQ pen Inject 65 Units into the skin every evening. 18 mL 11    insulin aspart U-100 (NOVOLOG FLEXPEN U-100 INSULIN) 100 unit/mL (3 mL) InPn pen Inject 20 Units into the skin 3 (three) times daily with meals. 15 mL 11    latanoprost 0.005 % ophthalmic solution Place 1 drop into both eyes once daily. 2.5 mL 1    losartan-hydrochlorothiazide 50-12.5 mg (HYZAAR) 50-12.5 mg per tablet Take 1 tablet by mouth once daily. 90 tablet 3    melatonin (MELATIN) 3 mg tablet Take 2 tablets (6 mg total) by mouth nightly as needed for Insomnia. 60 tablet 1    nebulizer accessories Kit 1 each by Misc.(Non-Drug; Combo Route) route 4 (four) times daily as needed (wheezing). 1 kit 0    nitroGLYCERIN (NITROSTAT) 0.4 MG SL tablet PLACE 1 TABLET UNDER THE TONGUE EVERY 5 MINUTES AS NEEDED FOR CHEST PAIN. GO TO ER AFTER THIRD DOSE 10 tablet 0    pen needle, diabetic 32 gauge x 5/32" Ndle 1 each by Misc.(Non-Drug; Combo Route) route 4 (four) times daily. 100 each 5    polyethylene glycol (GLYCOLAX) 17 gram PwPk Take 17 g by mouth once daily. 100 each 0    potassium chloride (K-TAB) 20 mEq TAKE ONE TABLET BY MOUTH DAILY AT 9 AM (VIAL) 90 tablet 11    vitamin D (VITAMIN D3) 1000 units Tab Take 2 tablets (2,000 Units total) by mouth Daily. 60 tablet 11     Current Inpatient Medications:    Current Facility-Administered Medications: "     amLODIPine tablet 10 mg, 10 mg, Oral, Daily, Fadia Howe MD, 10 mg at 11/17/23 0910    atorvastatin tablet 80 mg, 80 mg, Oral, Daily, Angy Sandhu Keat, DO, 80 mg at 11/17/23 0910    carvediloL tablet 12.5 mg, 12.5 mg, Oral, BID, Fadia Howe MD, 12.5 mg at 11/17/23 0910    cloNIDine tablet 0.2 mg, 0.2 mg, Oral, BID, Angy Sanhdu Keat, DO, 0.2 mg at 11/17/23 0910    dextrose 10% bolus 125 mL 125 mL, 12.5 g, Intravenous, PRN, Edson, Zui Keat, DO    dextrose 10% bolus 250 mL 250 mL, 25 g, Intravenous, PRN, Edson, BRANDONui Keat, DO    DULoxetine DR capsule 60 mg, 60 mg, Oral, Daily, Ng, BRANDONui Keat, DO, 60 mg at 11/17/23 0910    enoxaparin injection 40 mg, 40 mg, Subcutaneous, Q12H (prophylaxis, 0900/2100), Fadia Howe MD, 40 mg at 11/17/23 0914    glucagon (human recombinant) injection 1 mg, 1 mg, Intramuscular, PRN, Angy Sandhu Keat, DO    glucose chewable tablet 16 g, 16 g, Oral, PRN, Edson, BRANDONui Keat, DO    glucose chewable tablet 24 g, 24 g, Oral, PRN, Edson, BRANDONui Keat, DO    hydrALAZINE injection 20 mg, 20 mg, Intravenous, Q8H PRN, Fadia Howe MD, 20 mg at 11/17/23 1142    hydrALAZINE tablet 25 mg, 25 mg, Oral, Q8H, Fadia Howe MD, 25 mg at 11/17/23 1142    hydroCHLOROthiazide tablet 12.5 mg, 12.5 mg, Oral, Daily, Edson, BRANDONui Keat, DO, 12.5 mg at 11/17/23 0910    insulin aspart U-100 injection 0-10 Units, 0-10 Units, Subcutaneous, QID (AC + HS) PRN, Angy Sandhu Keat, DO, 1 Units at 11/16/23 2155    insulin detemir U-100 injection 12 Units, 12 Units, Subcutaneous, BID, Fadia Howe MD    ipratropium 0.02 % nebulizer solution 0.5 mg, 0.5 mg, Nebulization, Q4H PRN, Angy Sandhu DO    latanoprost 0.005 % ophthalmic solution 1 drop, 1 drop, Both Eyes, Daily, Fadia Howe MD, 1 drop at 11/17/23 0910    losartan tablet 50 mg, 50 mg, Oral, Daily, Angy Sandhu DO, 50 mg at 11/17/23 0910    melatonin tablet 6 mg, 6 mg, Oral, Nightly PRN, Angy Sandhu DO    mupirocin 2 % ointment, , Nasal, BID, Fadia Howe MD, Given at  11/17/23 0914    VTE Risk Mitigation (From admission, onward)           Ordered     enoxaparin injection 40 mg  Every 12 hours         11/17/23 0254     Place sequential compression device  Until discontinued         11/16/23 0347                  Assessment:   Hypertensive Emergency (BP Improved but Remains Above Goal)    - History of Hypertension  Hypertensive Encephalopathy (Improved)  CAD (Nonobstructive in 2021)    - EF 50-55%    - LM- Negative/Patent, LAD- 20-30% Proximal to Mid LAD, D1- LI (Small), LCX- Large LI, OM1- LI, LPDA- LI, RCA- LI with 50% Mid Stenosis, PLB- Small Vessel (9.29.21)  Intraparenchymal Hemorrhage- Small Focal Hemorrhage at the Right Insular Region  Elevated BMI/Morbid Obesity  Anemia  Chronic Kidney Disease Stage IIIB  Renal Cell Carcinoma     - History of Nephrectomy  Colon Cancer Status Post Hemicolectomy  MOHSEN/CPAP  DM II    - A1C 7.6%  Asthma (Stable)    Plan:   Increase Hydralazine to 50 Mg PO TID. Increase Coreg to 25 Mg PO BID.  Continue HCTZ 12.5 Mg PO Daily  Continue Losartan 50 Mg PO Daily & Amlodipine 10 Mg Daily.  Continue Clonidine 0.2 Mg PO BID.   Recommend strict Compliance with PPV given MOHSEN.  Will need follow up with [facility] once discharged.    Thank you for your consult.     Juliocesar Don, HEATHER  Cardiology  [facility] - [facility] ICU  11/17/2023 12:35 PM     I have seen the patient, reviewed the Nurse Practitioner's note, assessment and plan. I have personally interviewed and examined the patient at bedside and agree with the findings. Medical decision making listed above were done under my guidance.    Physical exam:  Cardiovascular system: regular rhythm, no murmur.  Lungs: CTAB.  Extremities: No leg edema.    Plan:  Will attempt to consolidate and max meds

## 2023-11-17 NOTE — NURSING
Nurses Note -- 4 Eyes      11/17/2023   5:12 PM      Skin assessed during: Q Shift Change      [x] No Altered Skin Integrity Present    [x]Prevention Measures Documented      [] Yes- Altered Skin Integrity Present or Discovered   [] LDA Added if Not in Epic (Describe Wound)   [] New Altered Skin Integrity was Present on Admit and Documented in LDA   [] Wound Image Taken    Wound Care Consulted? No    Attending Nurse:  Gorge Maldonado RN/Staff Member:   MONICA Deng           Patient is requesting a refill of medications    HYDROcodone-acetaminophen 7.5-325 MG Oral Tab  triamcinolone 0.1 % External Ointment  allopurinol 100 MG Oral Tab

## 2023-11-17 NOTE — PROGRESS NOTES
[facility] Hospital Medicine Progress Note        Chief Complaint: Inpatient Follow-up for [facility]    HPI:   The pt is a 68 y.o. female  with morbid obesity BMI 54.8, with a PMHx of Essential hypertension, hyperlipidemia, CAD, diabetes mellitus type 2, CKDIIIb, Colon cancer s/p hemicolectomy, Renal cell carcinoma s/p Left nephrectomy, MOHSEN on C-PAP, asthma, and cataracts who originally presented to [facility] on 11/16/2023 via EMS for altered mental status. It was reported that pt had a fall on day of presentation with dizziness and confusion. Initial blood pressure on arrival was 200/110. Pt was transferred to [facility] and admitted to ICU on Cleviprex gtt for hypertensive emergency with /100 and CT head evidence possible  small focus hemorrhage at the right insular region and hypertensive encephalopathy with episode of confusion and dizziness. Pt was weaned off Claviprex gtt and initiated on oral antihypertensives . Mental status improved to baseline and no focal neuro deficit appreciated on exam. Pt's care downgraded to  service on the same day  11/16/23. Labs showed mild Microcytic anemia , serum creatinine is stable at baseline 1.5, HbA1c 7.6, , Troponin 0.027, BNP 78.5. Home antihypertensives are reviewed and resumed as appropriate with BP goal below 160/90 per Neurosurgery.         Interval Hx:   Pt is fully awake , alert, oriented x4   Currently working with PT/OT  Daughter at bedside  BP remains uncontrolled   Amlodipine increased to 10 mg and Hydralazine added at 25 mg tid today     Labs this morning showed Na 141, K 4.0, CO2 28, Cr is stable at 1.5, , CK down to 432    Case was discussed with patient's nurse and  on the floor.    Objective/physical exam:  General: In no acute distress, afebrile, morbidly obese  Chest: Clear to auscultation bilaterally  Heart: RRR, +S1, S2, no appreciable murmur  Abdomen: Obese, Soft, nontender, BS +  MSK: Warm, trace  lower  extremity /pedal edema, no clubbing or cyanosis  Neurologic: Alert and oriented x4, Cranial nerve II-XII intact, Strength 4/5 in all 4 extremities    VITAL SIGNS: 24 HRS MIN & MAX LAST   Temp  Min: 98.2 °F (36.8 °C)  Max: 99.4 °F (37.4 °C) 98.2 °F (36.8 °C)   BP  Min: 124/69  Max: 179/124 (!) 150/84   Pulse  Min: 80  Max: 107  107   Resp  Min: 13  Max: 20 20   SpO2  Min: 97 %  Max: 100 % 99 %     I have reviewed the following labs:  Recent Labs   Lab 11/15/23  2249 11/17/23  0152   WBC 6.81 7.02   RBC 5.67* 5.44*   HGB 12.3 12.1   HCT 42.9 40.9   MCV 75.7* 75.2*   MCH 21.7* 22.2*   MCHC 28.7* 29.6*   RDW 15.6 16.1    198   MPV 10.5* 10.6*     Recent Labs   Lab 11/15/23  2249 11/16/23  0427 11/16/23 2045 11/17/23  0152    139 142 141   K 3.1* 3.1* 3.7 4.0   CO2 30 28 30 28   BUN 15.9 15.7 17.2 17.0   CREATININE 1.43* 1.32* 1.59* 1.51*   CALCIUM 9.0 9.2 9.2 9.1   MG 1.80 1.90  --  2.10   ALBUMIN 3.1*  --   --  2.9*   ALKPHOS 126  --   --  119   ALT 23  --   --  19   AST 30  --   --  22   BILITOT 0.7  --   --  0.8     Microbiology Results (last 7 days)       ** No results found for the last 168 hours. **             See below for Radiology    Scheduled Med:   amLODIPine  10 mg Oral Daily    atorvastatin  80 mg Oral Daily    carvediloL  12.5 mg Oral BID    cloNIDine  0.2 mg Oral BID    DULoxetine  60 mg Oral Daily    enoxparin  40 mg Subcutaneous Q12H (prophylaxis, 0900/2100)    hydrALAZINE  25 mg Oral Q8H    hydroCHLOROthiazide  12.5 mg Oral Daily    insulin detemir U-100  12 Units Subcutaneous BID    latanoprost  1 drop Both Eyes Daily    losartan  50 mg Oral Daily    mupirocin   Nasal BID      Continuous Infusions:     PRN Meds:  dextrose 10%, dextrose 10%, glucagon (human recombinant), glucose, glucose, hydrALAZINE, insulin aspart U-100, ipratropium, melatonin     Assessment/Plan:  Hypertensive emergency   Hypertensive encephalopathy   Intraparenchymal hemorrhage -  small focus hemorrhage at the right  insular region   Morbid Obesity, BMI 54.8  Microcytic anemia, mild   Hypokalemia, POA  CKD IIIb- stable at baseline   Mild CPK elevation - improved   MOHSEN, on C-PAP  Diabetes Mellitus , type 2- with HbA1c 7.6     Hx-Essential hypertension, hyperlipidemia, CAD, diabetes mellitus type 2, CKDIIIb, colon cancer s/p hemicolectomy, renal cell carcinoma s/p nephrectomy, asthma,     Plan-   Continue q4h Neuro check  Resume ASA when ok with Neurosurgery - message sent to Neurosurgery service   Home Oral antihypertensives are resumed - Coreg, Losartan, HCTZ  BP remains uncontrolled   Amlodipine 10 mg, Hydralazine 25 mg tid added  BP goal under 160/90 now per Neurosurgery with eventual goal under 130/80   Will consult Cardiology to assist with work up for Resistant HTN requiring multiple antihypertensives   Monitor BP trend and adjust meds accordingly   Mental status improved to baseline   For diabetes continue prandial Aspart sliding scale with long acting levemir    PT/OT consulted      Critical care note:  Critical care diagnosis: Hypertensive emergency required IV medicine   Critical care interventions: Hands-on evaluation, review of labs/radiographs/records and discussion with patient and family if present  Critical care time spent: 35 minutes    VTE prophylaxis: Lovenox. Neurosurgery cleared pt for Lovenox     Patient condition:  Stable/Fair/Guarded/ Serious/ Critical    Anticipated discharge and Disposition:         All diagnosis and differential diagnosis have been reviewed; assessment and plan has been documented; I have personally reviewed the labs and test results that are presently available; I have reviewed the patients medication list; I have reviewed the consulting providers response and recommendations. I have reviewed or attempted to review medical records based upon their availability    All of the patient's questions have been  addressed and answered. Patient's is agreeable to the above stated plan. I will  continue to monitor closely and make adjustments to medical management as needed.  ____________________    Fadia Howe MD   11/17/2023

## 2023-11-17 NOTE — PLAN OF CARE
Problem: Occupational Therapy  Goal: Occupational Therapy Goal  Description: Goals to be met by: 12/17/2023     Patient will increase functional independence with ADLs by performing:    UE Dressing with Modified [place].  LE Dressing with Minimal Assistance.  Toileting from toilet with Modified [place] for hygiene and clothing management.   Bathing from  shower chair/bench with Modified [place].  Toilet transfer to toilet with Modified [place].  Increased functional strength to 4/5 for increased ind in ADLs.    Outcome: Ongoing, Progressing

## 2023-11-18 LAB
POCT GLUCOSE: 112 MG/DL (ref 70–110)
POCT GLUCOSE: 151 MG/DL (ref 70–110)
POCT GLUCOSE: 183 MG/DL (ref 70–110)
POCT GLUCOSE: 224 MG/DL (ref 70–110)

## 2023-11-18 PROCEDURE — 97116 GAIT TRAINING THERAPY: CPT | Mod: CQ

## 2023-11-18 PROCEDURE — 99900035 HC TECH TIME PER 15 MIN (STAT)

## 2023-11-18 PROCEDURE — 97535 SELF CARE MNGMENT TRAINING: CPT | Mod: CO

## 2023-11-18 PROCEDURE — 25000003 PHARM REV CODE 250: Performed by: NURSE PRACTITIONER

## 2023-11-18 PROCEDURE — 97530 THERAPEUTIC ACTIVITIES: CPT | Mod: CQ

## 2023-11-18 PROCEDURE — 63600175 PHARM REV CODE 636 W HCPCS: Performed by: STUDENT IN AN ORGANIZED HEALTH CARE EDUCATION/TRAINING PROGRAM

## 2023-11-18 PROCEDURE — 25000003 PHARM REV CODE 250: Performed by: INTERNAL MEDICINE

## 2023-11-18 PROCEDURE — 11000001 HC ACUTE MED/SURG PRIVATE ROOM

## 2023-11-18 PROCEDURE — 25000003 PHARM REV CODE 250: Performed by: STUDENT IN AN ORGANIZED HEALTH CARE EDUCATION/TRAINING PROGRAM

## 2023-11-18 PROCEDURE — 63600175 PHARM REV CODE 636 W HCPCS: Performed by: INTERNAL MEDICINE

## 2023-11-18 RX ORDER — LOSARTAN POTASSIUM 50 MG/1
100 TABLET ORAL DAILY
Status: DISCONTINUED | OUTPATIENT
Start: 2023-11-19 | End: 2023-11-19 | Stop reason: HOSPADM

## 2023-11-18 RX ADMIN — CARVEDILOL 25 MG: 12.5 TABLET, FILM COATED ORAL at 08:11

## 2023-11-18 RX ADMIN — AMLODIPINE BESYLATE 10 MG: 5 TABLET ORAL at 08:11

## 2023-11-18 RX ADMIN — LOSARTAN POTASSIUM 50 MG: 50 TABLET, FILM COATED ORAL at 08:11

## 2023-11-18 RX ADMIN — CLONIDINE HYDROCHLORIDE 0.2 MG: 0.2 TABLET ORAL at 08:11

## 2023-11-18 RX ADMIN — INSULIN ASPART 2 UNITS: 100 INJECTION, SOLUTION INTRAVENOUS; SUBCUTANEOUS at 08:11

## 2023-11-18 RX ADMIN — HYDRALAZINE HYDROCHLORIDE 50 MG: 50 TABLET, FILM COATED ORAL at 02:11

## 2023-11-18 RX ADMIN — ENOXAPARIN SODIUM 40 MG: 40 INJECTION SUBCUTANEOUS at 08:11

## 2023-11-18 RX ADMIN — INSULIN DETEMIR 12 UNITS: 100 INJECTION, SOLUTION SUBCUTANEOUS at 08:11

## 2023-11-18 RX ADMIN — HYDROCHLOROTHIAZIDE 12.5 MG: 12.5 TABLET ORAL at 08:11

## 2023-11-18 RX ADMIN — ATORVASTATIN CALCIUM 80 MG: 40 TABLET, FILM COATED ORAL at 08:11

## 2023-11-18 RX ADMIN — DULOXETINE HYDROCHLORIDE 60 MG: 30 CAPSULE, DELAYED RELEASE ORAL at 08:11

## 2023-11-18 RX ADMIN — MUPIROCIN: 20 OINTMENT TOPICAL at 08:11

## 2023-11-18 RX ADMIN — LATANOPROST 1 DROP: 50 SOLUTION OPHTHALMIC at 08:11

## 2023-11-18 RX ADMIN — INSULIN ASPART 2 UNITS: 100 INJECTION, SOLUTION INTRAVENOUS; SUBCUTANEOUS at 05:11

## 2023-11-18 NOTE — PROGRESS NOTES
Consults  [facility] - [facility]    Cardiology  Progress Note    Patient Name: Elissa Freeman  MRN: [id number]  Admission Date: 11/16/2023  Hospital Length of Stay: 2 days  Code Status: No Order   Attending Provider: Clarence Mancera MD   Consulting Provider: HEATHER Swann  Primary Care Physician: Jeronimo Bird MD  Principal Problem:<principal problem not specified>    Patient information was obtained from patient, past medical records, ER records, and primary team.     Subjective:   Consultation Reason: Hypertension    HPI:   Ms. Freeman is a 68 year old female, known to [facility] at [facility], who presented to the hospital due to elevated BP. Patient noted to be in hypertensive emergency with BP in and around the 100/100 range. She was admitted to ICU and placed on Cleviprex infusion for acute BP Control. CT Head revealed evidence of of possible small focus hemorrhage at the right insular region and hypertensive encephalopathy with confusion and dizziness. Patient with multiple co-morbidities including MOHSEN/CPAP, Morbid Obesity, DM II, Hypertension, and CKD. [facility] is consulted to assist with BP Regulation.    Hospital Course:  11.18.23: NAD Noted. BP Improved.    PMH: Hypertension, Elevated BMI/Morbid Obesity, CKD Stage IIIB, MOHSEN/CPAP, Anemia, DM II, Asthma, Hyperlipidemia, CAD  PSH: Cataract Extraction, Cholecystectomy, Colon Surgery, Excision Lipoma, Nephrectomy, Tubal Ligation, Hysterectomy  Family History: None  Social History: Tobacco- Negative, Alcohol- Negative, Substance Abuse- Negative    Previous Cardiac Diagnostics:   Coronary Angiogram (9.29.21):  LM- Negative/Patent, LAD- 20-30% Proximal to Mid LAD, D1- LI (Small), LCX- Large LI, OM1- LI, LPDA- LI, RCA- LI with 50% Mid Stenosis, PLB- Small Vessel  Nonobstructive CAD, Tortuous Vessels.    Echocardiogram (4.27.21):  EF 50-55%    Review of patient's allergies indicates:  No Known Allergies    No current facility-administered medications on file  prior to encounter.     Current Outpatient Medications on File Prior to Encounter   Medication Sig    albuterol (PROVENTIL/VENTOLIN HFA) 90 mcg/actuation inhaler Inhale 1 puff into the lungs every 4 (four) hours as needed for Wheezing.    aspirin (ECOTRIN) 81 MG EC tablet Take 81 mg by mouth once daily.    atorvastatin (LIPITOR) 40 MG tablet Take 2 tablets (80 mg total) by mouth once daily.    carvediloL (COREG) 3.125 MG tablet Take 1 tablet (3.125 mg total) by mouth 2 (two) times daily.    cetirizine (ZYRTEC) 10 MG tablet Take 1 tablet (10 mg total) by mouth once daily.    cloNIDine (CATAPRES) 0.2 MG tablet Take 1 tablet (0.2 mg total) by mouth 2 (two) times daily. Takes 2 times per day. Takes whole tablet    diclofenac (VOLTAREN) 50 MG EC tablet Take 1 tablet (50 mg total) by mouth 2 (two) times daily as needed (Pain).    diltiaZEM (CARDIZEM CD) 180 MG 24 hr capsule Take 1 capsule (180 mg total) by mouth once daily.    DULoxetine (CYMBALTA) 60 MG capsule TAKE 1 CAPSULE BY MOUTH DAILY. DO NOT CRUSH OR CHEW    empagliflozin (JARDIANCE) 25 mg tablet Take 1 tablet (25 mg total) by mouth once daily.    exenatide microspheres (BYDUREON BCISE) 2 mg/0.85 mL AtIn Inject 2 mg into the skin every 7 days.    flash glucose scanning reader (FREESTYLE SMITHA 2 READER) Misc 1 each by Misc.(Non-Drug; Combo Route) route 4 (four) times daily with meals and nightly.    flash glucose sensor (FREESTYLE SMITHA 2 SENSOR) Kit 1 each by Misc.(Non-Drug; Combo Route) route 2 hours after meals and at bedtime.    furosemide (LASIX) 20 MG tablet TAKE ONE TABLET BY MOUTH DAILY AT 9 AM (VIAL)    gabapentin (NEURONTIN) 600 MG tablet Take 1 tablet (600 mg total) by mouth once daily.    insulin (LANTUS SOLOSTAR U-100 INSULIN) glargine 100 units/mL SubQ pen Inject 65 Units into the skin every evening.    insulin aspart U-100 (NOVOLOG FLEXPEN U-100 INSULIN) 100 unit/mL (3 mL) InPn pen Inject 20 Units into the skin 3 (three) times daily with meals.     "latanoprost 0.005 % ophthalmic solution Place 1 drop into both eyes once daily.    losartan-hydrochlorothiazide 50-12.5 mg (HYZAAR) 50-12.5 mg per tablet Take 1 tablet by mouth once daily.    melatonin (MELATIN) 3 mg tablet Take 2 tablets (6 mg total) by mouth nightly as needed for Insomnia.    nebulizer accessories Kit 1 each by Misc.(Non-Drug; Combo Route) route 4 (four) times daily as needed (wheezing).    nitroGLYCERIN (NITROSTAT) 0.4 MG SL tablet PLACE 1 TABLET UNDER THE TONGUE EVERY 5 MINUTES AS NEEDED FOR CHEST PAIN. GO TO ER AFTER THIRD DOSE    pen needle, diabetic 32 gauge x 5/32" Ndle 1 each by Misc.(Non-Drug; Combo Route) route 4 (four) times daily.    polyethylene glycol (GLYCOLAX) 17 gram PwPk Take 17 g by mouth once daily.    potassium chloride (K-TAB) 20 mEq TAKE ONE TABLET BY MOUTH DAILY AT 9 AM (VIAL)    vitamin D (VITAMIN D3) 1000 units Tab Take 2 tablets (2,000 Units total) by mouth Daily.     Review of Systems   Respiratory:  Negative for chest tightness and shortness of breath.    Cardiovascular:  Negative for chest pain.   Neurological:  Negative for weakness.   All other systems reviewed and are negative.    Objective:     Vital Signs (Most Recent):  Temp: 98.4 °F (36.9 °C) (11/18/23 0400)  Pulse: 71 (11/18/23 1030)  Resp: 17 (11/18/23 1030)  BP: 125/73 (11/18/23 1015)  SpO2: 100 % (11/18/23 1030) Vital Signs (24h Range):  Temp:  [98.1 °F (36.7 °C)-99.1 °F (37.3 °C)] 98.4 °F (36.9 °C)  Pulse:  [] 71  Resp:  [13-23] 17  SpO2:  [99 %-100 %] 100 %  BP: ()/() 125/73     Weight: (!) 145 kg (319 lb 10.7 oz)  Body mass index is 54.87 kg/m².    SpO2: 100 %         Intake/Output Summary (Last 24 hours) at 11/18/2023 1130  Last data filed at 11/18/2023 0543  Gross per 24 hour   Intake 995 ml   Output 1000 ml   Net -5 ml         Lines/Drains/Airways       Drain  Duration             Female External Urinary Catheter 11/16/23 2000 1 day              Peripheral Intravenous Line  Duration "                  Peripheral IV - Single Lumen 11/16/23 0200 20 G Right Antecubital 2 days         Peripheral IV - Single Lumen 11/16/23 0300 20 G Anterior;Left;Proximal Forearm 2 days                  Significant Labs:  Recent Results (from the past 72 hour(s))   Comprehensive Metabolic Panel    Collection Time: 11/15/23 10:49 PM   Result Value Ref Range    Sodium Level 143 136 - 145 mmol/L    Potassium Level 3.1 (L) 3.5 - 5.1 mmol/L    Chloride 101 98 - 107 mmol/L    Carbon Dioxide 30 23 - 31 mmol/L    Glucose Level 71 (L) 82 - 115 mg/dL    Blood Urea Nitrogen 15.9 9.8 - 20.1 mg/dL    Creatinine 1.43 (H) 0.55 - 1.02 mg/dL    Calcium Level Total 9.0 8.4 - 10.2 mg/dL    Protein Total 7.4 5.8 - 7.6 gm/dL    Albumin Level 3.1 (L) 3.4 - 4.8 g/dL    Globulin 4.3 (H) 2.4 - 3.5 gm/dL    Albumin/Globulin Ratio 0.7 (L) 1.1 - 2.0 ratio    Bilirubin Total 0.7 <=1.5 mg/dL    Alkaline Phosphatase 126 40 - 150 unit/L    Alanine Aminotransferase 23 0 - 55 unit/L    Aspartate Aminotransferase 30 5 - 34 unit/L    eGFR 41 mls/min/1.73/m2   CK    Collection Time: 11/15/23 10:49 PM   Result Value Ref Range    Creatine Kinase 876 (H) 29 - 168 U/L   CK-MB    Collection Time: 11/15/23 10:49 PM   Result Value Ref Range    Creatine Kinase MB 8.7 (H) <=3.4 ng/mL   Troponin I    Collection Time: 11/15/23 10:49 PM   Result Value Ref Range    Troponin-I 0.027 0.000 - 0.045 ng/mL   BNP    Collection Time: 11/15/23 10:49 PM   Result Value Ref Range    Natriuretic Peptide 78.5 <=100.0 pg/mL   Ammonia    Collection Time: 11/15/23 10:49 PM   Result Value Ref Range    Ammonia Level 28.5 18.0 - 72.0 umol/L   CBC with Differential    Collection Time: 11/15/23 10:49 PM   Result Value Ref Range    WBC 6.81 4.50 - 11.50 x10(3)/mcL    RBC 5.67 (H) 4.20 - 5.40 x10(6)/mcL    Hgb 12.3 12.0 - 16.0 g/dL    Hct 42.9 37.0 - 47.0 %    MCV 75.7 (L) 80.0 - 94.0 fL    MCH 21.7 (L) 27.0 - 31.0 pg    MCHC 28.7 (L) 33.0 - 36.0 g/dL    RDW 15.6 11.5 - 17.0 %    Platelet  221 130 - 400 x10(3)/mcL    MPV 10.5 (H) 7.4 - 10.4 fL    Neut % 66.1 %    Lymph % 22.3 %    Mono % 9.7 %    Eos % 1.3 %    Basophil % 0.3 %    Lymph # 1.52 0.6 - 4.6 x10(3)/mcL    Neut # 4.50 2.1 - 9.2 x10(3)/mcL    Mono # 0.66 0.1 - 1.3 x10(3)/mcL    Eos # 0.09 0 - 0.9 x10(3)/mcL    Baso # 0.02 <=0.2 x10(3)/mcL    IG# 0.02 0 - 0.04 x10(3)/mcL    IG% 0.3 %    NRBC% 0.0 %   Magnesium    Collection Time: 11/15/23 10:49 PM   Result Value Ref Range    Magnesium Level 1.80 1.60 - 2.60 mg/dL   POCT glucose    Collection Time: 11/16/23  4:25 AM   Result Value Ref Range    POCT Glucose 81 70 - 110 mg/dL   Hemoglobin A1c if not done in past 3 months    Collection Time: 11/16/23  4:27 AM   Result Value Ref Range    Hemoglobin A1c 7.6 (H) <=7.0 %    Estimated Average Glucose 171.4 mg/dL   Basic Metabolic Panel    Collection Time: 11/16/23  4:27 AM   Result Value Ref Range    Sodium Level 139 136 - 145 mmol/L    Potassium Level 3.1 (L) 3.5 - 5.1 mmol/L    Chloride 101 98 - 107 mmol/L    Carbon Dioxide 28 23 - 31 mmol/L    Glucose Level 83 82 - 115 mg/dL    Blood Urea Nitrogen 15.7 9.8 - 20.1 mg/dL    Creatinine 1.32 (H) 0.55 - 1.02 mg/dL    BUN/Creatinine Ratio 12     Calcium Level Total 9.2 8.4 - 10.2 mg/dL    Anion Gap 10.0 mEq/L    eGFR 45 mls/min/1.73/m2   Magnesium    Collection Time: 11/16/23  4:27 AM   Result Value Ref Range    Magnesium Level 1.90 1.60 - 2.60 mg/dL   POCT glucose    Collection Time: 11/16/23  8:05 AM   Result Value Ref Range    POCT Glucose 82 70 - 110 mg/dL   Basic Metabolic Panel    Collection Time: 11/16/23  8:45 PM   Result Value Ref Range    Sodium Level 142 136 - 145 mmol/L    Potassium Level 3.7 3.5 - 5.1 mmol/L    Chloride 101 98 - 107 mmol/L    Carbon Dioxide 30 23 - 31 mmol/L    Glucose Level 212 (H) 82 - 115 mg/dL    Blood Urea Nitrogen 17.2 9.8 - 20.1 mg/dL    Creatinine 1.59 (H) 0.55 - 1.02 mg/dL    BUN/Creatinine Ratio 11     Calcium Level Total 9.2 8.4 - 10.2 mg/dL    Anion Gap 11.0  mEq/L    eGFR 36 mls/min/1.73/m2   POCT glucose    Collection Time: 11/16/23  9:52 PM   Result Value Ref Range    POCT Glucose 199 (H) 70 - 110 mg/dL   Comprehensive Metabolic Panel    Collection Time: 11/17/23  1:52 AM   Result Value Ref Range    Sodium Level 141 136 - 145 mmol/L    Potassium Level 4.0 3.5 - 5.1 mmol/L    Chloride 102 98 - 107 mmol/L    Carbon Dioxide 28 23 - 31 mmol/L    Glucose Level 210 (H) 82 - 115 mg/dL    Blood Urea Nitrogen 17.0 9.8 - 20.1 mg/dL    Creatinine 1.51 (H) 0.55 - 1.02 mg/dL    Calcium Level Total 9.1 8.4 - 10.2 mg/dL    Protein Total 7.0 5.8 - 7.6 gm/dL    Albumin Level 2.9 (L) 3.4 - 4.8 g/dL    Globulin 4.1 (H) 2.4 - 3.5 gm/dL    Albumin/Globulin Ratio 0.7 (L) 1.1 - 2.0 ratio    Bilirubin Total 0.8 <=1.5 mg/dL    Alkaline Phosphatase 119 40 - 150 unit/L    Alanine Aminotransferase 19 0 - 55 unit/L    Aspartate Aminotransferase 22 5 - 34 unit/L    eGFR 38 mls/min/1.73/m2   Magnesium    Collection Time: 11/17/23  1:52 AM   Result Value Ref Range    Magnesium Level 2.10 1.60 - 2.60 mg/dL   Phosphorus    Collection Time: 11/17/23  1:52 AM   Result Value Ref Range    Phosphorus Level 3.1 2.3 - 4.7 mg/dL   CK    Collection Time: 11/17/23  1:52 AM   Result Value Ref Range    Creatine Kinase 432 (H) 29 - 168 U/L   CBC with Differential    Collection Time: 11/17/23  1:52 AM   Result Value Ref Range    WBC 7.02 4.50 - 11.50 x10(3)/mcL    RBC 5.44 (H) 4.20 - 5.40 x10(6)/mcL    Hgb 12.1 12.0 - 16.0 g/dL    Hct 40.9 37.0 - 47.0 %    MCV 75.2 (L) 80.0 - 94.0 fL    MCH 22.2 (L) 27.0 - 31.0 pg    MCHC 29.6 (L) 33.0 - 36.0 g/dL    RDW 16.1 11.5 - 17.0 %    Platelet 198 130 - 400 x10(3)/mcL    MPV 10.6 (H) 7.4 - 10.4 fL    Neut % 57.2 %    Lymph % 28.5 %    Mono % 11.5 %    Eos % 2.1 %    Basophil % 0.3 %    Lymph # 2.00 0.6 - 4.6 x10(3)/mcL    Neut # 4.01 2.1 - 9.2 x10(3)/mcL    Mono # 0.81 0.1 - 1.3 x10(3)/mcL    Eos # 0.15 0 - 0.9 x10(3)/mcL    Baso # 0.02 <=0.2 x10(3)/mcL    IG# 0.03 0 - 0.04  x10(3)/mcL    IG% 0.4 %    NRBC% 0.0 %   POCT glucose    Collection Time: 11/17/23  9:03 AM   Result Value Ref Range    POCT Glucose 146 (H) 70 - 110 mg/dL   POCT glucose    Collection Time: 11/17/23 11:23 AM   Result Value Ref Range    POCT Glucose 156 (H) 70 - 110 mg/dL   POCT glucose    Collection Time: 11/17/23  5:40 PM   Result Value Ref Range    POCT Glucose 143 (H) 70 - 110 mg/dL   POCT glucose    Collection Time: 11/17/23  8:31 PM   Result Value Ref Range    POCT Glucose 156 (H) 70 - 110 mg/dL   POCT glucose    Collection Time: 11/18/23  5:30 AM   Result Value Ref Range    POCT Glucose 151 (H) 70 - 110 mg/dL       Significant Imaging:  Imaging Results              CT Head Without Contrast (Final result)  Result time 11/16/23 08:54:13      Final result by Matthew Tinoco MD (11/16/23 08:54:13)                   Impression:      Focus of subtle increased density involving the right posterior insular cortex and subinsular brain, essentially identical to the prior exam from 5 hours earlier.  Focal area of hemorrhage not excluded.  This finding was not present on the previous exam from 2016.  Continued surveillance recommended.      Electronically signed by: Gerardo Tinoco MD  Date:    11/16/2023  Time:    08:54               Narrative:    EXAMINATION:  CT HEAD WITHOUT CONTRAST    CLINICAL HISTORY:  Subarachnoid hemorrhage (SAH) suspected;    TECHNIQUE:  Low dose axial images were obtained through the head.  Coronal and sagittal reformations were also performed. Contrast was not administered.  DLP 1126.  Automated exposure control used.    COMPARISON:  11/15/2023 and 10/25/2016    FINDINGS:  Small focus of subtle increased density in the right posterior insular cortex region, is essentially unchanged in the interval.  No new areas of increased density or hemorrhage.  No intra or extra-axial mass.  No midline shift.  Ventricles, sulci, cisterns normal.  Partial persistent cavum septum pellucidum incidentally  noted.  Normal gray-white differentiation.  Suspected mild chronic microvascular white matter change.  Cranium and extracranial structures unremarkable.                        Preliminary result by Scooter Lindsay Jr., MD (11/16/23 04:41:01)                   Impression:    1. No acute intracranial process identified. Details and other findings as noted above.               Narrative:    START OF REPORT:  Technique: CT of the head was performed without intravenous contrast with axial as well as coronal and sagittal images.    Comparison: None.    Dosage Information: Automated exposure control was utilized.    Clinical history: Subarachnoid hemorrhage (SAH) suspected.    Findings:  Hemorrhage: No acute intracranial hemorrhage is seen.  CSF spaces: The ventricles, sulci and basal cisterns all appear mildly prominent consistent with global cerebral atrophy. Incidental note is made of cavum velum interpositum, a normal anatomic variant.  Brain parenchyma: There is preservation of the grey white junction throughout. Mild microvascular change is seen in portions of the periventricular and deep white matter tracts.  Cerebellum: Unremarkable.  Vascular: Mild atheromatous calcification of the intracranial arteries is seen.  Sella and skull base: The sella appears to be within normal limits for age.  Herniation: None.  Intracranial calcifications: Incidental note is made of bilateral choroid plexus calcification. Incidental note is made of some pineal region calcification. Incidental note is made of some calcification of the falx.  Calvarium: No acute linear or depressed skull fracture is seen.    Maxillofacial Structures:  Paranasal sinuses: The visualized paranasal sinuses appear clear with no mucoperiosteal thickening or air fluid levels identified.  Orbits: The orbits appear unremarkable.  Zygomatic arches: The zygomatic arches are intact and unremarkable.  Temporal bones and mastoids: The temporal bones and  mastoids appear unremarkable.  TMJ: The mandibular condyles appear normally placed with respect to the mandibular fossa.                                      Telemetry:  SR    Physical Exam  Vitals and nursing note reviewed.   Constitutional:       General: She is not in acute distress.     Appearance: Normal appearance. She is obese. She is not ill-appearing.   HENT:      Head: Normocephalic.   Cardiovascular:      Rate and Rhythm: Normal rate and regular rhythm.      Heart sounds: Normal heart sounds.   Pulmonary:      Effort: Pulmonary effort is normal. No respiratory distress.      Breath sounds: Normal breath sounds.   Abdominal:      Palpations: Abdomen is soft.   Musculoskeletal:         General: Normal range of motion.      Cervical back: Neck supple.   Skin:     General: Skin is warm and dry.   Neurological:      Mental Status: She is alert. Mental status is at baseline.   Psychiatric:         Behavior: Behavior normal.       Home Medications:   No current facility-administered medications on file prior to encounter.     Current Outpatient Medications on File Prior to Encounter   Medication Sig Dispense Refill    albuterol (PROVENTIL/VENTOLIN HFA) 90 mcg/actuation inhaler Inhale 1 puff into the lungs every 4 (four) hours as needed for Wheezing. 18 g 3    aspirin (ECOTRIN) 81 MG EC tablet Take 81 mg by mouth once daily.      atorvastatin (LIPITOR) 40 MG tablet Take 2 tablets (80 mg total) by mouth once daily. 90 tablet 3    carvediloL (COREG) 3.125 MG tablet Take 1 tablet (3.125 mg total) by mouth 2 (two) times daily. 180 tablet 3    cetirizine (ZYRTEC) 10 MG tablet Take 1 tablet (10 mg total) by mouth once daily. 30 tablet 0    cloNIDine (CATAPRES) 0.2 MG tablet Take 1 tablet (0.2 mg total) by mouth 2 (two) times daily. Takes 2 times per day. Takes whole tablet 180 tablet 3    diclofenac (VOLTAREN) 50 MG EC tablet Take 1 tablet (50 mg total) by mouth 2 (two) times daily as needed (Pain). 12 tablet 0     "diltiaZEM (CARDIZEM CD) 180 MG 24 hr capsule Take 1 capsule (180 mg total) by mouth once daily. 90 capsule 3    DULoxetine (CYMBALTA) 60 MG capsule TAKE 1 CAPSULE BY MOUTH DAILY. DO NOT CRUSH OR CHEW 90 capsule 0    empagliflozin (JARDIANCE) 25 mg tablet Take 1 tablet (25 mg total) by mouth once daily. 90 tablet 3    exenatide microspheres (BYDUREON BCISE) 2 mg/0.85 mL AtIn Inject 2 mg into the skin every 7 days. 4 pen 3    flash glucose scanning reader (FREESTYLE SMITHA 2 READER) Misc 1 each by Misc.(Non-Drug; Combo Route) route 4 (four) times daily with meals and nightly. 1 each 0    flash glucose sensor (FREESTYLE SMITHA 2 SENSOR) Kit 1 each by Misc.(Non-Drug; Combo Route) route 2 hours after meals and at bedtime. 1 kit 0    furosemide (LASIX) 20 MG tablet TAKE ONE TABLET BY MOUTH DAILY AT 9 AM (VIAL) 90 tablet 5    gabapentin (NEURONTIN) 600 MG tablet Take 1 tablet (600 mg total) by mouth once daily. 90 tablet 3    insulin (LANTUS SOLOSTAR U-100 INSULIN) glargine 100 units/mL SubQ pen Inject 65 Units into the skin every evening. 18 mL 11    insulin aspart U-100 (NOVOLOG FLEXPEN U-100 INSULIN) 100 unit/mL (3 mL) InPn pen Inject 20 Units into the skin 3 (three) times daily with meals. 15 mL 11    latanoprost 0.005 % ophthalmic solution Place 1 drop into both eyes once daily. 2.5 mL 1    losartan-hydrochlorothiazide 50-12.5 mg (HYZAAR) 50-12.5 mg per tablet Take 1 tablet by mouth once daily. 90 tablet 3    melatonin (MELATIN) 3 mg tablet Take 2 tablets (6 mg total) by mouth nightly as needed for Insomnia. 60 tablet 1    nebulizer accessories Kit 1 each by Misc.(Non-Drug; Combo Route) route 4 (four) times daily as needed (wheezing). 1 kit 0    nitroGLYCERIN (NITROSTAT) 0.4 MG SL tablet PLACE 1 TABLET UNDER THE TONGUE EVERY 5 MINUTES AS NEEDED FOR CHEST PAIN. GO TO ER AFTER THIRD DOSE 10 tablet 0    pen needle, diabetic 32 gauge x 5/32" Ndle 1 each by Misc.(Non-Drug; Combo Route) route 4 (four) times daily. 100 each 5 "    polyethylene glycol (GLYCOLAX) 17 gram PwPk Take 17 g by mouth once daily. 100 each 0    potassium chloride (K-TAB) 20 mEq TAKE ONE TABLET BY MOUTH DAILY AT 9 AM (VIAL) 90 tablet 11    vitamin D (VITAMIN D3) 1000 units Tab Take 2 tablets (2,000 Units total) by mouth Daily. 60 tablet 11     Current Inpatient Medications:    Current Facility-Administered Medications:     amLODIPine tablet 10 mg, 10 mg, Oral, Daily, Fadia Howe MD, 10 mg at 11/18/23 0833    atorvastatin tablet 80 mg, 80 mg, Oral, Daily, Ng, Zui Keat, DO, 80 mg at 11/18/23 0833    carvediloL tablet 25 mg, 25 mg, Oral, BID, Juliocesar Don, FNP, 25 mg at 11/18/23 0833    cloNIDine tablet 0.2 mg, 0.2 mg, Oral, BID, Ng, Zui Keat, DO, 0.2 mg at 11/18/23 0833    dextrose 10% bolus 125 mL 125 mL, 12.5 g, Intravenous, PRN, Ng, Zui Keat, DO    dextrose 10% bolus 250 mL 250 mL, 25 g, Intravenous, PRN, Ng, Zui Keat, DO    DULoxetine DR capsule 60 mg, 60 mg, Oral, Daily, Ng, Zui Keat, DO, 60 mg at 11/18/23 0833    enoxaparin injection 40 mg, 40 mg, Subcutaneous, Q12H (prophylaxis, 0900/2100), Fadia Howe MD, 40 mg at 11/18/23 0833    glucagon (human recombinant) injection 1 mg, 1 mg, Intramuscular, PRN, Ng, Zui Keat, DO    glucose chewable tablet 16 g, 16 g, Oral, PRN, Ng, Zui Keat, DO    glucose chewable tablet 24 g, 24 g, Oral, PRN, Ng, Zui Keat, DO    hydrALAZINE injection 20 mg, 20 mg, Intravenous, Q8H PRN, Fadia Howe MD, 20 mg at 11/17/23 1142    hydrALAZINE tablet 50 mg, 50 mg, Oral, Q8H, Juliocesar Don, FNP, 50 mg at 11/17/23 2221    insulin aspart U-100 injection 0-10 Units, 0-10 Units, Subcutaneous, QID (AC + HS) PRN, Angy Sandhu DO, 2 Units at 11/17/23 1127    insulin detemir U-100 injection 12 Units, 12 Units, Subcutaneous, BID, Fadia Howe MD, 12 Units at 11/18/23 0842    ipratropium 0.02 % nebulizer solution 0.5 mg, 0.5 mg, Nebulization, Q4H PRN, Angy Sandhu DO    latanoprost 0.005 % ophthalmic solution 1 drop, 1 drop, Both  Eyes, Daily, Fadia Howe MD, 1 drop at 11/18/23 0841    [START ON 11/19/2023] losartan tablet 100 mg, 100 mg, Oral, Daily, Juliocesar Don FNP    melatonin tablet 6 mg, 6 mg, Oral, Nightly PRN, Angy Sandhu,     mupirocin 2 % ointment, , Nasal, BID, Fadia Howe MD, Given at 11/18/23 0833    ondansetron injection 4 mg, 4 mg, Intravenous, Q6H PRN, Fadia Howe MD, 4 mg at 11/17/23 1300    VTE Risk Mitigation (From admission, onward)           Ordered     enoxaparin injection 40 mg  Every 12 hours         11/17/23 0254     Place sequential compression device  Until discontinued         11/16/23 0347                  Assessment:   Hypertensive Emergency (BP Improved)    - History of Hypertension  Hypertensive Encephalopathy (Improved)  CAD (Nonobstructive in 2021)    - EF 50-55%    - LM- Negative/Patent, LAD- 20-30% Proximal to Mid LAD, D1- LI (Small), LCX- Large LI, OM1- LI, LPDA- LI, RCA- LI with 50% Mid Stenosis, PLB- Small Vessel (9.29.21)  Intraparenchymal Hemorrhage- Small Focal Hemorrhage at the Right Insular Region  Elevated BMI/Morbid Obesity  Anemia  Chronic Kidney Disease Stage IIIB  Renal Cell Carcinoma     - History of Nephrectomy  Colon Cancer Status Post Hemicolectomy  MOHSEN/CPAP  DM II    - A1C 7.6%  Asthma (Stable)    Plan:   Continue Hydralazine 50 Mg PO TID. Continue Coreg 25 Mg PO BID.  Discontinue HCTZ.  Increase Losartan to 100 Mg PO Daily & Continue Amlodipine 10 Mg Daily.    Continue Clonidine 0.2 Mg PO BID (Concern for Rebound Hypertension if Stopped).  Recommend strict Compliance with PPV given MOHSEN.  Will need follow up with [facility] once discharged.  Will sign off. Call if needed.    HEATHER Swann  Cardiology  [facility] - [facility] ICU  11/18/2023     I have seen the patient, reviewed the Nurse Practitioner's note, assessment and plan. I have personally interviewed and examined the patient at bedside and agree with the findings. Medical decision making  listed above were done under my guidance.    Physical exam:  Cardiovascular system: regular rhythm, no murmur.  Lungs: CTAB.  Extremities: No leg edema.    Plan:  I have seen the patient, reviewed the Nurse Practitioner's note, assessment and plan. I have personally interviewed and examined the patient at bedside and agree with the findings. Medical decision making listed above were done under my guidance.    Physical exam:  Cardiovascular system: regular rhythm, no murmur.  Lungs: CTAB.  Extremities: No leg edema.    Plan:  HTN meds adjusted

## 2023-11-18 NOTE — PT/OT/SLP PROGRESS
Occupational Therapy   Treatment    Name: Elissa Freeman  MRN: [id number]  Admitting Diagnosis:  <principal problem not specified>       Recommendations:     Recommended therapy intensity at discharge: Low Intensity Therapy   Discharge Equipment Recommendations:  none  Barriers to discharge:       Assessment:     Elissa Freeman is a 68 y.o. female with a medical diagnosis of <principal problem not specified>.  Performance deficits affecting function are impaired self care skills, impaired functional mobility, weakness, impaired endurance, decreased upper extremity function.     Rehab Prognosis:  Fair; patient would benefit from acute skilled OT services to address these deficits and reach maximum level of function.       Plan:     Patient to be seen 4 x/week to address the above listed problems via self-care/home management, therapeutic activities, therapeutic exercises  Plan of Care Expires: 12/17/23  Plan of Care Reviewed with: patient, daughter    Subjective     Pain/Comfort:       Objective:     Communicated with: RN prior to session.  Patient found up in chair with   upon OT entry to room.    General Precautions: Standard,      Orthopedic Precautions:N/A  Braces: N/A  Respiratory Status: Room air  Vital Signs: Blood Pressure: 125/72  HR: 72  Sp02: 100     Occupational Performance:     Functional Mobility/Transfers:  Patient completed Sit <> Stand Transfer with stand by assistance  with  rolling walker   Functional Mobility: no LOB    Activities of Daily Living:  Grooming: supervision standing at sink brushing teeth  Lower Body Dressing: total assistance donning B socks  Toileting: stand by assistance patient performing pericare in standing following incontinent episode     Patient Education:  Patient provided with verbal education education regarding OT role/goals/POC.  Understanding was verbalized.      Patient left HOB elevated with all lines intact and call button in reach    GOALS:   Multidisciplinary  Problems       Occupational Therapy Goals          Problem: Occupational Therapy    Goal Priority Disciplines Outcome Interventions   Occupational Therapy Goal     OT, PT/OT Ongoing, Progressing    Description: Goals to be met by: 12/17/2023     Patient will increase functional independence with ADLs by performing:    UE Dressing with Modified [place].  LE Dressing with Minimal Assistance.  Toileting from toilet with Modified [place] for hygiene and clothing management.   Bathing from  shower chair/bench with Modified [place].  Toilet transfer to toilet with Modified [place].  Increased functional strength to 4/5 for increased ind in ADLs.                         Time Tracking:     OT Date of Treatment: 11/18/23  OT Start Time: 0938  OT Stop Time: 0955  OT Total Time (min): 17 min    Billable Minutes:Self Care/Home Management 1    OT/JEREMIAH: JEREMIAH     Number of JEREMIAH visits since last OT visit: 1    11/18/2023

## 2023-11-18 NOTE — PROGRESS NOTES
[facility] Hospital Medicine Progress Note        Chief Complaint: Inpatient follow-up on hypertensive emergency     HPI:   Elissa Freeman is a 68 y.o. female with a past medical history of essential hypertension, hyperlipidemia, CAD, diabetes mellitus type 2, CKD, colon cancer s/p hemicolectomy, renal cell carcinoma s/p nephrectomy, MOHSEN, asthma, and cataracts who originally presented to [facility] on 11/16/2023 via EMS for altered mental status.  Patient had a fall on day of presentation with dizziness and confusion.  Initial blood pressure on arrival was 200/110.  CT head without contrast revealed small, faint hyperdense focus in the right insular region which may represent microhemorrhage or nonspecific calcification.  Patient was placed on Cleviprex drip and transferred to [facility] ED for higher level of care.  Neurosurgery was consulted without surgical intervention.  Patient was admitted to ICU for close monitoring.  Repeat CT head on 11/16 revealed focus of subtle increased density involving the right posterior insular cortex and subinsular brain, essentially identical to the prior exam from 5 hours earlier with focal area of hemorrhage not excluded.  Cleviprex was discontinued in the early morning hours of 11/16 and patient was started on home anti hypersensitive.  Patient was cleared for downgrade out of ICU on 11/16/2023.  Neurosurgery recommended q.4 hours neurochecks and BP less than 160/90.  Hospital medicine was consulted for assumption of care on 11/16.       Interval Hx:   Patient is sitting up in a chair, smiling, and very pleasant.  No acute issues have been reported to me.  Patient is afebrile, on room air, and hemodynamically stable. Case was discussed with patient's ICU nurse.      Objective/physical exam:  General:  Morbidly obese  female in no acute distress  HENT: normocephalic, atraumatic  Eye: PERRL, EOMI, clear conjunctiva  Neck: full ROM, no  thyromegaly, no JVD  Respiratory: clear to auscultation bilaterally  Cardiovascular: regular rate and rhythm  Gastrointestinal: non-distended, positive bowel sounds, non-tender  Musculoskeletal: no gross deformity  Integumentary: warm, dry, intact, no rashes  Neurological: cranial nerves grossly intact, no focal neurological deficit  Psychiatric: cooperative, Very pleasant      VITAL SIGNS: 24 HRS MIN & MAX LAST   Temp  Min: 98.1 °F (36.7 °C)  Max: 99.1 °F (37.3 °C) 98.4 °F (36.9 °C)   BP  Min: 98/69  Max: 204/91 112/71   Pulse  Min: 69  Max: 116  69   Resp  Min: 14  Max: 21 15   SpO2  Min: 99 %  Max: 100 % 100 %     I have reviewed the following labs:  Recent Labs   Lab 11/15/23  2249 11/17/23  0152   WBC 6.81 7.02   RBC 5.67* 5.44*   HGB 12.3 12.1   HCT 42.9 40.9   MCV 75.7* 75.2*   MCH 21.7* 22.2*   MCHC 28.7* 29.6*   RDW 15.6 16.1    198   MPV 10.5* 10.6*     Recent Labs   Lab 11/15/23  2249 11/16/23  0427 11/16/23 2045 11/17/23 0152    139 142 141   K 3.1* 3.1* 3.7 4.0   CO2 30 28 30 28   BUN 15.9 15.7 17.2 17.0   CREATININE 1.43* 1.32* 1.59* 1.51*   CALCIUM 9.0 9.2 9.2 9.1   MG 1.80 1.90  --  2.10   ALBUMIN 3.1*  --   --  2.9*   ALKPHOS 126  --   --  119   ALT 23  --   --  19   AST 30  --   --  22   BILITOT 0.7  --   --  0.8     Microbiology Results (last 7 days)       ** No results found for the last 168 hours. **             See below for Radiology    Scheduled Med:   amLODIPine  10 mg Oral Daily    atorvastatin  80 mg Oral Daily    carvediloL  25 mg Oral BID    cloNIDine  0.2 mg Oral BID    DULoxetine  60 mg Oral Daily    enoxparin  40 mg Subcutaneous Q12H (prophylaxis, 0900/2100)    hydrALAZINE  50 mg Oral Q8H    insulin detemir U-100  12 Units Subcutaneous BID    latanoprost  1 drop Both Eyes Daily    [START ON 11/19/2023] losartan  100 mg Oral Daily    mupirocin   Nasal BID      Continuous Infusions:  None.       PRN Meds:  dextrose 10%, dextrose 10%, glucagon (human recombinant), glucose,  glucose, hydrALAZINE, insulin aspart U-100, ipratropium, melatonin, ondansetron     Assessment/Plan:  Hypertensive emergency  Hypertensive encephalopathy  Intraparenchymal hemorrhage of the right posterior insular region  Iron-deficiency anemia   Anemia chronic disease   Stage IIIB chronic kidney disease   Obstructive sleep apnea on CPAP   Insulin-dependent type 2 diabetes mellitus   Coronary artery disease   History of colon cancer status post hemicolectomy   History of renal cell carcinoma status post nephrectomy  Morbid obesity      Plan:   Continue every 4 hours neurological evaluations  Continue aggressive blood pressure control with goal of less than 160/90  Cardiology evaluation has been requested for assistance with blood pressure control  Continue physical therapy and occupational therapy   Continue appropriate home medications  Resume aspirin when cleared by Neurosurgery      Critical Care Diagnosis: Hypertensive emergency requiring IV antihypertensives  Critical Care Interventions: Hands-on evaluation, review of labs, radiographs, medical records, and discussion with the patient and medical staff in order to assess and manage the high probability of imminent or life-threatening deterioration of cardio-respiratory status requiring vasopressor support and/or intubation and mechanical ventilation.  Critical Care Time Spent: 35 minutes      VTE prophylaxis:  Lovenox    Patient condition:  Stable    Anticipated discharge and Disposition:   TBD      All diagnosis and differential diagnosis have been reviewed; assessment and plan has been documented; I have personally reviewed the labs and test results that are presently available; I have reviewed the patients medication list; I have reviewed the consulting providers response and recommendations. I have reviewed or attempted to review medical records based upon their availability    All of the patient's questions have been  addressed and answered. Patient's is  agreeable to the above stated plan. I will continue to monitor closely and make adjustments to medical management as needed.  _____________________________________________________________________      Radiology:  I have personally reviewed the following imaging and agree with the radiologist.     US Retroperitoneal Complete With Doppler (XPD)  Narrative: EXAMINATION:  US RETROPERITONEAL COMPLETE WITH DOPPLER (XPD)    CLINICAL HISTORY:  Resistant HTN;    COMPARISON:  CT 16 May 2016    FINDINGS:  Grayscale and color Doppler sonographic evaluation of the kidneys and urinary bladder.    Right kidney measures 9.5 cm in length.  No hydronephrosis.  Normal renal parenchymal echogenicity.  Resistive indices are not significantly elevated.  Main renal artery velocity not significantly elevated.  Patent renal vein.    The left kidney is absent.    Urinary bladder unremarkable.  Impression: No significant sonographic abnormality of the right kidney.  Left kidney absent.    Electronically signed by: Augusto Dempsey  Date:    11/17/2023  Time:    12:52      Clarence Mancera MD   11/18/2023

## 2023-11-18 NOTE — NURSING
Nurses Note -- 4 Eyes      11/17/2023   9:25 PM      Skin assessed during: Q Shift Change      [x] No Altered Skin Integrity Present    []Prevention Measures Documented      [] Yes- Altered Skin Integrity Present or Discovered   [] LDA Added if Not in Epic (Describe Wound)   [] New Altered Skin Integrity was Present on Admit and Documented in LDA   [] Wound Image Taken    Wound Care Consulted? Yes    Attending Nurse:  Karina ANDERSON    Second RN/Staff Member:   Anthony ANDERSON

## 2023-11-18 NOTE — PT/OT/SLP PROGRESS
Physical Therapy Treatment    Patient Name:  Elissa Freeman   MRN:  [id number]    Recommendations:     Discharge therapy intensity: Low Intensity Therapy   Discharge Equipment Recommendations: none  Barriers to discharge: Impaired mobility    Assessment:     Elissa Freeman is a 68 y.o. female admitted with a medical diagnosis of <principal problem not specified>.  She presents with the following impairments/functional limitations: weakness, gait instability, impaired endurance, impaired balance, decreased lower extremity function, impaired self care skills, impaired functional mobility.    Rehab Prognosis: Good; patient would benefit from acute skilled PT services to address these deficits and reach maximum level of function.    Recent Surgery: * No surgery found *      Plan:     During this hospitalization, patient to be seen 6 x/week to address the identified rehab impairments via gait training, therapeutic activities, therapeutic exercises and progress toward the following goals:    Plan of Care Expires:  12/17/23    Subjective     Chief Complaint: I want to get out of this bed.     Objective:     Communicated with nurse prior to session.  Patient found supine with   upon PT entry to room.     General Precautions: Standard,  (BP<160/90)  Orthopedic Precautions: N/A  Braces: N/A  Respiratory Status: Room air  Blood Pressure: 143/75  Skin Integrity: Visible skin intact      Functional Mobility:  Min assist to get EOB and gait 50 ft x 2 with RW min assist.     Education Provided:  Role and goals of PT, transfer training, bed mobility, gait training, balance training, safety awareness, assistive device, strengthening exercises, and importance of participating in PT to return to PLOF.    Patient left up in chair with all lines intact and call button in reach..    GOALS:   Multidisciplinary Problems       Physical Therapy Goals          Problem: Physical Therapy    Goal Priority Disciplines Outcome Goal Variances  Interventions   Physical Therapy Goal     PT, PT/OT Ongoing, Progressing     Description: Goals to be met by: 23     Patient will increase functional independence with mobility by performin. Supine to sit with [place]  2. Sit to supine with [place]  3. Sit to stand transfer with Modified [place]  4. Gait  x 150 feet with Modified [place] using Quad Cane.   5. Ascend/descend 5 stair with bilateral Handrails Stand-by Assistance using No Assistive Device.                          Time Tracking:     Billable Minutes: Gait Training 12 and Therapeutic Activity 13    Treatment Type: Treatment  PT/PTA: PTA     Number of PTA visits since last PT visit: 2023

## 2023-11-19 VITALS
SYSTOLIC BLOOD PRESSURE: 128 MMHG | HEART RATE: 69 BPM | OXYGEN SATURATION: 100 % | RESPIRATION RATE: 17 BRPM | HEIGHT: 64 IN | DIASTOLIC BLOOD PRESSURE: 69 MMHG | BODY MASS INDEX: 50.02 KG/M2 | WEIGHT: 293 LBS | TEMPERATURE: 99 F

## 2023-11-19 PROBLEM — I16.1 HYPERTENSIVE EMERGENCY: Status: ACTIVE | Noted: 2023-11-19

## 2023-11-19 LAB — POCT GLUCOSE: 193 MG/DL (ref 70–110)

## 2023-11-19 PROCEDURE — 25000003 PHARM REV CODE 250: Performed by: STUDENT IN AN ORGANIZED HEALTH CARE EDUCATION/TRAINING PROGRAM

## 2023-11-19 PROCEDURE — 99900031 HC PATIENT EDUCATION (STAT)

## 2023-11-19 PROCEDURE — 25000003 PHARM REV CODE 250: Performed by: INTERNAL MEDICINE

## 2023-11-19 PROCEDURE — 25000003 PHARM REV CODE 250: Performed by: NURSE PRACTITIONER

## 2023-11-19 PROCEDURE — 63600175 PHARM REV CODE 636 W HCPCS: Performed by: INTERNAL MEDICINE

## 2023-11-19 PROCEDURE — 99900035 HC TECH TIME PER 15 MIN (STAT)

## 2023-11-19 RX ORDER — LOSARTAN POTASSIUM 100 MG/1
100 TABLET ORAL DAILY
Qty: 90 TABLET | Refills: 3 | Status: SHIPPED | OUTPATIENT
Start: 2023-11-20 | End: 2024-01-09

## 2023-11-19 RX ORDER — HYDRALAZINE HYDROCHLORIDE 50 MG/1
50 TABLET, FILM COATED ORAL EVERY 8 HOURS
Qty: 90 TABLET | Refills: 11 | Status: SHIPPED | OUTPATIENT
Start: 2023-11-19 | End: 2024-11-18

## 2023-11-19 RX ORDER — AMLODIPINE BESYLATE 10 MG/1
10 TABLET ORAL DAILY
Qty: 30 TABLET | Refills: 11 | Status: SHIPPED | OUTPATIENT
Start: 2023-11-20 | End: 2024-02-15

## 2023-11-19 RX ORDER — CARVEDILOL 25 MG/1
25 TABLET ORAL 2 TIMES DAILY
Qty: 60 TABLET | Refills: 11 | Status: SHIPPED | OUTPATIENT
Start: 2023-11-19 | End: 2024-02-15

## 2023-11-19 RX ADMIN — INSULIN DETEMIR 12 UNITS: 100 INJECTION, SOLUTION SUBCUTANEOUS at 10:11

## 2023-11-19 RX ADMIN — MUPIROCIN: 20 OINTMENT TOPICAL at 10:11

## 2023-11-19 RX ADMIN — AMLODIPINE BESYLATE 10 MG: 5 TABLET ORAL at 10:11

## 2023-11-19 RX ADMIN — CARVEDILOL 25 MG: 12.5 TABLET, FILM COATED ORAL at 10:11

## 2023-11-19 RX ADMIN — LOSARTAN POTASSIUM 100 MG: 50 TABLET, FILM COATED ORAL at 10:11

## 2023-11-19 RX ADMIN — CLONIDINE HYDROCHLORIDE 0.2 MG: 0.2 TABLET ORAL at 10:11

## 2023-11-19 RX ADMIN — ENOXAPARIN SODIUM 40 MG: 40 INJECTION SUBCUTANEOUS at 10:11

## 2023-11-19 RX ADMIN — ATORVASTATIN CALCIUM 80 MG: 40 TABLET, FILM COATED ORAL at 10:11

## 2023-11-19 RX ADMIN — LATANOPROST 1 DROP: 50 SOLUTION OPHTHALMIC at 10:11

## 2023-11-19 RX ADMIN — DULOXETINE HYDROCHLORIDE 60 MG: 30 CAPSULE, DELAYED RELEASE ORAL at 10:11

## 2023-11-19 NOTE — NURSING
Nurses Note -- 4 Eyes      11/18/2023   7:04 PM      Skin assessed during: Q Shift Change      [x] No Altered Skin Integrity Present    [x]Prevention Measures Documented      [] Yes- Altered Skin Integrity Present or Discovered   [] LDA Added if Not in Epic (Describe Wound)   [] New Altered Skin Integrity was Present on Admit and Documented in LDA   [] Wound Image Taken    Wound Care Consulted? No    Attending Nurse:  Karina ANDERSON      Second RN/Staff Member:   Anthony ANDERSON

## 2023-11-19 NOTE — PLAN OF CARE
Problem: Adult Inpatient Plan of Care  Goal: Plan of Care Review  Outcome: Met  Goal: Patient-Specific Goal (Individualized)  Outcome: Met  Goal: Absence of Hospital-Acquired Illness or Injury  Outcome: Met  Goal: Optimal Comfort and Wellbeing  Outcome: Met  Goal: Readiness for Transition of Care  Outcome: Met     Problem: Bariatric Environmental Safety  Goal: Safety Maintained with Care  Outcome: Met     Problem: Diabetes Comorbidity  Goal: Blood Glucose Level Within Targeted Range  Outcome: Met     Problem: Skin Injury Risk Increased  Goal: Skin Health and Integrity  Outcome: Met

## 2023-11-20 ENCOUNTER — PATIENT OUTREACH (OUTPATIENT)
Dept: ADMINISTRATIVE | Facility: CLINIC | Age: 68
End: 2023-11-20
Payer: MEDICARE

## 2023-11-20 ENCOUNTER — PATIENT MESSAGE (OUTPATIENT)
Dept: ADMINISTRATIVE | Facility: CLINIC | Age: 68
End: 2023-11-20
Payer: MEDICARE

## 2023-11-20 NOTE — PROGRESS NOTES
C3 nurse attempted to contact Elissa Freeman  for a TCC post hospital discharge follow up call. No answer. Left voicemail with callback information. The patient does not have a scheduled HOSFU appointment. Message sent to PCP staff for assistance with scheduling visit with patient.   Message sent via patient's portal regarding follow up call.

## 2023-11-20 NOTE — PROGRESS NOTES
C3 nurse attempted to contact Elissa Freeman  for a TCC post hospital discharge follow up call. No answer. Left voicemail with callback information. The patient does not have a scheduled HOSFU appointment. Message sent to PCP staff for assistance with scheduling visit with patient.

## 2023-11-21 NOTE — PROGRESS NOTES
C3 nurse spoke with Elissa Freeman  for a TCC post hospital discharge follow up call. The patient has a scheduled HOSFU appointment with [facility] on 11/27/2023 @ 130 pm.

## 2023-11-27 ENCOUNTER — OFFICE VISIT (OUTPATIENT)
Dept: INTERNAL MEDICINE | Facility: CLINIC | Age: 68
End: 2023-11-27
Payer: MEDICARE

## 2023-11-27 VITALS
WEIGHT: 293 LBS | OXYGEN SATURATION: 100 % | HEART RATE: 68 BPM | DIASTOLIC BLOOD PRESSURE: 83 MMHG | BODY MASS INDEX: 50.02 KG/M2 | TEMPERATURE: 98 F | HEIGHT: 64 IN | SYSTOLIC BLOOD PRESSURE: 149 MMHG | RESPIRATION RATE: 18 BRPM

## 2023-11-27 DIAGNOSIS — I62.9 INTRACRANIAL HEMORRHAGE: Primary | ICD-10-CM

## 2023-11-27 DIAGNOSIS — N18.30 TYPE 2 DIABETES MELLITUS WITH STAGE 3 CHRONIC KIDNEY DISEASE, WITH LONG-TERM CURRENT USE OF INSULIN, UNSPECIFIED WHETHER STAGE 3A OR 3B CKD: ICD-10-CM

## 2023-11-27 DIAGNOSIS — I10 PRIMARY HYPERTENSION: ICD-10-CM

## 2023-11-27 DIAGNOSIS — Z79.4 TYPE 2 DIABETES MELLITUS WITH STAGE 3 CHRONIC KIDNEY DISEASE, WITH LONG-TERM CURRENT USE OF INSULIN, UNSPECIFIED WHETHER STAGE 3A OR 3B CKD: ICD-10-CM

## 2023-11-27 DIAGNOSIS — E11.22 TYPE 2 DIABETES MELLITUS WITH STAGE 3 CHRONIC KIDNEY DISEASE, WITH LONG-TERM CURRENT USE OF INSULIN, UNSPECIFIED WHETHER STAGE 3A OR 3B CKD: ICD-10-CM

## 2023-11-27 LAB — HBA1C MFR BLD: 7.6 %

## 2023-11-27 PROCEDURE — 99215 OFFICE O/P EST HI 40 MIN: CPT | Mod: PBBFAC

## 2023-11-27 PROCEDURE — 83036 HEMOGLOBIN GLYCOSYLATED A1C: CPT | Mod: PBBFAC

## 2023-11-27 RX ORDER — OFLOXACIN 3 MG/ML
1 SOLUTION/ DROPS OPHTHALMIC 4 TIMES DAILY
COMMUNITY
Start: 2023-11-01

## 2023-11-27 RX ORDER — PREDNISOLONE ACETATE 10 MG/ML
1 SUSPENSION/ DROPS OPHTHALMIC
COMMUNITY
Start: 2023-11-01

## 2023-11-27 NOTE — PROGRESS NOTES
"INTERNAL MEDICINE RESIDENT   HOSPITAL FOLLOW UP    Patient Name: Elissa Freeman  YOB: 1955  Chief Complaint: Follow-up (Hospital follow up, states feeling "so,so")  PRESENTING HISTORY       History of Present Illness:  Patient is a 68 y.o. female with a pmhx of  obesity, MOHSEN, multinodular goiter, hypertension, hyperlipidemia, diabetes mellitus type 2, CKD III, adenocarcinoma of the colon stage IIA status post hemicolectomy (2014) and adjuvant chemotherapy (2015), renal cell carcinoma status post nephrectomy (2011), who presents for hospital follow up.  She was recently hospitalized for hypertensive emergency and intracranial hemorrhage.  She was transferred from [facility] to [facility], Neurosurgery was consulted, no surgical intervention required.  Today, patient states that she is been feeling approximally at her baseline since discharge.  She provided me her blood pressure log which ranged between systolic 120s to systolic 160s.  She states that she occasionally forgets to take her blood pressure medications.  She states that her blood pressure was near systolic 200 on the day she was admitted to the hospital, on that day she also forgot to take her blood pressure medications.  Denies any headache, dizziness, weakness.            Constitutional: no fever/chills  EENT: no sore throat, ear pain, sinus pain/congestion, nasal congestion/drainage  Respiratory: no cough, no wheezing, no shortness of breath  Cardiovascular: no chest pain, no palpitations, no edema  Gastrointestinal: no nausea, vomiting, or diarrhea. No abdominal pain  Genitourinary: no dysuria, no urinary frequency or urgency, no hematuria  Integumentary: no skin rash or abnormal lesion  Neurologic: no headache, no dizziness, no weakness or numbness    PAST HISTORY:     Past Medical History:   Diagnosis Date    Asthma     Cancer     CKD (chronic kidney disease)     Coronary artery disease     Diabetes mellitus     Hyperlipidemia     Hypertension     " Obesity, unspecified     MOHSEN (obstructive sleep apnea)     Unspecified cataract        Past Surgical History:   Procedure Laterality Date    CATARACT EXTRACTION Left 11/28/2022    CHOLECYSTECTOMY      COLON SURGERY      COLONOSCOPY N/A 4/27/2023    Procedure: COLONOSCOPY;  Surgeon: Jose Miguel Rosales MD;  Location: [facility] ENDOSCOPY;  Service: Endoscopy;  Laterality: N/A;    excision of lipoma      HYSTERECTOMY, VAGINAL, WITH SALPINGO-OOPHORECTOMY      NEPHRECTOMY Left     TUBAL LIGATION         Family History   Problem Relation Age of Onset    Hypertension Mother     Coronary artery disease Mother     Hyperlipidemia Mother     Stroke Father     Cancer Brother        Social History     Socioeconomic History    Marital status:    Tobacco Use    Smoking status: Never    Smokeless tobacco: Never   Substance and Sexual Activity    Alcohol use: Yes     Comment: frozen drinks once a month, giovanni    Drug use: Never    Sexual activity: Not Currently     Social Determinants of Health     Financial Resource Strain: Medium Risk (11/23/2022)    Overall Financial Resource Strain (CARDIA)     Difficulty of Paying Living Expenses: Somewhat hard   Food Insecurity: Food Insecurity Present (11/23/2022)    Hunger Vital Sign     Worried About Running Out of Food in the Last Year: Sometimes true     Ran Out of Food in the Last Year: Sometimes true   Transportation Needs: No Transportation Needs (11/23/2022)    PRAPARE - Transportation     Lack of Transportation (Medical): No     Lack of Transportation (Non-Medical): No   Physical Activity: Inactive (11/23/2022)    Exercise Vital Sign     Days of Exercise per Week: 0 days     Minutes of Exercise per Session: 0 min   Stress: No Stress Concern Present (11/23/2022)    [employer] - Occupational Stress Questionnaire     Feeling of Stress : Not at all   Social Connections: Moderately Isolated (11/23/2022)    Social Connection and Isolation Panel [NHANES]      Frequency of Communication with Friends and Family: More than three times a week     Frequency of Social Gatherings with Friends and Family: Once a week     Attends [employer] Services: More than 4 times per year     Active Member of Clubs or Organizations: No     Attends Club or Organization Meetings: Never     Marital Status:    Housing Stability: Low Risk  (11/23/2022)    Housing Stability Vital Sign     Unable to Pay for Housing in the Last Year: No     Number of Places Lived in the Last Year: 1     Unstable Housing in the Last Year: No       MEDICATIONS & ALLERGIES:     Current Outpatient Medications on File Prior to Visit   Medication Sig    albuterol (PROVENTIL/VENTOLIN HFA) 90 mcg/actuation inhaler Inhale 1 puff into the lungs every 4 (four) hours as needed for Wheezing.    amLODIPine (NORVASC) 10 MG tablet Take 1 tablet (10 mg total) by mouth once daily.    atorvastatin (LIPITOR) 40 MG tablet Take 2 tablets (80 mg total) by mouth once daily.    carvediloL (COREG) 25 MG tablet Take 1 tablet (25 mg total) by mouth 2 (two) times daily.    cetirizine (ZYRTEC) 10 MG tablet Take 1 tablet (10 mg total) by mouth once daily.    cloNIDine (CATAPRES) 0.2 MG tablet Take 1 tablet (0.2 mg total) by mouth 2 (two) times daily. Takes 2 times per day. Takes whole tablet    diclofenac (VOLTAREN) 50 MG EC tablet Take 1 tablet (50 mg total) by mouth 2 (two) times daily as needed (Pain).    DULoxetine (CYMBALTA) 60 MG capsule TAKE 1 CAPSULE BY MOUTH DAILY. DO NOT CRUSH OR CHEW    empagliflozin (JARDIANCE) 25 mg tablet Take 1 tablet (25 mg total) by mouth once daily.    exenatide microspheres (BYDUREON BCISE) 2 mg/0.85 mL AtIn Inject 2 mg into the skin every 7 days.    flash glucose scanning reader (FREESTYLE SMITHA 2 READER) Misc 1 each by Misc.(Non-Drug; Combo Route) route 4 (four) times daily with meals and nightly.    furosemide (LASIX) 20 MG tablet TAKE ONE TABLET BY MOUTH DAILY AT 9 AM (VIAL)    hydrALAZINE  "(APRESOLINE) 50 MG tablet Take 1 tablet (50 mg total) by mouth every 8 (eight) hours.    insulin (LANTUS SOLOSTAR U-100 INSULIN) glargine 100 units/mL SubQ pen Inject 65 Units into the skin every evening.    insulin aspart U-100 (NOVOLOG FLEXPEN U-100 INSULIN) 100 unit/mL (3 mL) InPn pen Inject 20 Units into the skin 3 (three) times daily with meals.    latanoprost 0.005 % ophthalmic solution Place 1 drop into both eyes once daily.    losartan (COZAAR) 100 MG tablet Take 1 tablet (100 mg total) by mouth once daily.    melatonin (MELATIN) 3 mg tablet Take 2 tablets (6 mg total) by mouth nightly as needed for Insomnia.    nebulizer accessories Kit 1 each by Misc.(Non-Drug; Combo Route) route 4 (four) times daily as needed (wheezing).    ofloxacin (OCUFLOX) 0.3 % ophthalmic solution Place 1 drop into the right eye 4 (four) times daily.    pen needle, diabetic 32 gauge x 5/32" Ndle 1 each by Misc.(Non-Drug; Combo Route) route 4 (four) times daily.    polyethylene glycol (GLYCOLAX) 17 gram PwPk Take 17 g by mouth once daily.    prednisoLONE acetate (PRED FORTE) 1 % DrpS Place 1 drop into the right eye 5 (five) times daily.    vitamin D (VITAMIN D3) 1000 units Tab Take 2 tablets (2,000 Units total) by mouth Daily.    flash glucose sensor (FREESTYLE SMITHA 2 SENSOR) Kit 1 each by Misc.(Non-Drug; Combo Route) route 2 hours after meals and at bedtime. (Patient not taking: Reported on 11/21/2023)    gabapentin (NEURONTIN) 600 MG tablet Take 1 tablet (600 mg total) by mouth once daily. (Patient not taking: Reported on 11/21/2023)    nitroGLYCERIN (NITROSTAT) 0.4 MG SL tablet PLACE 1 TABLET UNDER THE TONGUE EVERY 5 MINUTES AS NEEDED FOR CHEST PAIN. GO TO ER AFTER THIRD DOSE (Patient not taking: Reported on 11/21/2023)     No current facility-administered medications on file prior to visit.       Review of patient's allergies indicates:  No Known Allergies    OBJECTIVE:   Vital Signs:  Vitals:    11/27/23 1406   BP: (!) 149/83 " "  Pulse: 68   Resp: 18   Temp: 98.1 °F (36.7 °C)   TempSrc: Oral   SpO2: 100%   Weight: (!) 145.1 kg (319 lb 12.8 oz)   Height: 5' 4" (1.626 m)       No results found for this or any previous visit (from the past 24 hour(s)).      General:  Obese, no acute distress  Eye: PERRLA, EOMI, clear conjunctiva, eyelids normal  HENT: Head - normocephalic and atraumatic,  nasal  and oral mucosa moist and clear  Neck: full range of motion, supple  Respiratory: clear to auscultation bilaterally without wheezes, rales, rhonchi  Cardiovascular: regular rate and rhythm without murmurs, gallops or rubs, normal S1 S2. No JVD.   Gastrointestinal: soft, non-tender, non-distended with normal bowel sounds in all four quadrants. No masses palpated.  Musculoskeletal: full range of motion of all extremities without limitation or discomfort  Integumentary: no rashes or skin lesions present, no erythema  Neurologic: AAO x 3, Cranial nerves II-XII intact, no signs of peripheral neurological deficit, motor/sensory function intact, no dysarthria.  Psychiatric: cooperative with exam, good eye contact, no hallucinations.      Laboratory  Lab Results   Component Value Date    WBC 7.02 11/17/2023    HGB 12.1 11/17/2023    HCT 40.9 11/17/2023    MCV 75.2 (L) 11/17/2023     11/17/2023     @[id number](GLU,NA,K,Cl,CO2,BUN,Creatinine,Calcium,MG)@  Lab Results   Component Value Date    INR 1.84 (H) 10/30/2018    INR 2.04 05/09/2018    INR 2.04 (H) 05/09/2018    PROTIME 20.2 (H) 10/30/2018    PROTIME 21.9 (H) 05/09/2018    PROTIME 21.1 (H) 04/25/2018     Lab Results   Component Value Date    HGBA1C 7.6 (H) 11/16/2023     No results for input(s): "POCTGLUCOSE" in the last 72 hours.    Diagnostic Results:    ASSESSMENT & PLAN:     Hypertensive Emergency  Intraparenchymal hemorrhage of right posterior insular region  -Patient presented to ED after having AMS following a fall.  Her BP was elevated with systolic in the 190s.  She was found to have " intracranial hemorrhage.  -No surgical intervention was indicated.  Medical management for control of blood pressure.  -patient's home blood pressure log systolic ranged between 120s and 160s.  She endorses intermittent compliance with blood pressure medications.  -lengthy discussion with patient regarding strict compliance with medications considering she recently had intracranial hemorrhage.  -patient voiced understanding and understands the risks of noncompliance with her blood pressure medication.          Erick Sher MD  Internal Medicine PGY-1

## 2023-11-28 NOTE — PROGRESS NOTES
Attending Addendum:   Patient seen and examined in clinic. Management and Plan were discussed with resident. Care was reasonable and necessary.   Karyna Campbell MD  [facility] - Internal Medicine

## 2023-11-30 DIAGNOSIS — T78.40XD ALLERGY, SUBSEQUENT ENCOUNTER: ICD-10-CM

## 2023-12-01 ENCOUNTER — OFFICE VISIT (OUTPATIENT)
Dept: INTERNAL MEDICINE | Facility: CLINIC | Age: 68
End: 2023-12-01
Payer: MEDICARE

## 2023-12-01 VITALS
BODY MASS INDEX: 50.02 KG/M2 | WEIGHT: 293 LBS | DIASTOLIC BLOOD PRESSURE: 72 MMHG | SYSTOLIC BLOOD PRESSURE: 120 MMHG | HEIGHT: 64 IN | TEMPERATURE: 99 F | RESPIRATION RATE: 20 BRPM | OXYGEN SATURATION: 99 % | HEART RATE: 67 BPM

## 2023-12-01 DIAGNOSIS — I27.20 PULMONARY HYPERTENSION: ICD-10-CM

## 2023-12-01 DIAGNOSIS — E78.5 HYPERLIPIDEMIA ASSOCIATED WITH TYPE 2 DIABETES MELLITUS: ICD-10-CM

## 2023-12-01 DIAGNOSIS — I10 PRIMARY HYPERTENSION: ICD-10-CM

## 2023-12-01 DIAGNOSIS — E11.9 TYPE 2 DIABETES MELLITUS WITHOUT COMPLICATION, WITH LONG-TERM CURRENT USE OF INSULIN: ICD-10-CM

## 2023-12-01 DIAGNOSIS — E11.69 HYPERLIPIDEMIA ASSOCIATED WITH TYPE 2 DIABETES MELLITUS: ICD-10-CM

## 2023-12-01 DIAGNOSIS — I25.9 ISCHEMIC HEART DISEASE, CHRONIC: Primary | ICD-10-CM

## 2023-12-01 DIAGNOSIS — Z79.4 TYPE 2 DIABETES MELLITUS WITHOUT COMPLICATION, WITH LONG-TERM CURRENT USE OF INSULIN: ICD-10-CM

## 2023-12-01 PROCEDURE — 99215 OFFICE O/P EST HI 40 MIN: CPT | Mod: PBBFAC

## 2023-12-01 NOTE — PROGRESS NOTES
"[facility] INTERNAL MEDICINE  PRE-OP CLINIC VISIT    SUBJECTIVE:    HPI: Elissa Freeman is a 68 y.o. yo female w/ PMH of Patient is a 68 y.o. female with a pmhx of  obesity, MOHSEN compliant with CPAP, multinodular goiter, hypertension, hyperlipidemia, diabetes mellitus type 2, CKD III, adenocarcinoma of the colon stage IIA status post hemicolectomy (2014) and adjuvant chemotherapy (2015), renal cell carcinoma status post nephrectomy (2011), who presents today for preop evaluation for cataract surgery, had left cataract done Nov 2022 without complication. She did have a recent hospitalization for She has a chronic complaint of SOB/DUBOIS, able to do dishes at home, mobility limited by OA in knees as well as DUBOIS. Able to complete 2 METs. She denies any chest pain or discomfort w/ activities and has not noticed any abnormal swelling, denies orthopnea, syncope. Nonsmoker, denies ETOH, denies recreational drug use.     This patient's RCRI score is 2 ; able to complete 2 METs    ROS:  (+) SOB, DUBOIS, impaired mobility  (-) Chest pain, palpitation, syncope, diarrhea, constipation, N/V, fever, night sweat, chills, confusion.    OBJECTIVE:    Vitals:   /72 (BP Location: Left arm, Patient Position: Sitting, BP Method: Medium (Automatic))   Pulse 67   Temp 98.5 °F (36.9 °C) (Oral)   Resp 20   Ht 5' 4" (1.626 m)   Wt (!) 146 kg (321 lb 12.8 oz)   SpO2 99%   BMI 55.24 kg/m²      Physical Exam:  General: Obese, pleasant woman in no distress  HEENT: NC/AT; PERRL; nasal and oral mucosa moist and clear  Pulm: CTA bilaterally, normal work of breathing  CV: S1, S2 w/o murmurs or gallops; trace edema noted in BLE  GI: Soft with normal bowel sounds in all quadrants, no masses on palpation  MSK: no obvious deformities  Derm: No rashes, abnormal bruising, or skin lesions  Neuro: AAOx4; motor/sensory function intact  Psych: Cooperative; appropriate mood and affect    Significant findings:      ASSESSMENT & PLAN:    Obesity  Diabetes " mellitus type 2  Diabetic neuropathy  -POCT A1c 10.8  -On Levemir to 60 units q.h.s.  -On NovoLog 20 units t.i.d. with meals, exenatide microspheres, and Jardiance 25 mg q.day  -On Gabapentin 600 mg t.i.d. and duloxetine 60 mg q.day    Hypertension  -/72 today, at goal  -educated on healthy DASH diet and exercise  -continue carvedilol 25 mg BID, amlodipine 10 mg, losartan 100 mg daily, Lasix 20 mg     Chronic kidney disease  Solitary Kidney s/p nephrectomy from RCC  -Creatinine 1.51, EGFR 38  -continue to avoid excessive use of NSAIDs (ibuprofen, naproxen, Aleve, Advil, Toradol, Mobic) and nephrotoxic drugs  -continue to follow up with nephrology clinic    Renal cell carcinoma status post nephrectomy (2011)   Adenocarcinoma of the colon stage IIA status post hemicolectomy (2014) and adjuvant chemotherapy (2015)  -patient is s/p hemicolectomy, nephrectomy, and chemotherapy  -completed chemo in April 2015  -seen by Oncology previously but discharged from clinic    Hyperlipidemia  Moderate CAD  -, Triglycerides 170  -LexiScan 4/21:  Anterolateral and inferior ischemia  -TTE 4/21:  Left ventricular ejection fraction 50-55%  -C 9/21:  Coronaries: nonobstructive disease. 20 - 30% stenosis in the proximal-mid LAD. 50% stenosis in the mid RCA. No intervention needed, medical management only at that time  -Recent EKGs SR with PACs, PVCs, old left anterior fascicular block, old t wave inversions consistent with above CAD,   -Denies any chest pain, palpitations, orthopnea, significant leg swelling, no CP on exertion; DUBOIS likely 2/2 MOHSEN  -on lipitor 80 mg q.d.  -Continue f/u with cardiology     Constipation  Hematochezia  -initiate MiraLax q.d. p.r.n. for constipation  -Denies recent hematochezia, has GI referral pending    MOHSEN on CPAP  -Compliant with and benefits from CPAP; continue CPAP    Multinodular goiter  -TSH 1.993, Free T4 0.95  -thyroid biopsy results showed benign thyroid nodules    Depression  -no  SI/HI or symptoms today   -On duloxetine 60 mg q.d.    Gout  -on allopurinol by Nephrology  -reports no flares since last appt     Preoperative Clearance for Cataract Surgery  -RCRI 2, recent LHC from 9/21 with Dr. Casey showed moderate CAD requiring medical management only, TTE showed EF 55-60% from 4/21 as well. Recent EKGs Sinus with PACs and PVCs. No need for further cardiac workup at this time  -Able to perform 2 METs  -Patient is moderate risk for low risk procedure  -Not on any blood thinner, antiplatelet; no need to hold meds prior to procedure      Sandra Cee MD  [employer] IM PGY III

## 2023-12-01 NOTE — PROGRESS NOTES
Attending Addendum:   Patient seen and examined in clinic. Management and Plan were discussed with resident. Care was reasonable and necessary.   Alka Nixon MD  Internal Medicine   [facility]

## 2023-12-03 RX ORDER — CETIRIZINE HYDROCHLORIDE 10 MG/1
10 TABLET ORAL DAILY
Qty: 30 TABLET | Refills: 0 | Status: SHIPPED | OUTPATIENT
Start: 2023-12-03 | End: 2024-01-02 | Stop reason: SDUPTHER

## 2023-12-05 ENCOUNTER — HOSPITAL ENCOUNTER (EMERGENCY)
Facility: HOSPITAL | Age: 68
Discharge: HOME | End: 2023-12-05
Attending: INTERNAL MEDICINE
Payer: MEDICARE

## 2023-12-05 VITALS
TEMPERATURE: 99 F | OXYGEN SATURATION: 98 % | HEART RATE: 71 BPM | DIASTOLIC BLOOD PRESSURE: 95 MMHG | SYSTOLIC BLOOD PRESSURE: 156 MMHG | RESPIRATION RATE: 17 BRPM

## 2023-12-05 DIAGNOSIS — T38.3X5A HYPOGLYCEMIA DUE TO INSULIN: Primary | ICD-10-CM

## 2023-12-05 DIAGNOSIS — E16.0 HYPOGLYCEMIA DUE TO INSULIN: Primary | ICD-10-CM

## 2023-12-05 DIAGNOSIS — N18.9 CHRONIC KIDNEY DISEASE, UNSPECIFIED CKD STAGE: ICD-10-CM

## 2023-12-05 LAB
ALBUMIN SERPL-MCNC: 3.1 G/DL (ref 3.4–4.8)
ALBUMIN/GLOB SERPL: 0.7 RATIO (ref 1.1–2)
ALP SERPL-CCNC: 105 UNIT/L (ref 40–150)
ALT SERPL-CCNC: 13 UNIT/L (ref 0–55)
APPEARANCE UR: ABNORMAL
AST SERPL-CCNC: 18 UNIT/L (ref 5–34)
BACTERIA #/AREA URNS AUTO: ABNORMAL /HPF
BILIRUB SERPL-MCNC: 0.7 MG/DL
BILIRUB UR QL STRIP.AUTO: NEGATIVE
BUN SERPL-MCNC: 20.8 MG/DL (ref 9.8–20.1)
CALCIUM SERPL-MCNC: 8.8 MG/DL (ref 8.4–10.2)
CHLORIDE SERPL-SCNC: 108 MMOL/L (ref 98–107)
CO2 SERPL-SCNC: 27 MMOL/L (ref 23–31)
COLOR UR AUTO: ABNORMAL
CREAT SERPL-MCNC: 1.51 MG/DL (ref 0.55–1.02)
GFR SERPLBLD CREATININE-BSD FMLA CKD-EPI: 38 MLS/MIN/1.73/M2
GLOBULIN SER-MCNC: 4.2 GM/DL (ref 2.4–3.5)
GLUCOSE SERPL-MCNC: 184 MG/DL (ref 82–115)
GLUCOSE UR QL STRIP.AUTO: ABNORMAL
HOLD SPECIMEN: NORMAL
HYALINE CASTS #/AREA URNS LPF: ABNORMAL /LPF
KETONES UR QL STRIP.AUTO: NEGATIVE
LEUKOCYTE ESTERASE UR QL STRIP.AUTO: NEGATIVE
MUCOUS THREADS URNS QL MICRO: ABNORMAL /LPF
NITRITE UR QL STRIP.AUTO: NEGATIVE
PH UR STRIP.AUTO: 5.5 [PH]
POCT GLUCOSE: 145 MG/DL (ref 70–110)
POCT GLUCOSE: 190 MG/DL (ref 70–110)
POCT GLUCOSE: 26 MG/DL (ref 70–110)
POCT GLUCOSE: 53 MG/DL (ref 70–110)
POTASSIUM SERPL-SCNC: 3.5 MMOL/L (ref 3.5–5.1)
PROT SERPL-MCNC: 7.3 GM/DL (ref 5.8–7.6)
PROT UR QL STRIP.AUTO: ABNORMAL
RBC #/AREA URNS AUTO: ABNORMAL /HPF
RBC UR QL AUTO: NEGATIVE
SODIUM SERPL-SCNC: 142 MMOL/L (ref 136–145)
SP GR UR STRIP.AUTO: 1.02 (ref 1–1.03)
SQUAMOUS #/AREA URNS LPF: ABNORMAL /HPF
UROBILINOGEN UR STRIP-ACNC: NORMAL
WBC #/AREA URNS AUTO: ABNORMAL /HPF

## 2023-12-05 PROCEDURE — 82962 GLUCOSE BLOOD TEST: CPT

## 2023-12-05 PROCEDURE — 96372 THER/PROPH/DIAG INJ SC/IM: CPT | Performed by: INTERNAL MEDICINE

## 2023-12-05 PROCEDURE — 99285 EMERGENCY DEPT VISIT HI MDM: CPT

## 2023-12-05 PROCEDURE — 81001 URINALYSIS AUTO W/SCOPE: CPT | Performed by: INTERNAL MEDICINE

## 2023-12-05 PROCEDURE — 63600175 PHARM REV CODE 636 W HCPCS: Mod: JZ,JG | Performed by: INTERNAL MEDICINE

## 2023-12-05 PROCEDURE — 80053 COMPREHEN METABOLIC PANEL: CPT | Performed by: INTERNAL MEDICINE

## 2023-12-05 RX ORDER — GLUCAGON 1 MG
1 KIT INJECTION
Status: COMPLETED | OUTPATIENT
Start: 2023-12-05 | End: 2023-12-05

## 2023-12-05 RX ADMIN — GLUCAGON 1 MG: 1 INJECTION, POWDER, LYOPHILIZED, FOR SOLUTION INTRAMUSCULAR; INTRAVENOUS at 06:12

## 2023-12-05 NOTE — DISCHARGE SUMMARY
[facility]  Hospital Medicine Discharge Summary    Admit Date: 11/16/2023  Discharge Date and Time: 11/19/2023 12:30 PM  Admitting Physician:  Team  Discharging Physician: Clarence Mancera MD.  Primary Care Physician: Jeronimo Bird MD  Consults: Cardiology and Neurosurgery    Discharge Diagnoses:  Hypertensive emergency  Hypertensive encephalopathy  Intraparenchymal hemorrhage of the right posterior insular region  Iron-deficiency anemia   Anemia chronic disease   Stage IIIB chronic kidney disease   Obstructive sleep apnea on CPAP   Insulin-dependent type 2 diabetes mellitus   Coronary artery disease   History of colon cancer status post hemicolectomy   History of renal cell carcinoma status post nephrectomy  Morbid obesity    Hospital Course:   Elissa Freeman is a 68 y.o. female with a past medical history of essential hypertension, hyperlipidemia, CAD, diabetes mellitus type 2, CKD, colon cancer s/p hemicolectomy, renal cell carcinoma s/p nephrectomy, MOHSEN, asthma, and cataracts who originally presented to [facility] on 11/16/2023 via EMS for altered mental status.  Patient had a fall on day of presentation with dizziness and confusion.  Initial blood pressure on arrival was 200/110.  CT head without contrast revealed small, faint hyperdense focus in the right insular region which may represent microhemorrhage or nonspecific calcification.  Patient was placed on Cleviprex drip and transferred to [facility] ED for higher level of care.  Neurosurgery was consulted without surgical intervention.  Patient was admitted to ICU for close monitoring.  Repeat CT head on 11/16 revealed focus of subtle increased density involving the right posterior insular cortex and subinsular brain, essentially identical to the prior exam from 5 hours earlier with focal area of hemorrhage not excluded.  Cleviprex was discontinued in the early morning hours of 11/16 and patient was started on home anti  "hypersensitive.  Patient was cleared for downgrade out of ICU on 11/16/2023.  Neurosurgery recommended q.4 hours neurochecks and BP less than 160/90.  Hospital medicine was consulted for assumption of care on 11/16.  Patient continued to undergo frequent neurological evaluations, physical and occupational therapy and her blood pressure was brought under control with the assistance of Cardiology.  Patient was discharged home in stable and improved condition    Patient was seen and examined on the day of discharge.      Vitals:  VITAL SIGNS: 24 HRS MIN & MAX LAST   No data recorded 98.5 °F (36.9 °C)   No data recorded 128/69   No data recorded  69   No data recorded 17   No data recorded 100 %       Physical Exam:  General:  Morbidly obese  female in no acute distress  HENT: normocephalic, atraumatic  Eye: PERRL, EOMI, clear conjunctiva  Neck: full ROM, no thyromegaly, no JVD  Respiratory: clear to auscultation bilaterally  Cardiovascular: regular rate and rhythm  Gastrointestinal: non-distended, positive bowel sounds, non-tender  Musculoskeletal: no gross deformity  Integumentary: warm, dry, intact, no rashes  Neurological: cranial nerves grossly intact, no focal neurological deficit  Psychiatric: cooperative, Very pleasant    Procedures Performed: No admission procedures for hospital encounter.     Significant Diagnostic Studies: See Full reports for all details    No results for input(s): "WBC", "RBC", "HGB", "HCT", "MCV", "MCH", "MCHC", "RDW", "PLT", "MPV", "GRAN", "LYMPH", "MONO", "BASO", "NRBC" in the last 168 hours.    No results for input(s): "NA", "K", "CL", "CO2", "ANIONGAP", "BUN", "CREATININE", "GLU", "CALCIUM", "PH", "MG", "ALBUMIN", "PROT", "ALKPHOS", "ALT", "AST", "BILITOT" in the last 168 hours.     Microbiology Results (last 7 days)       ** No results found for the last 168 hours. **             US Retroperitoneal Complete With Doppler (XPD)  Narrative: EXAMINATION:  US " RETROPERITONEAL COMPLETE WITH DOPPLER (XPD)    CLINICAL HISTORY:  Resistant HTN;    COMPARISON:  CT 16 May 2016    FINDINGS:  Grayscale and color Doppler sonographic evaluation of the kidneys and urinary bladder.    Right kidney measures 9.5 cm in length.  No hydronephrosis.  Normal renal parenchymal echogenicity.  Resistive indices are not significantly elevated.  Main renal artery velocity not significantly elevated.  Patent renal vein.    The left kidney is absent.    Urinary bladder unremarkable.  Impression: No significant sonographic abnormality of the right kidney.  Left kidney absent.    Electronically signed by: Augusto Dempsey  Date:    11/17/2023  Time:    12:52         Medication List        START taking these medications      amLODIPine 10 MG tablet  Commonly known as: NORVASC  Take 1 tablet (10 mg total) by mouth once daily.     hydrALAZINE 50 MG tablet  Commonly known as: APRESOLINE  Take 1 tablet (50 mg total) by mouth every 8 (eight) hours.     losartan 100 MG tablet  Commonly known as: COZAAR  Take 1 tablet (100 mg total) by mouth once daily.            CHANGE how you take these medications      carvediloL 25 MG tablet  Commonly known as: COREG  Take 1 tablet (25 mg total) by mouth 2 (two) times daily.  What changed:   medication strength  how much to take            CONTINUE taking these medications      albuterol 90 mcg/actuation inhaler  Commonly known as: PROVENTIL/VENTOLIN HFA  Inhale 1 puff into the lungs every 4 (four) hours as needed for Wheezing.     atorvastatin 40 MG tablet  Commonly known as: LIPITOR  Take 2 tablets (80 mg total) by mouth once daily.     cloNIDine 0.2 MG tablet  Commonly known as: CATAPRES  Take 1 tablet (0.2 mg total) by mouth 2 (two) times daily. Takes 2 times per day. Takes whole tablet     diclofenac 50 MG EC tablet  Commonly known as: VOLTAREN  Take 1 tablet (50 mg total) by mouth 2 (two) times daily as needed (Pain).     DULoxetine 60 MG capsule  Commonly known as:  "CYMBALTA  TAKE 1 CAPSULE BY MOUTH DAILY. DO NOT CRUSH OR CHEW     empagliflozin 25 mg tablet  Commonly known as: Jardiance  Take 1 tablet (25 mg total) by mouth once daily.     exenatide microspheres 2 mg/0.85 mL Atin  Commonly known as: BYDUREON BCISE  Inject 2 mg into the skin every 7 days.     FREESTYLE SMITHA 2 READER [facility]  Generic drug: flash glucose scanning reader  1 each by Misc.(Non-Drug; Combo Route) route 4 (four) times daily with meals and nightly.     FREESTYLE SMITHA 2 SENSOR Kit  Generic drug: flash glucose sensor  1 each by Misc.(Non-Drug; Combo Route) route 2 hours after meals and at bedtime.     furosemide 20 MG tablet  Commonly known as: LASIX  TAKE ONE TABLET BY MOUTH DAILY AT 9 AM (VIAL)     gabapentin 600 MG tablet  Commonly known as: NEURONTIN  Take 1 tablet (600 mg total) by mouth once daily.     insulin aspart U-100 100 unit/mL (3 mL) Inpn pen  Commonly known as: NovoLOG Flexpen U-100 Insulin  Inject 20 Units into the skin 3 (three) times daily with meals.     insulin glargine 100 units/mL SubQ pen  Commonly known as: LANTUS SOLOSTAR U-100 INSULIN  Inject 65 Units into the skin every evening.     latanoprost 0.005 % ophthalmic solution  Place 1 drop into both eyes once daily.     melatonin 3 mg tablet  Commonly known as: MELATIN  Take 2 tablets (6 mg total) by mouth nightly as needed for Insomnia.     nebulizer accessories Kit  1 each by Misc.(Non-Drug; Combo Route) route 4 (four) times daily as needed (wheezing).     nitroGLYCERIN 0.4 MG SL tablet  Commonly known as: NITROSTAT  PLACE 1 TABLET UNDER THE TONGUE EVERY 5 MINUTES AS NEEDED FOR CHEST PAIN. GO TO ER AFTER THIRD DOSE     pen needle, diabetic 32 gauge x 5/32" Ndle  1 each by Misc.(Non-Drug; Combo Route) route 4 (four) times daily.     polyethylene glycol 17 gram Pwpk  Commonly known as: GLYCOLAX  Take 17 g by mouth once daily.     vitamin D 1000 units Tab  Commonly known as: VITAMIN D3  Take 2 tablets (2,000 Units total) by mouth " Daily.            STOP taking these medications      aspirin 81 MG EC tablet  Commonly known as: ECOTRIN     diltiaZEM 180 MG 24 hr capsule  Commonly known as: CARDIZEM CD     losartan-hydrochlorothiazide 50-12.5 mg 50-12.5 mg per tablet  Commonly known as: HYZAAR     potassium chloride 20 mEq  Commonly known as: K-TAB               Where to Get Your Medications        These medications were sent to [id number] - [address] AT [place] [address] [address] [address]      Phone: [phone]   amLODIPine 10 MG tablet  carvediloL 25 MG tablet  hydrALAZINE 50 MG tablet  losartan 100 MG tablet          Explained in detail to the patient about the discharge plan, medications, and follow-up visits. Pt understands and agrees with the treatment plan  Discharge Disposition: Home or Self Care   Discharged Condition: stable     Follow-up Information       Wu Taylor MD Follow up in 2 week(s).    Specialty: Cardiology  Why: Hospital Follow Up  Contact information:  [address] [place] [address] [id number] [phone]               Jeronimo Bird MD Follow up in 1 week(s).    Specialty: Internal Medicine  Contact information:  [address] [place] [address] [id number] [phone]                           For further questions contact your primary care provider or cardiologist    Discharge time 35 minutes    For worsening symptoms, chest pain, shortness of breath, increased abdominal pain, high grade fever, stroke or stroke like symptoms, immediately go to the nearest Emergency Room or call 911 as soon as possible.      Clarence Hudson M.D, on 12/5/2023. at 11:11 AM.

## 2023-12-06 NOTE — ED PROVIDER NOTES
Encounter Date: 12/5/2023       History     Chief Complaint   Patient presents with    Hypoglycemia     CBG <26     Presents by EMS due to hypoglycemia. Pt states Hx of DM, used her insulin today but did not eat anything. Denies LOC, chest pain, vomiting, dysuria or fever. Pt eating fruits during interview    The history is provided by the patient and the EMS personnel.     Review of patient's allergies indicates:  No Known Allergies  Past Medical History:   Diagnosis Date    Asthma     Cancer     CKD (chronic kidney disease)     Coronary artery disease     Diabetes mellitus     Hyperlipidemia     Hypertension     Obesity, unspecified     MOHSEN (obstructive sleep apnea)     Unspecified cataract      Past Surgical History:   Procedure Laterality Date    CATARACT EXTRACTION Left 11/28/2022    CHOLECYSTECTOMY      COLON SURGERY      COLONOSCOPY N/A 4/27/2023    Procedure: COLONOSCOPY;  Surgeon: Jose Miguel Rosales MD;  Location: [facility] ENDOSCOPY;  Service: Endoscopy;  Laterality: N/A;    excision of lipoma      HYSTERECTOMY, VAGINAL, WITH SALPINGO-OOPHORECTOMY      NEPHRECTOMY Left     TUBAL LIGATION       Family History   Problem Relation Age of Onset    Hypertension Mother     Coronary artery disease Mother     Hyperlipidemia Mother     Stroke Father     Cancer Brother      Social History     Tobacco Use    Smoking status: Never    Smokeless tobacco: Never   Substance Use Topics    Alcohol use: Yes     Comment: frozen drinks once a month, giovanni    Drug use: Never     Review of Systems   Constitutional:  Negative for fever.   HENT:  Negative for sore throat.    Respiratory:  Negative for shortness of breath.    Cardiovascular:  Negative for chest pain.   Gastrointestinal:  Negative for nausea.   Genitourinary:  Negative for dysuria.   Musculoskeletal:  Negative for back pain.   Skin:  Negative for rash.   Neurological:  Negative for weakness.   Hematological:  Does not bruise/bleed easily.   All other systems reviewed  and are negative.      Physical Exam     Initial Vitals [12/05/23 1805]   BP Pulse Resp Temp SpO2   139/89 73 (!) 24 98.7 °F (37.1 °C) 95 %      MAP       --         Physical Exam    Nursing note and vitals reviewed.  Constitutional: She appears well-developed and well-nourished.   HENT:   Head: Normocephalic and atraumatic.   Mouth/Throat: Oropharynx is clear and moist.   Eyes: Conjunctivae are normal. Pupils are equal, round, and reactive to light.   Neck: Neck supple. No thyromegaly present. No JVD present.   Normal range of motion.  Cardiovascular:  Normal rate, regular rhythm, normal heart sounds and intact distal pulses.           Pulmonary/Chest: Breath sounds normal.   Abdominal: Abdomen is soft. Bowel sounds are normal. She exhibits no distension. There is no abdominal tenderness. There is no rebound and no guarding.   Musculoskeletal:         General: No edema. Normal range of motion.      Cervical back: Normal range of motion and neck supple.     Neurological: She is alert and oriented to person, place, and time. She has normal strength. GCS score is 15. GCS eye subscore is 4. GCS verbal subscore is 5. GCS motor subscore is 6.   Skin: Skin is warm and dry. No rash noted.   Psychiatric: Her behavior is normal.         ED Course   Critical Care    Date/Time: 12/5/2023 9:06 PM    Performed by: John Plaza MD  Authorized by: John Plaza MD  Direct patient critical care time: 12 minutes  Additional history critical care time: 5 minutes  Ordering / reviewing critical care time: 12 minutes  Documentation critical care time: 5 minutes  Total critical care time (exclusive of procedural time) : 34 minutes  Critical care was necessary to treat or prevent imminent or life-threatening deterioration of the following conditions: endocrine crisis.  Critical care was time spent personally by me on the following activities: development of treatment plan with patient or surrogate,  discussions with consultants, evaluation of patient's response to treatment, examination of patient, obtaining history from patient or surrogate, ordering and performing treatments and interventions, ordering and review of laboratory studies and re-evaluation of patient's condition.        Labs Reviewed   COMPREHENSIVE METABOLIC PANEL - Abnormal; Notable for the following components:       Result Value    Chloride 108 (*)     Glucose Level 184 (*)     Blood Urea Nitrogen 20.8 (*)     Creatinine 1.51 (*)     Albumin Level 3.1 (*)     Globulin 4.2 (*)     Albumin/Globulin Ratio 0.7 (*)     All other components within normal limits   URINALYSIS, REFLEX TO URINE CULTURE - Abnormal; Notable for the following components:    Appearance, UA Turbid (*)     Protein, UA Trace (*)     Glucose, UA 4+ (*)     Bacteria, UA Trace (*)     Squamous Epithelial Cells, UA Many (*)     Mucous, UA Trace (*)     Hyaline Casts, UA 0-2 (*)     All other components within normal limits   POCT GLUCOSE - Abnormal; Notable for the following components:    POCT Glucose 26 (*)     All other components within normal limits   POCT GLUCOSE - Abnormal; Notable for the following components:    POCT Glucose 53 (*)     All other components within normal limits   POCT GLUCOSE - Abnormal; Notable for the following components:    POCT Glucose 190 (*)     All other components within normal limits   EXTRA TUBES    Narrative:     The following orders were created for panel order EXTRA TUBES.  Procedure                               Abnormality         Status                     ---------                               -----------         ------                     Light Blue Top Hold[[id number]]                             Final result               Light Green Top Hold[[id number]]                            Final result               Lavender Top Hold[[id number]]                               Final result               Lavender Top Hold[[id number]]                                Final result               Gold Top Hold[[id number]]                                   Final result                 Please view results for these tests on the individual orders.   LIGHT BLUE TOP HOLD   LIGHT GREEN TOP HOLD   LAVENDER TOP HOLD   LAVENDER TOP HOLD   GOLD TOP HOLD   POCT GLUCOSE, HAND-HELD DEVICE   POCT GLUCOSE, HAND-HELD DEVICE          Imaging Results    None          Medications   glucagon (human recombinant) injection 1 mg (1 mg Intramuscular Given 12/5/23 1835)     Medical Decision Making  Amount and/or Complexity of Data Reviewed  Independent Historian: EMS     Details: No medications given  Labs: ordered. Decision-making details documented in ED Course.    Risk  Prescription drug management.      Additional MDM:   Differential Diagnosis:   Insulin reaction, renal failure, reactive hypoglycemia, poor calorie intake, among others                                      Clinical Impression:  Final diagnoses:  [E16.0, T38.3X5A] Hypoglycemia due to insulin (Primary)  [N18.9] Chronic kidney disease, unspecified CKD stage          ED Disposition Condition    Discharge Stable          ED Prescriptions       Medication Sig Dispense Start Date End Date Auth. Provider    glucagon 3 mg/actuation Spry (Expires today) 1 spray by Nasal route once as needed (For severe hypoglycemia). 1 each 12/5/2023 12/5/2023 John Plaza MD          Follow-up Information       Follow up With Specialties Details Why Contact Info    Jeronimo Bird MD Internal Medicine Schedule an appointment as soon as possible for a visit in 2 weeks  [address] [facility] [address]  [phone]      [facility] Emergency Medicine  If symptoms worsen [facility] [facility] [id number] [phone]             John Plaza MD  12/05/23 2106       John Plaza MD  12/05/23 2110

## 2023-12-18 ENCOUNTER — TELEPHONE (OUTPATIENT)
Dept: INTERNAL MEDICINE | Facility: CLINIC | Age: 68
End: 2023-12-18

## 2023-12-18 NOTE — TELEPHONE ENCOUNTER
GOOD AFTERNOON DR. GIRON,       [facility] CALLED ASKING WOULD YOU AGREE TO FOLLOW AND SIGN HOME HEALTH ORDERS WHEN THEY COME THROUGH.  THEY WERE ASKING FOR A VERBAL TO THIS, BUT I TOLD THEM THAT I COULD NOT GIVE THAT TO THEM.  THEY ARE FAXING DOCUMENTATION TO YOU REGARDING HER RECENT HOSPITAL STAY AT [facility].     [facility]  [phone]

## 2023-12-20 ENCOUNTER — PATIENT OUTREACH (OUTPATIENT)
Dept: ADMINISTRATIVE | Facility: CLINIC | Age: 68
End: 2023-12-20
Payer: MEDICARE

## 2023-12-20 DIAGNOSIS — N18.30 TYPE 2 DIABETES MELLITUS WITH STAGE 3 CHRONIC KIDNEY DISEASE, WITH LONG-TERM CURRENT USE OF INSULIN, UNSPECIFIED WHETHER STAGE 3A OR 3B CKD: ICD-10-CM

## 2023-12-20 DIAGNOSIS — E11.22 TYPE 2 DIABETES MELLITUS WITH STAGE 3 CHRONIC KIDNEY DISEASE, WITH LONG-TERM CURRENT USE OF INSULIN, UNSPECIFIED WHETHER STAGE 3A OR 3B CKD: ICD-10-CM

## 2023-12-20 DIAGNOSIS — I21.4 NSTEMI (NON-ST ELEVATED MYOCARDIAL INFARCTION): Primary | ICD-10-CM

## 2023-12-20 DIAGNOSIS — Z79.4 TYPE 2 DIABETES MELLITUS WITH STAGE 3 CHRONIC KIDNEY DISEASE, WITH LONG-TERM CURRENT USE OF INSULIN, UNSPECIFIED WHETHER STAGE 3A OR 3B CKD: ICD-10-CM

## 2023-12-20 RX ORDER — VALSARTAN 40 MG/1
40 TABLET ORAL DAILY
COMMUNITY
Start: 2023-12-16 | End: 2024-02-15

## 2023-12-20 RX ORDER — POTASSIUM CHLORIDE 1500 MG/1
20 TABLET, EXTENDED RELEASE ORAL EVERY MORNING
COMMUNITY
Start: 2023-11-30

## 2023-12-20 RX ORDER — NITROGLYCERIN 0.4 MG/1
0.4 TABLET SUBLINGUAL EVERY 5 MIN PRN
Qty: 30 TABLET | Refills: 0 | Status: SHIPPED | OUTPATIENT
Start: 2023-12-20 | End: 2024-02-15 | Stop reason: SDUPTHER

## 2023-12-20 RX ORDER — LOSARTAN POTASSIUM AND HYDROCHLOROTHIAZIDE 12.5; 5 MG/1; MG/1
1 TABLET ORAL EVERY MORNING
COMMUNITY
Start: 2023-11-30 | End: 2024-01-09

## 2023-12-20 RX ORDER — DILTIAZEM HYDROCHLORIDE 180 MG/1
180 CAPSULE, COATED, EXTENDED RELEASE ORAL EVERY MORNING
COMMUNITY
Start: 2023-11-30 | End: 2024-02-15 | Stop reason: SDUPTHER

## 2023-12-20 RX ORDER — SODIUM CHLORIDE 50 MG/ML
1 SOLUTION/ DROPS OPHTHALMIC
COMMUNITY
Start: 2023-12-15

## 2023-12-20 RX ORDER — KETOROLAC TROMETHAMINE 5 MG/ML
1 SOLUTION OPHTHALMIC 2 TIMES DAILY
COMMUNITY
Start: 2023-12-15

## 2023-12-20 NOTE — PROGRESS NOTES
C3 nurse spoke with Elissa Freeman for a TCC post hospital discharge follow up call. The patient has a scheduled HOSFU appointment with Wu Taylor MD (cardiology) on 1/9/24 @ 2:00pm. She is unsure if a PCP HOSFU is needed at this time.     She does need a refill of Nitroglycerin to have PRN. Message sent to PCP to assist in having this refilled.    Modified Advancement Flap Text: The defect edges were debeveled with a #15 scalpel blade.  Given the location of the defect, shape of the defect and the proximity to free margins a modified advancement flap was deemed most appropriate.  Using a sterile surgical marker, an appropriate advancement flap was drawn incorporating the defect and placing the expected incisions within the relaxed skin tension lines where possible.    The area thus outlined was incised deep to adipose tissue with a #15 scalpel blade.  The skin margins were undermined to an appropriate distance in all directions utilizing iris scissors.The skin flap was carried over the surrounding defect skin.

## 2024-01-02 DIAGNOSIS — T78.40XD ALLERGY, SUBSEQUENT ENCOUNTER: ICD-10-CM

## 2024-01-02 DIAGNOSIS — G47.00 INSOMNIA, UNSPECIFIED TYPE: ICD-10-CM

## 2024-01-04 RX ORDER — CETIRIZINE HYDROCHLORIDE 10 MG/1
10 TABLET ORAL DAILY
Qty: 30 TABLET | Refills: 0 | Status: SHIPPED | OUTPATIENT
Start: 2024-01-04 | End: 2024-01-29 | Stop reason: SDUPTHER

## 2024-01-04 RX ORDER — TALC
6 POWDER (GRAM) TOPICAL NIGHTLY PRN
Qty: 60 TABLET | Refills: 1 | Status: SHIPPED | OUTPATIENT
Start: 2024-01-04

## 2024-01-08 PROBLEM — K62.5 RECTAL BLEEDING: Status: RESOLVED | Noted: 2023-04-12 | Resolved: 2024-01-08

## 2024-01-09 ENCOUNTER — OFFICE VISIT (OUTPATIENT)
Dept: CARDIOLOGY | Facility: CLINIC | Age: 69
End: 2024-01-09
Payer: MEDICARE

## 2024-01-09 VITALS
RESPIRATION RATE: 20 BRPM | DIASTOLIC BLOOD PRESSURE: 84 MMHG | BODY MASS INDEX: 50.02 KG/M2 | SYSTOLIC BLOOD PRESSURE: 128 MMHG | WEIGHT: 293 LBS | TEMPERATURE: 99 F | HEART RATE: 74 BPM | HEIGHT: 64 IN | OXYGEN SATURATION: 96 %

## 2024-01-09 DIAGNOSIS — E11.69 HYPERLIPIDEMIA ASSOCIATED WITH TYPE 2 DIABETES MELLITUS: ICD-10-CM

## 2024-01-09 DIAGNOSIS — I10 HYPERTENSION, UNSPECIFIED TYPE: ICD-10-CM

## 2024-01-09 DIAGNOSIS — I50.30 DIASTOLIC HEART FAILURE, UNSPECIFIED HF CHRONICITY: ICD-10-CM

## 2024-01-09 DIAGNOSIS — I50.22 HEART FAILURE WITH MID-RANGE EJECTION FRACTION (HFMEF): ICD-10-CM

## 2024-01-09 DIAGNOSIS — I48.20 CHRONIC ATRIAL FIBRILLATION: Primary | ICD-10-CM

## 2024-01-09 DIAGNOSIS — E78.5 HYPERLIPIDEMIA ASSOCIATED WITH TYPE 2 DIABETES MELLITUS: ICD-10-CM
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
## 2024-01-09 PROCEDURE — 93005 ELECTROCARDIOGRAM TRACING: CPT

## 2024-01-09 PROCEDURE — 99215 OFFICE O/P EST HI 40 MIN: CPT | Mod: PBBFAC,25 | Performed by: INTERNAL MEDICINE

## 2024-01-09 RX ORDER — ATORVASTATIN CALCIUM 40 MG/1
80 TABLET, FILM COATED ORAL DAILY
Qty: 90 TABLET | Refills: 3 | Status: SHIPPED | OUTPATIENT
Start: 2024-01-09 | End: 2024-02-15 | Stop reason: SDUPTHER

## 2024-01-09 NOTE — PATIENT INSTRUCTIONS
You will need to have a nuclear stress test prior to your next cardiology follow up.  Contact numbers sheet given to schedule test.    You will not be eligible to start anticoagulation therapy until Feb 15.  You have a internal medicine appointment that day and it will be further discussed at that time.

## 2024-01-09 NOTE — PROGRESS NOTES
Chief Complaint  3 month Follow up     HPI  Elissa Freeman is a 68 y.o.  female with past medical history of hypertension, hyperlipidemia, diabetes mellitus, renal cell carcinoma disease s/p left nephrectomy 2011), adenocarcinoma of the colon (s/p hemicolectomy 2014), involving obesity who presents for follow-up. Patient last seen in Cardiology clinic (5/2023).    At previous office clinic visit; patient reported significant deconditioning and inability to perform house chores due to becoming short of breath.  She reported this had been her baseline for some time, being able to walk a approximately x1/2 block before symptomatology, and denied any acute worsening of DUBOIS.  Otherwise; at that time she denied any recent episodes of chest discomfort with exertion or at rest.    Today (01/09/2024):  Since last office clinic visit, patient was admitted to [facility] following transfer from [facility] on (11/15/23) 2/2 a fall with subsequent altered mental status.  She also had hypertensive emergency and CT head revealed small faint hyperdense focus within right insular region suspicious for microhemorrhage.  Admitted to the ICU initiated on Cleviprex drip.  BP improved and patient was discharged home with suspected follow up with PCP and Cardiology.  Approximate x1 week later patient had additional fall in which he was admitted to [facility] with findings of atrial fibrillation RVR.  Due to recent intraparenchymal hemorrhage measuring 6 mm in greatest diameter; anticoagulation was deferred per neurosurgery recommendations for at least 8 week duration.  TTE was also performed during hospital stay as LVEF decreased to mid range ejection fraction of 45% with grade 1 diastolic dysfunction.    Patient states that since discharge he has noticed worsening B/L lower extremity edema and worsening dyspnea on exertion after walking a proximally x 15 ft requiring rest for times 10-15  minutes prior to continuing.  She denies any falls since prior hospitalization.  With exertion, she denies any associated chest pain, palpitations, nausea/vomiting, lightheadedness/dizziness, or loss of consciousness.    Of note; patient reports close compliance with all prescribed medications however does not utilize CPAP on daily basis.  Counseled patient regarding utilizing CPAP daily to avoid worsening of possible pulmonary hypertension and/or heart failure.    ROS  Review of Systems   Constitutional:  Negative for chills and fever.   HENT: Negative.     Eyes:  Negative for blurred vision and double vision.   Respiratory:  Positive for shortness of breath (With exertion). Negative for cough.    Cardiovascular:  Positive for leg swelling. Negative for chest pain and palpitations.   Gastrointestinal:  Negative for abdominal pain, constipation, diarrhea, nausea and vomiting.   Skin:  Negative for rash.   Neurological:  Negative for weakness and headaches.        PE  Vitals:    01/09/24 1448   BP: 128/84   Pulse: 74   Resp: 20   Temp: 99 °F (37.2 °C)      Physical Exam  Vitals and nursing note reviewed.   Constitutional:       General: She is not in acute distress.     Appearance: Normal appearance. She is obese. She is not toxic-appearing.   HENT:      Head: Normocephalic and atraumatic.      Right Ear: External ear normal.      Left Ear: External ear normal.      Nose: Nose normal.      Mouth/Throat:      Mouth: Mucous membranes are moist.      Pharynx: Oropharynx is clear.   Eyes:      Extraocular Movements: Extraocular movements intact.      Conjunctiva/sclera: Conjunctivae normal.      Pupils: Pupils are equal, round, and reactive to light.   Cardiovascular:      Rate and Rhythm: Normal rate and regular rhythm.      Pulses: Normal pulses.      Heart sounds: Normal heart sounds.   Pulmonary:      Effort: Pulmonary effort is normal.      Breath sounds: Normal breath sounds.   Abdominal:      General: Bowel sounds  are normal.   Musculoskeletal:      Cervical back: Normal range of motion and neck supple.   Skin:     General: Skin is warm and dry.   Neurological:      Mental Status: She is alert and oriented to person, place, and time.          Assessment/Plan  Concern for Ischemic Heart Disease  -[facility] 9/21:  No CAD  -LexiScan 4/21:  Anterolateral and inferior ischemia  -TTE 4/21:  Left ventricular ejection fraction 50-55%  -Patient denies any recent chest pain or visits to the emergency department s/t chest pain  -Recent worsening HF, LVEF decrease from 50-55% (2021) to 45% w/ Grade I DD (12/2023) w/ new onset A-fib  -Will schedule patient to undergo nuclear medicine stress test prior to next office clinic visit in x4 months    New Onset Atrial Fibrillation  -Diagnosed at recent hospitalization (12/2023) at [facility]  -TSH WNL  -Worsening CHF w/ now HFmrEF  -In setting of recent intraparenchymal hemorrhage & 6mm density noted on repeat CT head (12/15/23); [facility] recommended waiting at least x8 weeks prior to initiating anti-coagulation  -At this time patient rate controlled w/ Coreg & Diltiazem  -Will defer initiated anti-coagulation to PCP (Dr. Bird) as he is currently scheduled to see patient in IM clinic on (2/15/24); x8 weeks after recommended wait time prior to initiation    HFmrEF w/ Grade I DD  -TTE (12/2023) w/ findings of LVEF (45%) w/ Grade I Diastolic Dysfunction  -Continue current GDMT w/ Coreg, Diltiazem, Valsartan, & Jardiance  -Continue Lasix  -counseled patient regarding close medication compliance and low-sodium diet  -continue to follow up with Cardiology as scheduled    Hypertension-well-controlled  -BP today in clinic (128/84)  -continue Vaslartin 40mg qD, hydralazine 50 mg q.8, diltiazem 180 mg q.d., Coreg 25 mg b.i.d., and Lasix 20 mg q.d.    Hyperlipidemia  -lipid panel WNL (10/05/2023)  -refilled Lipitor 80 mg q.d. at today's clinic visit (01/09/2024)  -Stressed  importance of diet and excercise as tolerated    Diabetes mellitus  -Management per PCP    MOHSEN with CPAP  -Management per PCP    RCC of Left kidney s/p Nephrectomy (2011)  Adenocarcinoma of colon s/p hemicolectomy (2014)    Return to cardiology clinic in 4 months with completion of B/L carotid ultrasonography and dobutamine nuclear stress test further cardiovascular evaluation.    Patient also to report to PCP (02/15/2024) for regular clinic follow-up and possible initiation of anticoagulation in the setting of new onset atrial fibrillation following recent intraparenchymal hemorrhage    Festus Hollis MD  Internal Medicine PGY-III    Attending addendum to follow

## 2024-01-16 ENCOUNTER — EXTERNAL HOME HEALTH (OUTPATIENT)
Dept: HOME HEALTH SERVICES | Facility: HOSPITAL | Age: 69
End: 2024-01-16
Payer: MEDICARE

## 2024-01-29 DIAGNOSIS — E55.9 VITAMIN D DEFICIENCY: ICD-10-CM

## 2024-01-29 DIAGNOSIS — T78.40XD ALLERGY, SUBSEQUENT ENCOUNTER: ICD-10-CM

## 2024-01-30 ENCOUNTER — HOSPITAL ENCOUNTER (OUTPATIENT)
Dept: CARDIOLOGY | Facility: HOSPITAL | Age: 69
Discharge: HOME | End: 2024-01-30
Attending: STUDENT IN AN ORGANIZED HEALTH CARE EDUCATION/TRAINING PROGRAM
Payer: MEDICARE

## 2024-01-30 ENCOUNTER — HOSPITAL ENCOUNTER (OUTPATIENT)
Dept: RADIOLOGY | Facility: HOSPITAL | Age: 69
Discharge: HOME | End: 2024-01-30
Attending: STUDENT IN AN ORGANIZED HEALTH CARE EDUCATION/TRAINING PROGRAM
Payer: MEDICARE

## 2024-01-30 VITALS
WEIGHT: 293 LBS | RESPIRATION RATE: 20 BRPM | SYSTOLIC BLOOD PRESSURE: 183 MMHG | HEIGHT: 64 IN | HEART RATE: 97 BPM | BODY MASS INDEX: 50.02 KG/M2 | DIASTOLIC BLOOD PRESSURE: 131 MMHG

## 2024-01-30 DIAGNOSIS — I48.20 CHRONIC ATRIAL FIBRILLATION: ICD-10-CM

## 2024-01-30 DIAGNOSIS — I10 HYPERTENSION, UNSPECIFIED TYPE: ICD-10-CM

## 2024-01-30 DIAGNOSIS — I50.22 HEART FAILURE WITH MID-RANGE EJECTION FRACTION (HFMEF): ICD-10-CM

## 2024-01-30 DIAGNOSIS — I50.30 DIASTOLIC HEART FAILURE, UNSPECIFIED HF CHRONICITY: ICD-10-CM

## 2024-01-30 LAB
CV STRESS BASE HR: 96 BPM
DIASTOLIC BLOOD PRESSURE: 131 MMHG
NUC REST EJECTION FRACTION: 54
NUC STRESS EJECTION FRACTION: 59 %
OHS CV CPX 85 PERCENT MAX PREDICTED HEART RATE MALE: 131
OHS CV CPX ESTIMATED METS: 1
OHS CV CPX MAX PREDICTED HEART RATE: 154
OHS CV CPX PATIENT IS FEMALE: 1
OHS CV CPX PATIENT IS MALE: 0
OHS CV CPX PEAK DIASTOLIC BLOOD PRESSURE: 131 MMHG
OHS CV CPX PEAK HEAR RATE: 117 BPM
OHS CV CPX PEAK RATE PRESSURE PRODUCT: NORMAL
OHS CV CPX PEAK SYSTOLIC BLOOD PRESSURE: 183 MMHG
OHS CV CPX PERCENT MAX PREDICTED HEART RATE ACHIEVED: 79
OHS CV CPX RATE PRESSURE PRODUCT PRESENTING: NORMAL
STRESS ECHO POST EXERCISE DUR MIN: 0 MINUTES
STRESS ECHO POST EXERCISE DUR SEC: 52 SECONDS
SYSTOLIC BLOOD PRESSURE: 183 MMHG

## 2024-01-30 PROCEDURE — 63600175 PHARM REV CODE 636 W HCPCS: Performed by: STUDENT IN AN ORGANIZED HEALTH CARE EDUCATION/TRAINING PROGRAM

## 2024-01-30 PROCEDURE — 78452 HT MUSCLE IMAGE SPECT MULT: CPT

## 2024-01-30 RX ORDER — REGADENOSON 0.08 MG/ML
0.4 INJECTION, SOLUTION INTRAVENOUS
Status: COMPLETED | OUTPATIENT
Start: 2024-01-30 | End: 2024-01-30

## 2024-01-30 RX ADMIN — REGADENOSON 0.4 MG: 0.08 INJECTION, SOLUTION INTRAVENOUS at 11:01

## 2024-01-31 RX ORDER — CETIRIZINE HYDROCHLORIDE 10 MG/1
10 TABLET ORAL DAILY
Qty: 30 TABLET | Refills: 0 | Status: SHIPPED | OUTPATIENT
Start: 2024-01-31 | End: 2024-02-15 | Stop reason: SDUPTHER

## 2024-01-31 RX ORDER — CHOLECALCIFEROL (VITAMIN D3) 25 MCG
2000 TABLET ORAL DAILY
Qty: 60 TABLET | Refills: 11 | Status: SHIPPED | OUTPATIENT
Start: 2024-01-31 | End: 2024-02-15 | Stop reason: SDUPTHER

## 2024-02-15 ENCOUNTER — OFFICE VISIT (OUTPATIENT)
Dept: INTERNAL MEDICINE | Facility: CLINIC | Age: 69
End: 2024-02-15
Payer: MEDICARE

## 2024-02-15 VITALS
DIASTOLIC BLOOD PRESSURE: 85 MMHG | RESPIRATION RATE: 16 BRPM | HEIGHT: 64 IN | BODY MASS INDEX: 50.02 KG/M2 | HEART RATE: 86 BPM | TEMPERATURE: 98 F | WEIGHT: 293 LBS | SYSTOLIC BLOOD PRESSURE: 129 MMHG | OXYGEN SATURATION: 99 %

## 2024-02-15 DIAGNOSIS — E78.5 HYPERLIPIDEMIA ASSOCIATED WITH TYPE 2 DIABETES MELLITUS: ICD-10-CM

## 2024-02-15 DIAGNOSIS — Z79.4 TYPE 2 DIABETES MELLITUS WITH STAGE 3 CHRONIC KIDNEY DISEASE, WITH LONG-TERM CURRENT USE OF INSULIN, UNSPECIFIED WHETHER STAGE 3A OR 3B CKD: ICD-10-CM

## 2024-02-15 DIAGNOSIS — G62.9 NEUROPATHY: ICD-10-CM

## 2024-02-15 DIAGNOSIS — I42.9 CARDIOMYOPATHY, UNSPECIFIED TYPE: ICD-10-CM

## 2024-02-15 DIAGNOSIS — E55.9 VITAMIN D DEFICIENCY: ICD-10-CM

## 2024-02-15 DIAGNOSIS — Z79.4 TYPE 2 DIABETES MELLITUS WITHOUT COMPLICATION, WITH LONG-TERM CURRENT USE OF INSULIN: ICD-10-CM

## 2024-02-15 DIAGNOSIS — E11.69 HYPERLIPIDEMIA ASSOCIATED WITH TYPE 2 DIABETES MELLITUS: ICD-10-CM

## 2024-02-15 DIAGNOSIS — I10 PRIMARY HYPERTENSION: ICD-10-CM

## 2024-02-15 DIAGNOSIS — E11.9 TYPE 2 DIABETES MELLITUS WITHOUT COMPLICATION, WITH LONG-TERM CURRENT USE OF INSULIN: ICD-10-CM

## 2024-02-15 DIAGNOSIS — G47.33 OSA ON CPAP: ICD-10-CM

## 2024-02-15 DIAGNOSIS — T78.40XD ALLERGY, SUBSEQUENT ENCOUNTER: ICD-10-CM

## 2024-02-15 DIAGNOSIS — I61.9 HEMORRHAGIC STROKE: Primary | ICD-10-CM

## 2024-02-15 DIAGNOSIS — N18.30 TYPE 2 DIABETES MELLITUS WITH STAGE 3 CHRONIC KIDNEY DISEASE, WITH LONG-TERM CURRENT USE OF INSULIN, UNSPECIFIED WHETHER STAGE 3A OR 3B CKD: ICD-10-CM

## 2024-02-15 DIAGNOSIS — E11.22 TYPE 2 DIABETES MELLITUS WITH STAGE 3 CHRONIC KIDNEY DISEASE, WITH LONG-TERM CURRENT USE OF INSULIN, UNSPECIFIED WHETHER STAGE 3A OR 3B CKD: ICD-10-CM

## 2024-02-15 DIAGNOSIS — I21.4 NSTEMI (NON-ST ELEVATED MYOCARDIAL INFARCTION): ICD-10-CM

## 2024-02-15 DIAGNOSIS — N18.32 CKD STAGE G3B/A2, GFR 30-44 AND ALBUMIN CREATININE RATIO 30-299 MG/G: ICD-10-CM

## 2024-02-15 LAB — HBA1C MFR BLD: 8.5 %

## 2024-02-15 PROCEDURE — 99215 OFFICE O/P EST HI 40 MIN: CPT | Mod: PBBFAC

## 2024-02-15 PROCEDURE — 83036 HEMOGLOBIN GLYCOSYLATED A1C: CPT | Mod: PBBFAC

## 2024-02-15 RX ORDER — CARVEDILOL 3.12 MG/1
3.12 TABLET ORAL 2 TIMES DAILY
Qty: 60 TABLET | Refills: 11 | Status: SHIPPED | OUTPATIENT
Start: 2024-02-15 | End: 2025-02-14

## 2024-02-15 RX ORDER — LOSARTAN POTASSIUM AND HYDROCHLOROTHIAZIDE 12.5; 5 MG/1; MG/1
1 TABLET ORAL EVERY MORNING
COMMUNITY
Start: 2024-01-29 | End: 2024-02-15 | Stop reason: SDUPTHER

## 2024-02-15 RX ORDER — LOSARTAN POTASSIUM AND HYDROCHLOROTHIAZIDE 12.5; 5 MG/1; MG/1
1 TABLET ORAL EVERY MORNING
Qty: 30 TABLET | Refills: 11 | Status: SHIPPED | OUTPATIENT
Start: 2024-02-15 | End: 2025-02-14

## 2024-02-15 RX ORDER — CETIRIZINE HYDROCHLORIDE 10 MG/1
10 TABLET ORAL DAILY
Qty: 30 TABLET | Refills: 0 | Status: SHIPPED | OUTPATIENT
Start: 2024-02-15 | End: 2024-05-30

## 2024-02-15 RX ORDER — CHOLECALCIFEROL (VITAMIN D3) 25 MCG
2000 TABLET ORAL DAILY
Qty: 60 TABLET | Refills: 11 | Status: SHIPPED | OUTPATIENT
Start: 2024-02-15 | End: 2025-02-14

## 2024-02-15 RX ORDER — DILTIAZEM HYDROCHLORIDE 180 MG/1
180 CAPSULE, COATED, EXTENDED RELEASE ORAL EVERY MORNING
Qty: 30 CAPSULE | Refills: 11 | Status: SHIPPED | OUTPATIENT
Start: 2024-02-15 | End: 2024-05-03 | Stop reason: SDUPTHER

## 2024-02-15 RX ORDER — CARVEDILOL 3.12 MG/1
3.12 TABLET ORAL 2 TIMES DAILY
COMMUNITY
Start: 2024-01-29 | End: 2024-02-15 | Stop reason: SDUPTHER

## 2024-02-15 RX ORDER — DULOXETIN HYDROCHLORIDE 60 MG/1
60 CAPSULE, DELAYED RELEASE ORAL DAILY
Qty: 30 CAPSULE | Refills: 11 | Status: SHIPPED | OUTPATIENT
Start: 2024-02-15 | End: 2024-04-29 | Stop reason: SDUPTHER

## 2024-02-15 RX ORDER — ACETAMINOPHEN 500 MG
1000 TABLET ORAL EVERY 6 HOURS PRN
COMMUNITY

## 2024-02-15 RX ORDER — CLONIDINE HYDROCHLORIDE 0.2 MG/1
0.2 TABLET ORAL 2 TIMES DAILY
Qty: 60 TABLET | Refills: 11 | Status: SHIPPED | OUTPATIENT
Start: 2024-02-15 | End: 2025-02-14

## 2024-02-15 RX ORDER — FUROSEMIDE 20 MG/1
20 TABLET ORAL DAILY
Qty: 30 TABLET | Refills: 11 | Status: SHIPPED | OUTPATIENT
Start: 2024-02-15 | End: 2025-02-14

## 2024-02-15 RX ORDER — NITROGLYCERIN 0.4 MG/1
0.4 TABLET SUBLINGUAL EVERY 5 MIN PRN
Qty: 30 TABLET | Refills: 0 | Status: SHIPPED | OUTPATIENT
Start: 2024-02-15 | End: 2025-02-14

## 2024-02-15 RX ORDER — ATORVASTATIN CALCIUM 40 MG/1
80 TABLET, FILM COATED ORAL DAILY
Qty: 60 TABLET | Refills: 11 | Status: SHIPPED | OUTPATIENT
Start: 2024-02-15 | End: 2025-02-14

## 2024-02-16 ENCOUNTER — TELEPHONE (OUTPATIENT)
Dept: INTERNAL MEDICINE | Facility: CLINIC | Age: 69
End: 2024-02-16

## 2024-02-16 NOTE — TELEPHONE ENCOUNTER
TELEPHONE MESSAGE:      MS. WYATT,       MSLela PERERA FOR YOU TO RETURN HER CALL.  I TRIED TOA CALL HER BACK, BUT SHE HAS NOT ANSWERED.

## 2024-02-20 DIAGNOSIS — I61.9 HEMORRHAGIC STROKE: Primary | ICD-10-CM

## 2024-02-20 NOTE — TELEPHONE ENCOUNTER
Called patient and date of birth verified. Patient is requesting a home health referral and an order for a rolling walker with a seat. Please advise.

## 2024-02-28 ENCOUNTER — TELEPHONE (OUTPATIENT)
Dept: NEPHROLOGY | Facility: CLINIC | Age: 69
End: 2024-02-28
Payer: MEDICARE

## 2024-02-28 NOTE — TELEPHONE ENCOUNTER
----- Message from Altaf Wallis sent at 2/28/2024 12:04 PM CST -----  Regarding: Lab orders  Sena with [facility] general lab is requesting lab orders sent for pt. Sena can be contacted at [phone] ext. [phone]  Fax number [phone]  Labs faxed

## 2024-02-29 ENCOUNTER — OFFICE VISIT (OUTPATIENT)
Dept: NEPHROLOGY | Facility: CLINIC | Age: 69
End: 2024-02-29
Payer: MEDICARE

## 2024-02-29 VITALS
TEMPERATURE: 98 F | BODY MASS INDEX: 50.02 KG/M2 | SYSTOLIC BLOOD PRESSURE: 151 MMHG | HEART RATE: 89 BPM | OXYGEN SATURATION: 98 % | WEIGHT: 293 LBS | RESPIRATION RATE: 20 BRPM | HEIGHT: 64 IN | DIASTOLIC BLOOD PRESSURE: 97 MMHG

## 2024-02-29 DIAGNOSIS — N18.32 CKD STAGE G3B/A2, GFR 30-44 AND ALBUMIN CREATININE RATIO 30-299 MG/G: Primary | ICD-10-CM

## 2024-02-29 DIAGNOSIS — E11.22 TYPE 2 DIABETES MELLITUS WITH STAGE 3B CHRONIC KIDNEY DISEASE, WITH LONG-TERM CURRENT USE OF INSULIN: ICD-10-CM

## 2024-02-29 DIAGNOSIS — N18.32 TYPE 2 DIABETES MELLITUS WITH STAGE 3B CHRONIC KIDNEY DISEASE, WITH LONG-TERM CURRENT USE OF INSULIN: ICD-10-CM

## 2024-02-29 DIAGNOSIS — Z79.4 TYPE 2 DIABETES MELLITUS WITH STAGE 3B CHRONIC KIDNEY DISEASE, WITH LONG-TERM CURRENT USE OF INSULIN: ICD-10-CM

## 2024-02-29 DIAGNOSIS — M19.90 ARTHRITIS: ICD-10-CM

## 2024-02-29 DIAGNOSIS — I10 PRIMARY HYPERTENSION: ICD-10-CM

## 2024-02-29 DIAGNOSIS — E66.01 MORBID OBESITY DUE TO EXCESS CALORIES: ICD-10-CM

## 2024-02-29 PROCEDURE — 99214 OFFICE O/P EST MOD 30 MIN: CPT | Mod: PBBFAC | Performed by: NURSE PRACTITIONER

## 2024-02-29 PROCEDURE — 99214 OFFICE O/P EST MOD 30 MIN: CPT | Mod: S$PBB,,, | Performed by: NURSE PRACTITIONER

## 2024-02-29 PROCEDURE — 96372 THER/PROPH/DIAG INJ SC/IM: CPT | Mod: PBBFAC

## 2024-02-29 RX ORDER — KETOROLAC TROMETHAMINE 30 MG/ML
30 INJECTION, SOLUTION INTRAMUSCULAR; INTRAVENOUS
Status: COMPLETED | OUTPATIENT
Start: 2024-02-29 | End: 2024-02-29

## 2024-02-29 RX ADMIN — KETOROLAC TROMETHAMINE 30 MG: 30 INJECTION, SOLUTION INTRAMUSCULAR at 01:02

## 2024-02-29 NOTE — PROGRESS NOTES
"[facility]  Nephrology Clinic Note    Chief complaint: Chronic Kidney Disease (Follow up)    History of present illness:   Elissa Freeman is a 68 y.o. Black or  female with past medical history of CKD , hypertension, dyslipidemia, diabetes mellitus 2, renal cell carcinoma (status post left nephrectomy in 2011), adenocarcinoma of the colon (status post hemicolectomy in 2014), and morbid obesity.  Patient presents for follow-up appointment in Nephrology Clinic today, complains of lower back pain.  Denies symptoms of saddle anesthesia.    Review of Systems  12 point review of systems conducted, negative except as stated in the history of present illness.    Allergies: Patient has No Known Allergies.     Past Medical History:  has a past medical history of Asthma, Atrial fibrillation, Cancer, CKD (chronic kidney disease), Coronary artery disease, Diabetes mellitus, Hyperlipidemia, Hypertension, Obesity, unspecified, MOHSEN (obstructive sleep apnea), and Unspecified cataract.    Procedure History:  has a past surgical history that includes Colon surgery; Nephrectomy (Left); Cholecystectomy; hysterectomy, vaginal, with salpingo-oophorectomy; Tubal ligation; excision of lipoma; Cataract extraction (Left, 11/28/2022); Colonoscopy (N/A, 04/27/2023); Eye surgery; and Hysterectomy.    Family History: family history includes Asthma in her mother; Cancer in her brother; Coronary artery disease in her mother; Hyperlipidemia in her mother; Hypertension in her mother; Stroke in her father.    Social History:  reports that she has never smoked. She has never used smokeless tobacco. She reports that she does not currently use alcohol. She reports that she does not use drugs.    Physical exam  BP (!) 151/97 (BP Location: Left arm, Patient Position: Sitting, BP Method: Large (Automatic))   Pulse 89   Temp 98.3 °F (36.8 °C) (Oral)   Resp 20   Ht 5' 4" (1.626 m)   Wt (!) 137.9 kg (304 lb)   " SpO2 98%   BMI 52.18 kg/m²   General appearance: Patient is in no acute distress.  Skin: No rashes or wounds.  HEENT: PERRLA, EOMI, no scleral icterus, no JVD. Neck is supple.  Chest: Respirations are unlabored. Lungs sounds are clear.   Heart: S1, S2.   Abdomen: Benign.  : Deferred.  Extremities: No edema, peripheral pulses are palpable.   Neuro: No focal deficits.     Home Medications:    Current Outpatient Medications:     acetaminophen (TYLENOL EXTRA STRENGTH) 500 MG tablet, Take 1,000 mg by mouth every 6 (six) hours as needed for Pain., Disp: , Rfl:     albuterol (PROVENTIL/VENTOLIN HFA) 90 mcg/actuation inhaler, Inhale 1 puff into the lungs every 4 (four) hours as needed for Wheezing., Disp: 18 g, Rfl: 3    atorvastatin (LIPITOR) 40 MG tablet, Take 2 tablets (80 mg total) by mouth once daily., Disp: 60 tablet, Rfl: 11    carvediloL (COREG) 3.125 MG tablet, Take 1 tablet (3.125 mg total) by mouth 2 (two) times daily., Disp: 60 tablet, Rfl: 11    cetirizine (ZYRTEC) 10 MG tablet, Take 1 tablet (10 mg total) by mouth once daily., Disp: 30 tablet, Rfl: 0    cloNIDine (CATAPRES) 0.2 MG tablet, Take 1 tablet (0.2 mg total) by mouth 2 (two) times daily. Takes 2 times per day. Takes whole tablet, Disp: 60 tablet, Rfl: 11    diltiaZEM (CARDIZEM CD) 180 MG 24 hr capsule, Take 1 capsule (180 mg total) by mouth every morning., Disp: 30 capsule, Rfl: 11    DULoxetine (CYMBALTA) 60 MG capsule, Take 1 capsule (60 mg total) by mouth once daily. TAKE 1 CAPSULE BY MOUTH DAILY. DO NOT CRUSH OR CHEW, Disp: 30 capsule, Rfl: 11    empagliflozin (JARDIANCE) 25 mg tablet, Take 1 tablet (25 mg total) by mouth once daily., Disp: 90 tablet, Rfl: 3    exenatide microspheres (BYDUREON BCISE) 2 mg/0.85 mL AtIn, Inject 2 mg into the skin every 7 days., Disp: 4 pen , Rfl: 3    furosemide (LASIX) 20 MG tablet, Take 1 tablet (20 mg total) by mouth once daily., Disp: 30 tablet, Rfl: 11    hydrALAZINE (APRESOLINE) 50 MG tablet, Take 1 tablet  "(50 mg total) by mouth every 8 (eight) hours., Disp: 90 tablet, Rfl: 11    insulin (LANTUS SOLOSTAR U-100 INSULIN) glargine 100 units/mL SubQ pen, Inject 65 Units into the skin every evening., Disp: 18 mL, Rfl: 11    insulin aspart U-100 (NOVOLOG FLEXPEN U-100 INSULIN) 100 unit/mL (3 mL) InPn pen, Inject 20 Units into the skin 3 (three) times daily with meals., Disp: 15 mL, Rfl: 11    latanoprost 0.005 % ophthalmic solution, Place 1 drop into both eyes once daily., Disp: 2.5 mL, Rfl: 1    losartan-hydrochlorothiazide 50-12.5 mg (HYZAAR) 50-12.5 mg per tablet, Take 1 tablet by mouth every morning., Disp: 30 tablet, Rfl: 11    melatonin (MELATIN) 3 mg tablet, Take 2 tablets (6 mg total) by mouth nightly as needed for Insomnia., Disp: 60 tablet, Rfl: 1    nebulizer accessories Kit, 1 each by Misc.(Non-Drug; Combo Route) route 4 (four) times daily as needed (wheezing)., Disp: 1 kit, Rfl: 0    nitroGLYCERIN (NITROSTAT) 0.4 MG SL tablet, Place 1 tablet (0.4 mg total) under the tongue every 5 (five) minutes as needed for Chest pain., Disp: 30 tablet, Rfl: 0    pen needle, diabetic 32 gauge x 5/32" Ndle, 1 each by Misc.(Non-Drug; Combo Route) route 4 (four) times daily., Disp: 100 each, Rfl: 5    potassium chloride (K-TAB) 20 mEq, Take 20 mEq by mouth every morning., Disp: , Rfl:     vitamin D (VITAMIN D3) 1000 units Tab, Take 2 tablets (2,000 Units total) by mouth Daily., Disp: 60 tablet, Rfl: 11    flash glucose scanning reader (FREESTYLE SMITHA 2 READER) Misc, 1 each by Misc.(Non-Drug; Combo Route) route 4 (four) times daily with meals and nightly. (Patient not taking: Reported on 12/20/2023), Disp: 1 each, Rfl: 0    flash glucose sensor (FREESTYLE SMITHA 2 SENSOR) Kit, 1 each by Misc.(Non-Drug; Combo Route) route 2 hours after meals and at bedtime. (Patient not taking: Reported on 11/21/2023), Disp: 1 kit, Rfl: 0    ketorolac 0.5% (ACULAR) 0.5 % Drop, Place 1 drop into the left eye 2 (two) times daily., Disp: , Rfl:     " ofloxacin (OCUFLOX) 0.3 % ophthalmic solution, Place 1 drop into the right eye 4 (four) times daily., Disp: , Rfl:     prednisoLONE acetate (PRED FORTE) 1 % DrpS, Place 1 drop into the right eye 5 (five) times daily., Disp: , Rfl:     sodium chloride 5% (NIMA 128) 5 % ophthalmic solution, Place 1 drop into both eyes as needed., Disp: , Rfl:   No current facility-administered medications for this visit.    Laboratory data    Lab Results   Component Value Date    WBC 7.02 11/17/2023    HGB 12.1 11/17/2023    HCT 40.9 11/17/2023     11/17/2023    IRON 54 10/05/2023    TIBC 247 (L) 10/05/2023    TRANS 217 10/05/2023    LABIRON 22 10/05/2023    FERRITIN 83.25 10/05/2023     12/05/2023    K 3.5 12/05/2023    CHLORIDE 108 (H) 12/05/2023    CO2 27 12/05/2023    BUN 20.8 (H) 12/05/2023    CREATININE 1.51 (H) 12/05/2023    EGFRNORACEVR 38 12/05/2023    GLUCOSE 184 (H) 12/05/2023    CALCIUM 8.8 12/05/2023    ALKPHOS 105 12/05/2023    LABPROT 7.3 12/05/2023    ALBUMIN 3.1 (L) 12/05/2023    BILIDIR 0.3 02/11/2022    IBILI 0.20 02/11/2022    AST 18 12/05/2023    ALT 13 12/05/2023    MG 2.10 11/17/2023    PHOS 3.1 11/17/2023      Lab Results   Component Value Date    HGBA1C 7.6 (H) 11/16/2023    PTH 63.9 09/05/2018    SDCNPYUI99PO 34.8 08/01/2022    HIV Nonreactive 01/17/2023    HEPCAB Nonreactive 06/06/2023     Urine:  Lab Results   Component Value Date    COLORUA Light-Yellow 12/05/2023    APPEARANCEUA Turbid (A) 12/05/2023    SGUA 1.018 12/05/2023    PHUA 5.5 12/05/2023    PROTEINUA Trace (A) 12/05/2023    GLUCOSEUA 4+ (A) 12/05/2023    KETONESUA Negative 12/05/2023    BLOODUA Negative 12/05/2023    NITRITESUA Negative 12/05/2023    LEUKOCYTESUR Negative 12/05/2023    RBCUA 0-5 12/05/2023    WBCUA 0-5 12/05/2023    BACTERIA Trace (A) 12/05/2023    SQEPUA Many (A) 12/05/2023    HYALINECASTS 0-2 (A) 12/05/2023    CREATRANDUR 73.1 10/05/2023    PROTEINURINE 33.2 08/24/2023    UPROTCREA 0.1 08/24/2023          Imaging  US Retroperitoneal Complete With Doppler (XPD) 11/17/2023    Narrative  EXAMINATION:  US RETROPERITONEAL COMPLETE WITH DOPPLER (XPD)    CLINICAL HISTORY:  Resistant HTN;    COMPARISON:  CT 16 May 2016    FINDINGS:  Grayscale and color Doppler sonographic evaluation of the kidneys and urinary bladder.    Right kidney measures 9.5 cm in length.  No hydronephrosis.  Normal renal parenchymal echogenicity.  Resistive indices are not significantly elevated.  Main renal artery velocity not significantly elevated.  Patent renal vein.    The left kidney is absent.    Urinary bladder unremarkable.    Impression  No significant sonographic abnormality of the right kidney.  Left kidney absent.      Electronically signed by: Aguusto Dempsey  Date:    11/17/2023  Time:    12:52      Impression    ICD-10-CM ICD-9-CM   1. CKD stage G3b/A2, GFR 30-44 and albumin creatinine ratio  mg/g  N18.32 585.3   2. Primary hypertension  I10 401.9   3. Arthritis  M19.90 716.90   4. Morbid obesity due to excess calories  E66.01 278.01   5. Type 2 diabetes mellitus with stage 3b chronic kidney disease, with long-term current use of insulin  E11.22 250.40    N18.32 585.3    Z79.4 V58.67        Plan     CKD stage G3b/A2, GFR 30-44 and albumin creatinine ratio  mg/g  Diabetic kidney disease/reduced renal mass.  Continue aggressive risk factor management, MAHI blockade, and SGLT 2 inhibitor.  Consider GLP-1 for better glycemic control.    Continue renal sparing activities:  -2 g a day dietary sodium restriction  -optimize glycemic control (goal A1c is less than 7%)  -control high blood pressure (goal blood pressure is less than 130/80, patient was advised to check blood pressure once or twice a week and bring blood pressure logs to next office visit)  -exercise at least 30 minutes a day, 5 days a week  -maintain healthy weight  -stay well hydrated (drink water only, avoid juices, sweet tea, and sodas)  -ask about staying up-to-date  on vaccinations (flu vaccine, pneumonia vaccine, hepatitis B vaccine)  -avoid excessive use of NSAIDs (ibuprofen, naproxen, Aleve, Advil, Toradol, Mobic), take Tylenol as needed for headache or mild pain  -take cholesterol lowering medications if prescribed (LDL goal less than 100)  Follow up in about 6 months (around 8/29/2024).    Primary hypertension  Patient believes her blood pressure reading is elevated today due to pain.  Hypertension was controlled on prior visit.  Continue current medication regimen and 2 g a day dietary sodium restriction.      Arthritis  -     ketorolac injection 30 mg  Will give 1 time dose of ketorolac.  ED precautions discussed.      Morbid obesity due to excess calories  Lifestyle and dietary interventions discussed, patient encouraged to maintain non-sedentary lifestyle and well-balanced diet.     Type 2 diabetes mellitus with stage 3b chronic kidney disease, with long-term current use of insulin  Continue risk factor management, SGLT2 inhibitor, and MAHI blockade.     2/29/2024  Miranda Viveros NP  [facility]

## 2024-03-07 DIAGNOSIS — G47.00 INSOMNIA, UNSPECIFIED TYPE: ICD-10-CM

## 2024-03-11 RX ORDER — ACETAMINOPHEN 500 MG
5 TABLET ORAL NIGHTLY PRN
Qty: 30 TABLET | Refills: 1 | Status: SHIPPED | OUTPATIENT
Start: 2024-03-11 | End: 2024-04-29 | Stop reason: SDUPTHER

## 2024-04-02 ENCOUNTER — HOSPITAL ENCOUNTER (OUTPATIENT)
Dept: PULMONOLOGY | Facility: HOSPITAL | Age: 69
Discharge: HOME | End: 2024-04-02
Payer: MEDICARE

## 2024-04-02 DIAGNOSIS — G47.33 OSA ON CPAP: ICD-10-CM

## 2024-04-02 DIAGNOSIS — I27.20 PULMONARY HYPERTENSION: ICD-10-CM

## 2024-04-02 DIAGNOSIS — R06.00 DYSPNEA: Primary | ICD-10-CM

## 2024-04-02 PROCEDURE — 94060 EVALUATION OF WHEEZING: CPT

## 2024-04-02 PROCEDURE — 94640 AIRWAY INHALATION TREATMENT: CPT | Mod: 59

## 2024-04-02 PROCEDURE — 94729 DIFFUSING CAPACITY: CPT

## 2024-04-02 PROCEDURE — 94726 PLETHYSMOGRAPHY LUNG VOLUMES: CPT

## 2024-04-02 RX ORDER — ALBUTEROL SULFATE 2.5 MG/.5ML
2.5 SOLUTION RESPIRATORY (INHALATION) EVERY 4 HOURS PRN
Status: DISPENSED | OUTPATIENT
Start: 2024-04-02

## 2024-04-02 RX ADMIN — ALBUTEROL SULFATE 2.5 MG: 2.5 SOLUTION RESPIRATORY (INHALATION) at 01:04

## 2024-04-02 NOTE — PROGRESS NOTES
Good cooperation and effort given.  Albuterol 2.5mg given HR 75/78, SAT 95%, BBS CL  Patient complained of SOB throughout testing.  PFT completed

## 2024-04-04 LAB
DLCO SINGLE BREATH LLN: 16.13
DLCO SINGLE BREATH PRE REF: 46.7 %
DLCO SINGLE BREATH REF: 21.87
DLCOC SBVA LLN: 2.97
DLCOC SBVA REF: 4.43
DLCOC SINGLE BREATH LLN: 16.13
DLCOC SINGLE BREATH REF: 21.87
DLCOVA LLN: 2.97
DLCOVA PRE REF: 105.9 %
DLCOVA PRE: 4.69 ML/(MIN*MMHG*L) (ref 2.97–5.89)
DLCOVA REF: 4.43
ERV LLN: -16449.29
ERV PRE REF: 27.2 %
ERV REF: 0.71
FEF 25 75 CHG: 47.3 %
FEF 25 75 LLN: 0.7
FEF 25 75 POST REF: 47.9 %
FEF 25 75 PRE REF: 32.5 %
FEF 25 75 REF: 1.77
FET100 CHG: -5.2 %
FEV1 CHG: 8.4 %
FEV1 FVC CHG: 4.3 %
FEV1 FVC LLN: 67
FEV1 FVC POST REF: 99.9 %
FEV1 FVC PRE REF: 95.8 %
FEV1 FVC REF: 79
FEV1 LLN: 1.41
FEV1 POST REF: 44.6 %
FEV1 PRE REF: 41.1 %
FEV1 REF: 1.99
FRCPLETH LLN: 1.88
FRCPLETH PREREF: 106.5 %
FRCPLETH REF: 2.71
FVC CHG: 3.9 %
FVC LLN: 1.82
FVC POST REF: 44.4 %
FVC PRE REF: 42.7 %
FVC REF: 2.54
IVC PRE: 1.06 L (ref 1.82–3.29)
IVC SINGLE BREATH LLN: 1.82
IVC SINGLE BREATH PRE REF: 41.9 %
IVC SINGLE BREATH REF: 2.54
MVV LLN: 74
MVV PRE REF: 32.3 %
MVV REF: 87
PEF CHG: -36.5 %
PEF LLN: 3.17
PEF POST REF: 33.6 %
PEF PRE REF: 52.9 %
PEF REF: 5.17
POST FEF 25 75: 0.85 L/S (ref 0.7–3.36)
POST FET 100: 6.13 SEC
POST FEV1 FVC: 78.93 % (ref 66.55–89.72)
POST FEV1: 0.89 L (ref 1.41–2.55)
POST FVC: 1.13 L (ref 1.82–3.29)
POST PEF: 1.74 L/S (ref 3.17–7.18)
PRE DLCO: 10.22 ML/(MIN*MMHG) (ref 16.13–27.6)
PRE ERV: 0.19 L (ref -16449.29–16450.71)
PRE FEF 25 75: 0.58 L/S (ref 0.7–3.36)
PRE FET 100: 6.46 SEC
PRE FEV1 FVC: 75.65 % (ref 66.55–89.72)
PRE FEV1: 0.82 L (ref 1.41–2.55)
PRE FRC PL: 2.88 L (ref 1.88–3.53)
PRE FVC: 1.08 L (ref 1.82–3.29)
PRE MVV: 28.02 L/MIN (ref 73.82–99.87)
PRE PEF: 2.74 L/S (ref 3.17–7.18)
PRE RV: 2.69 L (ref 1.42–2.57)
PRE TLC: 3.78 L (ref 3.95–5.93)
RAW LLN: 3.06
RAW PRE REF: 185.2 %
RAW PRE: 5.67 CMH2O*S/L (ref 3.06–3.06)
RAW REF: 3.06
RV LLN: 1.42
RV PRE REF: 134.7 %
RV REF: 2
RVTLC LLN: 32
RVTLC PRE REF: 172.2 %
RVTLC PRE: 71.29 % (ref 31.81–50.99)
RVTLC REF: 41
TLC LLN: 3.95
TLC PRE REF: 76.4 %
TLC REF: 4.94
VA PRE: 2.18 L (ref 4.79–4.79)
VA SINGLE BREATH LLN: 4.79
VA SINGLE BREATH PRE REF: 45.5 %
VA SINGLE BREATH REF: 4.79
VC LLN: 1.82
VC PRE REF: 42.7 %
VC PRE: 1.08 L (ref 1.82–3.29)
VC REF: 2.54

## 2024-04-11 DIAGNOSIS — I61.9 HEMORRHAGIC STROKE: Primary | ICD-10-CM

## 2024-04-11 NOTE — PROGRESS NOTES
Patient unable to be seen neurosurgeon who performed surgical repair of ruptured brain aneurysm. Will refer to outside neurosurgeon for recs regarding timeframe for restarting antiplatelet therapy for ischemic heart disease post surgery. Thanks.

## 2024-04-28 DIAGNOSIS — R91.1 SOLITARY PULMONARY NODULE: Primary | ICD-10-CM

## 2024-04-29 DIAGNOSIS — G47.00 INSOMNIA, UNSPECIFIED TYPE: ICD-10-CM

## 2024-04-29 DIAGNOSIS — E11.9 TYPE 2 DIABETES MELLITUS WITHOUT COMPLICATION, WITH LONG-TERM CURRENT USE OF INSULIN: ICD-10-CM

## 2024-04-29 DIAGNOSIS — Z79.4 TYPE 2 DIABETES MELLITUS WITHOUT COMPLICATION, WITH LONG-TERM CURRENT USE OF INSULIN: ICD-10-CM

## 2024-04-30 RX ORDER — ACETAMINOPHEN 500 MG
5 TABLET ORAL NIGHTLY PRN
Qty: 30 TABLET | Refills: 1 | Status: SHIPPED | OUTPATIENT
Start: 2024-04-30 | End: 2024-05-28 | Stop reason: SDUPTHER

## 2024-04-30 RX ORDER — DULOXETIN HYDROCHLORIDE 60 MG/1
60 CAPSULE, DELAYED RELEASE ORAL DAILY
Qty: 30 CAPSULE | Refills: 11 | Status: SHIPPED | OUTPATIENT
Start: 2024-04-30 | End: 2024-05-28 | Stop reason: SDUPTHER

## 2024-05-03 DIAGNOSIS — I42.9 CARDIOMYOPATHY, UNSPECIFIED TYPE: ICD-10-CM

## 2024-05-06 RX ORDER — DILTIAZEM HYDROCHLORIDE 180 MG/1
180 CAPSULE, COATED, EXTENDED RELEASE ORAL EVERY MORNING
Qty: 30 CAPSULE | Refills: 11 | Status: SHIPPED | OUTPATIENT
Start: 2024-05-06 | End: 2025-05-06

## 2024-05-13 ENCOUNTER — HOSPITAL ENCOUNTER (OUTPATIENT)
Dept: RADIOLOGY | Facility: HOSPITAL | Age: 69
Discharge: HOME | End: 2024-05-13
Payer: MEDICARE

## 2024-05-13 DIAGNOSIS — R91.1 SOLITARY PULMONARY NODULE: ICD-10-CM

## 2024-05-13 PROCEDURE — 71250 CT THORAX DX C-: CPT | Mod: TC

## 2024-05-17 ENCOUNTER — TELEPHONE (OUTPATIENT)
Dept: INTERNAL MEDICINE | Facility: CLINIC | Age: 69
End: 2024-05-17
Payer: MEDICARE

## 2024-05-17 NOTE — TELEPHONE ENCOUNTER
"The staff of Dr Prater called stating that they are denying the referral  Dr Hernandez is not able to treat this condition at this time " Please advise   "

## 2024-05-23 NOTE — TELEPHONE ENCOUNTER
Please advise . Pt iis waiting on Neurosurgery before starting anti-coags , Did you wan tto send referral to [place] or [place]

## 2024-05-28 DIAGNOSIS — G47.00 INSOMNIA, UNSPECIFIED TYPE: ICD-10-CM

## 2024-05-28 DIAGNOSIS — Z79.4 TYPE 2 DIABETES MELLITUS WITHOUT COMPLICATION, WITH LONG-TERM CURRENT USE OF INSULIN: ICD-10-CM

## 2024-05-28 DIAGNOSIS — E11.9 TYPE 2 DIABETES MELLITUS WITHOUT COMPLICATION, WITH LONG-TERM CURRENT USE OF INSULIN: ICD-10-CM

## 2024-05-28 RX ORDER — ACETAMINOPHEN 500 MG
5 TABLET ORAL NIGHTLY PRN
Qty: 30 TABLET | Refills: 1 | Status: SHIPPED | OUTPATIENT
Start: 2024-05-28 | End: 2025-05-28

## 2024-05-28 RX ORDER — DULOXETIN HYDROCHLORIDE 60 MG/1
60 CAPSULE, DELAYED RELEASE ORAL DAILY
Qty: 30 CAPSULE | Refills: 2 | Status: SHIPPED | OUTPATIENT
Start: 2024-05-28 | End: 2024-08-26

## 2024-05-30 ENCOUNTER — OFFICE VISIT (OUTPATIENT)
Dept: CARDIOLOGY | Facility: CLINIC | Age: 69
End: 2024-05-30
Payer: MEDICARE

## 2024-05-30 VITALS
RESPIRATION RATE: 20 BRPM | WEIGHT: 293 LBS | HEIGHT: 64 IN | HEART RATE: 69 BPM | OXYGEN SATURATION: 97 % | TEMPERATURE: 99 F | SYSTOLIC BLOOD PRESSURE: 138 MMHG | DIASTOLIC BLOOD PRESSURE: 88 MMHG | BODY MASS INDEX: 50.02 KG/M2

## 2024-05-30 DIAGNOSIS — I10 PRIMARY HYPERTENSION: ICD-10-CM

## 2024-05-30 DIAGNOSIS — E78.00 HYPERCHOLESTEROLEMIA: ICD-10-CM

## 2024-05-30 DIAGNOSIS — I20.89 ANGINA OF EFFORT: Primary | ICD-10-CM

## 2024-05-30 DIAGNOSIS — I48.20 CHRONIC ATRIAL FIBRILLATION: ICD-10-CM

## 2024-05-30 DIAGNOSIS — R06.00 DYSPNEA, UNSPECIFIED TYPE: ICD-10-CM

## 2024-05-30 PROCEDURE — 99215 OFFICE O/P EST HI 40 MIN: CPT | Mod: PBBFAC | Performed by: INTERNAL MEDICINE

## 2024-05-30 RX ORDER — ISOSORBIDE MONONITRATE 30 MG/1
30 TABLET, EXTENDED RELEASE ORAL DAILY
Qty: 60 TABLET | Refills: 1 | Status: SHIPPED | OUTPATIENT
Start: 2024-05-30 | End: 2024-09-27

## 2024-05-30 RX ORDER — GABAPENTIN 600 MG/1
600 TABLET ORAL EVERY MORNING
COMMUNITY
Start: 2024-05-03

## 2024-05-30 RX ORDER — AMLODIPINE BESYLATE 10 MG/1
10 TABLET ORAL DAILY
COMMUNITY
Start: 2024-02-24

## 2024-05-30 NOTE — PROGRESS NOTES
Chief Complaint  4 month Follow up w/ sanjuana ANDREW  Elissa Freeman is a 68 y.o.  female with past medical history of hypertension, hyperlipidemia, diabetes mellitus, renal cell carcinoma disease s/p left nephrectomy 2011), adenocarcinoma of the colon (s/p hemicolectomy 2014), involving obesity who presents for follow-up. Patient last seen in Cardiology clinic (1/2024).    (01/09/2024):  Since last office clinic visit, patient was admitted to [facility] following transfer from [facility] on (11/15/23) 2/2 a fall with subsequent altered mental status.  She also had hypertensive emergency and CT head revealed small faint hyperdense focus within right insular region suspicious for microhemorrhage.  Admitted to the ICU initiated on Cleviprex drip.  BP improved and patient was discharged home with suspected follow up with PCP and Cardiology.  Approximate x1 week later patient had additional fall in which he was admitted to [facility] with findings of atrial fibrillation RVR.  Due to recent intraparenchymal hemorrhage measuring 6 mm in greatest diameter; anticoagulation was deferred per neurosurgery recommendations for at least 8 week duration.  TTE was also performed during hospital stay as LVEF decreased to mid range ejection fraction of 45% with grade 1 diastolic dysfunction.    Patient states that since discharge he has noticed worsening B/L lower extremity edema and worsening dyspnea on exertion after walking a proximally x 15 ft requiring rest for times 10-15 minutes prior to continuing.  She denies any falls since prior hospitalization.  With exertion, she denies any associated chest pain, palpitations, nausea/vomiting, lightheadedness/dizziness, or loss of consciousness.    Of note; patient reports close compliance with all prescribed medications however does not utilize CPAP on daily basis.  Counseled patient regarding utilizing CPAP daily to avoid worsening of  possible pulmonary hypertension and/or heart failure.      Today 5/30/24:  No new complaints today. Still gets dyspneic with housework. She gets angina requiring use of NTG tabs twice a week. Leg swelling L>R. Patient states these symptoms are stable and unchanged since her last visit. Still awaiting nsgy recs to restart anticoag/antiplt medications given hx of ich/aneurysm. Had a normal rambo since last visit.       ROS  Review of Systems   Constitutional:  Negative for chills and fever.   HENT: Negative.     Eyes:  Negative for blurred vision and double vision.   Respiratory:  Positive for shortness of breath (With exertion). Negative for cough.    Cardiovascular:  Positive for leg swelling. Negative for chest pain and palpitations.   Gastrointestinal:  Negative for abdominal pain, constipation, diarrhea, nausea and vomiting.   Skin:  Negative for rash.   Neurological:  Negative for weakness and headaches.        PE  Vitals:    05/30/24 1519   BP: (!) 140/96   Pulse: 69   Resp: 20   Temp: 98.8 °F (37.1 °C)        Physical Exam  Vitals and nursing note reviewed.   Constitutional:       General: She is not in acute distress.     Appearance: Normal appearance. She is obese. She is not toxic-appearing.   HENT:      Head: Normocephalic and atraumatic.      Right Ear: External ear normal.      Left Ear: External ear normal.      Nose: Nose normal.      Mouth/Throat:      Mouth: Mucous membranes are moist.      Pharynx: Oropharynx is clear.   Eyes:      Extraocular Movements: Extraocular movements intact.      Conjunctiva/sclera: Conjunctivae normal.      Pupils: Pupils are equal, round, and reactive to light.   Cardiovascular:      Rate and Rhythm: Normal rate and regular rhythm.      Pulses: Normal pulses.      Heart sounds: Normal heart sounds.   Pulmonary:      Effort: Pulmonary effort is normal.      Breath sounds: Normal breath sounds.   Abdominal:      General: Bowel sounds are normal.   Musculoskeletal:       Cervical back: Normal range of motion and neck supple.      Right lower leg: Edema present.      Left lower leg: Edema present.   Lymphadenopathy:      Cervical: Cervical adenopathy present.   Skin:     General: Skin is warm and dry.   Neurological:      Mental Status: She is alert and oriented to person, place, and time.          Assessment/Plan  Concern for Ischemic Heart Disease  -[facility] 9/21:  No CAD  -LexiScan 4/21:  Anterolateral and inferior ischemia  - lexiscan 1/30/24 - normal myocardial perfusion scan  -TTE 4/21:  Left ventricular ejection fraction 50-55%  -Patient denies any recent chest pain or visits to the emergency department s/t chest pain  -Recent worsening HF, LVEF decrease from 50-55% (2021) to 45% w/ Grade I DD (12/2023) w/ new onset A-fib...although the nuc stress test was normal.   - adding imdur qd.       Atrial Fibrillation  -Diagnosed at recent hospitalization (12/2023) at [facility]  -TSH WNL  -At this time patient rate controlled w/ Coreg & Diltiazem  -defer anti-coagulation and antiplatelet therapies to the patient's primary care physician, Dr. Bird (intern), Given concern for bleeding risk. Although is awaiting nsgy recs.        HFmrEF w/ Grade I DD  -TTE (12/2023) w/ findings of LVEF (45%) w/ Grade I Diastolic Dysfunction  -Continue current GDMT w/ Coreg, Diltiazem, Valsartan, & Jardiance  -Continue Lasix  -counseled patient regarding close medication compliance and low-sodium diet  -continue to follow up with Cardiology as scheduled    Hypertension-well-controlled  -BP today in clinic (128/84)  -continue Vaslartin 40mg qD, hydralazine 50 mg q.8, diltiazem 180 mg q.d., Coreg 25 mg b.i.d., and Lasix 20 mg q.d.    Hyperlipidemia  -lipid panel WNL (10/05/2023)  -refilled Lipitor 80 mg q.d. at today's clinic visit (01/09/2024)  -Stressed importance of diet and excercise as tolerated    Diabetes mellitus  -Management per PCP    MOHSEN with CPAP  -Management per PCP    RCC of Left  kidney s/p Nephrectomy (2011)  Adenocarcinoma of colon s/p hemicolectomy (2014)      Initiating imdur for antianginal effects.  We will follow up on patient's symptomology in roughly 3-4 months to see how she does with imdur.     Pillo Ayala DO  [facility] Internal Medicine, [facility]      Attending addendum to follow

## 2024-05-30 NOTE — PROGRESS NOTES
Cardiology Attending    I evaluated Elissa Freeman and discussed the patient's symptoms, findings, and management plan with the resident.  Please see the Cardiology note for details.

## 2024-06-03 DIAGNOSIS — I27.20 PULMONARY HYPERTENSION: ICD-10-CM

## 2024-06-03 RX ORDER — ALBUTEROL SULFATE 90 UG/1
1 AEROSOL, METERED RESPIRATORY (INHALATION) EVERY 4 HOURS PRN
Qty: 18 G | Refills: 3 | Status: SHIPPED | OUTPATIENT
Start: 2024-06-03

## 2024-06-26 DIAGNOSIS — Z79.4 TYPE 2 DIABETES MELLITUS WITH STAGE 3 CHRONIC KIDNEY DISEASE, WITH LONG-TERM CURRENT USE OF INSULIN, UNSPECIFIED WHETHER STAGE 3A OR 3B CKD: ICD-10-CM

## 2024-06-26 DIAGNOSIS — N18.30 TYPE 2 DIABETES MELLITUS WITH STAGE 3 CHRONIC KIDNEY DISEASE, WITH LONG-TERM CURRENT USE OF INSULIN, UNSPECIFIED WHETHER STAGE 3A OR 3B CKD: ICD-10-CM

## 2024-06-26 DIAGNOSIS — E11.22 TYPE 2 DIABETES MELLITUS WITH STAGE 3 CHRONIC KIDNEY DISEASE, WITH LONG-TERM CURRENT USE OF INSULIN, UNSPECIFIED WHETHER STAGE 3A OR 3B CKD: ICD-10-CM

## 2024-06-26 RX ORDER — INSULIN GLARGINE 100 [IU]/ML
65 INJECTION, SOLUTION SUBCUTANEOUS NIGHTLY
Qty: 18 ML | Refills: 11 | Status: SHIPPED | OUTPATIENT
Start: 2024-06-26 | End: 2025-06-26

## 2024-06-27 DIAGNOSIS — G47.00 INSOMNIA, UNSPECIFIED TYPE: ICD-10-CM

## 2024-06-27 DIAGNOSIS — E11.9 TYPE 2 DIABETES MELLITUS WITHOUT COMPLICATION, WITH LONG-TERM CURRENT USE OF INSULIN: ICD-10-CM

## 2024-06-27 DIAGNOSIS — Z79.4 TYPE 2 DIABETES MELLITUS WITHOUT COMPLICATION, WITH LONG-TERM CURRENT USE OF INSULIN: ICD-10-CM

## 2024-06-27 RX ORDER — DULOXETIN HYDROCHLORIDE 60 MG/1
60 CAPSULE, DELAYED RELEASE ORAL DAILY
Qty: 30 CAPSULE | Refills: 2 | Status: SHIPPED | OUTPATIENT
Start: 2024-06-27 | End: 2024-09-25

## 2024-06-27 RX ORDER — ACETAMINOPHEN 500 MG
5 TABLET ORAL NIGHTLY PRN
Qty: 30 TABLET | Refills: 1 | Status: SHIPPED | OUTPATIENT
Start: 2024-06-27 | End: 2025-06-27

## 2024-06-28 DIAGNOSIS — I27.20 PULMONARY HYPERTENSION: ICD-10-CM

## 2024-06-30 RX ORDER — ALBUTEROL SULFATE 90 UG/1
1 AEROSOL, METERED RESPIRATORY (INHALATION) EVERY 4 HOURS PRN
Qty: 18 G | Refills: 3 | Status: SHIPPED | OUTPATIENT
Start: 2024-06-30

## 2024-07-03 DIAGNOSIS — I27.82 CHRONIC PULMONARY EMBOLISM, UNSPECIFIED PULMONARY EMBOLISM TYPE, UNSPECIFIED WHETHER ACUTE COR PULMONALE PRESENT: Primary | ICD-10-CM

## 2024-07-03 RX ORDER — WARFARIN SODIUM 5 MG/1
2.5 TABLET ORAL DAILY
Qty: 15 TABLET | Refills: 11 | Status: SHIPPED | OUTPATIENT
Start: 2024-07-03 | End: 2025-07-03

## 2024-07-03 NOTE — PROGRESS NOTES
Called patient to discuss restarting warfarin anticoagulation. Have attempted to refer patient to several neurosurgeons for evaluation on when to restart anticoagulation however have been unable to find an accepting physician to date. Discussed risks versus benefits of restarting anticoagulation. Patient has indicated she would like to restart anticoagulation at this time despite the risks. Patient instructed to contact warfarin clinic to restart anticoagulation. Will continue to attempt to find neurosurgeon to follow up with patient post stroke.

## 2024-07-15 DIAGNOSIS — I42.9 CARDIOMYOPATHY, UNSPECIFIED TYPE: ICD-10-CM

## 2024-07-15 RX ORDER — FUROSEMIDE 20 MG/1
TABLET ORAL
Qty: 90 TABLET | Refills: 11 | Status: SHIPPED | OUTPATIENT
Start: 2024-07-15

## 2024-07-26 DIAGNOSIS — E78.5 HYPERLIPIDEMIA ASSOCIATED WITH TYPE 2 DIABETES MELLITUS: ICD-10-CM

## 2024-07-26 DIAGNOSIS — E11.69 HYPERLIPIDEMIA ASSOCIATED WITH TYPE 2 DIABETES MELLITUS: ICD-10-CM

## 2024-07-26 RX ORDER — ATORVASTATIN CALCIUM 40 MG/1
80 TABLET, FILM COATED ORAL DAILY
Qty: 60 TABLET | Refills: 11 | Status: SHIPPED | OUTPATIENT
Start: 2024-07-26 | End: 2025-07-26

## 2024-08-05 DIAGNOSIS — G47.00 INSOMNIA, UNSPECIFIED TYPE: ICD-10-CM

## 2024-08-05 RX ORDER — ACETAMINOPHEN 500 MG
5 TABLET ORAL NIGHTLY PRN
Qty: 30 TABLET | Refills: 1 | Status: SHIPPED | OUTPATIENT
Start: 2024-08-05 | End: 2025-08-05

## 2024-08-06 ENCOUNTER — HOSPITAL ENCOUNTER (EMERGENCY)
Facility: HOSPITAL | Age: 69
Discharge: HOME | End: 2024-08-06
Attending: FAMILY MEDICINE
Payer: MEDICARE

## 2024-08-06 VITALS
SYSTOLIC BLOOD PRESSURE: 143 MMHG | BODY MASS INDEX: 53.74 KG/M2 | HEART RATE: 80 BPM | DIASTOLIC BLOOD PRESSURE: 94 MMHG | WEIGHT: 293 LBS | TEMPERATURE: 98 F | RESPIRATION RATE: 18 BRPM | OXYGEN SATURATION: 95 %

## 2024-08-06 DIAGNOSIS — E16.2 HYPOGLYCEMIA: Primary | ICD-10-CM

## 2024-08-06 LAB
ALBUMIN SERPL-MCNC: 3.3 G/DL (ref 3.4–4.8)
ALBUMIN/GLOB SERPL: 0.7 RATIO (ref 1.1–2)
ALP SERPL-CCNC: 131 UNIT/L (ref 40–150)
ALT SERPL-CCNC: 33 UNIT/L (ref 0–55)
ANION GAP SERPL CALC-SCNC: 9 MEQ/L
AST SERPL-CCNC: 61 UNIT/L (ref 5–34)
BASOPHILS # BLD AUTO: 0.03 X10(3)/MCL
BASOPHILS NFR BLD AUTO: 0.4 %
BILIRUB SERPL-MCNC: 0.7 MG/DL
BUN SERPL-MCNC: 31.9 MG/DL (ref 9.8–20.1)
CALCIUM SERPL-MCNC: 9.5 MG/DL (ref 8.4–10.2)
CHLORIDE SERPL-SCNC: 106 MMOL/L (ref 98–107)
CO2 SERPL-SCNC: 24 MMOL/L (ref 23–31)
CREAT SERPL-MCNC: 1.75 MG/DL (ref 0.55–1.02)
CREAT/UREA NIT SERPL: 18
EOSINOPHIL # BLD AUTO: 0.03 X10(3)/MCL (ref 0–0.9)
EOSINOPHIL NFR BLD AUTO: 0.4 %
ERYTHROCYTE [DISTWIDTH] IN BLOOD BY AUTOMATED COUNT: 17.1 % (ref 11.5–17)
GFR SERPLBLD CREATININE-BSD FMLA CKD-EPI: 32 ML/MIN/1.73/M2
GLOBULIN SER-MCNC: 4.5 GM/DL (ref 2.4–3.5)
GLUCOSE SERPL-MCNC: 78 MG/DL (ref 82–115)
HCT VFR BLD AUTO: 40.5 % (ref 37–47)
HGB BLD-MCNC: 12 G/DL (ref 12–16)
HOLD SPECIMEN: NORMAL
IMM GRANULOCYTES # BLD AUTO: 0.03 X10(3)/MCL (ref 0–0.04)
IMM GRANULOCYTES NFR BLD AUTO: 0.4 %
LYMPHOCYTES # BLD AUTO: 1.35 X10(3)/MCL (ref 0.6–4.6)
LYMPHOCYTES NFR BLD AUTO: 18 %
MCH RBC QN AUTO: 22.1 PG (ref 27–31)
MCHC RBC AUTO-ENTMCNC: 29.6 G/DL (ref 33–36)
MCV RBC AUTO: 74.4 FL (ref 80–94)
MONOCYTES # BLD AUTO: 0.5 X10(3)/MCL (ref 0.1–1.3)
MONOCYTES NFR BLD AUTO: 6.7 %
NEUTROPHILS # BLD AUTO: 5.55 X10(3)/MCL (ref 2.1–9.2)
NEUTROPHILS NFR BLD AUTO: 74.1 %
NRBC BLD AUTO-RTO: 0 %
PLATELET # BLD AUTO: 223 X10(3)/MCL (ref 130–400)
PMV BLD AUTO: 9.7 FL (ref 7.4–10.4)
POCT GLUCOSE: 112 MG/DL (ref 70–110)
POCT GLUCOSE: 147 MG/DL (ref 70–110)
POCT GLUCOSE: 78 MG/DL (ref 70–110)
POTASSIUM SERPL-SCNC: 4.3 MMOL/L (ref 3.5–5.1)
PROT SERPL-MCNC: 7.8 GM/DL (ref 5.8–7.6)
RBC # BLD AUTO: 5.44 X10(6)/MCL (ref 4.2–5.4)
SODIUM SERPL-SCNC: 139 MMOL/L (ref 136–145)
WBC # BLD AUTO: 7.49 X10(3)/MCL (ref 4.5–11.5)

## 2024-08-06 PROCEDURE — 82962 GLUCOSE BLOOD TEST: CPT

## 2024-08-06 PROCEDURE — 99283 EMERGENCY DEPT VISIT LOW MDM: CPT

## 2024-08-06 PROCEDURE — 85025 COMPLETE CBC W/AUTO DIFF WBC: CPT | Performed by: NURSE PRACTITIONER

## 2024-08-06 PROCEDURE — 80053 COMPREHEN METABOLIC PANEL: CPT | Performed by: NURSE PRACTITIONER

## 2024-08-06 NOTE — ED PROVIDER NOTES
Encounter Date: 8/6/2024       History     Chief Complaint   Patient presents with    Hypoglycemia     PT IN /[facility] W REPORT OF AMS W CBG OF 20 UPON ARRIVAL. PT GIVE 1 AMP D 50, GLUCAGON AND 250CC OF D5W EN ROUTE REPEAT CBG / .  22 G TO LT HAND.  CBG UPON ARRIVAL TO ER 79.  PT A&O X 4 GIVEN OJ X 2 AND SANDWICH TRAY.  PT VOICES NO COMPLAINTS.      Pt is a 68 y.o. female who presents to the [facility] ED for evaluation of her blood sugar. Reports taking her insulin this AM but only ate some cereal prior to taking a nap. EMS was phoned per pt's son due to her drowsiness. Pt CBG at EMS arrival was 20. Amp D50 given. Pt CBG 78 upon arrival to ED. Pt denies weakness, dizziness, fever, chest pain, SOB, abdominal pain, or nausea/vomiting. Hx of atrial fibrillation, CKD, CAD, DM, HTN, and asthma.       Review of patient's allergies indicates:  No Known Allergies  Past Medical History:   Diagnosis Date    Asthma     Atrial fibrillation     Cancer     CKD (chronic kidney disease)     Coronary artery disease     Diabetes mellitus     Hyperlipidemia     Hypertension     Obesity, unspecified     MOHSEN (obstructive sleep apnea)     Unspecified cataract      Past Surgical History:   Procedure Laterality Date    CATARACT EXTRACTION Left 11/28/2022    CHOLECYSTECTOMY      COLON SURGERY      COLONOSCOPY N/A 04/27/2023    Procedure: COLONOSCOPY;  Surgeon: Jose Miguel Rosales MD;  Location: [facility] ENDOSCOPY;  Service: Endoscopy;  Laterality: N/A;    excision of lipoma      EYE SURGERY      HYSTERECTOMY      HYSTERECTOMY, VAGINAL, WITH SALPINGO-OOPHORECTOMY      NEPHRECTOMY Left     TUBAL LIGATION       Family History   Problem Relation Name Age of Onset    Hypertension Mother Fawn     Coronary artery disease Mother Fawn     Hyperlipidemia Mother Fawn     Asthma Mother Fawn     Stroke Father Gradni     Cancer Brother Ethel & Willem      Social History     Tobacco Use    Smoking status: Never    Smokeless tobacco: Never    Substance Use Topics    Alcohol use: Not Currently     Comment: frozen drinks once a month, giovanni    Drug use: Never     Review of Systems   Constitutional:  Negative for chills, diaphoresis, fatigue and fever.   HENT:  Negative for facial swelling, rhinorrhea, sinus pressure, sinus pain, sore throat and trouble swallowing.    Respiratory:  Negative for cough, chest tightness, shortness of breath and wheezing.    Cardiovascular:  Negative for chest pain, palpitations and leg swelling.   Gastrointestinal:  Negative for abdominal pain, diarrhea, nausea and vomiting.   Genitourinary:  Negative for dysuria, flank pain, frequency, hematuria and urgency.   Musculoskeletal:  Negative for arthralgias, back pain, joint swelling and myalgias.   Skin:  Negative for color change and rash.   Neurological:  Negative for dizziness, syncope, weakness and light-headedness.   Hematological:  Does not bruise/bleed easily.   All other systems reviewed and are negative.      Physical Exam     Initial Vitals [08/06/24 1746]   BP Pulse Resp Temp SpO2   (!) 136/106 63 18 97.9 °F (36.6 °C) 99 %      MAP       --         Physical Exam    Nursing note and vitals reviewed.  Constitutional: She appears well-developed and well-nourished.   HENT:   Head: Normocephalic and atraumatic.   Nose: Nose normal.   Mouth/Throat: Oropharynx is clear and moist.   Eyes: Conjunctivae and EOM are normal. Pupils are equal, round, and reactive to light.   Neck: Neck supple.   Normal range of motion.  Cardiovascular:  Normal rate, regular rhythm, normal heart sounds and intact distal pulses.           Pulmonary/Chest: Effort normal and breath sounds normal. No respiratory distress. She has no wheezes. She has no rhonchi. She has no rales. She exhibits no tenderness.   Abdominal: Abdomen is soft and flat. Bowel sounds are normal. She exhibits no distension. There is no abdominal tenderness. There is no rebound, no guarding, no tenderness at McBurney's point  and negative Cason's sign. negative psoas sign  Musculoskeletal:         General: Normal range of motion.      Cervical back: Normal range of motion and neck supple.     Neurological: She is alert and oriented to person, place, and time. She has normal strength and normal reflexes.   Skin: Skin is warm and dry. Capillary refill takes less than 2 seconds.   Psychiatric: She has a normal mood and affect. Her speech is normal and behavior is normal. Judgment and thought content normal.         ED Course   Procedures  Labs Reviewed   COMPREHENSIVE METABOLIC PANEL - Abnormal       Result Value    Sodium 139      Potassium 4.3      Chloride 106      CO2 24      Glucose 78 (*)     Blood Urea Nitrogen 31.9 (*)     Creatinine 1.75 (*)     Calcium 9.5      Protein Total 7.8 (*)     Albumin 3.3 (*)     Globulin 4.5 (*)     Albumin/Globulin Ratio 0.7 (*)     Bilirubin Total 0.7            ALT 33      AST 61 (*)     eGFR 32      Anion Gap 9.0      BUN/Creatinine Ratio 18     CBC WITH DIFFERENTIAL - Abnormal    WBC 7.49      RBC 5.44 (*)     Hgb 12.0      Hct 40.5      MCV 74.4 (*)     MCH 22.1 (*)     MCHC 29.6 (*)     RDW 17.1 (*)     Platelet 223      MPV 9.7      Neut % 74.1      Lymph % 18.0      Mono % 6.7      Eos % 0.4      Basophil % 0.4      Lymph # 1.35      Neut # 5.55      Mono # 0.50      Eos # 0.03      Baso # 0.03      IG# 0.03      IG% 0.4      NRBC% 0.0     POCT GLUCOSE - Abnormal    POCT Glucose 112 (*)    CBC W/ AUTO DIFFERENTIAL    Narrative:     The following orders were created for panel order CBC auto differential.  Procedure                               Abnormality         Status                     ---------                               -----------         ------                     CBC with Differential[[id number]]       Abnormal            Final result                 Please view results for these tests on the individual orders.   EXTRA TUBES    Narrative:     The following orders were  created for panel order EXTRA TUBES.  Procedure                               Abnormality         Status                     ---------                               -----------         ------                     Light Blue Top Hold[[id number]]                             Final result               Gold Top Hold[[id number]]                                   Final result               Pink Top Hold[[id number]]                                   Final result                 Please view results for these tests on the individual orders.   LIGHT BLUE TOP HOLD    Extra Tube Hold for add-ons.     GOLD TOP HOLD    Extra Tube Hold for add-ons.     PINK TOP HOLD    Extra Tube Hold for add-ons.     URINALYSIS, REFLEX TO URINE CULTURE   POCT GLUCOSE    POCT Glucose 78     POCT GLUCOSE MONITORING CONTINUOUS          Imaging Results    None          Medications - No data to display  Medical Decision Making  Differential:  Hypoglycemia  Electrolyte imbalance  Anemia      Amount and/or Complexity of Data Reviewed  Labs: ordered.               ED Course as of 08/06/24 1918   Tue Aug 06, 2024   1917 Given strict ED return precautions. I have spoken with the patient and/or caregivers. I have explained the patient's condition, diagnoses and treatment plan based on the information available to me at this time. I have answered the patient's and/or caregiver's questions and addressed any concerns. The patient and/or caregivers have as good an understanding of the patient's diagnosis, condition and treatment plan as can be expected at this point. The vital signs have been stable. The patient's condition is stable and appropriate for discharge from the emergency department.      The patient will pursue further outpatient evaluation with the primary care physician or other designated or consulting physician as outlined in the discharge instructions. The patient and/or caregivers are agreeable to this plan of care and follow-up instructions  have been explained in detail. The patient and/or caregivers have received these instructions in written format and have expressed an understanding of the discharge instructions. The patient and/or caregivers are aware that any significant change in condition or worsening of symptoms should prompt an immediate return to this or the closest emergency department or a call to 911.   [JA]      ED Course User Index  [JA] Augusto Stokes Jr., FNP                           Clinical Impression:  Final diagnoses:  [E16.2] Hypoglycemia (Primary)          ED Disposition Condition    Discharge Stable          ED Prescriptions    None       Follow-up Information       Follow up With Specialties Details Why Contact Info    Jeronimo Bird MD Internal Medicine In 3 days  [address] [facility] [address]  [phone]      [facility] Emergency Dept Emergency Medicine In 3 days As needed, If symptoms worsen [address] [facility] [id number] [phone]             Augusto Stokes Jr., FNP  08/06/24 3317

## 2024-08-07 ENCOUNTER — PATIENT OUTREACH (OUTPATIENT)
Dept: EMERGENCY MEDICINE | Facility: HOSPITAL | Age: 69
End: 2024-08-07
Payer: MEDICARE

## 2024-08-07 NOTE — DISCHARGE INSTRUCTIONS
Follow up with your primary care physician in 3-5 days for follow up evaluation.  Continue your home medications as previously prescribed.

## 2024-08-26 DIAGNOSIS — I27.20 PULMONARY HYPERTENSION: ICD-10-CM

## 2024-08-26 DIAGNOSIS — G47.00 INSOMNIA, UNSPECIFIED TYPE: ICD-10-CM

## 2024-08-26 RX ORDER — ALBUTEROL SULFATE 90 UG/1
1 INHALANT RESPIRATORY (INHALATION) EVERY 4 HOURS PRN
Qty: 18 G | Refills: 3 | Status: SHIPPED | OUTPATIENT
Start: 2024-08-26 | End: 2024-08-29 | Stop reason: SDUPTHER

## 2024-08-26 RX ORDER — ACETAMINOPHEN 500 MG
5 TABLET ORAL NIGHTLY PRN
Qty: 90 TABLET | Refills: 3 | Status: SHIPPED | OUTPATIENT
Start: 2024-08-26 | End: 2025-08-26

## 2024-08-26 NOTE — PROGRESS NOTES
[facility] INTERNAL MEDICINE CLINIC NOTE    Patient name: Elissa Freeman  YOB: 1955   MRN: [id number]  Appointment date: 8/29/2024  Appointment time: 12:15 PM    Subjective     HPI    Elissa Freeman is a 68 y.o.  female with a PMHx positive for obesity, MOHSEN, multinodular goiter, hypertension, hyperlipidemia, diabetes mellitus type 2, chronic kidney disease IIIb, adenocarcinoma of the colon stage IIA status post hemicolectomy (2014) and adjuvant chemotherapy (2015), renal cell carcinoma status post nephrectomy (2011), who presented to  clinic today for six month follow up. Patient reports multiple episodes of loose, watery, green stools that lasted for several days but eventually self resolved. She otherwise denies any acute complaints.    Interval History    02/15/2024: Patient today states that since discharge he has noticed worsening B/L lower extremity edema and worsening dyspnea on exertion after walking a proximally x 15 ft requiring rest for times 10-15 minutes prior to continuing.  She denies any falls since prior hospitalization. She also notes weekly right sided chest pain for the past 4 weeks which describes a dull, aching pain located to the right chest below the right breast which she rates as a 6-7/10. She typically feels the pain with exertion but is alleviated with rest and nitroglycerin. She denies any associated palpitations, nausea/vomiting, lightheadedness/dizziness, or loss of consciousness. Denies any other acute complaints. She is scheduled for a nuclear stress and is to follow up with Cardiology post stress test.    Past Medical History:  Past Medical History:   Diagnosis Date    Asthma     Atrial fibrillation     Cancer     CKD (chronic kidney disease)     Coronary artery disease     Diabetes mellitus     Hyperlipidemia     Hypertension     Obesity, unspecified     MOHSEN (obstructive sleep apnea)     Unspecified cataract      Past Surgical History:  Past Surgical  History:   Procedure Laterality Date    CATARACT EXTRACTION Left 11/28/2022    CHOLECYSTECTOMY      COLON SURGERY      COLONOSCOPY N/A 04/27/2023    Procedure: COLONOSCOPY;  Surgeon: Jose Miguel Rosales MD;  Location: [facility] ENDOSCOPY;  Service: Endoscopy;  Laterality: N/A;    excision of lipoma      EYE SURGERY      HYSTERECTOMY      HYSTERECTOMY, VAGINAL, WITH SALPINGO-OOPHORECTOMY      NEPHRECTOMY Left     TUBAL LIGATION       Family History:  Family History   Problem Relation Name Age of Onset    Hypertension Mother Fawn     Coronary artery disease Mother Fawn     Hyperlipidemia Mother Fawn     Asthma Mother Fawn     Stroke Father Gradnigo     Cancer Brother Anil Bangura      Social History:  Social History     Tobacco Use    Smoking status: Never    Smokeless tobacco: Never   Substance Use Topics    Alcohol use: Not Currently     Comment: frozen drinks once a month, giovanni    Drug use: Never     Allergies:  Review of patient's allergies indicates:  No Known Allergies  Home Medications:  Prior to Admission medications    Medication Sig Start Date End Date Taking? Authorizing Provider   acetaminophen (TYLENOL EXTRA STRENGTH) 500 MG tablet Take 1,000 mg by mouth every 6 (six) hours as needed for Pain.    Provider, Historical   albuterol (PROVENTIL/VENTOLIN HFA) 90 mcg/actuation inhaler Inhale 1 puff into the lungs every 4 (four) hours as needed for Wheezing. 8/26/24   Jeronimo Bird MD   amLODIPine (NORVASC) 10 MG tablet Take 10 mg by mouth once daily. 2/24/24   Provider, Historical   atorvastatin (LIPITOR) 40 MG tablet Take 2 tablets (80 mg total) by mouth once daily. 7/26/24 7/26/25  Jeronimo Bird MD   carvediloL (COREG) 3.125 MG tablet Take 1 tablet (3.125 mg total) by mouth 2 (two) times daily. 2/15/24 2/14/25  Jeronimo Bird MD   cetirizine (ZYRTEC) 10 MG tablet Take 1 tablet (10 mg total) by mouth once daily. 2/15/24 5/30/24  Jeronimo Bird MD   cloNIDine (CATAPRES) 0.2 MG tablet Take 1 tablet (0.2  mg total) by mouth 2 (two) times daily. Takes 2 times per day. Takes whole tablet 2/15/24 2/14/25  Jeronimo Bird MD   diltiaZEM (CARDIZEM CD) 180 MG 24 hr capsule Take 1 capsule (180 mg total) by mouth every morning. 5/6/24 5/6/25  Jeronimo Bird MD   DULoxetine (CYMBALTA) 60 MG capsule Take 1 capsule (60 mg total) by mouth once daily. TAKE 1 CAPSULE BY MOUTH DAILY. DO NOT CRUSH OR CHEW 6/27/24 9/25/24  Jeronimo Bird MD   empagliflozin (JARDIANCE) 25 mg tablet Take 1 tablet (25 mg total) by mouth once daily. 2/15/24 2/14/25  Jeronimo Bird MD   exenatide microspheres (BYDUREON BCISE) 2 mg/0.85 mL AtIn Inject 2 mg into the skin every 7 days. 8/16/23   Estrella Burnette MD   flash glucose scanning reader (FREESTYLE SMITHA 2 READER) Misc 1 each by Misc.(Non-Drug; Combo Route) route 4 (four) times daily with meals and nightly.  Patient not taking: Reported on 12/20/2023 9/27/23   Jeronimo Bird MD   flash glucose sensor (FREESTYLE SMITHA 2 SENSOR) Kit 1 each by Misc.(Non-Drug; Combo Route) route 2 hours after meals and at bedtime.  Patient not taking: Reported on 11/21/2023 9/27/23   Jeronimo Bird MD   furosemide (LASIX) 20 MG tablet TAKE ONE TABLET BY MOUTH DAILY AT 9 AM (VIAL) 7/15/24   Miranda Viveros FNP   gabapentin (NEURONTIN) 600 MG tablet Take 600 mg by mouth every morning. 5/3/24   Provider, Historical   hydrALAZINE (APRESOLINE) 50 MG tablet Take 1 tablet (50 mg total) by mouth every 8 (eight) hours. 11/19/23 11/18/24  Clarence Mancera MD   insulin aspart U-100 (NOVOLOG FLEXPEN U-100 INSULIN) 100 unit/mL (3 mL) InPn pen Inject 20 Units into the skin 3 (three) times daily with meals. 9/27/23   Jeronimo Bird MD   insulin glargine U-100, Lantus, (LANTUS SOLOSTAR U-100 INSULIN) 100 unit/mL (3 mL) InPn pen Inject 65 Units into the skin every evening. 6/26/24 6/26/25  Jeronimo Bird MD   isosorbide mononitrate (IMDUR) 30 MG 24 hr tablet Take 1 tablet (30 mg total) by mouth once daily. 5/30/24 9/27/24  Lucy  "Pillo, DO   ketorolac 0.5% (ACULAR) 0.5 % Drop Place 1 drop into the left eye 2 (two) times daily. 12/15/23   Provider, Historical   latanoprost 0.005 % ophthalmic solution Place 1 drop into both eyes once daily. 6/5/23   Juan Jose Urena,    losartan-hydrochlorothiazide 50-12.5 mg (HYZAAR) 50-12.5 mg per tablet Take 1 tablet by mouth every morning. 2/15/24 2/14/25  Jeronimo Bird MD   melatonin (MELATIN) 5 mg Take 1 tablet (5 mg total) by mouth nightly as needed for Insomnia. 8/26/24 8/26/25  Jeronimo Bird MD   nebulizer accessories Kit 1 each by Misc.(Non-Drug; Combo Route) route 4 (four) times daily as needed (wheezing). 9/27/23   Jeronimo Bird MD   nitroGLYCERIN (NITROSTAT) 0.4 MG SL tablet Place 1 tablet (0.4 mg total) under the tongue every 5 (five) minutes as needed for Chest pain. 2/15/24 2/14/25  Jeronimo Bird MD   ofloxacin (OCUFLOX) 0.3 % ophthalmic solution Place 1 drop into the right eye 4 (four) times daily. 11/1/23   Provider, Historical   pen needle, diabetic 32 gauge x 5/32" Ndle 1 each by Misc.(Non-Drug; Combo Route) route 4 (four) times daily. 9/27/23 9/26/24  Jeronimo Bird MD   potassium chloride (K-TAB) 20 mEq Take 20 mEq by mouth every morning. 11/30/23   Provider, Historical   prednisoLONE acetate (PRED FORTE) 1 % DrpS Place 1 drop into the right eye 5 (five) times daily. 11/1/23   Provider, Historical   sodium chloride 5% (NIMA 128) 5 % ophthalmic solution Place 1 drop into both eyes as needed. 12/15/23   Provider, Historical   vitamin D (VITAMIN D3) 1000 units Tab Take 2 tablets (2,000 Units total) by mouth Daily. 2/15/24 2/14/25  Jeronimo Bird MD   warfarin (COUMADIN) 5 MG tablet Take 0.5 tablets (2.5 mg total) by mouth Daily. 7/3/24 7/3/25  Jeronimo Bird MD   albuterol (PROVENTIL/VENTOLIN HFA) 90 mcg/actuation inhaler Inhale 1 puff into the lungs every 4 (four) hours as needed for Wheezing. 6/30/24 8/26/24  Jeronimo Bird MD   melatonin (MELATIN) 5 mg Take 1 tablet (5 mg total) " by mouth nightly as needed for Insomnia. 8/5/24 8/26/24  Jeronimo Bird MD     Review of Systems:  Review of Systems   Constitutional:  Negative for chills, diaphoresis, fatigue and fever.   HENT:  Negative for ear pain, hearing loss, rhinorrhea, sore throat, tinnitus and trouble swallowing.    Eyes:  Negative for pain, redness and visual disturbance.   Respiratory:  Negative for cough, chest tightness and shortness of breath.    Cardiovascular:  Negative for chest pain and palpitations.   Gastrointestinal:  Positive for diarrhea. Negative for abdominal pain, constipation, nausea and vomiting.   Genitourinary:  Negative for difficulty urinating, dysuria, frequency, hematuria and urgency.   Musculoskeletal:  Negative for arthralgias and myalgias.   Skin:  Negative for rash and wound.   Neurological:  Negative for dizziness, seizures, syncope, weakness, light-headedness, numbness and headaches.   Psychiatric/Behavioral:  Negative for confusion.        Objective     Vitals:   Vitals:    08/29/24 1254   BP: (!) 134/95   Pulse: 93   Resp: 16   Temp: 98.8 °F (37.1 °C)       Physical Examination:   Physical Exam  Vitals reviewed.   Constitutional:       General: She is not in acute distress.     Appearance: Normal appearance. She is obese. She is not ill-appearing, toxic-appearing or diaphoretic.   HENT:      Head: Normocephalic and atraumatic.      Right Ear: External ear normal.      Left Ear: External ear normal.   Eyes:      General: No scleral icterus.        Right eye: No discharge.         Left eye: No discharge.      Extraocular Movements: Extraocular movements intact.      Conjunctiva/sclera: Conjunctivae normal.      Pupils: Pupils are equal, round, and reactive to light.   Cardiovascular:      Rate and Rhythm: Normal rate and regular rhythm.      Pulses: Normal pulses.      Heart sounds: Normal heart sounds. No murmur heard.     No friction rub. No gallop.   Pulmonary:      Effort: Pulmonary effort is normal. No  respiratory distress.      Breath sounds: Normal breath sounds. No wheezing, rhonchi or rales.   Abdominal:      General: Abdomen is flat. Bowel sounds are normal. There is no distension.      Palpations: Abdomen is soft.      Tenderness: There is no abdominal tenderness. There is no guarding.   Musculoskeletal:         General: No swelling, tenderness, deformity or signs of injury. Normal range of motion.      Right lower leg: No edema.      Left lower leg: No edema.   Skin:     General: Skin is warm and dry.      Capillary Refill: Capillary refill takes less than 2 seconds.      Coloration: Skin is not jaundiced.      Findings: No bruising, erythema or rash.   Neurological:      Mental Status: She is alert.      Comments: AAO to person, place, time, and situation; CN II-XII grossly intact         PREVENTIVE CARE:  Lab Work:    DM (age>45 or HTN):  Lab Results   Component Value Date    HGBA1C 7.6 (H) 11/16/2023    TUJPCLV0Y 8.5 02/15/2024    MICALBCREAT 48.6 (H) 10/05/2023     Lipid :  Lab Results   Component Value Date    CHOL 113 08/29/2024    TRIG 99 08/29/2024    HDL 46 08/29/2024    LDL 47.00 (L) 08/29/2024     Thyroid:  Lab Results   Component Value Date    TSH 1.554 10/05/2023     Screening:    Mammo: Mammogram (11/17/2022) showed areas of fibroglandular initial negative for suspicious mass, asymmetry, distortion, or calcifications; ordered repeat mammogram  DEXA (age>65 or FRAX > 9.3%):  DXA (01/17/2023)  showed normal bone density to lumbar spine and bilateral femurs; will repeat in 2025  Lung Ca (30PY smoker age 55-79 or quit in last 15y):  Not indicated due to never smoker   Colon Ca: (age 45-75-annual FOBT, 5y flex + FOBTq3 or 10y c-scope):  Colonoscopy (04/28/2023) normal; will repeat in 2028  AAA (smoker 65-76):  Not indicated due to patient sex    ID Titers and Vaccinations:    Immunization History   Administered Date(s) Administered    COVID-19, MRNA, LN-S, PF (Pfizer) (Purple Cap) 02/10/2021,  "03/03/2021, 12/17/2021    Influenza 10/13/2010, 11/17/2017    Influenza (FLUAD) - Quadrivalent - Adjuvanted - PF *Preferred* (65+) 09/27/2023    Influenza - Quadrivalent 10/03/2016, 12/10/2019, 12/07/2020, 10/27/2021    Influenza - Quadrivalent - PF (6-35 months) 12/11/2018    Influenza - Quadrivalent - PF *Preferred* (6 months and older) 12/07/2020    Influenza - Trivalent - PF (ADULT) 10/13/2010    Pneumococcal Conjugate - 13 Valent 12/07/2020    Pneumococcal Conjugate - 20 Valent 06/06/2023    Pneumococcal Polysaccharide - 23 Valent 04/04/2017    Tdap 12/11/2018    Zoster Recombinant 12/11/2018, 05/29/2019      HIV (once age 15-65):  No results found for: "HIV1X2", "CXH60UWOC"     Hepatitis Screening:   Lab Results   Component Value Date    HEPCAB Nonreactive 06/06/2023      Pneumococcal vaccine (65 and over or high risk): UTD  Influenza Vaccine: UTD  Tetanus: UTD  Zoster vaccination (> 50y.o):  UTD  Covid vaccine:  UTD    ASSESSMENT/PLAN:    Obesity  Diabetes mellitus type 2  Diabetic neuropathy  -previous 8.5  -POCT A1c 8.8  -patient did not bring logs this visit  -instructed patient to keep log and check CBGs prior to meal, she voiced understanding  -discussed importance of adhering to diabetic diet and good exercise  -referred to ophthalmology for diabetic eye exam  -diabetic foot exam showed palpable dorsalis pedis pulses, normal sensation to bilateral feet, in no evidence of skin breakdown/ulceration/infection  -continue levemir to 60 units qhs, novoLog 20 units tidwm, exenatide microspheres 2 mg qweekly , and Jardiance 25 mg q.day  -patient reports she was told not to take take metformin due hx of colon and renal cancer  -will hold on prescribing sulfonylureas due to patient reporting multiple episodes of hypoglycemia  -will hold on increasing insulin regimen again due to patient reporting multiple episodes of hypoglycemia  -continue duloxetine 60 mg qd for diabetic neuropathy  -referred to [facility] diabetes " education and nutrition services for additional assistance   -referred to [facility] endocrinology for assistance managing diabetes due to patient on maximal doses of multiple medications    Hypertension  /80  -educated on low sodium (< 2g//day) DASH diet and exercise  -continue carvedilol 3.125 bid, diltiazem 180 mg qd, and losartan-hydrochlorothiazide 50-12.5 qd     Hyperlipidemia  CAD  Ischemic heart disease  Stable angina  Heart failure with reduced ejection fractions with grade I diastolic dysfunction  The ASCVD Risk score (Charanjit DK, et al., 2019) failed to calculate for the following reasons:    The patient has a prior MI or stroke diagnosis  -nuclear stress test (04/2021) showed anterolateral and inferior ischemia  -nuclear stress test (01/09/2024) showed no evidence of myocardial ischemia or infarction  -LHC (09/2021) showed no evidence of coronary artery disease  -TTE (04/2021) showed EF 50-55%  -TTE (12/2023) showed EF 45% with grade I diastolic dysfunction  -patient with mildly decreased ejection compared to prior TTE  -likely related to new onset atrial fibrillation but given patient experiencing episodes of angina cannot definitively rule out ischemic heart disease despite nuclear stress showing no evidence of ischemia  -patient with scheduled follow up with cardiology for discussion of nuclear stress test results and consideration of benefits versus risks of LHC  -continue atorvastatin 80 mg qd, carvedilol 3.125 bid, and losartan-hydrochlorothiazide 50-12.5 qd    Hemorrhagic stroke (12/2023) without sustained post stroke neurological deficits  -diagnosed (12/2023) at [facility]   -in setting of recent intraparenchymal hemorrhage & 6 mm density noted on repeat CT head (12/15/23) [place] [facility] recommended waiting at least x 8 weeks before initiating anti-coagulation   -no residual neuro deficits  -completed 8 weeks of anti-coagulation vacation  -restarted on coumadin  -appointment  scheduled with [facility]; appreciate assistance  -will obtain MRI without contrast for appointment with neurosurgery    Persistent atrial fibrillation  -rate controlled with carvedilol and diltiazem  -continue carvedilol 3.125 mg qd and diltiazem 180 mg qd    Chronic kidney disease stage IIIb  -continues to follow with nephrology  -continue management per nephrology  -continue to avoid excessive use of NSAIDs (ibuprofen, naproxen, Aleve, Advil, Toradol, Mobic) and nephrotoxic drugs  -continue to optimize hypertension and diabetes control    Renal cell carcinoma status post nephrectomy (2011)   Adenocarcinoma of the colon stage IIA status post hemicolectomy (2014) and adjuvant chemotherapy (2015)  Hx of provoked PE presumed 2/2 malignancy  -patient is s/p hemicolectomy, nephrectomy, and chemotherapy  -completed chemo in April 2015  -seen by Oncology previously but discharged from clinic  -continues on coumadin     Constipation  Hematochezia  -denies any further symptoms  -referred to GI for evaluation of hematochezia as patient may need EGD to evaluate for upper GI bleed in the setting of possible rapid colonic transport secondary to hemicolectomy    MOHSEN on CPAP  -continue CPAP    Multinodular goiter  TSH 1.554  Free T4 0.95  -patient asymptotic  -thyroid biopsy results showed benign thyroid nodules  -will continue to monitor    Depression  -symptomatically controlled  -no AVH/SI/HI   -continue duloxetine 60 mg qd    Gout  -on allopurinol by Nephrology  -reports no flares since last appt    Health Maintenance:  -lab work UTD  -initiated and/or refilled medications as above  -screenings ordered and/or UTD as above  -vaccinations UTD    Follow-up in 6 months    Future Appointments   Date Time Provider Department Center   9/4/2024  1:15 PM Miranda Viveros FNP [facility] NEPHR [place]   9/17/2024  2:00 PM Wu Taylor MD [facility] CARD [place]   9/19/2024 11:00 AM Marlin Oropeza FNP [facility]  [facility] [place]   9/23/2024 11:45 AM Radha Ascencio MD [facility]   11/18/2024 12:00 PM PROVIDERS, BERNARDA Bird MD  [facility] Internal Medicine -II

## 2024-08-29 ENCOUNTER — TELEPHONE (OUTPATIENT)
Dept: INTERNAL MEDICINE | Facility: CLINIC | Age: 69
End: 2024-08-29
Payer: MEDICARE

## 2024-08-29 ENCOUNTER — OFFICE VISIT (OUTPATIENT)
Dept: INTERNAL MEDICINE | Facility: CLINIC | Age: 69
End: 2024-08-29
Payer: MEDICARE

## 2024-08-29 ENCOUNTER — LAB VISIT (OUTPATIENT)
Dept: LAB | Facility: HOSPITAL | Age: 69
End: 2024-08-29
Payer: MEDICARE

## 2024-08-29 VITALS
BODY MASS INDEX: 50.02 KG/M2 | RESPIRATION RATE: 16 BRPM | TEMPERATURE: 99 F | HEART RATE: 93 BPM | WEIGHT: 293 LBS | HEIGHT: 64 IN | DIASTOLIC BLOOD PRESSURE: 80 MMHG | OXYGEN SATURATION: 97 % | SYSTOLIC BLOOD PRESSURE: 150 MMHG

## 2024-08-29 DIAGNOSIS — J45.909 ASTHMA, UNSPECIFIED ASTHMA SEVERITY, UNSPECIFIED WHETHER COMPLICATED, UNSPECIFIED WHETHER PERSISTENT: Primary | ICD-10-CM

## 2024-08-29 DIAGNOSIS — E11.22 TYPE 2 DIABETES MELLITUS WITH STAGE 3 CHRONIC KIDNEY DISEASE, WITH LONG-TERM CURRENT USE OF INSULIN, UNSPECIFIED WHETHER STAGE 3A OR 3B CKD: ICD-10-CM

## 2024-08-29 DIAGNOSIS — E78.5 HYPERLIPIDEMIA ASSOCIATED WITH TYPE 2 DIABETES MELLITUS: ICD-10-CM

## 2024-08-29 DIAGNOSIS — E11.42 DIABETIC POLYNEUROPATHY ASSOCIATED WITH TYPE 2 DIABETES MELLITUS: ICD-10-CM

## 2024-08-29 DIAGNOSIS — I27.82 CHRONIC PULMONARY EMBOLISM, UNSPECIFIED PULMONARY EMBOLISM TYPE, UNSPECIFIED WHETHER ACUTE COR PULMONALE PRESENT: ICD-10-CM

## 2024-08-29 DIAGNOSIS — E11.69 DIABETES MELLITUS TYPE 2 IN OBESE: ICD-10-CM

## 2024-08-29 DIAGNOSIS — I63.89 OTHER CEREBRAL INFARCTION: ICD-10-CM

## 2024-08-29 DIAGNOSIS — E66.9 DIABETES MELLITUS TYPE 2 IN OBESE: ICD-10-CM

## 2024-08-29 DIAGNOSIS — Z12.31 BREAST CANCER SCREENING BY MAMMOGRAM: ICD-10-CM

## 2024-08-29 DIAGNOSIS — I10 PRIMARY HYPERTENSION: ICD-10-CM

## 2024-08-29 DIAGNOSIS — I61.9 HEMORRHAGIC STROKE: ICD-10-CM

## 2024-08-29 DIAGNOSIS — Z79.4 TYPE 2 DIABETES MELLITUS WITH STAGE 3 CHRONIC KIDNEY DISEASE, WITH LONG-TERM CURRENT USE OF INSULIN, UNSPECIFIED WHETHER STAGE 3A OR 3B CKD: ICD-10-CM

## 2024-08-29 DIAGNOSIS — E11.9 TYPE 2 DIABETES MELLITUS WITHOUT COMPLICATION, WITH LONG-TERM CURRENT USE OF INSULIN: ICD-10-CM

## 2024-08-29 DIAGNOSIS — I42.9 CARDIOMYOPATHY, UNSPECIFIED TYPE: ICD-10-CM

## 2024-08-29 DIAGNOSIS — E11.69 HYPERLIPIDEMIA ASSOCIATED WITH TYPE 2 DIABETES MELLITUS: ICD-10-CM

## 2024-08-29 DIAGNOSIS — I27.20 PULMONARY HYPERTENSION: ICD-10-CM

## 2024-08-29 DIAGNOSIS — N18.30 TYPE 2 DIABETES MELLITUS WITH STAGE 3 CHRONIC KIDNEY DISEASE, WITH LONG-TERM CURRENT USE OF INSULIN, UNSPECIFIED WHETHER STAGE 3A OR 3B CKD: ICD-10-CM

## 2024-08-29 DIAGNOSIS — Z79.4 TYPE 2 DIABETES MELLITUS WITHOUT COMPLICATION, WITH LONG-TERM CURRENT USE OF INSULIN: ICD-10-CM

## 2024-08-29 DIAGNOSIS — E55.9 VITAMIN D DEFICIENCY: ICD-10-CM

## 2024-08-29 DIAGNOSIS — N18.32 CKD STAGE G3B/A2, GFR 30-44 AND ALBUMIN CREATININE RATIO 30-299 MG/G: ICD-10-CM

## 2024-08-29 LAB
ALBUMIN SERPL-MCNC: 3.3 G/DL (ref 3.4–4.8)
ALBUMIN/GLOB SERPL: 0.7 RATIO (ref 1.1–2)
ALP SERPL-CCNC: 162 UNIT/L (ref 40–150)
ALT SERPL-CCNC: 17 UNIT/L (ref 0–55)
ANION GAP SERPL CALC-SCNC: 8 MEQ/L
AST SERPL-CCNC: 16 UNIT/L (ref 5–34)
BASOPHILS # BLD AUTO: 0.03 X10(3)/MCL
BASOPHILS NFR BLD AUTO: 0.4 %
BILIRUB SERPL-MCNC: 0.7 MG/DL
BUN SERPL-MCNC: 14.8 MG/DL (ref 9.8–20.1)
CALCIUM SERPL-MCNC: 9.8 MG/DL (ref 8.4–10.2)
CHLORIDE SERPL-SCNC: 104 MMOL/L (ref 98–107)
CHOLEST SERPL-MCNC: 113 MG/DL
CHOLEST/HDLC SERPL: 2 {RATIO} (ref 0–5)
CO2 SERPL-SCNC: 29 MMOL/L (ref 23–31)
CREAT SERPL-MCNC: 1.84 MG/DL (ref 0.55–1.02)
CREAT/UREA NIT SERPL: 8
EOSINOPHIL # BLD AUTO: 0.14 X10(3)/MCL (ref 0–0.9)
EOSINOPHIL NFR BLD AUTO: 1.8 %
ERYTHROCYTE [DISTWIDTH] IN BLOOD BY AUTOMATED COUNT: 16.2 % (ref 11.5–17)
GFR SERPLBLD CREATININE-BSD FMLA CKD-EPI: 30 ML/MIN/1.73/M2
GLOBULIN SER-MCNC: 5 GM/DL (ref 2.4–3.5)
GLUCOSE SERPL-MCNC: 244 MG/DL (ref 82–115)
HBA1C MFR BLD: 8.8 %
HCT VFR BLD AUTO: 42.7 % (ref 37–47)
HDLC SERPL-MCNC: 46 MG/DL (ref 35–60)
HGB BLD-MCNC: 12.8 G/DL (ref 12–16)
IMM GRANULOCYTES # BLD AUTO: 0.01 X10(3)/MCL (ref 0–0.04)
IMM GRANULOCYTES NFR BLD AUTO: 0.1 %
LDLC SERPL CALC-MCNC: 47 MG/DL (ref 50–140)
LYMPHOCYTES # BLD AUTO: 2.09 X10(3)/MCL (ref 0.6–4.6)
LYMPHOCYTES NFR BLD AUTO: 26.6 %
MCH RBC QN AUTO: 22.1 PG (ref 27–31)
MCHC RBC AUTO-ENTMCNC: 30 G/DL (ref 33–36)
MCV RBC AUTO: 73.9 FL (ref 80–94)
MONOCYTES # BLD AUTO: 0.62 X10(3)/MCL (ref 0.1–1.3)
MONOCYTES NFR BLD AUTO: 7.9 %
NEUTROPHILS # BLD AUTO: 4.98 X10(3)/MCL (ref 2.1–9.2)
NEUTROPHILS NFR BLD AUTO: 63.2 %
NRBC BLD AUTO-RTO: 0 %
PHOSPHATE SERPL-MCNC: 3.7 MG/DL (ref 2.3–4.7)
PLATELET # BLD AUTO: 254 X10(3)/MCL (ref 130–400)
PMV BLD AUTO: 10.6 FL (ref 7.4–10.4)
POTASSIUM SERPL-SCNC: 4.2 MMOL/L (ref 3.5–5.1)
PROT SERPL-MCNC: 8.3 GM/DL (ref 5.8–7.6)
PTH-INTACT SERPL-MCNC: 197 PG/ML (ref 8.7–77)
RBC # BLD AUTO: 5.78 X10(6)/MCL (ref 4.2–5.4)
SODIUM SERPL-SCNC: 141 MMOL/L (ref 136–145)
TRIGL SERPL-MCNC: 99 MG/DL (ref 37–140)
TSH SERPL-ACNC: 1.98 UIU/ML (ref 0.35–4.94)
VLDLC SERPL CALC-MCNC: 20 MG/DL
WBC # BLD AUTO: 7.87 X10(3)/MCL (ref 4.5–11.5)

## 2024-08-29 PROCEDURE — 84443 ASSAY THYROID STIM HORMONE: CPT

## 2024-08-29 PROCEDURE — 99215 OFFICE O/P EST HI 40 MIN: CPT | Mod: PBBFAC

## 2024-08-29 PROCEDURE — 36415 COLL VENOUS BLD VENIPUNCTURE: CPT

## 2024-08-29 PROCEDURE — 85025 COMPLETE CBC W/AUTO DIFF WBC: CPT

## 2024-08-29 PROCEDURE — 83970 ASSAY OF PARATHORMONE: CPT

## 2024-08-29 PROCEDURE — 80061 LIPID PANEL: CPT

## 2024-08-29 PROCEDURE — 84100 ASSAY OF PHOSPHORUS: CPT

## 2024-08-29 PROCEDURE — 80053 COMPREHEN METABOLIC PANEL: CPT

## 2024-08-29 PROCEDURE — 83036 HEMOGLOBIN GLYCOSYLATED A1C: CPT | Mod: PBBFAC

## 2024-08-29 RX ORDER — CARVEDILOL 3.12 MG/1
3.12 TABLET ORAL 2 TIMES DAILY
Qty: 60 TABLET | Refills: 11 | Status: SHIPPED | OUTPATIENT
Start: 2024-08-29 | End: 2025-08-29

## 2024-08-29 RX ORDER — DULOXETIN HYDROCHLORIDE 60 MG/1
60 CAPSULE, DELAYED RELEASE ORAL DAILY
Qty: 30 CAPSULE | Refills: 2 | Status: SHIPPED | OUTPATIENT
Start: 2024-08-29 | End: 2024-11-27

## 2024-08-29 RX ORDER — ALBUTEROL SULFATE 90 UG/1
1 INHALANT RESPIRATORY (INHALATION) EVERY 4 HOURS PRN
Qty: 18 G | Refills: 3 | Status: SHIPPED | OUTPATIENT
Start: 2024-08-29

## 2024-08-29 RX ORDER — LOSARTAN POTASSIUM AND HYDROCHLOROTHIAZIDE 12.5; 5 MG/1; MG/1
1 TABLET ORAL DAILY
Qty: 90 TABLET | Refills: 3 | Status: SHIPPED | OUTPATIENT
Start: 2024-08-29 | End: 2025-08-29

## 2024-08-29 RX ORDER — LOSARTAN POTASSIUM 100 MG/1
100 TABLET ORAL DAILY
COMMUNITY
Start: 2024-08-28 | End: 2024-08-29 | Stop reason: SDUPTHER

## 2024-08-29 RX ORDER — FLASH GLUCOSE SENSOR
1 KIT MISCELLANEOUS
Qty: 1 KIT | Refills: 0 | Status: SHIPPED | OUTPATIENT
Start: 2024-08-29

## 2024-08-29 RX ORDER — ATORVASTATIN CALCIUM 80 MG/1
80 TABLET, FILM COATED ORAL DAILY
Qty: 90 TABLET | Refills: 3 | Status: SHIPPED | OUTPATIENT
Start: 2024-08-29 | End: 2025-08-29

## 2024-08-29 RX ORDER — HYDRALAZINE HYDROCHLORIDE 50 MG/1
50 TABLET, FILM COATED ORAL EVERY 8 HOURS
Qty: 90 TABLET | Refills: 11 | Status: SHIPPED | OUTPATIENT
Start: 2024-08-29 | End: 2025-08-29

## 2024-08-29 RX ORDER — FLASH GLUCOSE SCANNING READER
1 EACH MISCELLANEOUS
Qty: 1 EACH | Refills: 0 | Status: SHIPPED | OUTPATIENT
Start: 2024-08-29

## 2024-08-29 RX ORDER — CHOLECALCIFEROL (VITAMIN D3) 25 MCG
2000 TABLET ORAL DAILY
Qty: 60 TABLET | Refills: 11 | Status: SHIPPED | OUTPATIENT
Start: 2024-08-29 | End: 2025-08-29

## 2024-08-30 NOTE — PROGRESS NOTES
I have reviewed and concur with the resident's history, physical, assessment, and plan.  I have discussed with him all issues related to the diagnosis, workup and treatment plan. Care provided as reasonable and necessary.    Jitendra Potts MD  [facility]

## 2024-09-04 ENCOUNTER — OFFICE VISIT (OUTPATIENT)
Dept: NEPHROLOGY | Facility: CLINIC | Age: 69
End: 2024-09-04
Payer: MEDICARE

## 2024-09-04 ENCOUNTER — CLINICAL SUPPORT (OUTPATIENT)
Dept: DIABETES | Facility: CLINIC | Age: 69
End: 2024-09-04
Payer: MEDICARE

## 2024-09-04 VITALS — WEIGHT: 293 LBS | BODY MASS INDEX: 52.11 KG/M2

## 2024-09-04 VITALS
OXYGEN SATURATION: 96 % | DIASTOLIC BLOOD PRESSURE: 74 MMHG | SYSTOLIC BLOOD PRESSURE: 122 MMHG | RESPIRATION RATE: 20 BRPM | HEART RATE: 95 BPM | WEIGHT: 293 LBS | HEIGHT: 64 IN | TEMPERATURE: 98 F | BODY MASS INDEX: 50.02 KG/M2

## 2024-09-04 DIAGNOSIS — E66.01 SEVERE OBESITY (BMI >= 40): ICD-10-CM

## 2024-09-04 DIAGNOSIS — N18.32 CKD STAGE G3B/A3, GFR 30-44 AND ALBUMIN CREATININE RATIO >300 MG/G: Primary | ICD-10-CM

## 2024-09-04 DIAGNOSIS — Z79.4 TYPE 2 DIABETES MELLITUS WITH STAGE 3 CHRONIC KIDNEY DISEASE, WITH LONG-TERM CURRENT USE OF INSULIN, UNSPECIFIED WHETHER STAGE 3A OR 3B CKD: ICD-10-CM

## 2024-09-04 DIAGNOSIS — E11.22 TYPE 2 DIABETES MELLITUS WITH STAGE 3 CHRONIC KIDNEY DISEASE, WITH LONG-TERM CURRENT USE OF INSULIN, UNSPECIFIED WHETHER STAGE 3A OR 3B CKD: ICD-10-CM

## 2024-09-04 DIAGNOSIS — I10 PRIMARY HYPERTENSION: ICD-10-CM

## 2024-09-04 DIAGNOSIS — E11.22 TYPE 2 DIABETES MELLITUS WITH STAGE 3B CHRONIC KIDNEY DISEASE, WITH LONG-TERM CURRENT USE OF INSULIN: ICD-10-CM

## 2024-09-04 DIAGNOSIS — N18.30 TYPE 2 DIABETES MELLITUS WITH STAGE 3 CHRONIC KIDNEY DISEASE, WITH LONG-TERM CURRENT USE OF INSULIN, UNSPECIFIED WHETHER STAGE 3A OR 3B CKD: ICD-10-CM

## 2024-09-04 DIAGNOSIS — N18.32 TYPE 2 DIABETES MELLITUS WITH STAGE 3B CHRONIC KIDNEY DISEASE, WITH LONG-TERM CURRENT USE OF INSULIN: ICD-10-CM

## 2024-09-04 DIAGNOSIS — Z79.4 TYPE 2 DIABETES MELLITUS WITH STAGE 3B CHRONIC KIDNEY DISEASE, WITH LONG-TERM CURRENT USE OF INSULIN: ICD-10-CM

## 2024-09-04 PROCEDURE — 1159F MED LIST DOCD IN RCRD: CPT | Mod: CPTII,,, | Performed by: NURSE PRACTITIONER

## 2024-09-04 PROCEDURE — 3008F BODY MASS INDEX DOCD: CPT | Mod: CPTII,,, | Performed by: NURSE PRACTITIONER

## 2024-09-04 PROCEDURE — 3288F FALL RISK ASSESSMENT DOCD: CPT | Mod: CPTII,,, | Performed by: NURSE PRACTITIONER

## 2024-09-04 PROCEDURE — 3052F HG A1C>EQUAL 8.0%<EQUAL 9.0%: CPT | Mod: CPTII,,, | Performed by: NURSE PRACTITIONER

## 2024-09-04 PROCEDURE — 99215 OFFICE O/P EST HI 40 MIN: CPT | Mod: PBBFAC | Performed by: NURSE PRACTITIONER

## 2024-09-04 PROCEDURE — 3074F SYST BP LT 130 MM HG: CPT | Mod: CPTII,,, | Performed by: NURSE PRACTITIONER

## 2024-09-04 PROCEDURE — 99214 OFFICE O/P EST MOD 30 MIN: CPT | Mod: S$PBB,,, | Performed by: NURSE PRACTITIONER

## 2024-09-04 PROCEDURE — 1160F RVW MEDS BY RX/DR IN RCRD: CPT | Mod: CPTII,,, | Performed by: NURSE PRACTITIONER

## 2024-09-04 PROCEDURE — 4010F ACE/ARB THERAPY RXD/TAKEN: CPT | Mod: CPTII,,, | Performed by: NURSE PRACTITIONER

## 2024-09-04 PROCEDURE — 3078F DIAST BP <80 MM HG: CPT | Mod: CPTII,,, | Performed by: NURSE PRACTITIONER

## 2024-09-04 PROCEDURE — 1126F AMNT PAIN NOTED NONE PRSNT: CPT | Mod: CPTII,,, | Performed by: NURSE PRACTITIONER

## 2024-09-04 PROCEDURE — 99212 OFFICE O/P EST SF 10 MIN: CPT | Mod: PBBFAC,27 | Performed by: DIETITIAN, REGISTERED

## 2024-09-04 PROCEDURE — 1101F PT FALLS ASSESS-DOCD LE1/YR: CPT | Mod: CPTII,,, | Performed by: NURSE PRACTITIONER

## 2024-09-04 PROCEDURE — 3066F NEPHROPATHY DOC TX: CPT | Mod: CPTII,,, | Performed by: NURSE PRACTITIONER

## 2024-09-04 RX ORDER — AMLODIPINE BESYLATE 10 MG/1
10 TABLET ORAL DAILY
COMMUNITY
Start: 2024-08-29

## 2024-09-04 RX ORDER — BRIMONIDINE TARTRATE 2 MG/ML
1 SOLUTION/ DROPS OPHTHALMIC 2 TIMES DAILY
COMMUNITY
Start: 2024-07-19

## 2024-09-04 NOTE — Clinical Note
Hi Dr Bird,  I met with Ms Bowers and she was concerned about her recent ED visit for hypoglycemia. States the incident happened when she ate approximately 45 gm of carb for breakfast and gave 20 units Novolog. She did decrease to 15 units as you directed and we will monitor. Do you think a G-Voke pen or glucagon emergency kit would be appropriate for her? She is also requesting refills on her Freestyle Maame sensors so she can continue to monitor. She uses [facility] in [place]. Let me know if you have any questions.  Thank you, Dara Grullon Diabetes Care Specialist

## 2024-09-04 NOTE — PROGRESS NOTES
"[facility]  Nephrology Clinic Note    Chief complaint: Chronic Kidney Disease (Follow up)    History of present illness:   Elissa Freeman is a 68 y.o. Black or  female with past medical history of  CKD , hypertension, dyslipidemia, diabetes mellitus 2, renal cell carcinoma (status post left nephrectomy in 2011), adenocarcinoma of the colon (status post hemicolectomy in 2014), and morbid obesity.  Patient presents for follow-up appointment in Nephrology Clinic today. Denies complaints.     Review of Systems  12 point review of systems conducted, negative except as stated in the history of present illness.    Allergies: Patient has No Known Allergies.     Past Medical History:  has a past medical history of Asthma, Atrial fibrillation, Cancer, CKD (chronic kidney disease), Coronary artery disease, Diabetes mellitus, Hyperlipidemia, Hypertension, Obesity, unspecified, MOHSEN (obstructive sleep apnea), and Unspecified cataract.    Procedure History:  has a past surgical history that includes Colon surgery; Nephrectomy (Left); Cholecystectomy; hysterectomy, vaginal, with salpingo-oophorectomy; Tubal ligation; excision of lipoma; Cataract extraction (Left, 11/28/2022); Colonoscopy (N/A, 04/27/2023); Eye surgery; and Hysterectomy.    Family History: family history includes Asthma in her mother; Cancer in her brother; Coronary artery disease in her mother; Hyperlipidemia in her mother; Hypertension in her mother; Stroke in her father.    Social History:  reports that she has never smoked. She has never used smokeless tobacco. She reports that she does not currently use alcohol. She reports that she does not use drugs.    Physical exam  /74 (BP Location: Right arm, Patient Position: Sitting, BP Method: Medium (Automatic))   Pulse 95   Temp 98.2 °F (36.8 °C) (Oral)   Resp 20   Ht 5' 4" (1.626 m)   Wt 136 kg (299 lb 13.2 oz)   SpO2 96%   BMI 51.46 kg/m²   General " appearance: Patient is in no acute distress.  Skin: No rashes or wounds.  HEENT: PERRLA, EOMI, no scleral icterus, no JVD. Neck is supple.  Chest: Respirations are unlabored. Lungs sounds are clear.   Heart: S1, S2.   Abdomen: Benign.  : Deferred.  Extremities: No edema, peripheral pulses are palpable.   Neuro: No focal deficits.     Home Medications:    Current Outpatient Medications:     acetaminophen (TYLENOL EXTRA STRENGTH) 500 MG tablet, Take 1,000 mg by mouth every 6 (six) hours as needed for Pain., Disp: , Rfl:     albuterol (PROVENTIL/VENTOLIN HFA) 90 mcg/actuation inhaler, Inhale 1 puff into the lungs every 4 (four) hours as needed for Wheezing., Disp: 18 g, Rfl: 3    amLODIPine (NORVASC) 10 MG tablet, Take 10 mg by mouth once daily., Disp: , Rfl:     atorvastatin (LIPITOR) 80 MG tablet, Take 1 tablet (80 mg total) by mouth once daily., Disp: 90 tablet, Rfl: 3    brimonidine 0.2% (ALPHAGAN) 0.2 % Drop, Place 1 drop into both eyes 2 (two) times daily., Disp: , Rfl:     carvediloL (COREG) 3.125 MG tablet, Take 1 tablet (3.125 mg total) by mouth 2 (two) times daily., Disp: 60 tablet, Rfl: 11    diltiaZEM (CARDIZEM CD) 180 MG 24 hr capsule, Take 1 capsule (180 mg total) by mouth every morning., Disp: 30 capsule, Rfl: 11    DULoxetine (CYMBALTA) 60 MG capsule, Take 1 capsule (60 mg total) by mouth once daily. TAKE 1 CAPSULE BY MOUTH DAILY. DO NOT CRUSH OR CHEW, Disp: 30 capsule, Rfl: 2    empagliflozin (JARDIANCE) 25 mg tablet, Take 1 tablet (25 mg total) by mouth once daily., Disp: 90 tablet, Rfl: 3    exenatide microspheres (BYDUREON BCISE) 2 mg/0.85 mL AtIn, Inject 2 mg into the skin every 7 days., Disp: 4 pen , Rfl: 3    flash glucose scanning reader (FREESTYLE SMITHA 2 READER) Misc, 1 each by Misc.(Non-Drug; Combo Route) route 4 (four) times daily with meals and nightly., Disp: 1 each, Rfl: 0    flash glucose sensor (FREESTYLE SMITHA 2 SENSOR) Kit, 1 each by Misc.(Non-Drug; Combo Route) route 2 hours after  "meals and at bedtime., Disp: 1 kit, Rfl: 0    furosemide (LASIX) 20 MG tablet, TAKE ONE TABLET BY MOUTH DAILY AT 9 AM (VIAL), Disp: 90 tablet, Rfl: 11    gabapentin (NEURONTIN) 600 MG tablet, Take 600 mg by mouth every morning., Disp: , Rfl:     hydrALAZINE (APRESOLINE) 50 MG tablet, Take 1 tablet (50 mg total) by mouth every 8 (eight) hours., Disp: 90 tablet, Rfl: 11    insulin aspart U-100 (NOVOLOG FLEXPEN U-100 INSULIN) 100 unit/mL (3 mL) InPn pen, Inject 20 Units into the skin 3 (three) times daily with meals. (Patient taking differently: Inject 15 Units into the skin 3 (three) times daily with meals.), Disp: 15 mL, Rfl: 11    insulin glargine U-100, Lantus, (LANTUS SOLOSTAR U-100 INSULIN) 100 unit/mL (3 mL) InPn pen, Inject 65 Units into the skin every evening., Disp: 18 mL, Rfl: 11    isosorbide mononitrate (IMDUR) 30 MG 24 hr tablet, Take 1 tablet (30 mg total) by mouth once daily., Disp: 60 tablet, Rfl: 1    latanoprost 0.005 % ophthalmic solution, Place 1 drop into both eyes once daily., Disp: 2.5 mL, Rfl: 1    losartan-hydrochlorothiazide 50-12.5 mg (HYZAAR) 50-12.5 mg per tablet, Take 1 tablet by mouth once daily., Disp: 90 tablet, Rfl: 3    melatonin (MELATIN) 5 mg, Take 1 tablet (5 mg total) by mouth nightly as needed for Insomnia., Disp: 90 tablet, Rfl: 3    nitroGLYCERIN (NITROSTAT) 0.4 MG SL tablet, Place 1 tablet (0.4 mg total) under the tongue every 5 (five) minutes as needed for Chest pain., Disp: 30 tablet, Rfl: 0    pen needle, diabetic 32 gauge x 5/32" Ndle, 1 each by Misc.(Non-Drug; Combo Route) route 4 (four) times daily., Disp: 100 each, Rfl: 5    potassium chloride (K-TAB) 20 mEq, Take 20 mEq by mouth every morning., Disp: , Rfl:     vitamin D (VITAMIN D3) 1000 units Tab, Take 2 tablets (2,000 Units total) by mouth Daily., Disp: 60 tablet, Rfl: 11    warfarin (COUMADIN) 5 MG tablet, Take 0.5 tablets (2.5 mg total) by mouth Daily., Disp: 15 tablet, Rfl: 11    cetirizine (ZYRTEC) 10 MG " tablet, Take 1 tablet (10 mg total) by mouth once daily. (Patient not taking: Reported on 9/4/2024), Disp: 30 tablet, Rfl: 0    ketorolac 0.5% (ACULAR) 0.5 % Drop, Place 1 drop into the left eye 2 (two) times daily. (Patient not taking: Reported on 8/29/2024), Disp: , Rfl:     nebulizer accessories Kit, 1 each by Misc.(Non-Drug; Combo Route) route 4 (four) times daily as needed (wheezing). (Patient not taking: Reported on 8/29/2024), Disp: 1 kit, Rfl: 0    ofloxacin (OCUFLOX) 0.3 % ophthalmic solution, Place 1 drop into the right eye 4 (four) times daily. (Patient not taking: Reported on 8/29/2024), Disp: , Rfl:     prednisoLONE acetate (PRED FORTE) 1 % DrpS, Place 1 drop into the right eye 5 (five) times daily. (Patient not taking: Reported on 8/29/2024), Disp: , Rfl:     sodium chloride 5% (NIMA 128) 5 % ophthalmic solution, Place 1 drop into both eyes as needed. (Patient not taking: Reported on 8/29/2024), Disp: , Rfl:     Laboratory data    Lab Results   Component Value Date    WBC 7.87 08/29/2024    HGB 12.8 08/29/2024    HCT 42.7 08/29/2024     08/29/2024    IRON 54 10/05/2023    TIBC 247 (L) 10/05/2023    LABIRON 22 10/05/2023    FERRITIN 83.25 10/05/2023     08/29/2024    K 4.2 08/29/2024    CO2 29 08/29/2024    BUN 14.8 08/29/2024    CREATININE 1.84 (H) 08/29/2024    EGFRNORACEVR 30 08/29/2024    GLUCOSE 244 (H) 08/29/2024    CALCIUM 9.8 08/29/2024    ALKPHOS 162 (H) 08/29/2024    LABPROT 8.3 (H) 08/29/2024    ALBUMIN 3.3 (L) 08/29/2024    BILIDIR 0.3 02/11/2022    IBILI 0.20 02/11/2022    AST 16 08/29/2024    ALT 17 08/29/2024    MG 2.10 11/17/2023    PHOS 3.7 08/29/2024      Lab Results   Component Value Date    HGBA1C 7.6 (H) 11/16/2023    .0 (H) 08/29/2024    ZINSCXCP45FD 34.8 08/01/2022    HIV Nonreactive 01/17/2023    HEPCAB Nonreactive 06/06/2023     Urine:  Lab Results   Component Value Date    APPEARANCEUA Clear 08/29/2024    SGUA 1.017 08/29/2024    PROTEINUA Trace (A)  08/29/2024    KETONESUA Negative 08/29/2024    LEUKOCYTESUR Negative 08/29/2024    RBCUA 0-5 08/29/2024    WBCUA 0-5 08/29/2024    BACTERIA None Seen 08/29/2024    SQEPUA Few (A) 08/29/2024    HYALINECASTS None Seen 08/29/2024    CREATRANDUR 46.9 08/29/2024    PROTEINURINE 20.7 08/29/2024    UPROTCREA 0.4 08/29/2024         Imaging  US Retroperitoneal Complete With Doppler (XPD) 11/17/2023    Grayscale and color Doppler sonographic evaluation of the kidneys and urinary bladder.  Right kidney measures 9.5 cm in length.  No hydronephrosis.  Normal renal parenchymal echogenicity.  Resistive indices are not significantly elevated.  Main renal artery velocity not significantly elevated.  Patent renal vein.  The left kidney is absent.  Urinary bladder unremarkable.    Impression  No significant sonographic abnormality of the right kidney.  Left kidney absent.  Electronically signed by: Augusto Dempsey  Date:    11/17/2023  Time:    12:52      Impression    ICD-10-CM ICD-9-CM   1. CKD stage G3b/A3, GFR 30-44 and albumin creatinine ratio >300 mg/g  N18.32 585.3   2. Primary hypertension  I10 401.9   3. Type 2 diabetes mellitus with stage 3b chronic kidney disease, with long-term current use of insulin  E11.22 250.40    N18.32 585.3    Z79.4 V58.67   4. Severe obesity (BMI >= 40)  E66.01 278.01        Plan    CKD stage G3b/A3, GFR 30-44 and albumin creatinine ratio >300 mg/g  Diabetic kidney disease/reduced renal mass.  Continue aggressive risk factor management, MAHI blockade, and SGLT 2 inhibitor.   Continue renal sparing activities:  -2 g a day dietary sodium restriction  -optimize glycemic control (goal A1c is less than 7%)  -control high blood pressure (goal blood pressure is less than 130/80, patient was advised to check blood pressure once or twice a week and bring blood pressure logs to next office visit)  -exercise at least 30 minutes a day, 5 days a week  -maintain healthy weight  -stay well hydrated (drink water only,  avoid juices, sweet tea, and sodas)  -ask about staying up-to-date on vaccinations (flu vaccine, pneumonia vaccine, hepatitis B vaccine)  -avoid excessive use of NSAIDs (ibuprofen, naproxen, Aleve, Advil, Toradol, Mobic), take Tylenol as needed for headache or mild pain  -take cholesterol lowering medications if prescribed (LDL goal less than 100)    Primary hypertension  Blood pressure reading is at goal, continue current antihypertensive regimen and 2 g a day dietary sodium restriction.      Type 2 diabetes mellitus with stage 3b chronic kidney disease, with long-term current use of insulin  A1C is above goal. Continue SGLT2i and GLP1ra.     Severe obesity (BMI >= 40)  Lifestyle and dietary interventions discussed, patient encouraged to maintain non-sedentary lifestyle and well-balanced diet.     Orders Placed This Encounter   Procedures    Comprehensive Metabolic Panel     Standing Status:   Future     Standing Expiration Date:   1/25/2025    Protein/Creatinine Ratio, Urine     Standing Status:   Future     Standing Expiration Date:   1/25/2025     Order Specific Question:   Specimen Source     Answer:   Urine    Urinalysis, Reflex to Urine Culture     Standing Status:   Future     Standing Expiration Date:   1/25/2025     Order Specific Question:   Specimen Source     Answer:   Urine       Follow up in about 3 months (around 12/4/2024).     9/4/2024  Miranda Viveros NP  [facility]

## 2024-09-05 NOTE — PROGRESS NOTES
Diabetes Care Specialist Progress Note  Author: Dara Grullon RD  Date: 9/5/2024    Intake    Program Intake  Reason for Diabetes Program Visit:: Initial Diabetes Assessment  Current diabetes risk level:: high    Current Diabetes Treatment: Oral Medications, Insulin  Oral Medication Type/Dose: Jardiance 25 mg  Method of insulin delivery?: Injections  Injection Type: Pens  Pen Type/Dose: Levemir 60 units HS and Novolog 20 units TIDAC    Continuous Glucose Monitoring  Patient has CGM: Yes  Personal CGM type:: Freestyle Maame 2 with  - did not bring  because she has not been using    Lab Results   Component Value Date    HGBA1C 7.6 (H) 11/16/2023       Weight: (!) 137.7 kg (303 lb 9.6 oz)       Body mass index is 52.11 kg/m².    Lifestyle Coping Support & Clinical    Lifestyle/Coping/Support  Does anyone in your family have diabetes or does anyone in your family support you in your diabetes care?: sons  List anything about Diabetes that causes you stress?: hypoglycemia and ED  Learning Barriers:: None  Psychosocial/Coping Skills Assessment Completed: : No  Deffered due to:: Time  Area of need?: Deferred         Diabetes Self-Management Skills Assessment    Medication Skills Assessment  Patient is able to identify current diabetes medications, dosages, and appropriate timing of medications.: no  Patient reports problems or concerns with current medication regimen.: yes  Medication regimen problems/concerns:: concerned about side effects (hypoglycemia after taking Novolog 20 units after breakfast - ambulance was called)  Patient is  aware that some diabetes medications can cause low blood sugar?: Yes  Medication Skills Assessment Completed:: Yes  Assessment indicates:: Instruction Needed  Area of need?: Yes    Diabetes Disease Process/Treatment Options  Diabetes Type?: Type II  When were you diagnosed?: 15 years  If previous diabetes education, when/where:: yes  What are your goals for this education  session?: avoiding hypoglycemia  Diabetes Disease Process/Treatment Options: Skills Assessment Completed: No  Deferred due to:: Time  Area of need?: Deferred    Nutrition/Healthy Eating  Who shops/cooks?: sons won't let her cook because she falls asleep  Patient can identify foods that impact blood sugar.: yes  Nutrition/Healthy Eating Skills Assessment Completed:: No  Deffered due to:: Time  Area of need?: Deferred    Physical Activity/Exercise  Patient's daily activity level:: sedentary  Patient formally exercises outside of work.: no  Reasons for not exercising:: other (see comments) (tired)  Physical Activity/Exercise Skills Assessment Completed: : No  Deffered due to:: Time  Area of need?: Deferred    Home Blood Glucose Monitoring  Patient states that blood sugar is checked at home daily.: no  Personal CGM type:: Freestyle Maame 2 with  - did not bring  because she has not been using  Home Blood Glucose Monitoring Skills Assessment Completed: : Yes  Assessment indicates:: Instruction Needed  Area of need?: Yes    Acute Complications  Have you ever had hypoglycemia (low BG 70 or less)?: yes  How often and what are your symptoms?: confusion  Acute Complications Skills Assessment Completed: : Yes  Assessment indicates:: Instruction Needed  Area of need?: Yes    Chronic Complications  Chronic Complications Skills Assessment Completed: : No  Deferred due to:: Time  Area of need?: Deferred      Assessment Summary and Plan    Based on today's diabetes care assessment, the following areas of need were identified:          9/4/2024    12:02 AM   Areas of Need   Medications/Current Diabetes Treatment Yes - see care plan   Lifestyle Coping Support Deferred   Diabetes Disease Process/Treatment Options Deferred   Nutrition/Healthy Eating Deferred   Physical Activity/Exercise Deferred   Home Blood Glucose Monitoring Yes - see care plan   Acute Complications Yes - Reviewed blood glucose goals, prevention,  detection, signs and symptoms, and treatment of hypoglycemia and hyperglycemia, and when to contact the clinic.   Chronic Complications Deferred       Today's interventions were provided through individual discussion, instruction, and written materials were provided.      Patient verbalized understanding of instruction and written materials.  Pt was able to return back demonstration of instructions today. Patient understood key points, needs reinforcement and further instruction.     Diabetes Self-Management Care Plan:    Today's Diabetes Self-Management Care Plan was developed with Elissa's input. Elissa has agreed to work toward the following goal(s) to improve his/her overall diabetes control.      Care Plan: Diabetes Management   Updates made since 8/6/2024 12:00 AM        Problem: Blood Glucose Self-Monitoring         Goal: Patient agrees to contact [employer] for replacement sensors and to begin using Freestyle Maame 2    Start Date: 9/5/2024   Expected End Date: 9/19/2024   Priority: High   Barriers: Lack of Supplies   Note:    9/5/24 - Pt stopped wearing sensors due to falling off after sweating. She has one left at home and will restart. Gave Skin Tac sample and reviewed other adhesive options. Also noted, pt has no refills on Freestyle Maame. Will request refills. Will return in 1 mth for Libreview report review.       Problem: Medications         Goal: Patient Agrees to take Diabetes Medication(s) as prescribed.. Will take Novolog 15 units before meals and Levemir 60 units at night    Start Date: 9/5/2024   Expected End Date: 10/10/2024   Barriers: No Barriers Identified   Note:    9/5/24 - instructed to enter insulin events into Freestyle Maame reader for pattern management.       Task: Reviewed with patient all current diabetes medications and provided basic review of the purpose, dosage, frequency, side effects, and storage of both oral and injectable diabetes medications. Completed 9/5/2024        Task:  Discussed guidelines for preventing, detecting and treating hypoglycemia and hyperglycemia and reviewed the importance of meal and medication timing with diabetes mediations for prevention of hypoglycemia and maximum drug benefit. Completed 9/5/2024          Follow Up Plan     Follow up in about 4 weeks (around 10/2/2024) for Marcy report.    Today's care plan and follow up schedule was discussed with patient.  Elissa verbalized understanding of the care plan, goals, and agrees to follow up plan.        The patient was encouraged to communicate with his/her health care provider/physician and care team regarding his/her condition(s) and treatment.  I provided the patient with my contact information today and encouraged to contact me via phone or [facility] Patient Portal as needed.     Length of Visit   Total Time: 0 Minutes

## 2024-09-12 NOTE — PROGRESS NOTES
>100K mixed joselyn.  Will send a course of antibiotics given upcoming procedure. [facility]  OUTPATIENT OFFICE VISIT NOTE    SUBJECTIVE:      HPI: Ms. Freeman is a 67 year old  female with a PMH of renal cell ca s/p nephrectomy in 2011 and colon adenocarcinoma stage IIA s/p right hemicolectomy in 2014 w/ adjuvant chemo completed in 4/2015.  Patient to have laboratory orders completed in 2 weeks when she returns for vaccines and a BP check. Patient complaining of URI today and this has been going on for 7 days. She is requesting cough medication and was started on antibiotics. Patient reports 2 blood Bms and was referred to GI given colon cancer history. Patient will bring DM logs with her next visit. Patient is CAGE negative. Patient return to IM clinic for follow-up in 3 months.      ROS:  CONSTITUTIONAL: No weight loss, subjective fever, chills, weakness or fatigue.  HEENT: Eyes: No visual loss, blurred vision, double vision or yellow sclerae. Ears, Nose, Throat: No hearing loss, + sneezing, + congestion, no runny nose or sore throat.  SKIN: No rash or itching.  CARDIOVASCULAR: No chest pain, chest pressure or chest discomfort. No palpitations or edema.  RESPIRATORY: + cough no sputum.  GASTROINTESTINAL: No anorexia, nausea, vomiting or diarrhea. No abdominal pain or blood.  GENITOURINARY: No dysuria, hematuria, or incontinence  NEUROLOGICAL: No headache, dizziness, syncope, paralysis, ataxia, numbness or tingling in the extremities. No change in bowel or bladder control.  MUSCULOSKELETAL: + left knee stiffness.  HEMATOLOGIC: No anemia, bleeding or bruising.  LYMPHATICS: No enlarged nodes.  PSYCHIATRIC: No history of depression or anxiety.  ENDOCRINOLOGIC: No reports of sweating, cold or heat intolerance. No polyuria or polydipsia.  ALLERGIES: No history of asthma, hives, eczema or rhinitis.       OBJECTIVE:     Vital signs:   BP (!) 154/93 (BP Location: Right arm, Patient Position: Sitting, BP Method: Large (Automatic))   Pulse 71   Temp 99.3 °F (37.4 °C) (Oral)   " Resp 18   Ht 5' 4" (1.626 m)   Wt (!) 142.8 kg (314 lb 12.8 oz)   BMI 54.04 kg/m²      Physical Examination:  General: obese w/o distress  HEENT: NC/AT; PERRLA; nasal and oral mucosa moist and clear; no sinus tenderness; no thyromegaly  Neck: Full ROM; no lymphadenopathy  Pulm: bilateral rhonchi and mild wheezing, normal work of breathing, dry cough  CV: S1, S2 w/o murmurs or gallops; no edema noted  GI: Soft with normal bowel sounds in all quadrants, no masses on palpation  MSK: Full ROM of all extremities and spine w/o limitation or discomfort  Derm: No rashes, abnormal bruising, or skin lesions  Neuro: AAOx4; CN II-XII intact; motor/sensory function intact  Psych: Cooperative; appropriate mood and affect       ASSESSMENT & PLAN:     1) Diabetes mellitus  - A1c 10.5, will repeat A1c today  - Logs reviewed, fasting CBG is within goal and is on average between   -Continue Levemir 50u QHS  -continue with novolog 20u with each meal, dinner novolog of 25 units (11/23/22)  -patient was checking CBGs after dinner  - Instructed to keep log and check CBGs prior to meal, she voiced understanding, will need to bring logs next appt (11/23/22)  - Continue on Jardiance 10mg daily   - Fundus exam due November 2023  - Foot exam today, normal pinprick sensation, dorsal pedal pulses, no skin breakdown or calluses November 2022  - Discussed importance of adhering to diabetic diet and good exercise  -continue Gabapentin due to right hand finger numbness     2) Hypertension  - /93mmHg today, patient does not feel well today due to cold (11/23/22)  - Continue current regimen, being followed by cards and renal  - educated on healthy DASH diet and exercise (11/23/22)    3) Colon and renal cancer  - patient is s/p hemicolectomy, nephrectomy, and chemotherapy  - completed chemo in april 2015  - Repeat CT's in May 2018 showed stable appearance of the chest, abdomen and pelvis. No new or worsening sites of metastatic disease " identified.  - Seen by Oncology previously but discharged from clinic  -reports 2 episodes of blood in the stool, referral to GI given history today (11/23/22)    4) Chronic kidney disease  - Stage IIIa, stable  - Avoid NSAID's and nephrotoxic drugs  - continue to follow up with nephrology clinic     5) Hyperlipidemia  - continue on lipitor 40mg daily   - FLP June 2020 wnl  - reordered lipid panel today (11/23/22)    6) MOHSEN on CPAP  - Sleep study confirms MOHSEN  - continue CPAP    7) Multinodular goiter  - Biopsy results reveal benign thyroid nodules  - TSH/T4 in November 2021 wnl  -repeat TFTs today (11/23/22)    8) Depression  - Responding really well to Duloxetine, continue 60mg daily  -no SI/HI or symptoms today (11/23/22)    9) Gout  - On allopurinol by Nephrology  -reports no flares since last appt (11/23/22)    10) History of Hematuria  - followed by nephrology      Health Maintenance:  Pneumococcal vaccine: Prevnar and Pneumovax given previously, will need repeat Pneumovax today (11/23/22)  TDAP: UTD  Influenza vaccine: UTD  Shingrix vaccine: UTD  COVID 19 Vaccine: UTD  AAA U/S screening:n/a  Bone Density Screening: ordered today (11/23/22)  Mammogram: Nov 2022, repeat 1 year  Pap smear: n/a, has hx of total hysterectomy, follow up with GYN  Screening colonoscopy:Colonoscopy done in June 2020, results wnl - repeat 5 years  Lung Cancer Screening: n/a  Hepatitis Panel: Negative in June 2020  Ordered HIV Screening (11/23/22)    Return to clinic in 3 months. F/U in 2 weeks for vaccines, BP check, and labs.    Juan Jose Urena, DO   [facility] Internal Medicine, PGY-3

## 2024-09-13 ENCOUNTER — HOSPITAL ENCOUNTER (OUTPATIENT)
Dept: RADIOLOGY | Facility: HOSPITAL | Age: 69
Discharge: HOME | End: 2024-09-13
Payer: MEDICARE

## 2024-09-13 DIAGNOSIS — I61.9 HEMORRHAGIC STROKE: ICD-10-CM

## 2024-09-13 DIAGNOSIS — Z12.31 BREAST CANCER SCREENING BY MAMMOGRAM: ICD-10-CM

## 2024-09-13 PROCEDURE — 77063 BREAST TOMOSYNTHESIS BI: CPT | Mod: 26,,, | Performed by: RADIOLOGY

## 2024-09-13 PROCEDURE — 77067 SCR MAMMO BI INCL CAD: CPT | Mod: 26,,, | Performed by: RADIOLOGY

## 2024-09-13 PROCEDURE — 77067 SCR MAMMO BI INCL CAD: CPT | Mod: TC

## 2024-09-13 NOTE — PROGRESS NOTES
Patient unable to complete MRI brain w/o contrast due to body habitus and small bore in MRI. Patient was instructed to call scheduling to get appt at another facility that can accommodate her.

## 2024-09-17 ENCOUNTER — OFFICE VISIT (OUTPATIENT)
Dept: CARDIOLOGY | Facility: CLINIC | Age: 69
End: 2024-09-17
Payer: MEDICARE

## 2024-09-17 VITALS
HEART RATE: 82 BPM | TEMPERATURE: 98 F | RESPIRATION RATE: 18 BRPM | SYSTOLIC BLOOD PRESSURE: 125 MMHG | HEIGHT: 63 IN | OXYGEN SATURATION: 100 % | DIASTOLIC BLOOD PRESSURE: 75 MMHG | BODY MASS INDEX: 51.91 KG/M2 | WEIGHT: 293 LBS

## 2024-09-17 DIAGNOSIS — I10 PRIMARY HYPERTENSION: ICD-10-CM

## 2024-09-17 DIAGNOSIS — I25.9 ISCHEMIC HEART DISEASE, CHRONIC: ICD-10-CM

## 2024-09-17 DIAGNOSIS — E78.00 HYPERCHOLESTEROLEMIA: Primary | ICD-10-CM

## 2024-09-17 DIAGNOSIS — E11.69 HYPERLIPIDEMIA ASSOCIATED WITH TYPE 2 DIABETES MELLITUS: ICD-10-CM

## 2024-09-17 DIAGNOSIS — I27.20 PULMONARY HYPERTENSION: ICD-10-CM

## 2024-09-17 DIAGNOSIS — E78.5 HYPERLIPIDEMIA ASSOCIATED WITH TYPE 2 DIABETES MELLITUS: ICD-10-CM

## 2024-09-17 PROCEDURE — 99215 OFFICE O/P EST HI 40 MIN: CPT | Mod: PBBFAC | Performed by: INTERNAL MEDICINE

## 2024-09-17 RX ORDER — HYDRALAZINE HYDROCHLORIDE 50 MG/1
50 TABLET, FILM COATED ORAL EVERY 8 HOURS
Qty: 90 TABLET | Refills: 11 | Status: SHIPPED | OUTPATIENT
Start: 2024-09-17 | End: 2025-09-17

## 2024-09-17 RX ORDER — ATORVASTATIN CALCIUM 80 MG/1
80 TABLET, FILM COATED ORAL DAILY
Qty: 90 TABLET | Refills: 3 | Status: SHIPPED | OUTPATIENT
Start: 2024-09-17 | End: 2025-09-17

## 2024-09-17 NOTE — PROGRESS NOTES
Cardiology clinic Note     CHIEF COMPLAINT:   Chief Complaint   Patient presents with    Pt c/o chest pressure about once a month when she is active                   Review of patient's allergies indicates:  No Known Allergies                                       HPI:  Elissa Freeman 68 y.o. female PMHx HTN, HLD, DM, CKD3, A. Fib on warfarin, HFmrEF (EF 45%), stable angina, and hemorrhagic stroke in 2023 who presents to clinic for follow up. Patient was recently restarted on Warfarin and is following with neurosurgery and the coumadin clinic in the future.     The patient has been feeling well overall. She does report one episode of chest pain associated with exerting herself while cooking but was relieved by NG. She denies any palpation, dizziness, or N/V. She reports that she gets dyspnea often and was short of breath walking to clinic today. She can do most ADL but needs to rest often. She says that the dyspnea on exertion has improved than previously with medication. She denies orthopnea PND, but does have some leg edema which improved with Lasix. She reports that her BP is well controlled when she checks at home.                                                                                                                                                                                                                                                                                                                                                                                                                                                                                        CARDIAC TESTING:  No results found for this or any previous visit.    Results for orders placed during the hospital encounter of 01/30/24    Nuclear Stress - Cardiology Interpreted    Interpretation Summary    Normal myocardial perfusion scan. There is no evidence of myocardial ischemia or infarction.    The gated perfusion images showed  an ejection fraction of 54% at rest. The gated perfusion images showed an ejection fraction of 59% post stress.    The patient reported no chest pain during the stress test.    The nuclear stress test is  fair quality.  On the nuclear stress test the EF is normal.  If the patient has an echo, the EF on the echo is considered more accurate.    The patient has a low risk nuclear stress test    Nuclear stress test indicates no ischemia.     Results for orders placed in visit on 09/29/21    CATH LAB PROCEDURE       Patient Active Problem List   Diagnosis    Ischemic heart disease, chronic    Primary hypertension    Hyperlipidemia associated with type 2 diabetes mellitus    Diabetes mellitus type 2 in obese    MOHSEN on CPAP    Renal cell carcinoma of left kidney    Adenocarcinoma of colon    History of malignant neoplasm of kidney    Depression    Diabetes mellitus    Hypercholesterolemia    Kidney disorder    Mass of colon    Morbid obesity due to excess calories    Neuropathy    Pulmonary hypertension    Dyspnea    Personal history of colon cancer    Microcytic anemia    Hypertensive emergency     Past Surgical History:   Procedure Laterality Date    CATARACT EXTRACTION Left 11/28/2022    CHOLECYSTECTOMY      COLON SURGERY      COLONOSCOPY N/A 04/27/2023    Procedure: COLONOSCOPY;  Surgeon: Jose Miguel Rosales MD;  Location: [facility] ENDOSCOPY;  Service: Endoscopy;  Laterality: N/A;    excision of lipoma      EYE SURGERY      HYSTERECTOMY      HYSTERECTOMY, VAGINAL, WITH SALPINGO-OOPHORECTOMY      NEPHRECTOMY Left     TUBAL LIGATION       Social History     Socioeconomic History    Marital status:    Occupational History    Occupation: Disabled   Tobacco Use    Smoking status: Never    Smokeless tobacco: Never   Substance and Sexual Activity    Alcohol use: Not Currently     Comment: frozen drinks once a month, giovanni    Drug use: Never    Sexual activity: Not Currently     Social Determinants of Health      Financial Resource Strain: Medium Risk (11/23/2022)    Overall Financial Resource Strain (CARDIA)     Difficulty of Paying Living Expenses: Somewhat hard   Food Insecurity: Food Insecurity Present (11/23/2022)    Hunger Vital Sign     Worried About Running Out of Food in the Last Year: Sometimes true     Ran Out of Food in the Last Year: Sometimes true   Transportation Needs: No Transportation Needs (9/5/2024)    TRANSPORTATION NEEDS     Transportation : No   Physical Activity: Inactive (9/5/2024)    Exercise Vital Sign     Days of Exercise per Week: 0 days     Minutes of Exercise per Session: 0 min   Stress: No Stress Concern Present (11/23/2022)    [employer] - Occupational Stress Questionnaire     Feeling of Stress : Not at all   Housing Stability: Low Risk  (9/5/2024)    Housing Stability Vital Sign     Unable to Pay for Housing in the Last Year: No     Homeless in the Last Year: No        Family History   Problem Relation Name Age of Onset    Hypertension Mother Fawn     Coronary artery disease Mother Fawn     Hyperlipidemia Mother Fawn     Asthma Mother Fawn     Stroke Father Gradnigo     Cancer Brother Anil Bangura          Current Outpatient Medications:     acetaminophen (TYLENOL EXTRA STRENGTH) 500 MG tablet, Take 1,000 mg by mouth every 6 (six) hours as needed for Pain., Disp: , Rfl:     albuterol (PROVENTIL/VENTOLIN HFA) 90 mcg/actuation inhaler, Inhale 1 puff into the lungs every 4 (four) hours as needed for Wheezing., Disp: 18 g, Rfl: 3    amLODIPine (NORVASC) 10 MG tablet, Take 10 mg by mouth once daily., Disp: , Rfl:     atorvastatin (LIPITOR) 80 MG tablet, Take 1 tablet (80 mg total) by mouth once daily., Disp: 90 tablet, Rfl: 3    brimonidine 0.2% (ALPHAGAN) 0.2 % Drop, Place 1 drop into both eyes 2 (two) times daily., Disp: , Rfl:     carvediloL (COREG) 3.125 MG tablet, Take 1 tablet (3.125 mg total) by mouth 2 (two) times daily., Disp: 60 tablet, Rfl:  11    diltiaZEM (CARDIZEM CD) 180 MG 24 hr capsule, Take 1 capsule (180 mg total) by mouth every morning., Disp: 30 capsule, Rfl: 11    DULoxetine (CYMBALTA) 60 MG capsule, Take 1 capsule (60 mg total) by mouth once daily. TAKE 1 CAPSULE BY MOUTH DAILY. DO NOT CRUSH OR CHEW, Disp: 30 capsule, Rfl: 2    empagliflozin (JARDIANCE) 25 mg tablet, Take 1 tablet (25 mg total) by mouth once daily., Disp: 90 tablet, Rfl: 3    exenatide microspheres (BYDUREON BCISE) 2 mg/0.85 mL AtIn, Inject 2 mg into the skin every 7 days., Disp: 4 pen , Rfl: 3    flash glucose scanning reader (FREESTYLE SMITHA 2 READER) Misc, 1 each by Misc.(Non-Drug; Combo Route) route 4 (four) times daily with meals and nightly., Disp: 1 each, Rfl: 0    flash glucose sensor (FREESTYLE SMITHA 2 SENSOR) Kit, 1 each by Misc.(Non-Drug; Combo Route) route 2 hours after meals and at bedtime., Disp: 1 kit, Rfl: 0    furosemide (LASIX) 20 MG tablet, TAKE ONE TABLET BY MOUTH DAILY AT 9 AM (VIAL), Disp: 90 tablet, Rfl: 11    gabapentin (NEURONTIN) 600 MG tablet, Take 600 mg by mouth every morning., Disp: , Rfl:     hydrALAZINE (APRESOLINE) 50 MG tablet, Take 1 tablet (50 mg total) by mouth every 8 (eight) hours., Disp: 90 tablet, Rfl: 11    insulin aspart U-100 (NOVOLOG FLEXPEN U-100 INSULIN) 100 unit/mL (3 mL) InPn pen, Inject 20 Units into the skin 3 (three) times daily with meals. (Patient taking differently: Inject 15 Units into the skin 3 (three) times daily with meals.), Disp: 15 mL, Rfl: 11    insulin glargine U-100, Lantus, (LANTUS SOLOSTAR U-100 INSULIN) 100 unit/mL (3 mL) InPn pen, Inject 65 Units into the skin every evening., Disp: 18 mL, Rfl: 11    isosorbide mononitrate (IMDUR) 30 MG 24 hr tablet, Take 1 tablet (30 mg total) by mouth once daily., Disp: 60 tablet, Rfl: 1    latanoprost 0.005 % ophthalmic solution, Place 1 drop into both eyes once daily., Disp: 2.5 mL, Rfl: 1    losartan-hydrochlorothiazide 50-12.5 mg (HYZAAR) 50-12.5 mg per tablet, Take 1  "tablet by mouth once daily., Disp: 90 tablet, Rfl: 3    melatonin (MELATIN) 5 mg, Take 1 tablet (5 mg total) by mouth nightly as needed for Insomnia., Disp: 90 tablet, Rfl: 3    nitroGLYCERIN (NITROSTAT) 0.4 MG SL tablet, Place 1 tablet (0.4 mg total) under the tongue every 5 (five) minutes as needed for Chest pain., Disp: 30 tablet, Rfl: 0    vitamin D (VITAMIN D3) 1000 units Tab, Take 2 tablets (2,000 Units total) by mouth Daily., Disp: 60 tablet, Rfl: 11    warfarin (COUMADIN) 5 MG tablet, Take 0.5 tablets (2.5 mg total) by mouth Daily., Disp: 15 tablet, Rfl: 11    cetirizine (ZYRTEC) 10 MG tablet, Take 1 tablet (10 mg total) by mouth once daily. (Patient not taking: Reported on 9/4/2024), Disp: 30 tablet, Rfl: 0    ketorolac 0.5% (ACULAR) 0.5 % Drop, Place 1 drop into the left eye 2 (two) times daily. (Patient not taking: Reported on 8/29/2024), Disp: , Rfl:     nebulizer accessories Kit, 1 each by Misc.(Non-Drug; Combo Route) route 4 (four) times daily as needed (wheezing). (Patient not taking: Reported on 8/29/2024), Disp: 1 kit, Rfl: 0    ofloxacin (OCUFLOX) 0.3 % ophthalmic solution, Place 1 drop into the right eye 4 (four) times daily. (Patient not taking: Reported on 8/29/2024), Disp: , Rfl:     pen needle, diabetic 32 gauge x 5/32" Ndle, 1 each by Misc.(Non-Drug; Combo Route) route 4 (four) times daily. (Patient not taking: Reported on 9/17/2024), Disp: 100 each, Rfl: 5    potassium chloride (K-TAB) 20 mEq, Take 20 mEq by mouth every morning. (Patient not taking: Reported on 9/17/2024), Disp: , Rfl:     prednisoLONE acetate (PRED FORTE) 1 % DrpS, Place 1 drop into the right eye 5 (five) times daily. (Patient not taking: Reported on 8/29/2024), Disp: , Rfl:     sodium chloride 5% (NIMA 128) 5 % ophthalmic solution, Place 1 drop into both eyes as needed. (Patient not taking: Reported on 8/29/2024), Disp: , Rfl:      ROS:                                                                                         " "                                                                                    See HPI      Vitals:  Blood pressure 125/75, pulse 82, temperature 98.1 °F (36.7 °C), resp. rate 18, height 5' 2.99" (1.6 m), weight (!) 143.3 kg (316 lb), SpO2 100%.       PE:  Physical Exam  Constitutional:       Appearance: Normal appearance.   HENT:      Head: Normocephalic and atraumatic.   Eyes:      Extraocular Movements: Extraocular movements intact.      Conjunctiva/sclera: Conjunctivae normal.      Pupils: Pupils are equal, round, and reactive to light.   Cardiovascular:      Rate and Rhythm: Normal rate and regular rhythm.      Heart sounds: No murmur heard.     No gallop.   Pulmonary:      Breath sounds: Normal breath sounds. No wheezing.   Abdominal:      General: Abdomen is flat. Bowel sounds are normal.      Palpations: Abdomen is soft.   Musculoskeletal:         General: No swelling (Bilateral LE edema). Normal range of motion.      Cervical back: Normal range of motion and neck supple.   Skin:     General: Skin is warm and dry.      Capillary Refill: Capillary refill takes less than 2 seconds.   Neurological:      Mental Status: She is alert.         ASSESSMENT/PLAN:    Stable Angina   - [facility] 9/21:  No CAD  - Lexiscan 4/21:  Anterolateral and inferior ischemia  - Lexiscan 1/9/24 - normal myocardial perfusion scan  - TTE 4/21:  Left ventricular ejection fraction 50-55%  - Continue Imdur 30 and Nitroglycerin prn        Atrial Fibrillation  - Diagnosed at recent hospitalization (12/2023) at [facility]  - TSH WNL  - At this time patient rate controlled w/ Coreg 3.125 & Diltiazem 180  - Per PCP, restarted on Warfarin and is scheduled with neurosurgery and Coumadin clinic         HFmrEF w/ Grade I DD  - TTE (12/2023) w/ findings of LVEF (45%) w/ Grade I Diastolic Dysfunction  - Not on Spironolactone due to CKD  - Continue current GDMT w/ Coreg 3.125, Jardiance 25, and Hyzaar 50-12.5  - Continue Lasix 20     "   Hypertension, well controlled  - BP today in clinic (125/75)  - Continue Hydralazine 50, Amlodipine 10, Coreg 3.125, and Lasix 20 mg q.d.  - Refilled Hydralazine this visit        Hyperlipidemia  - Lipid panel WNL (8/29/24)  - Refilled Lipitor 80 mg q.d. at today's clinic visit   - Stressed importance of diet and excercise as tolerated       Diabetes mellitus  - Management per PCP       MOHSEN with CPAP  - Management per PCP    RTC: 4 months       Crescencio Hooks MS3

## 2024-09-17 NOTE — PROGRESS NOTES
09/17/2024  4:07 PM    Cardiology Attending Addendum to Medical Student Note  I evaluated Elissa Freeman and discussed the patient's symptoms, findings, and management plan with the medical student.     Subjective:   Ms. Elissa Freeman is a 68 y.o. female with:   Chief Complaint   Patient presents with    Pt c/o chest pressure about once a month when she is active         HPI  Ms. Elissa Freeman was seen for follow up.  The patient has HTN, HLD, DM, CKD3, A. Fib on warfarin, HFmrEF (EF 45%), stable angina, and hemorrhagic stroke in 2023 who presents to clinic for follow up. Patient was recently restarted on Warfarin and is following with neurosurgery and the coumadin clinic in the future.      The patient has been feeling well overall. She does report one episode of chest pain associated with exerting herself while cooking but was relieved by NG. She denies any palpation, dizziness, or N/V. She reports that she gets dyspnea often and was short of breath walking to clinic today. She can do most ADL but needs to rest often. She says that the dyspnea on exertion has improved than previously with medication. She denies orthopnea PND, but does have some leg edema which improved with Lasix. She reports that her BP is well controlled when she checks at home.      METS:   Patient appears to do 2 METS      PMH:    Patient Active Problem List   Diagnosis    Ischemic heart disease, chronic    Primary hypertension    Hyperlipidemia associated with type 2 diabetes mellitus    Diabetes mellitus type 2 in obese    MOHSEN on CPAP    Renal cell carcinoma of left kidney    Adenocarcinoma of colon    History of malignant neoplasm of kidney    Depression    Diabetes mellitus    Hypercholesterolemia    Kidney disorder    Mass of colon    Morbid obesity due to excess calories    Neuropathy    Pulmonary hypertension    Dyspnea    Personal history of colon cancer    Microcytic anemia    Hypertensive emergency       Surgical Hx:    Past  Surgical History:   Procedure Laterality Date    CATARACT EXTRACTION Left 11/28/2022    CHOLECYSTECTOMY      COLON SURGERY      COLONOSCOPY N/A 04/27/2023    Procedure: COLONOSCOPY;  Surgeon: Jose Miguel Rosales MD;  Location: [facility] ENDOSCOPY;  Service: Endoscopy;  Laterality: N/A;    excision of lipoma      EYE SURGERY      HYSTERECTOMY      HYSTERECTOMY, VAGINAL, WITH SALPINGO-OOPHORECTOMY      NEPHRECTOMY Left     TUBAL LIGATION           Social History     Socioeconomic History    Marital status:    Occupational History    Occupation: Disabled   Tobacco Use    Smoking status: Never    Smokeless tobacco: Never   Substance and Sexual Activity    Alcohol use: Not Currently     Comment: frozen drinks once a month, giovanni    Drug use: Never    Sexual activity: Not Currently     Social Determinants of Health     Financial Resource Strain: Medium Risk (11/23/2022)    Overall Financial Resource Strain (CARDIA)     Difficulty of Paying Living Expenses: Somewhat hard   Food Insecurity: Food Insecurity Present (11/23/2022)    Hunger Vital Sign     Worried About Running Out of Food in the Last Year: Sometimes true     Ran Out of Food in the Last Year: Sometimes true   Transportation Needs: No Transportation Needs (9/5/2024)    TRANSPORTATION NEEDS     Transportation : No   Physical Activity: Inactive (9/5/2024)    Exercise Vital Sign     Days of Exercise per Week: 0 days     Minutes of Exercise per Session: 0 min   Stress: No Stress Concern Present (11/23/2022)    [employer] [place] [employer] - Occupational Stress Questionnaire     Feeling of Stress : Not at all   Housing Stability: Low Risk  (9/5/2024)    Housing Stability Vital Sign     Unable to Pay for Housing in the Last Year: No     Homeless in the Last Year: No     Family History   Problem Relation Name Age of Onset    Hypertension Mother Fawn     Coronary artery disease Mother Fawn     Hyperlipidemia Mother Fawn     Asthma Mother Fawn      Stroke Father [facility]     Cancer Brother Anil & Willem      Review of patient's allergies indicates:  No Known Allergies    Current Medications:    Current Outpatient Medications   Medication Instructions    acetaminophen (TYLENOL EXTRA STRENGTH) 1,000 mg, Oral, Every 6 hours PRN    albuterol (PROVENTIL/VENTOLIN HFA) 90 mcg/actuation inhaler 1 puff, Inhalation, Every 4 hours PRN    amLODIPine (NORVASC) 10 mg, Oral, Daily    atorvastatin (LIPITOR) 80 mg, Oral, Daily    brimonidine 0.2% (ALPHAGAN) 0.2 % Drop 1 drop, Both Eyes, 2 times daily    carvediloL (COREG) 3.125 mg, Oral, 2 times daily    cetirizine (ZYRTEC) 10 mg, Oral, Daily    diltiaZEM (CARDIZEM CD) 180 mg, Oral, Every morning    DULoxetine (CYMBALTA) 60 mg, Oral, Daily, TAKE 1 CAPSULE BY MOUTH DAILY. DO NOT CRUSH OR CHEW    empagliflozin (JARDIANCE) 25 mg, Oral, Daily    exenatide microspheres (BYDUREON BCISE) 2 mg, Subcutaneous, Every 7 days    flash glucose scanning reader (FREESTYLE SMITHA 2 READER) Misc 1 each, Misc.(Non-Drug; Combo Route), 4 times daily with meals & nightly    flash glucose sensor (FREESTYLE SMITHA 2 SENSOR) Kit 1 each, Misc.(Non-Drug; Combo Route), 4 times daily after meals & nightly    furosemide (LASIX) 20 MG tablet TAKE ONE TABLET BY MOUTH DAILY AT 9 AM (VIAL)    gabapentin (NEURONTIN) 600 mg, Oral, Every morning    hydrALAZINE (APRESOLINE) 50 mg, Oral, Every 8 hours    insulin aspart U-100 (NOVOLOG FLEXPEN U-100 INSULIN) 20 Units, Subcutaneous, 3 times daily with meals    isosorbide mononitrate (IMDUR) 30 mg, Oral, Daily    ketorolac 0.5% (ACULAR) 0.5 % Drop 1 drop, 2 times daily    LANTUS SOLOSTAR U-100 INSULIN 65 Units, Subcutaneous, Nightly    latanoprost 0.005 % ophthalmic solution 1 drop, Both Eyes, Daily    losartan-hydrochlorothiazide 50-12.5 mg (HYZAAR) 50-12.5 mg per tablet 1 tablet, Oral, Daily    melatonin (MELATIN) 5 mg, Oral, Nightly PRN    nebulizer accessories Kit 1 each, Misc.(Non-Drug; Combo Route), 4 times daily  "PRN    nitroGLYCERIN (NITROSTAT) 0.4 mg, Sublingual, Every 5 min PRN    ofloxacin (OCUFLOX) 0.3 % ophthalmic solution 1 drop, 4 times daily    pen needle, diabetic 32 gauge x 5/32" Ndle 1 each, Misc.(Non-Drug; Combo Route), 4 times daily    potassium chloride (K-TAB) 20 mEq 20 mEq, Every morning    prednisoLONE acetate (PRED FORTE) 1 % DrpS 1 drop, 5 times daily    sodium chloride 5% (NIMA 128) 5 % ophthalmic solution 1 drop, As needed (PRN)    vitamin D (VITAMIN D3) 2,000 Units, Oral, Daily    warfarin (COUMADIN) 2.5 mg, Oral, Daily       ROS:  Please see HPI.    Objective:     BP Readings from Last 3 Encounters:   09/17/24 125/75   09/04/24 122/74   08/29/24 (!) 150/80        Pulse Readings from Last 3 Encounters:   09/17/24 82   09/04/24 95   08/29/24 93        Temp Readings from Last 3 Encounters:   09/17/24 98.1 °F (36.7 °C)   09/04/24 98.2 °F (36.8 °C) (Oral)   08/29/24 98.8 °F (37.1 °C) (Oral)     Wt Readings from Last 3 Encounters:   09/17/24 (!) 143.3 kg (316 lb)   09/04/24 (!) 137.7 kg (303 lb 9.6 oz)   09/04/24 136 kg (299 lb 13.2 oz)     BMI:  55.99 kg/m^2    PE  Blood pressure 125/75, pulse 82, temperature 98.1 °F (36.7 °C), resp. rate 18, height 5' 2.99" (1.6 m), weight (!) 143.3 kg (316 lb), SpO2 100%.  CONSTITUTIONAL:  No acute distress.  Not ill appearing.  GENERAL:  Cooperative  NEUROLOGICAL:  Awake and alert.  Communicates  well  NECK:  supple  RESPIRATIONS:  no apparent distress  ABDOMEN:   obese  SKIN:  dry     CARDIAC TESTS  Echo  No results found for this or any previous visit.    Stress Test     Results for orders placed during the hospital encounter of 01/30/24    Nuclear Stress - Cardiology Interpreted    Interpretation Summary    Normal myocardial perfusion scan. There is no evidence of myocardial ischemia or infarction.    The gated perfusion images showed an ejection fraction of 54% at rest. The gated perfusion images showed an ejection fraction of 59% post stress.    The patient " "reported no chest pain during the stress test.    The nuclear stress test is  fair quality.  On the nuclear stress test the EF is normal.  If the patient has an echo, the EF on the echo is considered more accurate.    The patient has a low risk nuclear stress test    Nuclear stress test indicates no ischemia.    [facility]  Results for orders placed in visit on 09/29/21    CATH LAB PROCEDURE    Holter Monitor  No cardiac monitor results found for the past 12 months    LABS  Last BMP BMP  Lab Results   Component Value Date     08/29/2024    K 4.2 08/29/2024     08/29/2024    CO2 29 08/29/2024    BUN 14.8 08/29/2024    CREATININE 1.84 (H) 08/29/2024    CALCIUM 9.8 08/29/2024    EGFRNORACEVR 30 08/29/2024       Creatinine    Lab Results   Component Value Date    CREATININE 1.84 (H) 08/29/2024    CREATININE 1.75 (H) 08/06/2024    CREATININE 1.51 (H) 12/05/2023     Magnesium No components found for: "MAG"  BNP    Lab Results   Component Value Date    BNP 78.5 11/15/2023    BNP 30.7 12/26/2022     Troponin    Lab Results   Component Value Date    TROPONINI 0.027 11/15/2023    TROPONINI 0.036 12/26/2022    TROPONINI 0.021 10/30/2022     Last CBC     Lab Results   Component Value Date    WBC 7.87 08/29/2024    HGB 12.8 08/29/2024    HCT 42.7 08/29/2024    MCV 73.9 (L) 08/29/2024     08/29/2024           Last lipids    Lab Results   Component Value Date    CHOL 113 08/29/2024    CHOL 104 10/05/2023    CHOL 216 (H) 06/06/2023    HDL 46 08/29/2024    HDL 38 10/05/2023    HDL 46 06/06/2023    LDL 47.00 (L) 08/29/2024    LDL 53.00 10/05/2023    .00 06/06/2023    TRIG 99 08/29/2024    TRIG 64 10/05/2023    TRIG 178 (H) 06/06/2023    TOTALCHOLEST 2 08/29/2024    TOTALCHOLEST 3 10/05/2023    TOTALCHOLEST 5 06/06/2023       LFT   No components found for: "LFT"    Assessment:   1. Hypercholesterolemia    2. Primary hypertension  - hydrALAZINE (APRESOLINE) 50 MG tablet; Take 1 tablet (50 mg total) by mouth every 8 " (eight) hours.  Dispense: 90 tablet; Refill: 11    3. Pulmonary hypertension    4. Ischemic heart disease, chronic    5. Hyperlipidemia associated with type 2 diabetes mellitus  - atorvastatin (LIPITOR) 80 MG tablet; Take 1 tablet (80 mg total) by mouth once daily.  Dispense: 90 tablet; Refill: 3    10 Year Cardiovascular Risk:  The ASCVD Risk score (Charanjit DK, et al., 2019) failed to calculate for the following reasons:    The patient has a prior MI or stroke diagnosis  BMI:  Body mass index is 55.99 kg/m².  Patient has super morbid obesity (BMI >39.9)  Last PCP visit:  8/29/2024      Plan:     MEDICATIONS:  refill lipitor and hydralazine    WORK UP:  No cardiac work up ordered.    WEIGHT:     Wt Readings from Last 3 Encounters:   09/17/24 (!) 143.3 kg (316 lb)   09/04/24 (!) 137.7 kg (303 lb 9.6 oz)   09/04/24 136 kg (299 lb 13.2 oz)     BLOOD PRESSURE:    BP Readings from Last 3 Encounters:   09/17/24 125/75   09/04/24 122/74   08/29/24 (!) 150/80     HEART RATE:    Pulse Readings from Last 3 Encounters:   09/17/24 82   09/04/24 95   08/29/24 93     LIPIDS:    Lab Results   Component Value Date    CHOL 113 08/29/2024    LDL 47.00 (L) 08/29/2024       HEART FAILURE:    Lab Results   Component Value Date    BNP 78.5 11/15/2023    BNP 30.7 12/26/2022     FOLLOW UP:  4 months    Wu Taylor MD  Cardiology Attending     Future Appointments   Date Time Provider Department Center   9/18/2024  2:30 PM NURSE, [facility] INTERNAL MEDICINE RESIDENTS [facility] IM RES [place]   9/19/2024 11:00 AM Marlin Oropeza FNP [facility] GASTRO [place]   9/19/2024 11:30 AM Dara Grullon RD [facility] FMDEDU [place]   9/23/2024 11:45 AM Radha Ascencio MD [facility] SMPNRSR [facility] [place]   9/24/2024  2:30 PM [facility] MRI1 650 LB LIMIT [facility] MRI St [facility]   9/26/2024  2:20 PM COUMADIN, AYANNA MARIE OLLETTYB COUM [facility]   9/27/2024  1:00 PM DIETITIAN, [facility] NUTRITION [facility] NUTR [place]   11/18/2024 12:00 PM PROVIDERS, [facility]  [place]   1/6/2025  1:00 PM Miranda Viveros FNP [facility] NEPHR [place]   1/24/2025  8:00 AM Wu Taylor MD [facility] CARD [place]   2/11/2025  1:30 PM Jeronimo Bird MD [facility] IM RES [place]

## 2024-09-24 ENCOUNTER — HOSPITAL ENCOUNTER (OUTPATIENT)
Dept: RADIOLOGY | Facility: HOSPITAL | Age: 69
Discharge: HOME | End: 2024-09-24
Payer: MEDICARE

## 2024-09-24 DIAGNOSIS — E11.69 DIABETES MELLITUS TYPE 2 IN OBESE: ICD-10-CM

## 2024-09-24 DIAGNOSIS — E66.9 DIABETES MELLITUS TYPE 2 IN OBESE: ICD-10-CM

## 2024-09-24 PROCEDURE — 70551 MRI BRAIN STEM W/O DYE: CPT | Mod: TC

## 2024-09-25 ENCOUNTER — TELEPHONE (OUTPATIENT)
Dept: DIABETES | Facility: CLINIC | Age: 69
End: 2024-09-25
Payer: MEDICARE

## 2024-09-25 NOTE — TELEPHONE ENCOUNTER
Contacted pt to r/s diabetes education for follow-up. Pt states she has not received her Freestyle Maame sensor refills. She will reach out to PCP and contact diabetes education for clinic visit and Freestyle Maame assistance.

## 2024-09-26 ENCOUNTER — ANTI-COAG VISIT (OUTPATIENT)
Dept: CARDIOLOGY | Facility: HOSPITAL | Age: 69
End: 2024-09-26
Payer: MEDICARE

## 2024-09-26 DIAGNOSIS — E11.69 HYPERLIPIDEMIA ASSOCIATED WITH TYPE 2 DIABETES MELLITUS: ICD-10-CM

## 2024-09-26 DIAGNOSIS — I26.99 PULMONARY EMBOLISM, OTHER, UNSPECIFIED CHRONICITY, UNSPECIFIED WHETHER ACUTE COR PULMONALE PRESENT: Primary | ICD-10-CM

## 2024-09-26 DIAGNOSIS — E78.5 HYPERLIPIDEMIA ASSOCIATED WITH TYPE 2 DIABETES MELLITUS: ICD-10-CM

## 2024-09-26 LAB
CTP QC/QA: YES
INR PPP: 1.6 (ref 2–3)
PROTHROMBIN TIME, POC: ABNORMAL (ref 2–3)

## 2024-09-26 PROCEDURE — 85610 PROTHROMBIN TIME: CPT

## 2024-09-26 PROCEDURE — 99211 OFF/OP EST MAY X REQ PHY/QHP: CPT

## 2024-09-26 NOTE — PROGRESS NOTES
Pt first visit to Coumadin Clinic.  She states she was on warfarin prior.  All teaching begun and guidelines reviewed.  Pt questions were answered and she voiced understanding. She will take 7.5 mg of warfarin today, then will resume with dosage increase. She will buy a pill cutter for dosing accuracy.

## 2024-09-27 ENCOUNTER — TELEPHONE (OUTPATIENT)
Dept: INTERNAL MEDICINE | Facility: CLINIC | Age: 69
End: 2024-09-27
Payer: MEDICARE

## 2024-09-27 DIAGNOSIS — I62.9 INTRACRANIAL HEMORRHAGE: Primary | ICD-10-CM

## 2024-09-27 DIAGNOSIS — E11.42 DIABETIC POLYNEUROPATHY ASSOCIATED WITH TYPE 2 DIABETES MELLITUS: Primary | ICD-10-CM

## 2024-09-27 RX ORDER — GABAPENTIN 600 MG/1
600 TABLET ORAL EVERY MORNING
Qty: 90 TABLET | Refills: 3 | Status: SHIPPED | OUTPATIENT
Start: 2024-09-27 | End: 2025-09-27

## 2024-09-27 NOTE — TELEPHONE ENCOUNTER
Called patient and date of birth verified. Notified patient of mammogram and mri results. Message given to patient from PCP regarding neurosurgery appointment. Patient verbalized understanding.

## 2024-09-27 NOTE — TELEPHONE ENCOUNTER
----- Message from Jeronimo Bird MD sent at 9/27/2024 11:41 AM CDT -----  Patient call patient to inform her that mammogram is negative. Thank you.

## 2024-09-27 NOTE — TELEPHONE ENCOUNTER
Called patient and date of birth verified. Notified patient of mammogram and mri results. Patient verbalized understanding.

## 2024-09-27 NOTE — TELEPHONE ENCOUNTER
----- Message from Jeronimo Bird MD sent at 9/27/2024 10:35 AM CDT -----  Please call patient to inform that her repeat MRI shows age related changes but is otherwise unremarkable. Patient currently scheduled with Neurosurgery in [place] for 11/11/2024.

## 2024-09-27 NOTE — TELEPHONE ENCOUNTER
----- Message from Jeronimo Bird MD sent at 9/27/2024 10:36 AM CDT -----  Please tell patient to maintain appointment with neurosurgery.

## 2024-10-01 DIAGNOSIS — I20.89 ANGINA OF EFFORT: ICD-10-CM

## 2024-10-08 ENCOUNTER — ANTI-COAG VISIT (OUTPATIENT)
Dept: CARDIOLOGY | Facility: HOSPITAL | Age: 69
End: 2024-10-08
Payer: MEDICARE

## 2024-10-08 DIAGNOSIS — Z79.01 ANTICOAGULATION MONITORING, INR RANGE 2-3: ICD-10-CM

## 2024-10-08 DIAGNOSIS — I26.99 PULMONARY EMBOLISM, OTHER, UNSPECIFIED CHRONICITY, UNSPECIFIED WHETHER ACUTE COR PULMONALE PRESENT: Primary | ICD-10-CM

## 2024-10-08 LAB
CTP QC/QA: YES
INR PPP: 1.1 (ref 2–3)
PROTHROMBIN TIME, POC: ABNORMAL (ref 2–3)

## 2024-10-08 PROCEDURE — 85610 PROTHROMBIN TIME: CPT

## 2024-10-08 NOTE — PROGRESS NOTES
POC pt/inr performed.  Pt will take 5 mg of warfarin today, then resume with dosage increase.  Pt states she ran out of warfarin and went to the pharmacy to  prescription authorization that was called in on 10/3/24.  They were closed for lunch, and she hasn't been back.  She will go now and  her warfarin.     Patient Seen in: [id number] [facility] [place]      History   Patient presents with:  Cough/URI    Stated Complaint: cough, painful when coughing    HPI    Patient is a pleasant 24-year-old male. Medical history of hyperlipidemia and hypertension.   Pr (Temporal)   Resp 16   SpO2 98%         Physical Exam    Gen: Well appearing, well groomed, alert and aware x 3  Neck: Supple, full range of motion, no thyromegaly or lymphadenopathy.   Eye examination: EOMs are intact, normal conjunctival  ENT: No injectio Oral Cap  Take 1 capsule (100 mg total) by mouth 3 (three) times daily as needed for cough. Qty: 30 capsule Refills: 0    predniSONE 20 MG Oral Tab  Take 2 tablets (40 mg total) by mouth daily for 5 days.   Qty: 10 tablet Refills: 0

## 2024-10-10 RX ORDER — ISOSORBIDE MONONITRATE 30 MG/1
30 TABLET, EXTENDED RELEASE ORAL DAILY
Qty: 60 TABLET | Refills: 1 | Status: SHIPPED | OUTPATIENT
Start: 2024-10-10 | End: 2025-02-07

## 2024-10-11 RX ORDER — POTASSIUM CHLORIDE 1500 MG/1
TABLET, EXTENDED RELEASE ORAL
Qty: 90 TABLET | Refills: 11 | Status: SHIPPED | OUTPATIENT
Start: 2024-10-11

## 2024-10-23 ENCOUNTER — ANTI-COAG VISIT (OUTPATIENT)
Dept: CARDIOLOGY | Facility: HOSPITAL | Age: 69
End: 2024-10-23
Payer: MEDICARE

## 2024-10-23 DIAGNOSIS — Z79.01 ANTICOAGULATION MONITORING, INR RANGE 2-3: ICD-10-CM

## 2024-10-23 DIAGNOSIS — I26.99 PULMONARY EMBOLISM, OTHER, UNSPECIFIED CHRONICITY, UNSPECIFIED WHETHER ACUTE COR PULMONALE PRESENT: Primary | ICD-10-CM

## 2024-10-23 LAB
CTP QC/QA: YES
INR PPP: 2.3 (ref 2–3)
PROTHROMBIN TIME, POC: NORMAL (ref 2–3)

## 2024-10-23 PROCEDURE — 85610 PROTHROMBIN TIME: CPT

## 2024-10-25 DIAGNOSIS — I10 PRIMARY HYPERTENSION: ICD-10-CM

## 2024-10-25 DIAGNOSIS — I42.9 CARDIOMYOPATHY, UNSPECIFIED TYPE: ICD-10-CM

## 2024-10-25 RX ORDER — CARVEDILOL 3.12 MG/1
3.12 TABLET ORAL 2 TIMES DAILY
Qty: 60 TABLET | Refills: 11 | Status: SHIPPED | OUTPATIENT
Start: 2024-10-25 | End: 2025-10-25

## 2024-10-25 RX ORDER — LOSARTAN POTASSIUM AND HYDROCHLOROTHIAZIDE 12.5; 5 MG/1; MG/1
1 TABLET ORAL DAILY
Qty: 90 TABLET | Refills: 3 | Status: SHIPPED | OUTPATIENT
Start: 2024-10-25 | End: 2025-10-25

## 2024-10-30 ENCOUNTER — ANTI-COAG VISIT (OUTPATIENT)
Dept: CARDIOLOGY | Facility: HOSPITAL | Age: 69
End: 2024-10-30
Payer: MEDICARE

## 2024-10-30 DIAGNOSIS — Z79.01 ANTICOAGULATION MONITORING, INR RANGE 2-3: ICD-10-CM

## 2024-10-30 DIAGNOSIS — I26.99 PULMONARY EMBOLISM, OTHER, UNSPECIFIED CHRONICITY, UNSPECIFIED WHETHER ACUTE COR PULMONALE PRESENT: Primary | ICD-10-CM

## 2024-10-30 LAB
CTP QC/QA: YES
INR PPP: 3.6 (ref 2–3)
PROTHROMBIN TIME, POC: ABNORMAL (ref 2–3)

## 2024-10-30 PROCEDURE — 85610 PROTHROMBIN TIME: CPT

## 2024-11-04 ENCOUNTER — TELEPHONE (OUTPATIENT)
Dept: INTERNAL MEDICINE | Facility: CLINIC | Age: 69
End: 2024-11-04
Payer: MEDICARE

## 2024-11-06 ENCOUNTER — TELEPHONE (OUTPATIENT)
Dept: INTERNAL MEDICINE | Facility: CLINIC | Age: 69
End: 2024-11-06
Payer: MEDICARE

## 2024-11-06 NOTE — TELEPHONE ENCOUNTER
Pt called requesting a return call from Nenita. Pt states Nenita called her yesterday and she missed the call and requesting she call her back. Pt did not disclose why

## 2024-11-11 ENCOUNTER — TELEPHONE (OUTPATIENT)
Dept: INTERNAL MEDICINE | Facility: CLINIC | Age: 69
End: 2024-11-11
Payer: MEDICARE

## 2024-11-11 PROBLEM — I60.9 SUBARACHNOID HEMORRHAGE: Status: ACTIVE | Noted: 2024-11-11

## 2024-11-11 NOTE — TELEPHONE ENCOUNTER
Patient stopped by clinic and name and date of birth verified. Patient is requesting a referral to another neurologist because transportation will not bring her that far. Please advise.

## 2024-11-11 NOTE — TELEPHONE ENCOUNTER
----- Message from Nurse Gutierres sent at 11/11/2024 10:45 AM CST -----  Regarding: FW: Dr Bird    ----- Message -----  From: Galina Potter  Sent: 11/11/2024   9:53 AM CST  To: #  Subject: Dr Bird                                       Caller is:  (shay) Patient       Provider:Dr Bird    Last Visit:8/29/24    Next Visit:2/11/24    Reason for Call:  patient need to speek with nurse            Preferred Phone Number: [phone]

## 2024-11-14 ENCOUNTER — ANTI-COAG VISIT (OUTPATIENT)
Dept: CARDIOLOGY | Facility: HOSPITAL | Age: 69
End: 2024-11-14
Payer: MEDICARE

## 2024-11-14 DIAGNOSIS — I26.99 PULMONARY EMBOLISM, OTHER, UNSPECIFIED CHRONICITY, UNSPECIFIED WHETHER ACUTE COR PULMONALE PRESENT: Primary | ICD-10-CM

## 2024-11-14 DIAGNOSIS — Z79.01 ANTICOAGULATION MONITORING, INR RANGE 2-3: ICD-10-CM

## 2024-11-14 LAB
CTP QC/QA: YES
INR PPP: 1.2 (ref 2–3)
PROTHROMBIN TIME, POC: ABNORMAL (ref 2–3)

## 2024-11-14 PROCEDURE — 99211 OFF/OP EST MAY X REQ PHY/QHP: CPT

## 2024-11-14 PROCEDURE — 85610 PROTHROMBIN TIME: CPT

## 2024-11-14 NOTE — PROGRESS NOTES
POC pt/inr performed.  Pt will take 5 mg of warfarin daily for 2 days, then will resume dosing. Pharmacy [facility] on [address] to clarify prescription instructions.

## 2024-11-25 NOTE — TELEPHONE ENCOUNTER
Patient called facility and name and date of birth verified. Patient states she will call her insurance to get a list of providers in-network and let us know.

## 2024-11-27 ENCOUNTER — ANTI-COAG VISIT (OUTPATIENT)
Dept: CARDIOLOGY | Facility: HOSPITAL | Age: 69
End: 2024-11-27
Payer: MEDICARE

## 2024-11-27 DIAGNOSIS — Z79.01 ANTICOAGULATION MONITORING, INR RANGE 2-3: ICD-10-CM

## 2024-11-27 DIAGNOSIS — I26.99 PULMONARY EMBOLISM, OTHER, UNSPECIFIED CHRONICITY, UNSPECIFIED WHETHER ACUTE COR PULMONALE PRESENT: Primary | ICD-10-CM

## 2024-11-27 LAB
CTP QC/QA: YES
INR PPP: 2.9 (ref 2–3)
PROTHROMBIN TIME, POC: NORMAL (ref 2–3)

## 2024-11-27 PROCEDURE — 85610 PROTHROMBIN TIME: CPT

## 2024-12-03 DIAGNOSIS — E11.42 DIABETIC POLYNEUROPATHY ASSOCIATED WITH TYPE 2 DIABETES MELLITUS: ICD-10-CM

## 2024-12-03 NOTE — TELEPHONE ENCOUNTER
MS. WYATT,      MS. BREWER ASKED THAT YOU GIVE HER A CALL.  SHE DID NOT STATE THE NATURE OF THE CALL.  WHEN TIME PERMITS PLEASE GIVE HER A CALL.

## 2024-12-04 RX ORDER — DULOXETIN HYDROCHLORIDE 60 MG/1
60 CAPSULE, DELAYED RELEASE ORAL DAILY
Qty: 30 CAPSULE | Refills: 2 | Status: CANCELLED | OUTPATIENT
Start: 2024-12-04 | End: 2025-03-04

## 2024-12-04 NOTE — TELEPHONE ENCOUNTER
Called patient and date of birth verified. Patient states she would like to be referred Neurosurgeon, to [facility]. Pau Garrison[facility] [address] Please advise.

## 2024-12-16 ENCOUNTER — ANTI-COAG VISIT (OUTPATIENT)
Dept: CARDIOLOGY | Facility: HOSPITAL | Age: 69
End: 2024-12-16
Payer: MEDICARE

## 2024-12-16 DIAGNOSIS — Z79.01 ANTICOAGULATION MONITORING, INR RANGE 2-3: ICD-10-CM

## 2024-12-16 DIAGNOSIS — I26.99 PULMONARY EMBOLISM, OTHER, UNSPECIFIED CHRONICITY, UNSPECIFIED WHETHER ACUTE COR PULMONALE PRESENT: Primary | ICD-10-CM

## 2024-12-16 LAB
CTP QC/QA: YES
INR PPP: 1.4 (ref 2–3)
PROTHROMBIN TIME, POC: ABNORMAL (ref 2–3)

## 2024-12-16 PROCEDURE — 85610 PROTHROMBIN TIME: CPT

## 2024-12-16 PROCEDURE — 99211 OFF/OP EST MAY X REQ PHY/QHP: CPT

## 2024-12-20 ENCOUNTER — LAB VISIT (OUTPATIENT)
Dept: LAB | Facility: HOSPITAL | Age: 69
End: 2024-12-20
Payer: MEDICARE

## 2024-12-20 ENCOUNTER — OFFICE VISIT (OUTPATIENT)
Dept: NEPHROLOGY | Facility: CLINIC | Age: 69
End: 2024-12-20
Payer: MEDICARE

## 2024-12-20 VITALS
TEMPERATURE: 98 F | WEIGHT: 293 LBS | DIASTOLIC BLOOD PRESSURE: 76 MMHG | OXYGEN SATURATION: 98 % | RESPIRATION RATE: 20 BRPM | HEIGHT: 62 IN | HEART RATE: 89 BPM | SYSTOLIC BLOOD PRESSURE: 124 MMHG | BODY MASS INDEX: 53.92 KG/M2

## 2024-12-20 DIAGNOSIS — N18.32 CKD STAGE G3B/A3, GFR 30-44 AND ALBUMIN CREATININE RATIO >300 MG/G: Primary | ICD-10-CM

## 2024-12-20 DIAGNOSIS — E11.69 DIABETES MELLITUS TYPE 2 IN OBESE: ICD-10-CM

## 2024-12-20 DIAGNOSIS — E66.01 MORBID OBESITY DUE TO EXCESS CALORIES: ICD-10-CM

## 2024-12-20 DIAGNOSIS — E66.9 DIABETES MELLITUS TYPE 2 IN OBESE: ICD-10-CM

## 2024-12-20 DIAGNOSIS — N18.32 CKD STAGE G3B/A3, GFR 30-44 AND ALBUMIN CREATININE RATIO >300 MG/G: ICD-10-CM

## 2024-12-20 DIAGNOSIS — C64.2 RENAL CELL CARCINOMA OF LEFT KIDNEY: ICD-10-CM

## 2024-12-20 DIAGNOSIS — I27.20 PULMONARY HYPERTENSION: ICD-10-CM

## 2024-12-20 LAB
25(OH)D3+25(OH)D2 SERPL-MCNC: 38 NG/ML (ref 30–80)
ALBUMIN SERPL-MCNC: 3.5 G/DL (ref 3.4–4.8)
ALBUMIN/GLOB SERPL: 0.7 RATIO (ref 1.1–2)
ALP SERPL-CCNC: 137 UNIT/L (ref 40–150)
ALT SERPL-CCNC: 12 UNIT/L (ref 0–55)
ANION GAP SERPL CALC-SCNC: 7 MEQ/L
AST SERPL-CCNC: 14 UNIT/L (ref 5–34)
BACTERIA #/AREA URNS AUTO: ABNORMAL /HPF
BASOPHILS # BLD AUTO: 0.03 X10(3)/MCL
BASOPHILS NFR BLD AUTO: 0.5 %
BILIRUB SERPL-MCNC: 0.7 MG/DL
BILIRUB UR QL STRIP.AUTO: NEGATIVE
BUN SERPL-MCNC: 16.5 MG/DL (ref 9.8–20.1)
CALCIUM SERPL-MCNC: 9.5 MG/DL (ref 8.4–10.2)
CHLORIDE SERPL-SCNC: 105 MMOL/L (ref 98–107)
CLARITY UR: ABNORMAL
CO2 SERPL-SCNC: 28 MMOL/L (ref 23–31)
COLOR UR AUTO: ABNORMAL
CREAT SERPL-MCNC: 1.76 MG/DL (ref 0.55–1.02)
CREAT UR-MCNC: 82.5 MG/DL (ref 45–106)
CREAT/UREA NIT SERPL: 9
EOSINOPHIL # BLD AUTO: 0.13 X10(3)/MCL (ref 0–0.9)
EOSINOPHIL NFR BLD AUTO: 2 %
ERYTHROCYTE [DISTWIDTH] IN BLOOD BY AUTOMATED COUNT: 15.7 % (ref 11.5–17)
GFR SERPLBLD CREATININE-BSD FMLA CKD-EPI: 31 ML/MIN/1.73/M2
GLOBULIN SER-MCNC: 4.7 GM/DL (ref 2.4–3.5)
GLUCOSE SERPL-MCNC: 155 MG/DL (ref 82–115)
GLUCOSE UR QL STRIP: ABNORMAL
HCT VFR BLD AUTO: 41.5 % (ref 37–47)
HGB BLD-MCNC: 12.3 G/DL (ref 12–16)
HGB UR QL STRIP: NEGATIVE
HYALINE CASTS #/AREA URNS LPF: ABNORMAL /LPF
IGA SERPL-MCNC: 398 MG/DL (ref 69–517)
IGG SERPL-MCNC: 1918 MG/DL (ref 522–1631)
IGM SERPL-MCNC: 105 MG/DL (ref 33–293)
IMM GRANULOCYTES # BLD AUTO: 0.02 X10(3)/MCL (ref 0–0.04)
IMM GRANULOCYTES NFR BLD AUTO: 0.3 %
KETONES UR QL STRIP: NEGATIVE
LEUKOCYTE ESTERASE UR QL STRIP: NEGATIVE
LYMPHOCYTES # BLD AUTO: 1.93 X10(3)/MCL (ref 0.6–4.6)
LYMPHOCYTES NFR BLD AUTO: 30 %
MAGNESIUM SERPL-MCNC: 2.1 MG/DL (ref 1.6–2.6)
MCH RBC QN AUTO: 22.2 PG (ref 27–31)
MCHC RBC AUTO-ENTMCNC: 29.6 G/DL (ref 33–36)
MCV RBC AUTO: 74.8 FL (ref 80–94)
MONOCYTES # BLD AUTO: 0.57 X10(3)/MCL (ref 0.1–1.3)
MONOCYTES NFR BLD AUTO: 8.9 %
MUCOUS THREADS URNS QL MICRO: ABNORMAL /LPF
NEUTROPHILS # BLD AUTO: 3.75 X10(3)/MCL (ref 2.1–9.2)
NEUTROPHILS NFR BLD AUTO: 58.3 %
NITRITE UR QL STRIP: NEGATIVE
NRBC BLD AUTO-RTO: 0 %
PH UR STRIP: 5.5 [PH]
PHOSPHATE SERPL-MCNC: 3.6 MG/DL (ref 2.3–4.7)
PLATELET # BLD AUTO: 246 X10(3)/MCL (ref 130–400)
PMV BLD AUTO: 10.6 FL (ref 7.4–10.4)
POTASSIUM SERPL-SCNC: 4.1 MMOL/L (ref 3.5–5.1)
PROT SERPL-MCNC: 8.2 GM/DL (ref 5.8–7.6)
PROT UR QL STRIP: ABNORMAL
PROT UR STRIP-MCNC: 28.2 MG/DL
PTH-INTACT SERPL-MCNC: 151.3 PG/ML (ref 8.7–77)
RBC # BLD AUTO: 5.55 X10(6)/MCL (ref 4.2–5.4)
RBC #/AREA URNS AUTO: ABNORMAL /HPF
SODIUM SERPL-SCNC: 140 MMOL/L (ref 136–145)
SP GR UR STRIP.AUTO: 1.02 (ref 1–1.03)
SQUAMOUS #/AREA URNS LPF: ABNORMAL /HPF
URINE PROTEIN/CREATININE RATIO (OLG): 0.3
UROBILINOGEN UR STRIP-ACNC: NORMAL
WBC # BLD AUTO: 6.43 X10(3)/MCL (ref 4.5–11.5)
WBC #/AREA URNS AUTO: ABNORMAL /HPF

## 2024-12-20 PROCEDURE — 84100 ASSAY OF PHOSPHORUS: CPT

## 2024-12-20 PROCEDURE — 85025 COMPLETE CBC W/AUTO DIFF WBC: CPT

## 2024-12-20 PROCEDURE — 86036 ANCA SCREEN EACH ANTIBODY: CPT

## 2024-12-20 PROCEDURE — 82784 ASSAY IGA/IGD/IGG/IGM EACH: CPT

## 2024-12-20 PROCEDURE — 82306 VITAMIN D 25 HYDROXY: CPT

## 2024-12-20 PROCEDURE — 81015 MICROSCOPIC EXAM OF URINE: CPT

## 2024-12-20 PROCEDURE — 86039 ANTINUCLEAR ANTIBODIES (ANA): CPT

## 2024-12-20 PROCEDURE — 99215 OFFICE O/P EST HI 40 MIN: CPT | Mod: PBBFAC | Performed by: INTERNAL MEDICINE

## 2024-12-20 PROCEDURE — 83970 ASSAY OF PARATHORMONE: CPT

## 2024-12-20 PROCEDURE — 86334 IMMUNOFIX E-PHORESIS SERUM: CPT

## 2024-12-20 PROCEDURE — 83735 ASSAY OF MAGNESIUM: CPT

## 2024-12-20 PROCEDURE — 36415 COLL VENOUS BLD VENIPUNCTURE: CPT

## 2024-12-20 PROCEDURE — 82570 ASSAY OF URINE CREATININE: CPT

## 2024-12-20 PROCEDURE — 80053 COMPREHEN METABOLIC PANEL: CPT

## 2024-12-20 PROCEDURE — 83521 IG LIGHT CHAINS FREE EACH: CPT

## 2024-12-20 NOTE — TELEPHONE ENCOUNTER
----- Message from Nurse Sapna sent at 12/20/2024 11:32 AM CST -----  Regarding: Refill  Patient requesting a refill of BYDUREON sent to [facility] in [place].    Thank you,  Sapna RAMOS

## 2024-12-20 NOTE — PROGRESS NOTES
[facility]  Nephrology Clinic Note    Chief complaint: Chronic Kidney Disease (Follow up)    History of present illness:   Elissa Freeman is a 69 y.o. Black or  female with past medical history of  CKD , hypertension, dyslipidemia, diabetes mellitus 2, renal cell carcinoma (status post left nephrectomy in 2011), adenocarcinoma of the colon (status post hemicolectomy in 2014), and morbid obesity.  Patient presents for follow-up appointment in Nephrology Clinic today. Patient has been feeling well since her last appointment. She reports compliance with her home medications. She reports good urine output along with good oral hydration since her last appointment. She continues to have worsening renal indices on her previous workup. She denies any fever, chills, SOB, chest pain, abdominal pain, dysuria, or incontinence. Patient has have history of solitary kidney due to renal cell carcinoma s/p left nephrectomy in 2011.     Review of Systems  12 point review of systems conducted, negative except as stated in the history of present illness.    Allergies: Patient has No Known Allergies.     Past Medical History:  has a past medical history of Asthma, Atrial fibrillation, Cancer, CKD (chronic kidney disease), Coronary artery disease, Diabetes mellitus, Hyperlipidemia, Hypertension, Obesity, unspecified, MOHSEN (obstructive sleep apnea), and Unspecified cataract.    Procedure History:  has a past surgical history that includes Colon surgery; Nephrectomy (Left); Cholecystectomy; hysterectomy, vaginal, with salpingo-oophorectomy; Tubal ligation; excision of lipoma; Cataract extraction (Left, 11/28/2022); Colonoscopy (N/A, 04/27/2023); Eye surgery; and Hysterectomy.    Family History: family history includes Asthma in her mother; Cancer in her brother; Coronary artery disease in her mother; Hyperlipidemia in her mother; Hypertension in her mother; Stroke in her father.    Social  "History:  reports that she has never smoked. She has never used smokeless tobacco. She reports that she does not currently use alcohol. She reports that she does not use drugs.    Physical exam  /76 (BP Location: Left arm, Patient Position: Sitting)   Pulse 89   Temp 98 °F (36.7 °C) (Oral)   Resp 20   Ht 5' 2" (1.575 m)   Wt (!) 148 kg (326 lb 4.5 oz)   SpO2 98%   BMI 59.68 kg/m²   General appearance: Patient is in no acute distress.  Skin: No rashes or wounds.  HEENT: PERRLA, EOMI, no scleral icterus, no JVD. Neck is supple.  Chest: Respirations are unlabored. Lungs sounds are clear.   Heart: S1, S2.   Abdomen: Benign.  : Deferred.  Extremities: No edema, peripheral pulses are palpable.   Neuro: No focal deficits.     Home Medications:    Current Outpatient Medications:     acetaminophen (TYLENOL EXTRA STRENGTH) 500 MG tablet, Take 1,000 mg by mouth every 6 (six) hours as needed for Pain., Disp: , Rfl:     albuterol (PROVENTIL/VENTOLIN HFA) 90 mcg/actuation inhaler, Inhale 1 puff into the lungs every 4 (four) hours as needed for Wheezing., Disp: 18 g, Rfl: 3    amLODIPine (NORVASC) 10 MG tablet, Take 10 mg by mouth once daily., Disp: , Rfl:     atorvastatin (LIPITOR) 80 MG tablet, Take 1 tablet (80 mg total) by mouth once daily., Disp: 90 tablet, Rfl: 3    brimonidine 0.2% (ALPHAGAN) 0.2 % Drop, Place 1 drop into both eyes 2 (two) times daily., Disp: , Rfl:     carvediloL (COREG) 3.125 MG tablet, Take 1 tablet (3.125 mg total) by mouth 2 (two) times daily., Disp: 60 tablet, Rfl: 11    diltiaZEM (CARDIZEM CD) 180 MG 24 hr capsule, Take 1 capsule (180 mg total) by mouth every morning., Disp: 30 capsule, Rfl: 11    DULoxetine (CYMBALTA) 60 MG capsule, Take 1 capsule (60 mg total) by mouth once daily. TAKE 1 CAPSULE BY MOUTH DAILY. DO NOT CRUSH OR CHEW, Disp: 30 capsule, Rfl: 2    empagliflozin (JARDIANCE) 25 mg tablet, Take 1 tablet (25 mg total) by mouth once daily., Disp: 90 tablet, Rfl: 3    flash " glucose scanning reader (FREESTYLE SMITHA 2 READER) Misc, 1 each by Misc.(Non-Drug; Combo Route) route 4 (four) times daily with meals and nightly., Disp: 1 each, Rfl: 0    flash glucose sensor (FREESTYLE SMITHA 2 SENSOR) Kit, 1 each by Misc.(Non-Drug; Combo Route) route 2 hours after meals and at bedtime., Disp: 1 kit, Rfl: 0    gabapentin (NEURONTIN) 600 MG tablet, Take 1 tablet (600 mg total) by mouth every morning., Disp: 90 tablet, Rfl: 3    hydrALAZINE (APRESOLINE) 50 MG tablet, Take 1 tablet (50 mg total) by mouth every 8 (eight) hours., Disp: 90 tablet, Rfl: 11    insulin aspart U-100 (NOVOLOG FLEXPEN U-100 INSULIN) 100 unit/mL (3 mL) InPn pen, Inject 20 Units into the skin 3 (three) times daily with meals. (Patient taking differently: Inject 15 Units into the skin 3 (three) times daily with meals.), Disp: 15 mL, Rfl: 11    insulin glargine U-100, Lantus, (LANTUS SOLOSTAR U-100 INSULIN) 100 unit/mL (3 mL) InPn pen, Inject 65 Units into the skin every evening., Disp: 18 mL, Rfl: 11    isosorbide mononitrate (IMDUR) 30 MG 24 hr tablet, Take 1 tablet (30 mg total) by mouth once daily., Disp: 60 tablet, Rfl: 1    latanoprost 0.005 % ophthalmic solution, Place 1 drop into both eyes once daily., Disp: 2.5 mL, Rfl: 1    losartan-hydrochlorothiazide 50-12.5 mg (HYZAAR) 50-12.5 mg per tablet, Take 1 tablet by mouth once daily., Disp: 90 tablet, Rfl: 3    melatonin (MELATIN) 5 mg, Take 1 tablet (5 mg total) by mouth nightly as needed for Insomnia., Disp: 90 tablet, Rfl: 3    nitroGLYCERIN (NITROSTAT) 0.4 MG SL tablet, Place 1 tablet (0.4 mg total) under the tongue every 5 (five) minutes as needed for Chest pain., Disp: 30 tablet, Rfl: 0    potassium chloride (K-TAB) 20 mEq, TAKE ONE TABLET BY MOUTH DAILY AT 9 AM (VIAL), Disp: 90 tablet, Rfl: 11    sodium chloride 5% (NIMA 128) 5 % ophthalmic solution, Place 1 drop into both eyes as needed., Disp: , Rfl:     vitamin D (VITAMIN D3) 1000 units Tab, Take 2 tablets (2,000  Units total) by mouth Daily., Disp: 60 tablet, Rfl: 11    warfarin (COUMADIN) 5 MG tablet, Take 0.5 tablets (2.5 mg total) by mouth Daily., Disp: 15 tablet, Rfl: 11    cetirizine (ZYRTEC) 10 MG tablet, Take 1 tablet (10 mg total) by mouth once daily. (Patient not taking: Reported on 9/4/2024), Disp: 30 tablet, Rfl: 0    exenatide microspheres (BYDUREON BCISE) 2 mg/0.85 mL AtIn, Inject 2 mg into the skin every 7 days. (Patient not taking: Reported on 12/20/2024), Disp: 4 pen , Rfl: 3    ketorolac 0.5% (ACULAR) 0.5 % Drop, Place 1 drop into the left eye 2 (two) times daily. (Patient not taking: Reported on 12/20/2024), Disp: , Rfl:     nebulizer accessories Kit, 1 each by Misc.(Non-Drug; Combo Route) route 4 (four) times daily as needed (wheezing). (Patient not taking: Reported on 12/20/2024), Disp: 1 kit, Rfl: 0    ofloxacin (OCUFLOX) 0.3 % ophthalmic solution, Place 1 drop into the right eye 4 (four) times daily. (Patient not taking: Reported on 12/20/2024), Disp: , Rfl:     prednisoLONE acetate (PRED FORTE) 1 % DrpS, Place 1 drop into the right eye 5 (five) times daily. (Patient not taking: Reported on 12/20/2024), Disp: , Rfl:     Laboratory data    Lab Results   Component Value Date    WBC 7.87 08/29/2024    HGB 12.8 08/29/2024    HCT 42.7 08/29/2024     08/29/2024    IRON 54 10/05/2023    TIBC 247 (L) 10/05/2023    LABIRON 22 10/05/2023    FERRITIN 83.25 10/05/2023     08/29/2024    K 4.2 08/29/2024    CO2 29 08/29/2024    BUN 14.8 08/29/2024    CREATININE 1.84 (H) 08/29/2024    EGFRNORACEVR 30 08/29/2024    GLUCOSE 244 (H) 08/29/2024    CALCIUM 9.8 08/29/2024    ALKPHOS 162 (H) 08/29/2024    LABPROT 8.3 (H) 08/29/2024    ALBUMIN 3.3 (L) 08/29/2024    BILIDIR 0.3 02/11/2022    IBILI 0.20 02/11/2022    AST 16 08/29/2024    ALT 17 08/29/2024    MG 2.10 11/17/2023    PHOS 3.7 08/29/2024      Lab Results   Component Value Date    HGBA1C 7.6 (H) 11/16/2023    .0 (H) 08/29/2024    HCWNVFDT97YV 34.8  08/01/2022    HIV Nonreactive 01/17/2023    HEPCAB Nonreactive 06/06/2023     Urine:  Lab Results   Component Value Date    APPEARANCEUA Clear 08/29/2024    SGUA 1.017 08/29/2024    PROTEINUA Trace (A) 08/29/2024    KETONESUA Negative 08/29/2024    LEUKOCYTESUR Negative 08/29/2024    RBCUA 0-5 08/29/2024    WBCUA 0-5 08/29/2024    BACTERIA None Seen 08/29/2024    SQEPUA Few (A) 08/29/2024    HYALINECASTS None Seen 08/29/2024    CREATRANDUR 46.9 08/29/2024    PROTEINURINE 20.7 08/29/2024    UPROTCREA 0.4 08/29/2024         Imaging  US Retroperitoneal Complete With Doppler (XPD) 11/17/2023    Grayscale and color Doppler sonographic evaluation of the kidneys and urinary bladder.  Right kidney measures 9.5 cm in length.  No hydronephrosis.  Normal renal parenchymal echogenicity.  Resistive indices are not significantly elevated.  Main renal artery velocity not significantly elevated.  Patent renal vein.  The left kidney is absent.  Urinary bladder unremarkable.    Impression  No significant sonographic abnormality of the right kidney.  Left kidney absent.  Electronically signed by: Augusto Dempsey  Date:    11/17/2023  Time:    12:52      Impression    ICD-10-CM ICD-9-CM   1. CKD stage G3b/A3, GFR 30-44 and albumin creatinine ratio >300 mg/g  N18.32 585.3   2. Renal cell carcinoma of left kidney  C64.2 189.0   3. Diabetes mellitus type 2 in obese  E11.69 250.00    E66.9 278.00   4. Morbid obesity due to excess calories  E66.01 278.01   5. Pulmonary hypertension  I27.20 416.8        Plan    CKD stage G3b/A3, GFR 30-44 and albumin creatinine ratio >300 mg/g  Diabetic kidney disease/reduced renal mass.  Continue aggressive risk factor management, MAHI blockade, and SGLT 2 inhibitor.   Continue renal sparing activities:  -2 g a day dietary sodium restriction  -optimize glycemic control (goal A1c is less than 7%)  -control high blood pressure (goal blood pressure is less than 130/80, patient was advised to check blood pressure  once or twice a week and bring blood pressure logs to next office visit)  -exercise at least 30 minutes a day, 5 days a week  -maintain healthy weight  -stay well hydrated (drink water only, avoid juices, sweet tea, and sodas)  -ask about staying up-to-date on vaccinations (flu vaccine, pneumonia vaccine, hepatitis B vaccine)  -avoid excessive use of NSAIDs (ibuprofen, naproxen, Aleve, Advil, Toradol, Mobic), take Tylenol as needed for headache or mild pain  -take cholesterol lowering medications if prescribed (LDL goal less than 100)  -Hx of previous nephrectomy in 2011 due to RCC; no longer follows with Heme/Onc  -noted progressive worsening renal function; could be related to hypertension/diabetes  -Will hold off on kidney biopsy due to history of solitary kidney  -will order additional workup including SPEP, UPEP, NOA, ANCA, and retroperitoneal US along with usual blood work  -case discussed with PCP with plans to obtain further workup with close follow up once results have come back    Primary hypertension  Blood pressure reading is at goal, continue current antihypertensive regimen and 2 g a day dietary sodium restriction.      Type 2 diabetes mellitus with stage 3b chronic kidney disease, with long-term current use of insulin  A1C is above goal. Continue SGLT2i and GLP1ra.     Severe obesity (BMI >= 40)  Lifestyle and dietary interventions discussed, patient encouraged to maintain non-sedentary lifestyle and well-balanced diet.     Orders Placed This Encounter   Procedures    US Retroperitoneal Complete     Standing Status:   Future     Standing Expiration Date:   12/20/2025     Order Specific Question:   May the Radiologist modify the order per protocol to meet the clinical needs of the patient?     Answer:   Yes     Order Specific Question:   Release to patient     Answer:   Immediate    CBC Auto Differential     Standing Status:   Future     Standing Expiration Date:   2/18/2026    Comprehensive Metabolic  Panel     Standing Status:   Future     Standing Expiration Date:   2/18/2026    Magnesium     Standing Status:   Future     Standing Expiration Date:   2/18/2026    Phosphorus     Standing Status:   Future     Standing Expiration Date:   2/18/2026    Protein/Creatinine Ratio, Urine     Standing Status:   Future     Standing Expiration Date:   2/18/2026     Order Specific Question:   Specimen Source     Answer:   Urine    PTH, Intact     Standing Status:   Future     Standing Expiration Date:   2/18/2026    Vitamin D     Standing Status:   Future     Standing Expiration Date:   2/18/2026    Protein, 24 Hr Urine     Standing Status:   Future     Number of Occurrences:   1     Standing Expiration Date:   12/20/2025     Order Specific Question:   Specimen Source     Answer:   Urine     Order Specific Question:   Send normal result to authorizing provider's In Basket if patient is active on MyChart:     Answer:   Yes    Immunofixation & Protein Electrophoresis & Immunoglobulins     Standing Status:   Future     Standing Expiration Date:   3/20/2026     Order Specific Question:   Send normal result to authorizing provider's In Basket if patient is active on MyChart:     Answer:   Yes    Protein electrophoresis, timed urine     Standing Status:   Future     Number of Occurrences:   1     Standing Expiration Date:   12/20/2025     Order Specific Question:   Specimen Source     Answer:   Urine    NOA IgG by IFA     Standing Status:   Future     Standing Expiration Date:   3/20/2026     Order Specific Question:   Send normal result to authorizing provider's In Basket if patient is active on MyChart:     Answer:   Yes    Anti-Neutrophilic Cytoplasmic Antibody     Standing Status:   Future     Standing Expiration Date:   3/20/2026     Order Specific Question:   Send normal result to authorizing provider's In Basket if patient is active on MyChart:     Answer:   Yes       Fam Hurtado MD  [facility]  [facility]

## 2024-12-20 NOTE — TELEPHONE ENCOUNTER
Refill request for exenatide microspheres (BYTORI ERNST) 2 mg/0.85 mL AtIn in chart. Next appt 02/11/2025.

## 2024-12-21 LAB
ANA PAT SER IF-IMP: ABNORMAL
ANA SER QL HEP2 SUBST: ABNORMAL
ANA TITR SER HEP2 SUBST: ABNORMAL {TITER}

## 2024-12-23 LAB
ALBUMIN % SPEP (OHS): 40.58 (ref 48.1–59.5)
ALBUMIN SERPL-MCNC: 3 G/DL (ref 3.4–4.8)
ALBUMIN/GLOB SERPL: 0.7 RATIO (ref 1.1–2)
ALPHA 1 GLOB (OHS): 0.25 GM/DL (ref 0–0.4)
ALPHA 1 GLOB% (OHS): 3.43 (ref 2.3–4.9)
ALPHA 2 GLOB % (OHS): 11.95 (ref 6.9–13)
ALPHA 2 GLOB (OHS): 0.88 GM/DL (ref 0.4–1)
BETA GLOB (OHS): 1.31 GM/DL (ref 0.7–1.3)
BETA GLOB% (OHS): 17.69 (ref 13.8–19.7)
C-ANCA TITR SER IF: NEGATIVE {TITER}
GAMMA GLOBULIN % (OHS): 26.36 (ref 10.1–21.9)
GAMMA GLOBULIN (OHS): 1.95 GM/DL (ref 0.4–1.8)
GLOBULIN SER-MCNC: 4.4 GM/DL (ref 2.4–3.5)
KAPPA LC FREE SER NEPH-MCNC: 5.29 MG/DL (ref 0.33–1.94)
KAPPA LC FREE/LAMBDA FREE SER NEPH: 1.17 {RATIO} (ref 0.26–1.65)
LAMBDA LC FREE SERPL-MCNC: 4.51 MG/DL (ref 0.57–2.63)
M SPIKE % (OHS): ABNORMAL
M SPIKE (OHS): ABNORMAL
P-ANCA SER QL IF: NEGATIVE
PATH REV: NORMAL
PROT SERPL-MCNC: 7.4 GM/DL (ref 5.8–7.6)

## 2024-12-24 DIAGNOSIS — E11.42 DIABETIC POLYNEUROPATHY ASSOCIATED WITH TYPE 2 DIABETES MELLITUS: ICD-10-CM

## 2024-12-26 RX ORDER — DULOXETIN HYDROCHLORIDE 60 MG/1
60 CAPSULE, DELAYED RELEASE ORAL DAILY
Qty: 30 CAPSULE | Refills: 2 | Status: SHIPPED | OUTPATIENT
Start: 2024-12-26 | End: 2025-03-26

## 2024-12-27 ENCOUNTER — APPOINTMENT (OUTPATIENT)
Dept: LAB | Facility: HOSPITAL | Age: 69
End: 2024-12-27
Payer: MEDICARE

## 2024-12-31 ENCOUNTER — ANTI-COAG VISIT (OUTPATIENT)
Dept: CARDIOLOGY | Facility: HOSPITAL | Age: 69
End: 2024-12-31
Payer: MEDICARE

## 2024-12-31 DIAGNOSIS — Z79.01 ANTICOAGULATION MONITORING, INR RANGE 2-3: ICD-10-CM

## 2024-12-31 DIAGNOSIS — I26.99 PULMONARY EMBOLISM, OTHER, UNSPECIFIED CHRONICITY, UNSPECIFIED WHETHER ACUTE COR PULMONALE PRESENT: Primary | ICD-10-CM

## 2024-12-31 DIAGNOSIS — J45.909 ASTHMA, UNSPECIFIED ASTHMA SEVERITY, UNSPECIFIED WHETHER COMPLICATED, UNSPECIFIED WHETHER PERSISTENT: ICD-10-CM

## 2024-12-31 LAB
CTP QC/QA: YES
INR PPP: 1 (ref 2–3)
PROTHROMBIN TIME, POC: ABNORMAL (ref 2–3)

## 2024-12-31 PROCEDURE — 85610 PROTHROMBIN TIME: CPT

## 2024-12-31 NOTE — PROGRESS NOTES
POC pt/inr performed.  Pt will take 5 mg of warfarin daily for 3 days, then will resume dosing.  Pt states she called her pharmacy to refill warfarin and was denied stating it was too early to fill.  Prescription authorization was called in on 10/3/2024 for 20 tablets for times 6 refills.  She should have plenty.  Will call pharmacy to clarify.  She has missed several warfarin doses.

## 2025-01-01 RX ORDER — ALBUTEROL SULFATE 90 UG/1
1 INHALANT RESPIRATORY (INHALATION) EVERY 4 HOURS PRN
Qty: 18 G | Refills: 3 | Status: SHIPPED | OUTPATIENT
Start: 2025-01-01

## 2025-01-02 DIAGNOSIS — R76.8 POSITIVE ANA (ANTINUCLEAR ANTIBODY): Primary | ICD-10-CM

## 2025-01-02 NOTE — PROGRESS NOTES
Patient blood work came back positive for NOA along with elevated protein in urine. Patient also had elevated IgG levels, free light chains, along with abnormal SPEP without M spike. Will order additional KANDY panel to help delineate cause of elevated NOA and consider Rheumatology referral once results return.    Fam Hurtado MD  \[facility] [place] [facility]\"" Internal Medicine, [facility]

## 2025-01-07 DIAGNOSIS — J45.909 ASTHMA, UNSPECIFIED ASTHMA SEVERITY, UNSPECIFIED WHETHER COMPLICATED, UNSPECIFIED WHETHER PERSISTENT: ICD-10-CM

## 2025-01-07 DIAGNOSIS — I20.89 ANGINA OF EFFORT: ICD-10-CM

## 2025-01-08 RX ORDER — ISOSORBIDE MONONITRATE 30 MG/1
30 TABLET, EXTENDED RELEASE ORAL DAILY
Qty: 60 TABLET | Refills: 1 | Status: SHIPPED | OUTPATIENT
Start: 2025-01-08 | End: 2025-05-08

## 2025-01-08 RX ORDER — ALBUTEROL SULFATE 90 UG/1
1 INHALANT RESPIRATORY (INHALATION) EVERY 4 HOURS PRN
Qty: 18 G | Refills: 3 | Status: SHIPPED | OUTPATIENT
Start: 2025-01-08

## 2025-01-09 ENCOUNTER — ANTI-COAG VISIT (OUTPATIENT)
Dept: CARDIOLOGY | Facility: HOSPITAL | Age: 70
End: 2025-01-09
Payer: MEDICARE

## 2025-01-09 ENCOUNTER — HOSPITAL ENCOUNTER (OUTPATIENT)
Dept: RADIOLOGY | Facility: HOSPITAL | Age: 70
Discharge: HOME | End: 2025-01-09
Payer: MEDICARE

## 2025-01-09 DIAGNOSIS — Z79.01 ANTICOAGULATION MONITORING, INR RANGE 2-3: ICD-10-CM

## 2025-01-09 DIAGNOSIS — I26.99 PULMONARY EMBOLISM, OTHER, UNSPECIFIED CHRONICITY, UNSPECIFIED WHETHER ACUTE COR PULMONALE PRESENT: Primary | ICD-10-CM

## 2025-01-09 DIAGNOSIS — N18.32 CKD STAGE G3B/A3, GFR 30-44 AND ALBUMIN CREATININE RATIO >300 MG/G: ICD-10-CM

## 2025-01-09 LAB
CTP QC/QA: YES
INR PPP: 1.6 (ref 2–3)
PROTHROMBIN TIME, POC: ABNORMAL (ref 2–3)

## 2025-01-09 PROCEDURE — 99211 OFF/OP EST MAY X REQ PHY/QHP: CPT | Mod: 25

## 2025-01-09 PROCEDURE — 76770 US EXAM ABDO BACK WALL COMP: CPT | Mod: TC

## 2025-01-09 PROCEDURE — 85610 PROTHROMBIN TIME: CPT

## 2025-01-09 NOTE — PROGRESS NOTES
POC pt/inr performed.  Pt will take 5 mg of warfarin today, then will resume with increased dosing.  Please see anticoagulation calendar.

## 2025-01-28 ENCOUNTER — ANTI-COAG VISIT (OUTPATIENT)
Dept: CARDIOLOGY | Facility: HOSPITAL | Age: 70
End: 2025-01-28
Payer: MEDICARE

## 2025-01-28 ENCOUNTER — OFFICE VISIT (OUTPATIENT)
Dept: CARDIOLOGY | Facility: CLINIC | Age: 70
End: 2025-01-28
Payer: MEDICARE

## 2025-01-28 VITALS
RESPIRATION RATE: 20 BRPM | HEART RATE: 90 BPM | DIASTOLIC BLOOD PRESSURE: 82 MMHG | TEMPERATURE: 98 F | HEIGHT: 62 IN | WEIGHT: 293 LBS | OXYGEN SATURATION: 96 % | BODY MASS INDEX: 53.92 KG/M2 | SYSTOLIC BLOOD PRESSURE: 125 MMHG

## 2025-01-28 DIAGNOSIS — I26.99 PULMONARY EMBOLISM, OTHER, UNSPECIFIED CHRONICITY, UNSPECIFIED WHETHER ACUTE COR PULMONALE PRESENT: Primary | ICD-10-CM

## 2025-01-28 DIAGNOSIS — I25.10 CORONARY ARTERY DISEASE, UNSPECIFIED VESSEL OR LESION TYPE, UNSPECIFIED WHETHER ANGINA PRESENT, UNSPECIFIED WHETHER NATIVE OR TRANSPLANTED HEART: Primary | ICD-10-CM

## 2025-01-28 DIAGNOSIS — Z79.01 ANTICOAGULATION MONITORING, INR RANGE 2-3: ICD-10-CM

## 2025-01-28 LAB
CTP QC/QA: YES
INR PPP: 1.3 (ref 2–3)
PROTHROMBIN TIME, POC: ABNORMAL (ref 2–3)

## 2025-01-28 PROCEDURE — 93005 ELECTROCARDIOGRAM TRACING: CPT

## 2025-01-28 PROCEDURE — 85610 PROTHROMBIN TIME: CPT

## 2025-01-28 PROCEDURE — 99215 OFFICE O/P EST HI 40 MIN: CPT | Mod: PBBFAC | Performed by: INTERNAL MEDICINE

## 2025-01-28 RX ORDER — LOSARTAN POTASSIUM 100 MG/1
100 TABLET ORAL DAILY
COMMUNITY
Start: 2024-08-29

## 2025-01-28 RX ORDER — FUROSEMIDE 20 MG/1
20 TABLET ORAL EVERY MORNING
COMMUNITY
Start: 2024-12-24

## 2025-01-28 NOTE — PROGRESS NOTES
POC pt/inr performed.  Pt will take warfarin 7.5 mg today, then will resume with increased dosing.  Dosing calendar filled out, and given to pt for accuracy.

## 2025-01-28 NOTE — PROGRESS NOTES
Cardiology clinic Note     CHIEF COMPLAINT:   Chief Complaint   Patient presents with    f/u states has had some chest pain has DUBOIS no questions                    Review of patient's allergies indicates:  No Known Allergies                                       HPI:  Elissa Freeman 69 y.o. female PMHx HTN, HLD, DM, CKD3, A. Fib on warfarin, HFmrEF (EF 45%), stable angina, and hemorrhagic stroke in 2023 who presents to clinic for follow up. Follows coumadin clinic. Patient reports feeling overall well and reports having chest pain 2x per month when she over exerts herself in housework or walks further than 1 block. Also has some SOB at baseline and with walking more than 1 block or so. Patient reports that her chest pain is sharp and substernal, and only lasts less than 1 minute, and is immediately resolved by nitroglycerin. Denies swelling, palpitations, syncope. Has reported LHC in 2021 with non-obstructive CAD, and normal lexiscan in 01/2024. Last echo in 10/2023 with EF 40-45% and grade 1 diastolic dysfunction.                                                                                                                                                                                                                                                                                                                                                                                                                                                                                      CARDIAC TESTING:  No results found for this or any previous visit.    Results for orders placed during the hospital encounter of 01/30/24    Nuclear Stress - Cardiology Interpreted    Interpretation Summary    Normal myocardial perfusion scan. There is no evidence of myocardial ischemia or infarction.    The gated perfusion images showed an ejection fraction of 54% at rest. The gated perfusion images showed an ejection fraction of 59% post  stress.    The patient reported no chest pain during the stress test.    The nuclear stress test is  fair quality.  On the nuclear stress test the EF is normal.  If the patient has an echo, the EF on the echo is considered more accurate.    The patient has a low risk nuclear stress test    Nuclear stress test indicates no ischemia.     Results for orders placed in visit on 09/29/21    CATH LAB PROCEDURE       Patient Active Problem List   Diagnosis    Ischemic heart disease, chronic    Primary hypertension    Hyperlipidemia associated with type 2 diabetes mellitus    Diabetes mellitus type 2 in obese    MOHSEN on CPAP    Renal cell carcinoma of left kidney    Adenocarcinoma of colon    History of malignant neoplasm of kidney    Depression    Diabetes mellitus    Hypercholesterolemia    Kidney disorder    Mass of colon    Morbid obesity due to excess calories    Neuropathy    Pulmonary hypertension    Dyspnea    Personal history of colon cancer    Microcytic anemia    Hypertensive emergency    Subarachnoid hemorrhage     Past Surgical History:   Procedure Laterality Date    CATARACT EXTRACTION Left 11/28/2022    CHOLECYSTECTOMY      COLON SURGERY      COLONOSCOPY N/A 04/27/2023    Procedure: COLONOSCOPY;  Surgeon: Jose Miguel Rosales MD;  Location: [facility] ENDOSCOPY;  Service: Endoscopy;  Laterality: N/A;    excision of lipoma      EYE SURGERY      HYSTERECTOMY      HYSTERECTOMY, VAGINAL, WITH SALPINGO-OOPHORECTOMY      NEPHRECTOMY Left     TUBAL LIGATION       Social History     Socioeconomic History    Marital status:    Occupational History    Occupation: Disabled   Tobacco Use    Smoking status: Never    Smokeless tobacco: Never   Substance and Sexual Activity    Alcohol use: Not Currently     Comment: frozen drinks once a month, giovanni    Drug use: Never    Sexual activity: Not Currently     Social Drivers of Health     Financial Resource Strain: Medium Risk (11/23/2022)    Overall Financial Resource Strain  (CARDIA)     Difficulty of Paying Living Expenses: Somewhat hard   Food Insecurity: Food Insecurity Present (11/23/2022)    Hunger Vital Sign     Worried About Running Out of Food in the Last Year: Sometimes true     Ran Out of Food in the Last Year: Sometimes true   Transportation Needs: No Transportation Needs (9/5/2024)    TRANSPORTATION NEEDS     Transportation : No   Physical Activity: Inactive (9/5/2024)    Exercise Vital Sign     Days of Exercise per Week: 0 days     Minutes of Exercise per Session: 0 min   Stress: No Stress Concern Present (11/23/2022)    [employer] [place] [employer] - Occupational Stress Questionnaire     Feeling of Stress : Not at all   Housing Stability: Low Risk  (9/5/2024)    Housing Stability Vital Sign     Unable to Pay for Housing in the Last Year: No     Homeless in the Last Year: No        Family History   Problem Relation Name Age of Onset    Hypertension Mother Fawn     Coronary artery disease Mother Fawn     Hyperlipidemia Mother Fawn     Asthma Mother Fawn     Stroke Father Gradnigo     Cancer Brother Anil Bangura          Current Outpatient Medications:     acetaminophen (TYLENOL EXTRA STRENGTH) 500 MG tablet, Take 1,000 mg by mouth every 6 (six) hours as needed for Pain., Disp: , Rfl:     albuterol (PROVENTIL/VENTOLIN HFA) 90 mcg/actuation inhaler, Inhale 1 puff into the lungs every 4 (four) hours as needed for Wheezing., Disp: 18 g, Rfl: 3    amLODIPine (NORVASC) 10 MG tablet, Take 10 mg by mouth once daily., Disp: , Rfl:     atorvastatin (LIPITOR) 80 MG tablet, Take 1 tablet (80 mg total) by mouth once daily., Disp: 90 tablet, Rfl: 3    carvediloL (COREG) 3.125 MG tablet, Take 1 tablet (3.125 mg total) by mouth 2 (two) times daily., Disp: 60 tablet, Rfl: 11    cetirizine (ZYRTEC) 10 MG tablet, Take 1 tablet (10 mg total) by mouth once daily., Disp: 30 tablet, Rfl: 0    diltiaZEM (CARDIZEM CD) 180 MG 24 hr capsule, Take 1 capsule (180 mg total) by mouth  every morning., Disp: 30 capsule, Rfl: 11    DULoxetine (CYMBALTA) 60 MG capsule, Take 1 capsule (60 mg total) by mouth once daily. TAKE 1 CAPSULE BY MOUTH DAILY. DO NOT CRUSH OR CHEW, Disp: 30 capsule, Rfl: 2    empagliflozin (JARDIANCE) 25 mg tablet, Take 1 tablet (25 mg total) by mouth once daily., Disp: 90 tablet, Rfl: 3    exenatide microspheres (BYDUREON BCISE) 2 mg/0.85 mL AtIn, Inject 2 mg into the skin every 7 days., Disp: 12 pen , Rfl: 3    furosemide (LASIX) 20 MG tablet, Take 20 mg by mouth every morning., Disp: , Rfl:     gabapentin (NEURONTIN) 600 MG tablet, Take 1 tablet (600 mg total) by mouth every morning., Disp: 90 tablet, Rfl: 3    hydrALAZINE (APRESOLINE) 50 MG tablet, Take 1 tablet (50 mg total) by mouth every 8 (eight) hours., Disp: 90 tablet, Rfl: 11    insulin aspart U-100 (NOVOLOG FLEXPEN U-100 INSULIN) 100 unit/mL (3 mL) InPn pen, Inject 20 Units into the skin 3 (three) times daily with meals., Disp: 15 mL, Rfl: 11    insulin glargine U-100, Lantus, (LANTUS SOLOSTAR U-100 INSULIN) 100 unit/mL (3 mL) InPn pen, Inject 65 Units into the skin every evening., Disp: 18 mL, Rfl: 11    isosorbide mononitrate (IMDUR) 30 MG 24 hr tablet, Take 1 tablet (30 mg total) by mouth once daily., Disp: 60 tablet, Rfl: 1    losartan (COZAAR) 100 MG tablet, Take 100 mg by mouth once daily., Disp: , Rfl:     losartan-hydrochlorothiazide 50-12.5 mg (HYZAAR) 50-12.5 mg per tablet, Take 1 tablet by mouth once daily., Disp: 90 tablet, Rfl: 3    melatonin (MELATIN) 5 mg, Take 1 tablet (5 mg total) by mouth nightly as needed for Insomnia., Disp: 90 tablet, Rfl: 3    nitroGLYCERIN (NITROSTAT) 0.4 MG SL tablet, Place 1 tablet (0.4 mg total) under the tongue every 5 (five) minutes as needed for Chest pain., Disp: 30 tablet, Rfl: 0    potassium chloride (K-TAB) 20 mEq, TAKE ONE TABLET BY MOUTH DAILY AT 9 AM (VIAL), Disp: 90 tablet, Rfl: 11    vitamin D (VITAMIN D3) 1000 units Tab, Take 2 tablets (2,000 Units total) by  "mouth Daily., Disp: 60 tablet, Rfl: 11    warfarin (COUMADIN) 5 MG tablet, Take 0.5 tablets (2.5 mg total) by mouth Daily., Disp: 15 tablet, Rfl: 11    brimonidine 0.2% (ALPHAGAN) 0.2 % Drop, Place 1 drop into both eyes 2 (two) times daily., Disp: , Rfl:     flash glucose scanning reader (FREESTYLE SMITHA 2 READER) Misc, 1 each by Misc.(Non-Drug; Combo Route) route 4 (four) times daily with meals and nightly., Disp: 1 each, Rfl: 0    flash glucose sensor (FREESTYLE SMITHA 2 SENSOR) Kit, 1 each by Misc.(Non-Drug; Combo Route) route 2 hours after meals and at bedtime., Disp: 1 kit, Rfl: 0    ketorolac 0.5% (ACULAR) 0.5 % Drop, Place 1 drop into the left eye 2 (two) times daily. (Patient not taking: Reported on 12/20/2024), Disp: , Rfl:     latanoprost 0.005 % ophthalmic solution, Place 1 drop into both eyes once daily., Disp: 2.5 mL, Rfl: 1    nebulizer accessories Kit, 1 each by Misc.(Non-Drug; Combo Route) route 4 (four) times daily as needed (wheezing). (Patient not taking: Reported on 12/20/2024), Disp: 1 kit, Rfl: 0    ofloxacin (OCUFLOX) 0.3 % ophthalmic solution, Place 1 drop into the right eye 4 (four) times daily. (Patient not taking: Reported on 12/20/2024), Disp: , Rfl:     prednisoLONE acetate (PRED FORTE) 1 % DrpS, Place 1 drop into the right eye 5 (five) times daily. (Patient not taking: Reported on 12/20/2024), Disp: , Rfl:     sodium chloride 5% (NIMA 128) 5 % ophthalmic solution, Place 1 drop into both eyes as needed., Disp: , Rfl:      ROS:                                                                                                                                                                             + for chest pain, SOB  - for LE swelling      Vitals:  Blood pressure 125/82, pulse 90, temperature 98.1 °F (36.7 °C), temperature source Oral, resp. rate 20, height 5' 2" (1.575 m), weight (!) 140.4 kg (309 lb 9.6 oz), SpO2 96%.       PE:  Physical Exam  Constitutional:       Appearance: Normal " appearance.   HENT:      Head: Normocephalic and atraumatic.   Eyes:      Extraocular Movements: Extraocular movements intact.      Conjunctiva/sclera: Conjunctivae normal.      Pupils: Pupils are equal, round, and reactive to light.   Cardiovascular:      Rate and Rhythm: Normal rate and regular rhythm.      Heart sounds: No murmur heard.     No gallop.   Pulmonary:      Breath sounds: Normal breath sounds. No wheezing.   Abdominal:      General: Abdomen is flat. Bowel sounds are normal.      Palpations: Abdomen is soft.   Musculoskeletal:         General: No swelling (Bilateral LE edema). Normal range of motion.      Cervical back: Normal range of motion and neck supple.   Skin:     General: Skin is warm and dry.      Capillary Refill: Capillary refill takes less than 2 seconds.   Neurological:      Mental Status: She is alert.         ASSESSMENT/PLAN:    Stable Angina   - [facility] 9/21:  No CAD  - Lexiscan 4/21:  Anterolateral and inferior ischemia  - Lexiscan 1/9/24 - normal myocardial perfusion scan  - TTE 4/21:  Left ventricular ejection fraction 50-55%  - Continue Imdur 30 and Nitroglycerin prn  - will repeat echo before next visit        Atrial Fibrillation  - Diagnosed at recent hospitalization (12/2023) at [facility]  - TSH WNL  - At this time patient rate controlled w/ Coreg 3.125 & Diltiazem 180  - Per PCP, on Warfarin and is scheduled with Coumadin clinic   - EKG with afib on this visit, pressure is stable, Rate controlled        HFmrEF w/ Grade I DD  - TTE (12/2023) w/ findings of LVEF (45%) w/ Grade I Diastolic Dysfunction  - Not on Spironolactone due to CKD  - Continue current GDMT w/ Coreg 3.125, Jardiance 25, and Hyzaar 50-12.5  - Continue Lasix 20       Hypertension, well controlled  - BP today in clinic (125/82)  - Continue Hydralazine 50, Amlodipine 10, Coreg 3.125, and Lasix 20 mg q.d.       Hyperlipidemia  - Lipid panel WNL (8/29/24)  - continue lipitor 80 mg  - Stressed importance of  diet and excercise as tolerated       Diabetes mellitus  - Management per PCP       MOHSEN with CPAP  - Management per PCP    RTC: 4 months     Mia Grimm MD PGY-1  [facility]

## 2025-01-28 NOTE — PROGRESS NOTES
"Cardiology Attending  01/28/2025 5:06 PM    I evaluated Elissa Freeman in Cardiology Clinic and discussed the patient's symptoms, findings, and management plan with the resident.   Ms. Elissa Freeman is a 69 y.o. female.  The patient is seen in cardiology clinic for chest pain, atrial fibrillation, HFrEF, HTN, HLD.  Blood pressure 125/82, pulse 90, temperature 98.1 °F (36.7 °C), temperature source Oral, resp. rate 20, height 5' 2" (1.575 m), weight (!) 140.4 kg (309 lb 9.6 oz), SpO2 96%.  The patient is in no apparent distress.    LEVEL OF EXERTION:  Activities are limited.  Patient walks with a cane.    Current Outpatient Medications   Medication Instructions    acetaminophen (TYLENOL EXTRA STRENGTH) 1,000 mg, Every 6 hours PRN    albuterol (PROVENTIL/VENTOLIN HFA) 90 mcg/actuation inhaler 1 puff, Inhalation, Every 4 hours PRN    amLODIPine (NORVASC) 10 mg, Daily    atorvastatin (LIPITOR) 80 mg, Oral, Daily    brimonidine 0.2% (ALPHAGAN) 0.2 % Drop 1 drop, 2 times daily    carvediloL (COREG) 3.125 mg, Oral, 2 times daily    cetirizine (ZYRTEC) 10 mg, Oral, Daily    diltiaZEM (CARDIZEM CD) 180 mg, Oral, Every morning    DULoxetine (CYMBALTA) 60 mg, Oral, Daily, TAKE 1 CAPSULE BY MOUTH DAILY. DO NOT CRUSH OR CHEW    empagliflozin (JARDIANCE) 25 mg, Oral, Daily    exenatide microspheres (BYDUREON BCISE) 2 mg, Subcutaneous, Every 7 days    flash glucose scanning reader (FREESTYLE SMITHA 2 READER) Misc 1 each, Misc.(Non-Drug; Combo Route), 4 times daily with meals & nightly    flash glucose sensor (FREESTYLE SMITHA 2 SENSOR) Kit 1 each, Misc.(Non-Drug; Combo Route), 4 times daily after meals & nightly    furosemide (LASIX) 20 mg, Every morning    gabapentin (NEURONTIN) 600 mg, Oral, Every morning    hydrALAZINE (APRESOLINE) 50 mg, Oral, Every 8 hours    insulin aspart U-100 (NOVOLOG FLEXPEN U-100 INSULIN) 20 Units, Subcutaneous, 3 times daily with meals    isosorbide mononitrate (IMDUR) 30 mg, Oral, Daily    ketorolac " 0.5% (ACULAR) 0.5 % Drop 1 drop, 2 times daily    LANTUS SOLOSTAR U-100 INSULIN 65 Units, Subcutaneous, Nightly    latanoprost 0.005 % ophthalmic solution 1 drop, Both Eyes, Daily    losartan (COZAAR) 100 mg, Daily    losartan-hydrochlorothiazide 50-12.5 mg (HYZAAR) 50-12.5 mg per tablet 1 tablet, Oral, Daily    melatonin (MELATIN) 5 mg, Oral, Nightly PRN    nebulizer accessories Kit 1 each, Misc.(Non-Drug; Combo Route), 4 times daily PRN    nitroGLYCERIN (NITROSTAT) 0.4 mg, Sublingual, Every 5 min PRN    ofloxacin (OCUFLOX) 0.3 % ophthalmic solution 1 drop, 4 times daily    potassium chloride (K-TAB) 20 mEq TAKE ONE TABLET BY MOUTH DAILY AT 9 AM (VIAL)    prednisoLONE acetate (PRED FORTE) 1 % DrpS 1 drop, 5 times daily    sodium chloride 5% (NIMA 128) 5 % ophthalmic solution 1 drop, As needed (PRN)    vitamin D (VITAMIN D3) 2,000 Units, Oral, Daily    warfarin (COUMADIN) 2.5 mg, Oral, Daily       No results found for this or any previous visit.    Results for orders placed during the hospital encounter of 01/30/24    Nuclear Stress - Cardiology Interpreted    Interpretation Summary    Normal myocardial perfusion scan. There is no evidence of myocardial ischemia or infarction.    The gated perfusion images showed an ejection fraction of 54% at rest. The gated perfusion images showed an ejection fraction of 59% post stress.    The patient reported no chest pain during the stress test.    The nuclear stress test is  fair quality.  On the nuclear stress test the EF is normal.  If the patient has an echo, the EF on the echo is considered more accurate.    The patient has a low risk nuclear stress test    Nuclear stress test indicates no ischemia.    Results for orders placed in visit on 09/29/21    CATH LAB PROCEDURE      Lab Results   Component Value Date    CHOL 113 08/29/2024      Lab Results   Component Value Date    HGB 12.3 12/20/2024      Lab Results   Component Value Date    CREATININE 1.76 (H) 12/20/2024       Lab Results   Component Value Date    BNP 78.5 11/15/2023        Plan is to repeat the echocardiogram since patient complains of shortness of breath limiting how far she can walk    Wu Taylor MD

## 2025-01-30 LAB
OHS QRS DURATION: 96 MS
OHS QTC CALCULATION: 462 MS

## 2025-02-06 DIAGNOSIS — J45.909 ASTHMA, UNSPECIFIED ASTHMA SEVERITY, UNSPECIFIED WHETHER COMPLICATED, UNSPECIFIED WHETHER PERSISTENT: ICD-10-CM

## 2025-02-10 ENCOUNTER — ANTI-COAG VISIT (OUTPATIENT)
Dept: CARDIOLOGY | Facility: HOSPITAL | Age: 70
End: 2025-02-10
Payer: MEDICARE

## 2025-02-10 DIAGNOSIS — I26.99 PULMONARY EMBOLISM, OTHER, UNSPECIFIED CHRONICITY, UNSPECIFIED WHETHER ACUTE COR PULMONALE PRESENT: Primary | ICD-10-CM

## 2025-02-10 DIAGNOSIS — Z79.01 ANTICOAGULATION MONITORING, INR RANGE 2-3: ICD-10-CM

## 2025-02-10 LAB
CTP QC/QA: YES
INR PPP: 2.4 (ref 2–3)
PROTHROMBIN TIME, POC: NORMAL (ref 2–3)

## 2025-02-10 PROCEDURE — 99211 OFF/OP EST MAY X REQ PHY/QHP: CPT

## 2025-02-10 PROCEDURE — 85610 PROTHROMBIN TIME: CPT

## 2025-02-10 RX ORDER — ALBUTEROL SULFATE 90 UG/1
1 INHALANT RESPIRATORY (INHALATION) EVERY 4 HOURS PRN
Qty: 18 G | Refills: 3 | Status: SHIPPED | OUTPATIENT
Start: 2025-02-10

## 2025-02-11 ENCOUNTER — OFFICE VISIT (OUTPATIENT)
Dept: INTERNAL MEDICINE | Facility: CLINIC | Age: 70
End: 2025-02-11
Payer: MEDICARE

## 2025-02-11 VITALS
WEIGHT: 293 LBS | TEMPERATURE: 99 F | HEIGHT: 62 IN | HEART RATE: 99 BPM | OXYGEN SATURATION: 97 % | RESPIRATION RATE: 18 BRPM | DIASTOLIC BLOOD PRESSURE: 71 MMHG | SYSTOLIC BLOOD PRESSURE: 115 MMHG | BODY MASS INDEX: 53.92 KG/M2

## 2025-02-11 DIAGNOSIS — E66.01 MORBID OBESITY WITH BMI OF 50.0-59.9, ADULT: ICD-10-CM

## 2025-02-11 DIAGNOSIS — G47.33 OSA ON CPAP: ICD-10-CM

## 2025-02-11 DIAGNOSIS — T78.40XD ALLERGY, SUBSEQUENT ENCOUNTER: ICD-10-CM

## 2025-02-11 DIAGNOSIS — E11.69 HYPERLIPIDEMIA ASSOCIATED WITH TYPE 2 DIABETES MELLITUS: ICD-10-CM

## 2025-02-11 DIAGNOSIS — E11.22 TYPE 2 DIABETES MELLITUS WITH STAGE 3 CHRONIC KIDNEY DISEASE, WITH LONG-TERM CURRENT USE OF INSULIN, UNSPECIFIED WHETHER STAGE 3A OR 3B CKD: Primary | ICD-10-CM

## 2025-02-11 DIAGNOSIS — C64.2 RENAL CELL CARCINOMA OF LEFT KIDNEY: ICD-10-CM

## 2025-02-11 DIAGNOSIS — Z00.00 HEALTHCARE MAINTENANCE: ICD-10-CM

## 2025-02-11 DIAGNOSIS — I10 PRIMARY HYPERTENSION: ICD-10-CM

## 2025-02-11 DIAGNOSIS — N18.30 TYPE 2 DIABETES MELLITUS WITH STAGE 3 CHRONIC KIDNEY DISEASE, WITH LONG-TERM CURRENT USE OF INSULIN, UNSPECIFIED WHETHER STAGE 3A OR 3B CKD: Primary | ICD-10-CM

## 2025-02-11 DIAGNOSIS — E78.5 HYPERLIPIDEMIA ASSOCIATED WITH TYPE 2 DIABETES MELLITUS: ICD-10-CM

## 2025-02-11 DIAGNOSIS — Z79.4 TYPE 2 DIABETES MELLITUS WITH STAGE 3 CHRONIC KIDNEY DISEASE, WITH LONG-TERM CURRENT USE OF INSULIN, UNSPECIFIED WHETHER STAGE 3A OR 3B CKD: Primary | ICD-10-CM

## 2025-02-11 PROBLEM — R06.00 DYSPNEA: Status: RESOLVED | Noted: 2019-08-06 | Resolved: 2025-02-11

## 2025-02-11 PROBLEM — E11.9 DIABETES MELLITUS: Status: RESOLVED | Noted: 2022-08-17 | Resolved: 2025-02-11

## 2025-02-11 PROBLEM — Z85.528 HISTORY OF MALIGNANT NEOPLASM OF KIDNEY: Status: RESOLVED | Noted: 2022-08-17 | Resolved: 2025-02-11

## 2025-02-11 PROBLEM — E78.00 HYPERCHOLESTEROLEMIA: Status: RESOLVED | Noted: 2022-08-17 | Resolved: 2025-02-11

## 2025-02-11 PROBLEM — K63.89 MASS OF COLON: Status: RESOLVED | Noted: 2022-08-17 | Resolved: 2025-02-11

## 2025-02-11 LAB — HBA1C MFR BLD: 8.5 %

## 2025-02-11 PROCEDURE — 83036 HEMOGLOBIN GLYCOSYLATED A1C: CPT | Mod: PBBFAC

## 2025-02-11 PROCEDURE — G0008 ADMIN INFLUENZA VIRUS VAC: HCPCS | Mod: PBBFAC

## 2025-02-11 PROCEDURE — 90653 IIV ADJUVANT VACCINE IM: CPT | Mod: PBBFAC

## 2025-02-11 PROCEDURE — 99213 OFFICE O/P EST LOW 20 MIN: CPT | Mod: PBBFAC

## 2025-02-11 RX ORDER — BLOOD-GLUCOSE,RECEIVER,CONT
EACH MISCELLANEOUS
Qty: 1 EACH | Refills: 0 | Status: SHIPPED | OUTPATIENT
Start: 2025-02-11

## 2025-02-11 RX ORDER — BLOOD-GLUCOSE SENSOR
EACH MISCELLANEOUS
Qty: 3 EACH | Refills: 4 | Status: SHIPPED | OUTPATIENT
Start: 2025-02-11

## 2025-02-11 RX ORDER — INSULIN ASPART 100 [IU]/ML
20 INJECTION, SOLUTION INTRAVENOUS; SUBCUTANEOUS
Qty: 15 ML | Refills: 11 | Status: SHIPPED | OUTPATIENT
Start: 2025-02-11

## 2025-02-11 RX ORDER — BRIMONIDINE TARTRATE 2 MG/ML
1 SOLUTION/ DROPS OPHTHALMIC 2 TIMES DAILY
Qty: 10 ML | Refills: 2 | Status: SHIPPED | OUTPATIENT
Start: 2025-02-11

## 2025-02-11 RX ADMIN — INFLUENZA A VIRUS A/VICTORIA/4897/2022 IVR-238 (H1N1) ANTIGEN (FORMALDEHYDE INACTIVATED), INFLUENZA A VIRUS A/THAILAND/8/2022 IVR-237 (H3N2) ANTIGEN (FORMALDEHYDE INACTIVATED), INFLUENZA B VIRUS B/AUSTRIA/1359417/2021 BVR-26 ANTIGEN (FORMALDEHYDE INACTIVATED) 0.5 ML: 15; 15; 15 INJECTION, SUSPENSION INTRAMUSCULAR at 02:02

## 2025-02-11 NOTE — PROGRESS NOTES
\[facility]\"" INTERNAL MEDICINE CLINIC NOTE    Patient name: Elissa Freeman  YOB: 1955   MRN: [id number]  Appointment date: 2/11/2025  Appointment time: 01:15 PM    Subjective     HPI    Elissa Freeman is a 69 y.o.  female with a PMHx positive for obesity, MOHSEN, multinodular goiter, hypertension, hyperlipidemia, diabetes mellitus type 2, chronic kidney disease IIIb, adenocarcinoma of the colon stage IIA status post hemicolectomy (2014) and adjuvant chemotherapy (2015), renal cell carcinoma status post nephrectomy (2011), who presented to  clinic today for six month follow up. Patient reports multiple episodes of loose, watery, green stools that lasted for several days but eventually self resolved. She otherwise denies any acute complaints.    Interval History    02/15/2024: Patient today states that since discharge he has noticed worsening B/L lower extremity edema and worsening dyspnea on exertion after walking a proximally x 15 ft requiring rest for times 10-15 minutes prior to continuing.  She denies any falls since prior hospitalization. She also notes weekly right sided chest pain for the past 4 weeks which describes a dull, aching pain located to the right chest below the right breast which she rates as a 6-7/10. She typically feels the pain with exertion but is alleviated with rest and nitroglycerin. She denies any associated palpitations, nausea/vomiting, lightheadedness/dizziness, or loss of consciousness. Denies any other acute complaints. She is scheduled for a nuclear stress and is to follow up with Cardiology post stress test.    Past Medical History:  Past Medical History:   Diagnosis Date    Asthma     Atrial fibrillation     Cancer     CKD (chronic kidney disease)     Coronary artery disease     Diabetes mellitus     Hyperlipidemia     Hypertension     Obesity, unspecified     MOHSEN (obstructive sleep apnea)     Unspecified cataract      Past Surgical History:  Past Surgical  History:   Procedure Laterality Date    CATARACT EXTRACTION Left 11/28/2022    CHOLECYSTECTOMY      COLON SURGERY      COLONOSCOPY N/A 04/27/2023    Procedure: COLONOSCOPY;  Surgeon: Jose Miguel Rosales MD;  Location: [facility] ENDOSCOPY;  Service: Endoscopy;  Laterality: N/A;    excision of lipoma      EYE SURGERY      HYSTERECTOMY      HYSTERECTOMY, VAGINAL, WITH SALPINGO-OOPHORECTOMY      NEPHRECTOMY Left     TUBAL LIGATION       Family History:  Family History   Problem Relation Name Age of Onset    Hypertension Mother Fawn     Coronary artery disease Mother Fawn     Hyperlipidemia Mother Fawn     Asthma Mother Fawn     Stroke Father Gradnigo     Cancer Brother Anil Bangura      Social History:  Social History     Tobacco Use    Smoking status: Never    Smokeless tobacco: Never   Substance Use Topics    Alcohol use: Not Currently     Comment: frozen drinks once a month, giovanni    Drug use: Never     Allergies:  Review of patient's allergies indicates:  No Known Allergies  Home Medications:  Prior to Admission medications    Medication Sig Start Date End Date Taking? Authorizing Provider   acetaminophen (TYLENOL EXTRA STRENGTH) 500 MG tablet Take 1,000 mg by mouth every 6 (six) hours as needed for Pain.    Provider, Historical   albuterol (PROVENTIL/VENTOLIN HFA) 90 mcg/actuation inhaler Inhale 1 puff into the lungs every 4 (four) hours as needed for Wheezing. 8/26/24   Jeronimo Bird MD   amLODIPine (NORVASC) 10 MG tablet Take 10 mg by mouth once daily. 2/24/24   Provider, Historical   atorvastatin (LIPITOR) 40 MG tablet Take 2 tablets (80 mg total) by mouth once daily. 7/26/24 7/26/25  Jeronimo Bird MD   carvediloL (COREG) 3.125 MG tablet Take 1 tablet (3.125 mg total) by mouth 2 (two) times daily. 2/15/24 2/14/25  Jeronimo Bird MD   cetirizine (ZYRTEC) 10 MG tablet Take 1 tablet (10 mg total) by mouth once daily. 2/15/24 5/30/24  Jeronimo Bird MD   cloNIDine (CATAPRES) 0.2 MG tablet Take 1 tablet (0.2  mg total) by mouth 2 (two) times daily. Takes 2 times per day. Takes whole tablet 2/15/24 2/14/25  Jeronimo Bird MD   diltiaZEM (CARDIZEM CD) 180 MG 24 hr capsule Take 1 capsule (180 mg total) by mouth every morning. 5/6/24 5/6/25  Jeronimo Bird MD   DULoxetine (CYMBALTA) 60 MG capsule Take 1 capsule (60 mg total) by mouth once daily. TAKE 1 CAPSULE BY MOUTH DAILY. DO NOT CRUSH OR CHEW 6/27/24 9/25/24  Jeronimo Bird MD   empagliflozin (JARDIANCE) 25 mg tablet Take 1 tablet (25 mg total) by mouth once daily. 2/15/24 2/14/25  Jeronimo Bird MD   exenatide microspheres (BYDUREON BCISE) 2 mg/0.85 mL AtIn Inject 2 mg into the skin every 7 days. 8/16/23   Estrella Burnette MD   flash glucose scanning reader (FREESTYLE SMITHA 2 READER) Misc 1 each by Misc.(Non-Drug; Combo Route) route 4 (four) times daily with meals and nightly.  Patient not taking: Reported on 12/20/2023 9/27/23   Jeronimo Bird MD   flash glucose sensor (FREESTYLE SMITHA 2 SENSOR) Kit 1 each by Misc.(Non-Drug; Combo Route) route 2 hours after meals and at bedtime.  Patient not taking: Reported on 11/21/2023 9/27/23   Jeronimo Bird MD   furosemide (LASIX) 20 MG tablet TAKE ONE TABLET BY MOUTH DAILY AT 9 AM (VIAL) 7/15/24   Miranda Viveros FNP   gabapentin (NEURONTIN) 600 MG tablet Take 600 mg by mouth every morning. 5/3/24   Provider, Historical   hydrALAZINE (APRESOLINE) 50 MG tablet Take 1 tablet (50 mg total) by mouth every 8 (eight) hours. 11/19/23 11/18/24  Clarence Mancera MD   insulin aspart U-100 (NOVOLOG FLEXPEN U-100 INSULIN) 100 unit/mL (3 mL) InPn pen Inject 20 Units into the skin 3 (three) times daily with meals. 9/27/23   Jeronimo Bird MD   insulin glargine U-100, Lantus, (LANTUS SOLOSTAR U-100 INSULIN) 100 unit/mL (3 mL) InPn pen Inject 65 Units into the skin every evening. 6/26/24 6/26/25  Jeronimo Bird MD   isosorbide mononitrate (IMDUR) 30 MG 24 hr tablet Take 1 tablet (30 mg total) by mouth once daily. 5/30/24 9/27/24  Lucy  "Pillo, DO   ketorolac 0.5% (ACULAR) 0.5 % Drop Place 1 drop into the left eye 2 (two) times daily. 12/15/23   Provider, Historical   latanoprost 0.005 % ophthalmic solution Place 1 drop into both eyes once daily. 6/5/23   Juan Jose Urena,    losartan-hydrochlorothiazide 50-12.5 mg (HYZAAR) 50-12.5 mg per tablet Take 1 tablet by mouth every morning. 2/15/24 2/14/25  Jeronimo Bird MD   melatonin (MELATIN) 5 mg Take 1 tablet (5 mg total) by mouth nightly as needed for Insomnia. 8/26/24 8/26/25  Jeronimo Bird MD   nebulizer accessories Kit 1 each by Misc.(Non-Drug; Combo Route) route 4 (four) times daily as needed (wheezing). 9/27/23   Jeronimo Bird MD   nitroGLYCERIN (NITROSTAT) 0.4 MG SL tablet Place 1 tablet (0.4 mg total) under the tongue every 5 (five) minutes as needed for Chest pain. 2/15/24 2/14/25  Jeronimo Bird MD   ofloxacin (OCUFLOX) 0.3 % ophthalmic solution Place 1 drop into the right eye 4 (four) times daily. 11/1/23   Provider, Historical   pen needle, diabetic 32 gauge x 5/32" Ndle 1 each by Misc.(Non-Drug; Combo Route) route 4 (four) times daily. 9/27/23 9/26/24  Jeronimo Bird MD   potassium chloride (K-TAB) 20 mEq Take 20 mEq by mouth every morning. 11/30/23   Provider, Historical   prednisoLONE acetate (PRED FORTE) 1 % DrpS Place 1 drop into the right eye 5 (five) times daily. 11/1/23   Provider, Historical   sodium chloride 5% (NIMA 128) 5 % ophthalmic solution Place 1 drop into both eyes as needed. 12/15/23   Provider, Historical   vitamin D (VITAMIN D3) 1000 units Tab Take 2 tablets (2,000 Units total) by mouth Daily. 2/15/24 2/14/25  Jeronimo Bird MD   warfarin (COUMADIN) 5 MG tablet Take 0.5 tablets (2.5 mg total) by mouth Daily. 7/3/24 7/3/25  Jeronimo Bird MD   albuterol (PROVENTIL/VENTOLIN HFA) 90 mcg/actuation inhaler Inhale 1 puff into the lungs every 4 (four) hours as needed for Wheezing. 6/30/24 8/26/24  Jeronimo Bird MD   melatonin (MELATIN) 5 mg Take 1 tablet (5 mg total) " by mouth nightly as needed for Insomnia. 8/5/24 8/26/24  Jeronimo Bird MD     Review of Systems:  Review of Systems   Constitutional:  Negative for chills, diaphoresis, fatigue and fever.   HENT:  Negative for ear pain, hearing loss, rhinorrhea, sore throat, tinnitus and trouble swallowing.    Eyes:  Negative for pain, redness and visual disturbance.   Respiratory:  Negative for cough, chest tightness and shortness of breath.    Cardiovascular:  Negative for chest pain and palpitations.   Gastrointestinal:  Positive for diarrhea. Negative for abdominal pain, constipation, nausea and vomiting.   Genitourinary:  Negative for difficulty urinating, dysuria, frequency, hematuria and urgency.   Musculoskeletal:  Negative for arthralgias and myalgias.   Skin:  Negative for rash and wound.   Neurological:  Negative for dizziness, seizures, syncope, weakness, light-headedness, numbness and headaches.   Psychiatric/Behavioral:  Negative for confusion.        Objective     Vitals:   There were no vitals filed for this visit.      Physical Examination:   Physical Exam  Vitals reviewed.   Constitutional:       General: She is not in acute distress.     Appearance: Normal appearance. She is obese. She is not ill-appearing, toxic-appearing or diaphoretic.   HENT:      Head: Normocephalic and atraumatic.      Right Ear: External ear normal.      Left Ear: External ear normal.   Eyes:      General: No scleral icterus.        Right eye: No discharge.         Left eye: No discharge.      Extraocular Movements: Extraocular movements intact.      Conjunctiva/sclera: Conjunctivae normal.      Pupils: Pupils are equal, round, and reactive to light.   Cardiovascular:      Rate and Rhythm: Normal rate and regular rhythm.      Pulses: Normal pulses.      Heart sounds: Normal heart sounds. No murmur heard.     No friction rub. No gallop.   Pulmonary:      Effort: Pulmonary effort is normal. No respiratory distress.      Breath sounds: Normal  breath sounds. No wheezing, rhonchi or rales.   Abdominal:      General: Abdomen is flat. Bowel sounds are normal. There is no distension.      Palpations: Abdomen is soft.      Tenderness: There is no abdominal tenderness. There is no guarding.   Musculoskeletal:         General: No swelling, tenderness, deformity or signs of injury. Normal range of motion.      Right lower leg: No edema.      Left lower leg: No edema.   Skin:     General: Skin is warm and dry.      Capillary Refill: Capillary refill takes less than 2 seconds.      Coloration: Skin is not jaundiced.      Findings: No bruising, erythema or rash.   Neurological:      Mental Status: She is alert.      Comments: AAO to person, place, time, and situation; CN II-XII grossly intact         PREVENTIVE CARE:  Lab Work:    DM (age>45 or HTN):  Lab Results   Component Value Date    HGBA1C 7.6 (H) 11/16/2023    ZOEKAKT1H 8.8 08/29/2024    MICALBCREAT 48.6 (H) 10/05/2023     Lipid :  Lab Results   Component Value Date    CHOL 113 08/29/2024    TRIG 99 08/29/2024    HDL 46 08/29/2024    LDL 47.00 (L) 08/29/2024     Thyroid:  Lab Results   Component Value Date    TSH 1.976 08/29/2024     Screening:    Mammo: Mammogram (11/17/2022) showed areas of fibroglandular initial negative for suspicious mass, asymmetry, distortion, or calcifications; ordered repeat mammogram  DEXA (age>65 or FRAX > 9.3%):  DXA (01/17/2023)  showed normal bone density to lumbar spine and bilateral femurs; will repeat in 2025  Lung Ca (30PY smoker age 55-79 or quit in last 15y):  Not indicated due to never smoker   Colon Ca: (age 45-75-annual FOBT, 5y flex + FOBTq3 or 10y c-scope):  Colonoscopy (04/28/2023) normal; will repeat in 2028  AAA (smoker 65-76):  Not indicated due to patient sex    ID Titers and Vaccinations:    Immunization History   Administered Date(s) Administered    COVID-19, MRNA, LN-S, PF (Pfizer) (Purple Cap) 02/10/2021, 03/03/2021, 12/17/2021    Influenza 10/13/2010,  "11/17/2017    Influenza (FLUAD) - Quadrivalent - Adjuvanted - PF *Preferred* (65+) 09/27/2023    Influenza - Quadrivalent 10/03/2016, 12/10/2019, 12/07/2020, 10/27/2021    Influenza - Quadrivalent - PF (6-35 months) 12/11/2018    Influenza - Quadrivalent - PF *Preferred* (6 months and older) 12/07/2020    Influenza - Trivalent - Fluarix, Flulaval, Fluzone, Afluria - PF 10/13/2010    Pneumococcal Conjugate - 13 Valent 12/07/2020    Pneumococcal Conjugate - 20 Valent 06/06/2023    Pneumococcal Polysaccharide - 23 Valent 04/04/2017    Tdap 12/11/2018    Zoster Recombinant 12/11/2018, 05/29/2019      HIV (once age 15-65):  No results found for: "HIV1X2", "[id number]"     Hepatitis Screening:   Lab Results   Component Value Date    HEPCAB Nonreactive 06/06/2023      Pneumococcal vaccine (65 and over or high risk): UTD  Influenza Vaccine: UTD  Tetanus: UTD  Zoster vaccination (> 50y.o):  UTD  Covid vaccine:  UTD    ASSESSMENT/PLAN:    Obesity  Diabetes mellitus type 2  Diabetic neuropathy  -previous 8.5  -POCT A1c 8.8  -patient did not bring logs this visit  -instructed patient to keep log and check CBGs prior to meal, she voiced understanding  -discussed importance of adhering to diabetic diet and good exercise  -referred to ophthalmology for diabetic eye exam  -diabetic foot exam showed palpable dorsalis pedis pulses, normal sensation to bilateral feet, in no evidence of skin breakdown/ulceration/infection  -continue levemir to 60 units qhs, novoLog 20 units tidwm, exenatide microspheres 2 mg qweekly , and Jardiance 25 mg q.day  -patient reports she was told not to take take metformin due hx of colon and renal cancer  -will hold on prescribing sulfonylureas due to patient reporting multiple episodes of hypoglycemia  -will hold on increasing insulin regimen again due to patient reporting multiple episodes of hypoglycemia  -continue duloxetine 60 mg qd for diabetic neuropathy  -referred to [facility] diabetes education and " nutrition services for additional assistance   -referred to [facility] endocrinology for assistance managing diabetes due to patient on maximal doses of multiple medications    Hypertension  /80  -educated on low sodium (< 2g//day) DASH diet and exercise  -continue carvedilol 3.125 bid, diltiazem 180 mg qd, and losartan-hydrochlorothiazide 50-12.5 qd     Hyperlipidemia  CAD  Ischemic heart disease  Stable angina  Heart failure with reduced ejection fractions with grade I diastolic dysfunction  The ASCVD Risk score (Charanjit DK, et al., 2019) failed to calculate for the following reasons:    Risk score cannot be calculated because patient has a medical history suggesting prior/existing ASCVD  -nuclear stress test (04/2021) showed anterolateral and inferior ischemia  -nuclear stress test (01/09/2024) showed no evidence of myocardial ischemia or infarction  -LHC (09/2021) showed no evidence of coronary artery disease  -TTE (04/2021) showed EF 50-55%  -TTE (12/2023) showed EF 45% with grade I diastolic dysfunction  -patient with mildly decreased ejection compared to prior TTE  -likely related to new onset atrial fibrillation but given patient experiencing episodes of angina cannot definitively rule out ischemic heart disease despite nuclear stress showing no evidence of ischemia  -patient with scheduled follow up with cardiology for discussion of nuclear stress test results and consideration of benefits versus risks of LHC  -continue atorvastatin 80 mg qd, carvedilol 3.125 bid, and losartan-hydrochlorothiazide 50-12.5 qd    Hemorrhagic stroke (12/2023) without sustained post stroke neurological deficits  -diagnosed (12/2023) at [facility]   -in setting of recent intraparenchymal hemorrhage & 6 mm density noted on repeat CT head (12/15/23) [facility] recommended waiting at least x 8 weeks before initiating anti-coagulation   -no residual neuro deficits  -completed 8 weeks of anti-coagulation  vacation  -restarted on coumadin  -appointment scheduled with [facility]; appreciate assistance  -will obtain MRI without contrast for appointment with neurosurgery    Persistent atrial fibrillation  -rate controlled with carvedilol and diltiazem  -continue carvedilol 3.125 mg qd and diltiazem 180 mg qd    Chronic kidney disease stage IIIb  -continues to follow with nephrology  -continue management per nephrology  -continue to avoid excessive use of NSAIDs (ibuprofen, naproxen, Aleve, Advil, Toradol, Mobic) and nephrotoxic drugs  -continue to optimize hypertension and diabetes control    Renal cell carcinoma status post nephrectomy (2011)   Adenocarcinoma of the colon stage IIA status post hemicolectomy (2014) and adjuvant chemotherapy (2015)  Hx of provoked PE presumed 2/2 malignancy  -patient is s/p hemicolectomy, nephrectomy, and chemotherapy  -completed chemo in April 2015  -seen by Oncology previously but discharged from clinic  -continues on coumadin     Constipation  Hematochezia  -denies any further symptoms  -referred to GI for evaluation of hematochezia as patient may need EGD to evaluate for upper GI bleed in the setting of possible rapid colonic transport secondary to hemicolectomy    MOHSEN on CPAP  -continue CPAP    Multinodular goiter  TSH 1.554  Free T4 0.95  -patient asymptotic  -thyroid biopsy results showed benign thyroid nodules  -will continue to monitor    Depression  -symptomatically controlled  -no AVH/SI/HI   -continue duloxetine 60 mg qd    Gout  -on allopurinol by Nephrology  -reports no flares since last appt    Health Maintenance:  -lab work UTD  -initiated and/or refilled medications as above  -screenings ordered and/or UTD as above  -vaccinations UTD    Follow-up in 6 months    Future Appointments   Date Time Provider Department Center   2/11/2025  1:30 PM Jeronimo Bird MD [facility] IM RES [place]   2/21/2025 10:00 AM Silvia Mckeon MD [facility] NEPHR [place]    2/27/2025  2:00 PM COUMADIN, OLGH [facility]   3/24/2025  1:40 PM PROVIDERS, BERNARDA [place]   5/29/2025  3:30 PM Wu Taylor MD [facility]        Jeronimo Bird MD  [facility] Internal Medicine -II

## 2025-02-11 NOTE — PROGRESS NOTES
[facility] INTERNAL MEDICINE CLINIC NOTE    Patient name: Elissa Freeman  YOB: 1955   MRN: [id number]  Appointment date: 2/11/2025  Appointment time: 01:15 PM    Subjective     HPI    Elissa Freeman is a 69 y.o.  female with a PMHx positive for obesity, MOHSEN, multinodular goiter, hypertension, hyperlipidemia, diabetes mellitus type 2, CAD, HFrEF with Grade 1 DD (EF 45%), persistent Afib, chronic kidney disease IIIb, depression, gout, adenocarcinoma of the colon stage IIA status post hemicolectomy (2014) and adjuvant chemotherapy (2015), renal cell carcinoma status post nephrectomy (2011), who presented to IM clinic today for six months follow up. Today, patient has no active complaints. Reports resolution of loose, watery, green stools reported during last visit as well as DUBOIS. Denies any lower extremity edema, SOB, chest pain, breast pain, palpitations, nausea/vomiting, ightheadedness/dizziness, or loss of consciousness. Denies smoking, alcohol or drug use.      Past Medical History:  Past Medical History:   Diagnosis Date    Asthma     Atrial fibrillation     Cancer     CKD (chronic kidney disease)     Coronary artery disease     Diabetes mellitus     Hyperlipidemia     Hypertension     Obesity, unspecified     MOHSEN (obstructive sleep apnea)     Unspecified cataract      Past Surgical History:  Past Surgical History:   Procedure Laterality Date    CATARACT EXTRACTION Left 11/28/2022    CHOLECYSTECTOMY      COLON SURGERY      COLONOSCOPY N/A 04/27/2023    Procedure: COLONOSCOPY;  Surgeon: Jose Miguel Rosales MD;  Location: [facility] ENDOSCOPY;  Service: Endoscopy;  Laterality: N/A;    excision of lipoma      EYE SURGERY      HYSTERECTOMY      HYSTERECTOMY, VAGINAL, WITH SALPINGO-OOPHORECTOMY      NEPHRECTOMY Left     TUBAL LIGATION       Family History:  Family History   Problem Relation Name Age of Onset    Hypertension Mother Fawn     Coronary artery disease Mother Fawn     Hyperlipidemia Mother  Fawn     Asthma Mother Fawn     Stroke Father [facility] [address]     Cancer Brother Anil Bangura      Social History:  Social History     Tobacco Use    Smoking status: Never    Smokeless tobacco: Never   Substance Use Topics    Alcohol use: Not Currently     Comment: frozen drinks once a month, giovanni    Drug use: Never     Allergies:  Review of patient's allergies indicates:  No Known Allergies  Home Medications:  Prior to Admission medications    Medication Sig Start Date End Date Taking? Authorizing Provider   acetaminophen (TYLENOL EXTRA STRENGTH) 500 MG tablet Take 1,000 mg by mouth every 6 (six) hours as needed for Pain.    Provider, Historical   albuterol (PROVENTIL/VENTOLIN HFA) 90 mcg/actuation inhaler Inhale 1 puff into the lungs every 4 (four) hours as needed for Wheezing. 8/26/24   Jeronimo Bird MD   amLODIPine (NORVASC) 10 MG tablet Take 10 mg by mouth once daily. 2/24/24   Provider, Historical   atorvastatin (LIPITOR) 40 MG tablet Take 2 tablets (80 mg total) by mouth once daily. 7/26/24 7/26/25  Jeronimo Bird MD   carvediloL (COREG) 3.125 MG tablet Take 1 tablet (3.125 mg total) by mouth 2 (two) times daily. 2/15/24 2/14/25  Jeronimo Bird MD   cetirizine (ZYRTEC) 10 MG tablet Take 1 tablet (10 mg total) by mouth once daily. 2/15/24 5/30/24  Jeronimo Bird MD   cloNIDine (CATAPRES) 0.2 MG tablet Take 1 tablet (0.2 mg total) by mouth 2 (two) times daily. Takes 2 times per day. Takes whole tablet 2/15/24 2/14/25  Jeronimo Bird MD   diltiaZEM (CARDIZEM CD) 180 MG 24 hr capsule Take 1 capsule (180 mg total) by mouth every morning. 5/6/24 5/6/25  Jeronimo Bird MD   DULoxetine (CYMBALTA) 60 MG capsule Take 1 capsule (60 mg total) by mouth once daily. TAKE 1 CAPSULE BY MOUTH DAILY. DO NOT CRUSH OR CHEW 6/27/24 9/25/24  Jeronimo Bird MD   empagliflozin (JARDIANCE) 25 mg tablet Take 1 tablet (25 mg total) by mouth once daily. 2/15/24 2/14/25  Jeronimo Bird MD   exenatide microspheres (BYDUREON BCISE) 2  mg/0.85 mL AtIn Inject 2 mg into the skin every 7 days. 8/16/23   Estrella Burnette MD   flash glucose scanning reader (FREESTYLE SMITHA 2 READER) Misc 1 each by Misc.(Non-Drug; Combo Route) route 4 (four) times daily with meals and nightly.  Patient not taking: Reported on 12/20/2023 9/27/23   Jeronimo Bird MD   flash glucose sensor (FREESTYLE SMITHA 2 SENSOR) Kit 1 each by Misc.(Non-Drug; Combo Route) route 2 hours after meals and at bedtime.  Patient not taking: Reported on 11/21/2023 9/27/23   Jeronimo Bird MD   furosemide (LASIX) 20 MG tablet TAKE ONE TABLET BY MOUTH DAILY AT 9 AM (VIAL) 7/15/24   Miranda Viveros FNP   gabapentin (NEURONTIN) 600 MG tablet Take 600 mg by mouth every morning. 5/3/24   Provider, Historical   hydrALAZINE (APRESOLINE) 50 MG tablet Take 1 tablet (50 mg total) by mouth every 8 (eight) hours. 11/19/23 11/18/24  Clarence Mancera MD   insulin aspart U-100 (NOVOLOG FLEXPEN U-100 INSULIN) 100 unit/mL (3 mL) InPn pen Inject 20 Units into the skin 3 (three) times daily with meals. 9/27/23   Jeronimo Bird MD   insulin glargine U-100, Lantus, (LANTUS SOLOSTAR U-100 INSULIN) 100 unit/mL (3 mL) InPn pen Inject 65 Units into the skin every evening. 6/26/24 6/26/25  Jeronimo Bird MD   isosorbide mononitrate (IMDUR) 30 MG 24 hr tablet Take 1 tablet (30 mg total) by mouth once daily. 5/30/24 9/27/24  Pillo Ayala, DO   ketorolac 0.5% (ACULAR) 0.5 % Drop Place 1 drop into the left eye 2 (two) times daily. 12/15/23   Provider, Historical   latanoprost 0.005 % ophthalmic solution Place 1 drop into both eyes once daily. 6/5/23   Juan Jose Urena, DO   losartan-hydrochlorothiazide 50-12.5 mg (HYZAAR) 50-12.5 mg per tablet Take 1 tablet by mouth every morning. 2/15/24 2/14/25  Jeronimo Bird MD   melatonin (MELATIN) 5 mg Take 1 tablet (5 mg total) by mouth nightly as needed for Insomnia. 8/26/24 8/26/25  Jeronimo Bird MD   nebulizer accessories Kit 1 each by Misc.(Non-Drug; Combo Route) route 4  "(four) times daily as needed (wheezing). 9/27/23   Jeronimo Bird MD   nitroGLYCERIN (NITROSTAT) 0.4 MG SL tablet Place 1 tablet (0.4 mg total) under the tongue every 5 (five) minutes as needed for Chest pain. 2/15/24 2/14/25  Jeronimo Bird MD   ofloxacin (OCUFLOX) 0.3 % ophthalmic solution Place 1 drop into the right eye 4 (four) times daily. 11/1/23   Provider, Historical   pen needle, diabetic 32 gauge x 5/32" Ndle 1 each by Misc.(Non-Drug; Combo Route) route 4 (four) times daily. 9/27/23 9/26/24  Jeronimo Bird MD   potassium chloride (K-TAB) 20 mEq Take 20 mEq by mouth every morning. 11/30/23   Provider, Historical   prednisoLONE acetate (PRED FORTE) 1 % DrpS Place 1 drop into the right eye 5 (five) times daily. 11/1/23   Provider, Historical   sodium chloride 5% (NIMA 128) 5 % ophthalmic solution Place 1 drop into both eyes as needed. 12/15/23   Provider, Historical   vitamin D (VITAMIN D3) 1000 units Tab Take 2 tablets (2,000 Units total) by mouth Daily. 2/15/24 2/14/25  Jeronimo Bird MD   warfarin (COUMADIN) 5 MG tablet Take 0.5 tablets (2.5 mg total) by mouth Daily. 7/3/24 7/3/25  Jeronimo Bird MD   albuterol (PROVENTIL/VENTOLIN HFA) 90 mcg/actuation inhaler Inhale 1 puff into the lungs every 4 (four) hours as needed for Wheezing. 6/30/24 8/26/24  Jeronimo Bird MD   melatonin (MELATIN) 5 mg Take 1 tablet (5 mg total) by mouth nightly as needed for Insomnia. 8/5/24 8/26/24  Jeronimo Bird MD     Review of Systems:  Review of Systems   Constitutional:  Negative for chills, diaphoresis, fatigue and fever.   HENT:  Negative for ear pain, hearing loss, rhinorrhea, sore throat, tinnitus and trouble swallowing.    Eyes:  Negative for pain, redness and visual disturbance.   Respiratory:  Negative for cough, chest tightness and shortness of breath.    Cardiovascular:  Negative for chest pain and palpitations.   Gastrointestinal:  Negative for abdominal pain, constipation, diarrhea, nausea and vomiting.        " Mentions having to pass stool soon after eating   Genitourinary:  Negative for difficulty urinating, dysuria, frequency, hematuria and urgency.   Musculoskeletal:  Negative for arthralgias and myalgias.   Skin:  Negative for rash and wound.   Neurological:  Negative for dizziness, seizures, syncope, weakness, light-headedness, numbness and headaches.        Recent onset L sided facial and neck pain - occasional, intermittent   Psychiatric/Behavioral:  Negative for confusion and sleep disturbance.        Objective     Vitals:   Vitals:    02/11/25 1349   BP: 115/71   Pulse: 99   Resp: 18   Temp: 98.5 °F (36.9 °C)         Physical Examination:   Physical Exam  Vitals reviewed.   Constitutional:       General: She is not in acute distress.     Appearance: Normal appearance. She is obese. She is not ill-appearing, toxic-appearing or diaphoretic.   HENT:      Head: Normocephalic and atraumatic.      Right Ear: External ear normal.      Left Ear: External ear normal.   Eyes:      General: No scleral icterus.        Right eye: No discharge.         Left eye: No discharge.      Extraocular Movements: Extraocular movements intact.      Conjunctiva/sclera: Conjunctivae normal.      Pupils: Pupils are equal, round, and reactive to light.   Cardiovascular:      Rate and Rhythm: Normal rate and regular rhythm.      Pulses: Normal pulses.      Heart sounds: Normal heart sounds. No murmur heard.     No friction rub. No gallop.   Pulmonary:      Effort: Pulmonary effort is normal. No respiratory distress.      Breath sounds: Normal breath sounds. No wheezing, rhonchi or rales.   Abdominal:      General: Abdomen is flat. Bowel sounds are normal. There is no distension.      Palpations: Abdomen is soft.      Tenderness: There is no abdominal tenderness. There is no guarding.   Musculoskeletal:         General: No swelling, tenderness, deformity or signs of injury. Normal range of motion.      Right lower leg: No edema.      Left  lower leg: No edema.   Skin:     General: Skin is warm and dry.      Capillary Refill: Capillary refill takes less than 2 seconds.      Coloration: Skin is not jaundiced.      Findings: No bruising, erythema or rash.   Neurological:      Mental Status: She is alert.      Comments: AAO to person, place, time, and situation; CN II-XII grossly intact         PREVENTIVE CARE:  Lab Work:    DM (age>45 or HTN):  Lab Results   Component Value Date    HGBA1C 7.6 (H) 11/16/2023    MZLMSLP6A 8.8 08/29/2024    MICALBCREAT 48.6 (H) 10/05/2023     Lipid :  Lab Results   Component Value Date    CHOL 113 08/29/2024    TRIG 99 08/29/2024    HDL 46 08/29/2024    LDL 47.00 (L) 08/29/2024     Thyroid:  Lab Results   Component Value Date    TSH 1.976 08/29/2024     Screening:    Mammo: Mammogram (11/17/2022) showed areas of fibroglandular initial negative for suspicious mass, asymmetry, distortion, or calcifications; ordered repeat mammogram  DEXA (age>65 or FRAX > 9.3%):  DXA (01/17/2023)  showed normal bone density to lumbar spine and bilateral femurs; will repeat in 2025  Lung Ca (30PY smoker age 55-79 or quit in last 15y):  Not indicated due to never smoker   Colon Ca: (age 45-75-annual FOBT, 5y flex + FOBTq3 or 10y c-scope):  Colonoscopy (04/28/2023) normal; will repeat in 2028  AAA (smoker 65-76):  Not indicated due to patient sex    ID Titers and Vaccinations:    Immunization History   Administered Date(s) Administered    COVID-19, MRNA, LN-S, PF (Pfizer) (Purple Cap) 02/10/2021, 03/03/2021, 12/17/2021    Influenza 10/13/2010, 11/17/2017    Influenza (FLUAD) - Quadrivalent - Adjuvanted - PF *Preferred* (65+) 09/27/2023    Influenza - Quadrivalent 10/03/2016, 12/10/2019, 12/07/2020, 10/27/2021    Influenza - Quadrivalent - PF (6-35 months) 12/11/2018    Influenza - Quadrivalent - PF *Preferred* (6 months and older) 12/07/2020    Influenza - Trivalent - Fluarix, Flulaval, Fluzone, Afluria - PF 10/13/2010    Pneumococcal Conjugate  "- 13 Valent 12/07/2020    Pneumococcal Conjugate - 20 Valent 06/06/2023    Pneumococcal Polysaccharide - 23 Valent 04/04/2017    Tdap 12/11/2018    Zoster Recombinant 12/11/2018, 05/29/2019      HIV (once age 15-65):  No results found for: "HIV1X2", "NSC53OFLJ"     Hepatitis Screening:   Lab Results   Component Value Date    HEPCAB Nonreactive 06/06/2023      Pneumococcal vaccine (65 and over or high risk): UTD  Influenza Vaccine: UTD  Tetanus: UTD  Zoster vaccination (> 50y.o):  UTD  Covid vaccine:  UTD    ASSESSMENT/PLAN:    Obesity  Diabetes mellitus type 2  Diabetic neuropathy  -previous 8.5  -POCT A1c 8.5  -patient did not bring logs this visit, has been encouraged to bring next time  -instructed patient to keep log and check CBGs prior to meal, she voiced understanding  -discussed importance of adhering to diabetic diet and good exercise  -referred to ophthalmology for diabetic eye exam  -diabetic foot exam showed palpable dorsalis pedis pulses, normal sensation to bilateral feet, in no evidence of skin breakdown/ulceration/infection  -continue levemir to 60 units qhs, novoLog 20 units tidwm, exenatide microspheres 2 mg qweekly , and Jardiance 25 mg q.day  -patient reports she was told not to take take metformin due hx of colon and renal cancer  -will hold on prescribing sulfonylureas due to patient reporting multiple episodes of hypoglycemia  -continue duloxetine 60 mg qd for diabetic neuropathy  -referred to [facility] diabetes education and nutrition services for additional assistance - requested for them to see patient again 1 week before IM follow up, in order to have CGM data updated in chart prior to next visit.  -referred to [facility] endocrinology for assistance managing diabetes due to patient on maximal doses of multiple medications  - Ordering DEXCOM per patient's request. Will follow up in 1 month for CGM data to optimize glucose control.    Hypertension  - /71 today  -educated on low sodium (< 2g//day) " DASH diet and exercise  -continue carvedilol 3.125 bid, diltiazem 180 mg qd, and losartan-hydrochlorothiazide 50-12.5 qd     Hyperlipidemia  CAD  Ischemic heart disease  Stable angina  Heart failure with reduced ejection fractions with grade I diastolic dysfunction  The ASCVD Risk score (Charanjit DK, et al., 2019) failed to calculate for the following reasons:    Risk score cannot be calculated because patient has a medical history suggesting prior/existing ASCVD  -nuclear stress test (04/2021) showed anterolateral and inferior ischemia  -nuclear stress test (01/09/2024) showed no evidence of myocardial ischemia or infarction  -LHC (09/2021) showed no evidence of coronary artery disease  -TTE (04/2021) showed EF 50-55%  -TTE (12/2023) showed EF 45% with grade I diastolic dysfunction  -patient with mildly decreased ejection compared to prior TTE  -likely related to new onset atrial fibrillation but given patient experiencing episodes of angina cannot definitively rule out ischemic heart disease despite nuclear stress showing no evidence of ischemia  -patient followed with cardiology 01/28/25 - per them, continue imdur 30 and nitroglycerin prn; plan to repeat echo before next visit  -continue atorvastatin 80 mg qd, carvedilol 3.125 bid, and losartan-hydrochlorothiazide 50-12.5 qd    Hemorrhagic stroke (12/2023) without sustained post stroke neurological deficits  -diagnosed (12/2023) at [facility]   -in setting of recent intraparenchymal hemorrhage & 6 mm density noted on repeat CT head (12/15/23) [facility] recommended waiting at least x 8 weeks before initiating anti-coagulation   -no residual neuro deficits  -completed 8 weeks of anti-coagulation vacation  -restarted on coumadin - follows regularly with coumadin clinic  - MRI Brain w/o contrast - chronic age related changes, otherwise unremarkable  -appointment scheduled with [facility]; appreciate assistance. Patient was  not able to attend due to transportation difficulties    Persistent atrial fibrillation  -rate controlled with carvedilol and diltiazem  -continue carvedilol 3.125 mg qd and diltiazem 180 mg qd    Chronic kidney disease stage IIIb  -continues to follow with nephrology  -continue management per nephrology  - Per them, - additional workup including SPEP, UPEP, NOA, ANCA, and retroperitoneal US ordered along with usual blood work   - NOA +, elevated urine protein, IgG levels, free light chains along with abnormal SPEP without M spike.   - Based on results, KANDY panel ordered to determine cause of elevated NOA; they will consider rheumatology referral once results return.  -continue to avoid excessive use of NSAIDs (ibuprofen, naproxen, Aleve, Advil, Toradol, Mobic) and nephrotoxic drugs  -continue to optimize hypertension and diabetes control    Renal cell carcinoma status post nephrectomy (2011)   Adenocarcinoma of the colon stage IIA status post hemicolectomy (2014) and adjuvant chemotherapy (2015)  Hx of provoked PE presumed 2/2 malignancy  -patient is s/p hemicolectomy, nephrectomy, and chemotherapy  -completed chemo in April 2015  -seen by Oncology previously but discharged from clinic  -continues on coumadin     Constipation  Hematochezia  -denies any further symptoms  -referred to GI for evaluation of hematochezia as patient may need EGD to evaluate for upper GI bleed in the setting of possible rapid colonic transport secondary to hemicolectomy    MOHSEN on CPAP  -continue CPAP  - reports no active complaints    Multinodular goiter  TSH 1.976 (08/29/24)  Free T4 0.95  -patient asymptotic  -thyroid biopsy results showed benign thyroid nodules  -will continue to monitor    Depression  -symptomatically controlled  -no AVH/SI/HI   -continue duloxetine 60 mg qd    Gout  -on allopurinol by Nephrology  -reports no recent flares     Health Maintenance:  -lab work UTD  -initiated and/or refilled medications as  above  -screenings ordered and/or UTD as above  -vaccinations UTD    Follow-up in 1 month with Dr Medina for CGM data and blood glucose monitoring  Follow up in 6 months with Dr Bird.    Future Appointments   Date Time Provider Department Center   2/21/2025 10:00 AM Silvia Mckeon MD [facility] NEPHR [facility]   2/27/2025  2:00 PM COUMADIN, OLGH [facility]   3/24/2025  1:40 PM PROVIDERS, BERNARDA [facility]   5/29/2025  3:30 PM Wu Taylor MD [facility] CARD [facility]     Case discussed with Dr Copeland. Physician attestation to follow.    Seth Medina MD  Internal Medicine - PGY-1

## 2025-02-11 NOTE — Clinical Note
Also, could you please see her again a week after initial visit so we may have her CGM data uploaded on chart before her next follow up with us in 4-5 weeks? Thank you.

## 2025-02-11 NOTE — Clinical Note
Good afternoon,  We saw this patient in IM clinic today. She was previously referred for diabetes education and missed her appointment, was not agreeable to reschedule. Please schedule her for a visit again, she really needs her blood sugar control optimized. We counseled her extensively regarding this today too.

## 2025-02-11 NOTE — PROGRESS NOTES
I saw and evaluated the patient and was present for the key portions of the exam and separately billed procedures, if any.  I have reviewed and agree with the resident's findings, including all diagnostic interpretations and plans as written.    Pt is here for follow up. No acute issues today. Pt here for med refills and to order Dexcom. A1c above goal at 8.5% with current regimen Lantus 60 U nighty, Novolog 15 U TID, Jardiance 25 mg daily, and exenatide 2 mg weekly. Pt currently not checking sugars. Ideally would like for patient to be on Mounjaro or Ozempic to assist with weight loss in setting of BMI 56. Pt call to insurance to see other GLP-1's that are covered. Message sent to DM education to have patient scheduled within the next few weeks. Will have close follow up with patient in the next month with Dr. Medina as next PCP follow up is in 8/2025. Remainder of care as documented per resident.    Maranda Copeland MD

## 2025-02-12 ENCOUNTER — TELEPHONE (OUTPATIENT)
Dept: DIABETES | Facility: CLINIC | Age: 70
End: 2025-02-12
Payer: MEDICARE

## 2025-02-12 DIAGNOSIS — E11.22 TYPE 2 DIABETES MELLITUS WITH STAGE 3 CHRONIC KIDNEY DISEASE, WITH LONG-TERM CURRENT USE OF INSULIN, UNSPECIFIED WHETHER STAGE 3A OR 3B CKD: Primary | ICD-10-CM

## 2025-02-12 DIAGNOSIS — Z79.4 TYPE 2 DIABETES MELLITUS WITH STAGE 3 CHRONIC KIDNEY DISEASE, WITH LONG-TERM CURRENT USE OF INSULIN, UNSPECIFIED WHETHER STAGE 3A OR 3B CKD: Primary | ICD-10-CM

## 2025-02-12 DIAGNOSIS — N18.30 TYPE 2 DIABETES MELLITUS WITH STAGE 3 CHRONIC KIDNEY DISEASE, WITH LONG-TERM CURRENT USE OF INSULIN, UNSPECIFIED WHETHER STAGE 3A OR 3B CKD: Primary | ICD-10-CM

## 2025-02-12 NOTE — TELEPHONE ENCOUNTER
You're welcome.    Pt contacted 2/11 for scheduling. Left VM. Pt returned call after hours on 2/11. Returned call 2/12 morning. No answer - left VM. If it's ok with you, I can add a new referral that will go in our Workqueue so the schedulers will try to reach her.    ----- Message from Seth Medina sent at 2/11/2025  3:25 PM CST -----  Also, could you please see her again a week after initial visit so we may have her CGM data uploaded on chart before her next follow up with us in 4-5 weeks? Thank you.

## 2025-02-21 ENCOUNTER — OFFICE VISIT (OUTPATIENT)
Dept: NEPHROLOGY | Facility: CLINIC | Age: 70
End: 2025-02-21
Payer: MEDICARE

## 2025-02-21 VITALS — SYSTOLIC BLOOD PRESSURE: 135 MMHG | DIASTOLIC BLOOD PRESSURE: 78 MMHG

## 2025-02-21 DIAGNOSIS — N18.32 CKD STAGE 3B, GFR 30-44 ML/MIN: Primary | ICD-10-CM

## 2025-02-21 DIAGNOSIS — I10 PRIMARY HYPERTENSION: ICD-10-CM

## 2025-02-21 DIAGNOSIS — E66.01 MORBID OBESITY WITH BMI OF 50.0-59.9, ADULT: ICD-10-CM

## 2025-02-21 DIAGNOSIS — E11.69 DIABETES MELLITUS TYPE 2 IN OBESE: ICD-10-CM

## 2025-02-21 DIAGNOSIS — E66.9 DIABETES MELLITUS TYPE 2 IN OBESE: ICD-10-CM

## 2025-02-21 DIAGNOSIS — R76.8 POSITIVE ANA (ANTINUCLEAR ANTIBODY): ICD-10-CM

## 2025-02-21 NOTE — PROGRESS NOTES
[facility]  Nephrology Clinic Note    Chief complaint: Chronic Kidney Disease (Follow up)    History of present illness:   Elissa Freeman is a 69 y.o. Black or  female with past medical history of  CKD , hypertension, dyslipidemia, diabetes mellitus 2, renal cell carcinoma (status post left nephrectomy in 2011), adenocarcinoma of the colon (status post hemicolectomy in 2014), and morbid obesity.   Patient has been feeling well since her last appointment. She reports compliance with her home medications. She reports good urine output along with good oral hydration since her last appointment. Her renal indices look stable in the previous visit. She denies any fever, chills, SOB, chest pain, abdominal pain, dysuria, or incontinence. Patient has have history of solitary kidney due to renal cell carcinoma s/p left nephrectomy in 2011.     Review of Systems  12 point review of systems conducted, negative except as stated in the history of present illness.    Allergies: Patient has no known allergies.     Past Medical History:  has a past medical history of Asthma, Atrial fibrillation, Cancer, CKD (chronic kidney disease), Coronary artery disease, Diabetes mellitus, Hyperlipidemia, Hypertension, Obesity, unspecified, MOHSEN (obstructive sleep apnea), and Unspecified cataract.    Procedure History:  has a past surgical history that includes Colon surgery; Nephrectomy (Left); Cholecystectomy; hysterectomy, vaginal, with salpingo-oophorectomy; Tubal ligation; excision of lipoma; Cataract extraction (Left, 11/28/2022); Colonoscopy (N/A, 04/27/2023); Eye surgery; and Hysterectomy.    Family History: family history includes Asthma in her mother; Cancer in her brother; Coronary artery disease in her mother; Hyperlipidemia in her mother; Hypertension in her mother; Stroke in her father.    Social History:  reports that she has never smoked. She has never used smokeless tobacco. She reports  that she does not currently use alcohol. She reports that she does not use drugs.    Physical exam  /78     Home Medications:    Current Outpatient Medications:     acetaminophen (TYLENOL EXTRA STRENGTH) 500 MG tablet, Take 1,000 mg by mouth every 6 (six) hours as needed for Pain., Disp: , Rfl:     albuterol (PROVENTIL/VENTOLIN HFA) 90 mcg/actuation inhaler, Inhale 1 puff into the lungs every 4 (four) hours as needed for Wheezing., Disp: 18 g, Rfl: 3    amLODIPine (NORVASC) 10 MG tablet, Take 10 mg by mouth once daily., Disp: , Rfl:     atorvastatin (LIPITOR) 80 MG tablet, Take 1 tablet (80 mg total) by mouth once daily., Disp: 90 tablet, Rfl: 3    blood-glucose meter,continuous (DEXCOM G7 ) Misc, To use with your sensor to check blood sugar, Disp: 1 each, Rfl: 0    blood-glucose sensor (DEXCOM G7 SENSOR) Lily, Replace every 10 days, used to check blood sugar, Disp: 3 each, Rfl: 4    brimonidine 0.2% (ALPHAGAN) 0.2 % Drop, Place 1 drop into both eyes 2 (two) times daily., Disp: 10 mL, Rfl: 2    carvediloL (COREG) 3.125 MG tablet, Take 1 tablet (3.125 mg total) by mouth 2 (two) times daily., Disp: 60 tablet, Rfl: 11    cetirizine (ZYRTEC) 10 MG tablet, Take 1 tablet (10 mg total) by mouth once daily., Disp: 30 tablet, Rfl: 0    diltiaZEM (CARDIZEM CD) 180 MG 24 hr capsule, Take 1 capsule (180 mg total) by mouth every morning., Disp: 30 capsule, Rfl: 11    DULoxetine (CYMBALTA) 60 MG capsule, Take 1 capsule (60 mg total) by mouth once daily. TAKE 1 CAPSULE BY MOUTH DAILY. DO NOT CRUSH OR CHEW, Disp: 30 capsule, Rfl: 2    empagliflozin (JARDIANCE) 25 mg tablet, Take 1 tablet (25 mg total) by mouth once daily., Disp: 90 tablet, Rfl: 3    exenatide microspheres (BYDUREON BCISE) 2 mg/0.85 mL AtIn, Inject 2 mg into the skin every 7 days., Disp: 12 pen , Rfl: 3    furosemide (LASIX) 20 MG tablet, Take 20 mg by mouth every morning., Disp: , Rfl:     gabapentin (NEURONTIN) 600 MG tablet, Take 1 tablet (600 mg  total) by mouth every morning., Disp: 90 tablet, Rfl: 3    hydrALAZINE (APRESOLINE) 50 MG tablet, Take 1 tablet (50 mg total) by mouth every 8 (eight) hours., Disp: 90 tablet, Rfl: 11    insulin aspart U-100 (NOVOLOG FLEXPEN U-100 INSULIN) 100 unit/mL (3 mL) InPn pen, Inject 20 Units into the skin 3 (three) times daily with meals., Disp: 15 mL, Rfl: 11    insulin glargine U-100, Lantus, (LANTUS SOLOSTAR U-100 INSULIN) 100 unit/mL (3 mL) InPn pen, Inject 65 Units into the skin every evening., Disp: 18 mL, Rfl: 11    isosorbide mononitrate (IMDUR) 30 MG 24 hr tablet, Take 1 tablet (30 mg total) by mouth once daily., Disp: 60 tablet, Rfl: 1    latanoprost 0.005 % ophthalmic solution, Place 1 drop into both eyes once daily., Disp: 2.5 mL, Rfl: 1    losartan-hydrochlorothiazide 50-12.5 mg (HYZAAR) 50-12.5 mg per tablet, Take 1 tablet by mouth once daily., Disp: 90 tablet, Rfl: 3    melatonin (MELATIN) 5 mg, Take 1 tablet (5 mg total) by mouth nightly as needed for Insomnia., Disp: 90 tablet, Rfl: 3    nebulizer accessories Kit, 1 each by Misc.(Non-Drug; Combo Route) route 4 (four) times daily as needed (wheezing)., Disp: 1 kit, Rfl: 0    nitroGLYCERIN (NITROSTAT) 0.4 MG SL tablet, Place 1 tablet (0.4 mg total) under the tongue every 5 (five) minutes as needed for Chest pain., Disp: 30 tablet, Rfl: 0    potassium chloride (K-TAB) 20 mEq, TAKE ONE TABLET BY MOUTH DAILY AT 9 AM (VIAL), Disp: 90 tablet, Rfl: 11    vitamin D (VITAMIN D3) 1000 units Tab, Take 2 tablets (2,000 Units total) by mouth Daily., Disp: 60 tablet, Rfl: 11    warfarin (COUMADIN) 5 MG tablet, Take 0.5 tablets (2.5 mg total) by mouth Daily., Disp: 15 tablet, Rfl: 11    flash glucose scanning reader ([employer] SMITHA 2 READER) Misc, 1 each by Misc.(Non-Drug; Combo Route) route 4 (four) times daily with meals and nightly. (Patient not taking: Reported on 2/21/2025), Disp: 1 each, Rfl: 0    flash glucose sensor (FREESTYLE SMITHA 2 SENSOR) Kit, 1 each by  Misc.(Non-Drug; Combo Route) route 2 hours after meals and at bedtime. (Patient not taking: Reported on 2/21/2025), Disp: 1 kit, Rfl: 0    ketorolac 0.5% (ACULAR) 0.5 % Drop, Place 1 drop into the left eye 2 (two) times daily. (Patient not taking: Reported on 2/21/2025), Disp: , Rfl:     losartan (COZAAR) 100 MG tablet, Take 100 mg by mouth once daily. (Patient not taking: Reported on 2/21/2025), Disp: , Rfl:     ofloxacin (OCUFLOX) 0.3 % ophthalmic solution, Place 1 drop into the right eye 4 (four) times daily. (Patient not taking: Reported on 2/21/2025), Disp: , Rfl:     prednisoLONE acetate (PRED FORTE) 1 % DrpS, Place 1 drop into the right eye 5 (five) times daily. (Patient not taking: Reported on 2/21/2025), Disp: , Rfl:     sodium chloride 5% (NIMA 128) 5 % ophthalmic solution, Place 1 drop into both eyes as needed. (Patient not taking: Reported on 2/21/2025), Disp: , Rfl:     Laboratory data    Lab Results   Component Value Date    WBC 6.43 12/20/2024    HGB 12.3 12/20/2024    HCT 41.5 12/20/2024     12/20/2024    IRON 54 10/05/2023    TIBC 247 (L) 10/05/2023    LABIRON 22 10/05/2023    FERRITIN 83.25 10/05/2023     12/20/2024    K 4.1 12/20/2024    CO2 28 12/20/2024    BUN 16.5 12/20/2024    CREATININE 1.76 (H) 12/20/2024    EGFRNORACEVR 31 12/20/2024    GLUCOSE 155 (H) 12/20/2024    CALCIUM 9.5 12/20/2024    ALKPHOS 137 12/20/2024    LABPROT 8.2 (H) 12/20/2024    LABPROT 7.4 12/20/2024    ALBUMIN 3.5 12/20/2024    ALBUMIN 3.0 (L) 12/20/2024    BILIDIR 0.3 02/11/2022    IBILI 0.20 02/11/2022    AST 14 12/20/2024    ALT 12 12/20/2024    MG 2.10 12/20/2024    PHOS 3.6 12/20/2024      Lab Results   Component Value Date    HGBA1C 7.6 (H) 11/16/2023    .3 (H) 12/20/2024    GQSWJFWJ76ES 38 12/20/2024    ANAHS Positive 1:320 (A) 12/20/2024    CANCA Negative 12/20/2024    PANCA Negative 12/20/2024    HIV Nonreactive 01/17/2023    HEPCAB Nonreactive 06/06/2023    MSPIKEPCT  12/20/2024       Comment:      Not observed     Urine:  Lab Results   Component Value Date    APPEARANCEUA Turbid (A) 12/20/2024    SGUA 1.020 12/20/2024    PROTEINUA Trace (A) 12/20/2024    KETONESUA Negative 12/20/2024    LEUKOCYTESUR Negative 12/20/2024    RBCUA 6-10 (A) 12/20/2024    WBCUA 0-5 12/20/2024    BACTERIA Trace (A) 12/20/2024    SQEPUA Moderate (A) 12/20/2024    HYALINECASTS None Seen 12/20/2024    CREATRANDUR 82.5 12/20/2024    PROTEINURINE 26.0 12/27/2024    UPROTCREA 0.3 12/20/2024         Imaging  US Retroperitoneal Complete With Doppler (XPD) 11/17/2023    Grayscale and color Doppler sonographic evaluation of the kidneys and urinary bladder.  Right kidney measures 9.5 cm in length.  No hydronephrosis.  Normal renal parenchymal echogenicity.  Resistive indices are not significantly elevated.  Main renal artery velocity not significantly elevated.  Patent renal vein.  The left kidney is absent.  Urinary bladder unremarkable.    Impression  No significant sonographic abnormality of the right kidney.  Left kidney absent.  Electronically signed by: Augusto Dempsey  Date:    11/17/2023  Time:    12:52    UD retroperitoneal ultrasound :  (1/9/25)  Narrative & Impression  EXAMINATION:  Ultrasound retroperitoneum     CLINICAL HISTORY:  Chronic kidney disease stage 3     COMPARISON:  11/17/2023     FINDINGS:  Left kidney is surgically absent.  The left renal fossa is empty.  The right kidney measures 9.7 x 7.7 x 5.2 cm.  No discrete right renal lesion or hydronephrosis.  The bladder is underdistended and not well assessed.     Impression:     Prior left nephrectomy     Unremarkable appearance of the right kidney     Underdistended urinary bladder         Impression    ICD-10-CM ICD-9-CM   1. CKD stage 3b, GFR 30-44 ml/min  N18.32 585.3   2. Diabetes mellitus type 2 in obese  E11.69 250.00    E66.9 278.00   3. Primary hypertension  I10 401.9   4. Morbid obesity with BMI of 50.0-59.9, adult  E66.01 278.01    Z68.43 V85.43   5.  Positive NOA (antinuclear antibody)  R76.8 795.79          Plan    CKD stage G3b/A3, GFR 30-44 and albumin creatinine ratio >300 mg/g  Diabetic kidney disease/reduced renal mass.  Continue aggressive risk factor management, MAHI blockade, and SGLT 2 inhibitor.   Continue renal sparing activities:  -2 g a day dietary sodium restriction  -optimize glycemic control (goal A1c is less than 7%)  -control high blood pressure (goal blood pressure is less than 130/80, patient was advised to check blood pressure once or twice a week and bring blood pressure logs to next office visit)  -exercise at least 30 minutes a day, 5 days a week  -maintain healthy weight  -stay well hydrated (drink water only, avoid juices, sweet tea, and sodas)  -ask about staying up-to-date on vaccinations (flu vaccine, pneumonia vaccine, hepatitis B vaccine)  -avoid excessive use of NSAIDs (ibuprofen, naproxen, Aleve, Advil, Toradol, Mobic), take Tylenol as needed for headache or mild pain  -take cholesterol lowering medications if prescribed (LDL goal less than 100)  -Hx of previous nephrectomy in 2011 due to RCC; no longer follows with Heme/Onc  -noted progressive worsening renal function; could be related to hypertension/diabetes  -Will hold off on kidney biopsy due to history of solitary kidney  - No Mspike, normal SPeP and Upep, Normal ANCA, NOA positive at 1:320. Will order dsdna for next visit.   - US retroperitoneal ultrasound - No significant sonographic abnormality in the right kidney     Primary hypertension  Blood pressure reading is at goal, continue current antihypertensive regimen and 2 g a day dietary sodium restriction.      Type 2 diabetes mellitus with stage 3b chronic kidney disease, with long-term current use of insulin  A1C is above goal. Continue SGLT2i and GLP1ra.   - Will need aggressive DM control    Severe obesity (BMI >= 40)  Lifestyle and dietary interventions discussed, patient encouraged to maintain non-sedentary  lifestyle and well-balanced diet.     Orders Placed This Encounter   Procedures    DSDNA     Standing Status:   Future     Expected Date:   5/21/2025     Expiration Date:   4/21/2026    CBC Auto Differential     Standing Status:   Future     Expected Date:   5/21/2025     Expiration Date:   4/22/2026    Comprehensive Metabolic Panel     Standing Status:   Future     Expected Date:   5/21/2025     Expiration Date:   4/22/2026    Magnesium     Standing Status:   Future     Expected Date:   5/21/2025     Expiration Date:   5/22/2026     Send normal result to authorizing provider's In Basket if patient is active on MyChart::   Yes    Phosphorus     Standing Status:   Future     Expected Date:   5/21/2025     Expiration Date:   5/22/2026     Send normal result to authorizing provider's In Basket if patient is active on MyChart::   Yes    Urinalysis     Standing Status:   Future     Number of Occurrences:   1     Expected Date:   5/21/2025     Expiration Date:   2/21/2026     Collection Type:   Urine, Clean Catch    Protein/Creatinine Ratio, Urine     Standing Status:   Future     Expected Date:   5/21/2025     Expiration Date:   4/22/2026     Specimen Source:   Urine     Send normal result to authorizing provider's In Basket if patient is active on MyChart::   Yes    Vitamin D     Standing Status:   Future     Expected Date:   5/21/2025     Expiration Date:   5/22/2026     Send normal result to authorizing provider's In Basket if patient is active on MyChart::   Yes    PTH, intact     Standing Status:   Future     Expected Date:   5/21/2025     Expiration Date:   5/22/2026     Send normal result to authorizing provider's In Basket if patient is active on MyChart::   Yes     This is a telemedicine note.   I have reviewed the patient's name and date of birth.  I have verbally reviewed the past medical history, active medication list, and allergies with the patient.  I have verified that this patient is in the state of  [place].  Patient was treated using telemedicine, real-time audio, according to [facility] protocols. I, distant provider, conducted the visit from [facility] and [facility] in [place], [place]. The patient participated in the visit at a non-[facility] location. Patient was located at: Home. I am licensed in the state where the patient stated they are located. The patient stated that they understood and accepted the privacy and security risks to their information in their location.    Verbal consent to participate in interactive audio & video visit was obtained. Patient was explained that:  - Our sessions are not being recorded and that personal health information is protected   - I would evaluate the patient and recommend diagnostics and treatments based on my assessment  - The team will provide follow up care in person if/when the patient needs it.    Total time spent with patient was 25  minutes, over 50% of which was used for education and counseling regarding medical conditions, current medications includeing risk/benefit and side effects/adverse events, over-the-counter medication uses/doses, home self-care and contact precautions, and red flags and indications for immediate medical attention. The patient is receptive, expresses understanding and is agreeable to plan. All questions answered and concerns addressed.     Nandini Ledezma MD  [facility] Medicine, [facility]

## 2025-02-27 ENCOUNTER — ANTI-COAG VISIT (OUTPATIENT)
Dept: CARDIOLOGY | Facility: HOSPITAL | Age: 70
End: 2025-02-27
Payer: MEDICARE

## 2025-02-27 DIAGNOSIS — I26.99 PULMONARY EMBOLISM, OTHER, UNSPECIFIED CHRONICITY, UNSPECIFIED WHETHER ACUTE COR PULMONALE PRESENT: Primary | ICD-10-CM

## 2025-02-27 DIAGNOSIS — Z79.01 ANTICOAGULATION MONITORING, INR RANGE 2-3: ICD-10-CM

## 2025-02-27 LAB
CTP QC/QA: YES
INR PPP: 4.9 (ref 2–3)
PROTHROMBIN TIME, POC: ABNORMAL (ref 2–3)

## 2025-02-27 PROCEDURE — 85610 PROTHROMBIN TIME: CPT

## 2025-02-27 NOTE — PROGRESS NOTES
POC pt/inr performed.  Pt states she took warfarin today.  She will hold warfarin 2/28/25, take 2.5 mg only Saturday 3/1/2025, then will resume dosing.  She has no s/s of bleeding.

## 2025-03-03 DIAGNOSIS — N18.30 TYPE 2 DIABETES MELLITUS WITH STAGE 3 CHRONIC KIDNEY DISEASE, WITH LONG-TERM CURRENT USE OF INSULIN, UNSPECIFIED WHETHER STAGE 3A OR 3B CKD: ICD-10-CM

## 2025-03-03 DIAGNOSIS — Z79.4 TYPE 2 DIABETES MELLITUS WITH STAGE 3 CHRONIC KIDNEY DISEASE, WITH LONG-TERM CURRENT USE OF INSULIN, UNSPECIFIED WHETHER STAGE 3A OR 3B CKD: ICD-10-CM

## 2025-03-03 DIAGNOSIS — E11.22 TYPE 2 DIABETES MELLITUS WITH STAGE 3 CHRONIC KIDNEY DISEASE, WITH LONG-TERM CURRENT USE OF INSULIN, UNSPECIFIED WHETHER STAGE 3A OR 3B CKD: ICD-10-CM

## 2025-03-03 DIAGNOSIS — E11.42 DIABETIC POLYNEUROPATHY ASSOCIATED WITH TYPE 2 DIABETES MELLITUS: ICD-10-CM

## 2025-03-07 RX ORDER — DULOXETIN HYDROCHLORIDE 60 MG/1
60 CAPSULE, DELAYED RELEASE ORAL DAILY
Qty: 90 CAPSULE | Refills: 3 | Status: SHIPPED | OUTPATIENT
Start: 2025-03-07 | End: 2026-03-07

## 2025-03-07 RX ORDER — INSULIN GLARGINE 100 [IU]/ML
65 INJECTION, SOLUTION SUBCUTANEOUS NIGHTLY
Qty: 18 ML | Refills: 11 | Status: SHIPPED | OUTPATIENT
Start: 2025-03-07 | End: 2026-03-07

## 2025-03-11 ENCOUNTER — OFFICE VISIT (OUTPATIENT)
Dept: INTERNAL MEDICINE | Facility: CLINIC | Age: 70
End: 2025-03-11
Payer: MEDICARE

## 2025-03-11 VITALS
HEART RATE: 93 BPM | OXYGEN SATURATION: 100 % | SYSTOLIC BLOOD PRESSURE: 123 MMHG | DIASTOLIC BLOOD PRESSURE: 73 MMHG | HEIGHT: 62 IN | BODY MASS INDEX: 53.92 KG/M2 | TEMPERATURE: 99 F | WEIGHT: 293 LBS | RESPIRATION RATE: 20 BRPM

## 2025-03-11 DIAGNOSIS — E11.69 DIABETES MELLITUS TYPE 2 IN OBESE: ICD-10-CM

## 2025-03-11 DIAGNOSIS — E66.9 DIABETES MELLITUS TYPE 2 IN OBESE: ICD-10-CM

## 2025-03-11 DIAGNOSIS — D50.9 MICROCYTIC ANEMIA: Primary | ICD-10-CM

## 2025-03-11 PROBLEM — N28.9 KIDNEY DISORDER: Status: RESOLVED | Noted: 2022-08-17 | Resolved: 2025-03-11

## 2025-03-11 PROCEDURE — 99213 OFFICE O/P EST LOW 20 MIN: CPT | Mod: PBBFAC

## 2025-03-11 RX ORDER — TIRZEPATIDE 2.5 MG/.5ML
2.5 INJECTION, SOLUTION SUBCUTANEOUS
Qty: 2 ML | Refills: 0 | Status: SHIPPED | OUTPATIENT
Start: 2025-03-11 | End: 2025-04-10

## 2025-03-11 NOTE — PROGRESS NOTES
I saw and evaluated the patient and was present for the key portions of the exam and separately billed procedures, if any.  I have reviewed and agree with the resident's findings, including all diagnostic interpretations and plans as written.    Patient is here for follow up. Pt reported episodes of BRBPR that occurred a few weeks ago that has since resolved. Ddx includes hemorrhoidal bleeding, diverticulosis, malignancy. Last colonoscopy performed 4/27/2023 was unremarkable. Concern for hemorrhoidal bleeding with pt hx of external hemorrhoids. Agree with labs including iron studies to further monitor for now. Concerning her diabetes, she was able to place the CGM sensor today. Pt denies sxs of hypoglycemia on her current regimen. Recommend close follow up within the next 3 months with repeat A1c and to review CGM readings. Remainder of care as documented per resident.    Maranda Copeland MD

## 2025-03-12 ENCOUNTER — ANTI-COAG VISIT (OUTPATIENT)
Dept: CARDIOLOGY | Facility: HOSPITAL | Age: 70
End: 2025-03-12
Payer: MEDICARE

## 2025-03-12 ENCOUNTER — CLINICAL SUPPORT (OUTPATIENT)
Dept: DIABETES | Facility: CLINIC | Age: 70
End: 2025-03-12
Payer: MEDICARE

## 2025-03-12 DIAGNOSIS — E11.22 TYPE 2 DIABETES MELLITUS WITH STAGE 3 CHRONIC KIDNEY DISEASE, WITH LONG-TERM CURRENT USE OF INSULIN, UNSPECIFIED WHETHER STAGE 3A OR 3B CKD: ICD-10-CM

## 2025-03-12 DIAGNOSIS — N18.30 TYPE 2 DIABETES MELLITUS WITH STAGE 3 CHRONIC KIDNEY DISEASE, WITH LONG-TERM CURRENT USE OF INSULIN, UNSPECIFIED WHETHER STAGE 3A OR 3B CKD: ICD-10-CM

## 2025-03-12 DIAGNOSIS — Z79.4 TYPE 2 DIABETES MELLITUS WITH STAGE 3 CHRONIC KIDNEY DISEASE, WITH LONG-TERM CURRENT USE OF INSULIN, UNSPECIFIED WHETHER STAGE 3A OR 3B CKD: ICD-10-CM

## 2025-03-12 DIAGNOSIS — Z79.01 ANTICOAGULATION MONITORING, INR RANGE 2-3: Primary | ICD-10-CM

## 2025-03-12 DIAGNOSIS — I25.10 CORONARY ARTERY DISEASE, UNSPECIFIED VESSEL OR LESION TYPE, UNSPECIFIED WHETHER ANGINA PRESENT, UNSPECIFIED WHETHER NATIVE OR TRANSPLANTED HEART: ICD-10-CM

## 2025-03-12 DIAGNOSIS — I26.99 PULMONARY EMBOLISM, OTHER, UNSPECIFIED CHRONICITY, UNSPECIFIED WHETHER ACUTE COR PULMONALE PRESENT: ICD-10-CM

## 2025-03-12 LAB
CTP QC/QA: YES
INR PPP: 3.9 (ref 2–3)
PROTHROMBIN TIME, POC: ABNORMAL (ref 2–3)

## 2025-03-12 PROCEDURE — 85610 PROTHROMBIN TIME: CPT

## 2025-03-12 PROCEDURE — G0108 DIAB MANAGE TRN  PER INDIV: HCPCS | Mod: PBBFAC | Performed by: DIETITIAN, REGISTERED

## 2025-03-12 PROCEDURE — 99211 OFF/OP EST MAY X REQ PHY/QHP: CPT

## 2025-03-12 PROCEDURE — 99211 OFF/OP EST MAY X REQ PHY/QHP: CPT | Mod: PBBFAC,27 | Performed by: DIETITIAN, REGISTERED

## 2025-03-12 NOTE — PROGRESS NOTES
POC pt/inr performed.  Pt will hold warfarin on 3/13/2025, then will resume warfarin with decreased dosing.  Please see anticoagulation calendar.

## 2025-03-13 NOTE — PROGRESS NOTES
Diabetes Care Specialist Progress Note  Author: Dara Grullon RD, [facility]  Date: 3/13/2025    Intake    Program Intake  Reason for Diabetes Program Visit:: Intervention  Type of Intervention:: Individual  Individual: Education  Education: Self-Management Skill Review  Current diabetes risk level:: high    Current Diabetes Treatment: Oral Medications  Oral Medication Type/Dose: Jardiance 25 mg  Method of insulin delivery?: Injections  Injection Type: Pens  Pen Type/Dose: Levemir 60 HS and Novolog 20 units TIDAC    Continuous Glucose Monitoring  Personal CGM type:: dexcom G7    Lab Results   Component Value Date    HGBA1C 7.6 (H) 11/16/2023               There is no height or weight on file to calculate BMI.    Lifestyle Coping Support & Clinical    Lifestyle/Coping/Support  Does anyone in your family have diabetes or does anyone in your family support you in your diabetes care?: son and friends  How do you deal with stress/distress?: social activities  Learning Barriers:: None  Psychosocial/Coping Skills Assessment Completed: : Yes  Assessment indicates:: Instruction Needed  Area of need?: Yes         Diabetes Self-Management Skills Assessment    Medication Skills Assessment  Patient is able to identify current diabetes medications, dosages, and appropriate timing of medications.: yes  Patient reports problems or concerns with current medication regimen.: no  Medication Skills Assessment Completed:: Yes  Assessment indicates:: Adequate understanding  Area of need?: No    Diabetes Disease Process/Treatment Options  Diabetes Type?: Type II  Diabetes Disease Process/Treatment Options: Skills Assessment Completed: Yes  Assessment indicates:: Adequate understanding  Area of need?: No    Nutrition/Healthy Eating  Nutrition/Healthy Eating Skills Assessment Completed:: No  Deffered due to:: Time  Area of need?: Deferred    Physical Activity/Exercise  Patient's daily activity level:: sedentary  Patient formally exercises  outside of work.: no  Physical Activity/Exercise Skills Assessment Completed: : Yes  Assessment indicates:: Instruction Needed  Area of need?: Yes    Home Blood Glucose Monitoring  Patient states that blood sugar is checked at home daily.: yes  Personal CGM type:: dexcom G7  Home Blood Glucose Monitoring Skills Assessment Completed: : Yes  Assessment indicates:: Instruction Needed  Area of need?: Yes         Chronic Complications  Reviewed health maintenance: no (see comments)  Deferred due to:: Time  Area of need?: Deferred      Assessment Summary and Plan    Based on today's diabetes care assessment, the following areas of need were identified:      Identified Areas of Need      Medication/Current Diabetes Treatment: No   Lifestyle Coping/Support: Yes- reviewed getting together with friends and social activities to avoid feelings of isolation. She does like bingo and going to see bands   Diabetes Disease Process/Treatment Options: No   Nutrition/Healthy Eating: Deferred    Physical Activity/Exercise: Yes - Discussed physical activity as it relates to insulin resistance and weight loss.    Home Blood Glucose Monitoring: Yes see care plan   Acute Complications:      Chronic Complications: Deferred       Today's interventions were provided through individual discussion, instruction, and written materials were provided.      Patient verbalized understanding of instruction and written materials.  Pt was able to return back demonstration of instructions today. Patient understood key points, needs reinforcement and further instruction.     Diabetes Self-Management Care Plan:    Today's Diabetes Self-Management Care Plan was developed with Elissa's input. Elissa has agreed to work toward the following goal(s) to improve his/her overall diabetes control.      Care Plan: Diabetes Management   Updates made since 3/13/2024 12:00 AM        Problem: Blood Glucose Self-Monitoring         Goal: Patient agrees to contact [employer] for  replacement sensors and to begin using Freestyle Maame 2 Completed 3/13/2025   Start Date: 9/5/2024   Expected End Date: 9/19/2024   Priority: High   Barriers: Lack of Supplies   Note:    9/5/24 - Pt stopped wearing sensors due to falling off after sweating. She has one left at home and will restart. Gave Skin Tac sample and reviewed other adhesive options. Also noted, pt has no refills on Freestyle Maame. Will request refills. Will return in 1 mth for Libreview report review.       Goal: Pt agrees to self-start dexcom at home and return in 2 weeks for report review    Start Date: 3/13/2025   Expected End Date: 3/27/2025   Priority: High   Barriers: No Barriers Identified   Note:    Provided overview of  and sensor use       Problem: Medications Resolved 3/13/2025        Goal: Patient Agrees to take Diabetes Medication(s) as prescribed.. Will take Novolog 20 units before meals and Levemir 60 units at night Completed 3/13/2025   Start Date: 9/5/2024   Expected End Date: 3/26/2025   Barriers: No Barriers Identified        Task: Reviewed with patient all current diabetes medications and provided basic review of the purpose, dosage, frequency, side effects, and storage of both oral and injectable diabetes medications. Completed 9/5/2024        Task: Discussed guidelines for preventing, detecting and treating hypoglycemia and hyperglycemia and reviewed the importance of meal and medication timing with diabetes mediations for prevention of hypoglycemia and maximum drug benefit. Completed 9/5/2024          Follow Up Plan     Follow up in about 2 weeks (around 3/26/2025) for dexcom , nutrition, Psychosocial/Coping, chronic complications, health maintenance, activity.    Today's care plan and follow up schedule was discussed with patient.  Elissa verbalized understanding of the care plan, goals, and agrees to follow up plan.        The patient was encouraged to communicate with his/her health care  provider/physician and care team regarding his/her condition(s) and treatment.  I provided the patient with my contact information today and encouraged to contact me via phone or [facility] Patient Portal as needed.     Length of Visit   Total Time: 45 Minutes

## 2025-03-24 ENCOUNTER — HOSPITAL ENCOUNTER (EMERGENCY)
Facility: HOSPITAL | Age: 70
Discharge: HOME | End: 2025-03-24
Attending: INTERNAL MEDICINE
Payer: MEDICARE

## 2025-03-24 ENCOUNTER — TELEPHONE (OUTPATIENT)
Dept: INTERNAL MEDICINE | Facility: CLINIC | Age: 70
End: 2025-03-24
Payer: MEDICARE

## 2025-03-24 VITALS
RESPIRATION RATE: 17 BRPM | DIASTOLIC BLOOD PRESSURE: 109 MMHG | BODY MASS INDEX: 49.51 KG/M2 | HEART RATE: 102 BPM | SYSTOLIC BLOOD PRESSURE: 155 MMHG | WEIGHT: 290 LBS | OXYGEN SATURATION: 98 % | HEIGHT: 64 IN | TEMPERATURE: 98 F

## 2025-03-24 DIAGNOSIS — E16.0 HYPOGLYCEMIA DUE TO INSULIN: Primary | ICD-10-CM

## 2025-03-24 DIAGNOSIS — E11.22 TYPE 2 DIABETES MELLITUS WITH STAGE 3 CHRONIC KIDNEY DISEASE, WITH LONG-TERM CURRENT USE OF INSULIN, UNSPECIFIED WHETHER STAGE 3A OR 3B CKD: ICD-10-CM

## 2025-03-24 DIAGNOSIS — Z79.4 TYPE 2 DIABETES MELLITUS WITH STAGE 3 CHRONIC KIDNEY DISEASE, WITH LONG-TERM CURRENT USE OF INSULIN, UNSPECIFIED WHETHER STAGE 3A OR 3B CKD: ICD-10-CM

## 2025-03-24 DIAGNOSIS — N18.9 CHRONIC KIDNEY DISEASE, UNSPECIFIED CKD STAGE: ICD-10-CM

## 2025-03-24 DIAGNOSIS — I10 UNCONTROLLED HYPERTENSION: ICD-10-CM

## 2025-03-24 DIAGNOSIS — T38.3X5A HYPOGLYCEMIA DUE TO INSULIN: Primary | ICD-10-CM

## 2025-03-24 DIAGNOSIS — N18.30 TYPE 2 DIABETES MELLITUS WITH STAGE 3 CHRONIC KIDNEY DISEASE, WITH LONG-TERM CURRENT USE OF INSULIN, UNSPECIFIED WHETHER STAGE 3A OR 3B CKD: ICD-10-CM

## 2025-03-24 DIAGNOSIS — E11.42 DIABETIC POLYNEUROPATHY ASSOCIATED WITH TYPE 2 DIABETES MELLITUS: ICD-10-CM

## 2025-03-24 LAB
ALBUMIN SERPL-MCNC: 3.1 G/DL (ref 3.4–4.8)
ALBUMIN/GLOB SERPL: 0.6 RATIO (ref 1.1–2)
ALP SERPL-CCNC: 109 UNIT/L (ref 40–150)
ALT SERPL-CCNC: 12 UNIT/L (ref 0–55)
ANION GAP SERPL CALC-SCNC: 7 MEQ/L
AST SERPL-CCNC: 17 UNIT/L (ref 11–45)
BACTERIA #/AREA URNS AUTO: ABNORMAL /HPF
BILIRUB SERPL-MCNC: 0.8 MG/DL
BILIRUB UR QL STRIP.AUTO: NEGATIVE
BUN SERPL-MCNC: 20.6 MG/DL (ref 9.8–20.1)
CALCIUM SERPL-MCNC: 9.3 MG/DL (ref 8.4–10.2)
CHLORIDE SERPL-SCNC: 105 MMOL/L (ref 98–107)
CLARITY UR: CLEAR
CO2 SERPL-SCNC: 30 MMOL/L (ref 23–31)
COLOR UR AUTO: ABNORMAL
CREAT SERPL-MCNC: 1.59 MG/DL (ref 0.55–1.02)
CREAT/UREA NIT SERPL: 13
GFR SERPLBLD CREATININE-BSD FMLA CKD-EPI: 35 ML/MIN/1.73/M2
GLOBULIN SER-MCNC: 4.8 GM/DL (ref 2.4–3.5)
GLUCOSE SERPL-MCNC: 110 MG/DL (ref 70–110)
GLUCOSE SERPL-MCNC: 124 MG/DL (ref 82–115)
GLUCOSE UR QL STRIP: ABNORMAL
HGB UR QL STRIP: NEGATIVE
HOLD SPECIMEN: NORMAL
HYALINE CASTS #/AREA URNS LPF: ABNORMAL /LPF
KETONES UR QL STRIP: NEGATIVE
LEUKOCYTE ESTERASE UR QL STRIP: NEGATIVE
MUCOUS THREADS URNS QL MICRO: ABNORMAL /LPF
NITRITE UR QL STRIP: NEGATIVE
PH UR STRIP: 6 [PH]
POCT GLUCOSE: 110 MG/DL (ref 70–110)
POCT GLUCOSE: 112 MG/DL (ref 70–110)
POCT GLUCOSE: 120 MG/DL (ref 70–110)
POCT GLUCOSE: 81 MG/DL (ref 70–110)
POTASSIUM SERPL-SCNC: 4.1 MMOL/L (ref 3.5–5.1)
PROT SERPL-MCNC: 7.9 GM/DL (ref 5.8–7.6)
PROT UR QL STRIP: ABNORMAL
RBC #/AREA URNS AUTO: ABNORMAL /HPF
SODIUM SERPL-SCNC: 142 MMOL/L (ref 136–145)
SP GR UR STRIP.AUTO: 1.02 (ref 1–1.03)
SQUAMOUS #/AREA URNS LPF: ABNORMAL /HPF
UROBILINOGEN UR STRIP-ACNC: NORMAL
WBC #/AREA URNS AUTO: ABNORMAL /HPF

## 2025-03-24 PROCEDURE — 99283 EMERGENCY DEPT VISIT LOW MDM: CPT

## 2025-03-24 PROCEDURE — 82962 GLUCOSE BLOOD TEST: CPT

## 2025-03-24 PROCEDURE — 80053 COMPREHEN METABOLIC PANEL: CPT | Performed by: INTERNAL MEDICINE

## 2025-03-24 PROCEDURE — 81001 URINALYSIS AUTO W/SCOPE: CPT | Performed by: INTERNAL MEDICINE

## 2025-03-24 NOTE — ED PROVIDER NOTES
Encounter Date: 3/24/2025       History     Chief Complaint   Patient presents with    Hypoglycemia     Reports low CBG upon arrival of EMS to home. Initial CBG reading 30, after about 175 mL of D10, cbg 65. GCS 15 upon arrival to ED     Presents with an episode of hypoglycemia after using more insulin than prescribed. States she was concern about her glucose, she is prescribed 20 units of Humolog TID and used 40 today. Glucose was 30 mg/dL. Paramedics gave her D10 (175 ml) and PO glucose.     The history is provided by the patient and the EMS personnel.     Review of patient's allergies indicates:  No Known Allergies  Past Medical History:   Diagnosis Date    Asthma     Atrial fibrillation     Cancer     CKD (chronic kidney disease)     Coronary artery disease     Diabetes mellitus     Hyperlipidemia     Hypertension     Obesity, unspecified     MOHSEN (obstructive sleep apnea)     Unspecified cataract      Past Surgical History:   Procedure Laterality Date    CATARACT EXTRACTION Left 11/28/2022    CHOLECYSTECTOMY      COLON SURGERY      COLONOSCOPY N/A 04/27/2023    Procedure: COLONOSCOPY;  Surgeon: Jose Miguel Rosales MD;  Location: [facility] ENDOSCOPY;  Service: Endoscopy;  Laterality: N/A;    excision of lipoma      EYE SURGERY      HYSTERECTOMY      HYSTERECTOMY, VAGINAL, WITH SALPINGO-OOPHORECTOMY      NEPHRECTOMY Left     TUBAL LIGATION       Family History   Problem Relation Name Age of Onset    Hypertension Mother Fawn     Coronary artery disease Mother Fawn     Hyperlipidemia Mother Fawn     Asthma Mother Fawn     Stroke Father Gradni     Cancer Brother Anil & Willem      Social History[1]  Review of Systems   All other systems reviewed and are negative.      Physical Exam     Initial Vitals [03/24/25 1234]   BP Pulse Resp Temp SpO2   (!) 173/104 94 18 97.9 °F (36.6 °C) 99 %      MAP       --         Physical Exam    Nursing note and vitals reviewed.  Constitutional: She appears well-developed. No  distress.   HENT:   Head: Normocephalic and atraumatic. Mouth/Throat: Oropharynx is clear and moist.   Eyes: Conjunctivae are normal. Pupils are equal, round, and reactive to light.   Neck: Neck supple. No thyromegaly present. No JVD present.   Normal range of motion.  Cardiovascular:  Normal rate, regular rhythm, normal heart sounds and intact distal pulses.           Pulmonary/Chest: Breath sounds normal.   Abdominal: Abdomen is soft. Bowel sounds are normal. She exhibits no distension. There is no abdominal tenderness. There is no rebound and no guarding.   Musculoskeletal:         General: No tenderness or edema. Normal range of motion.      Cervical back: Normal range of motion and neck supple.     Neurological: She is alert and oriented to person, place, and time. She has normal strength. GCS score is 15. GCS eye subscore is 4. GCS verbal subscore is 5. GCS motor subscore is 6.   Skin: Skin is warm and dry. No rash noted.   Psychiatric: Thought content normal.         ED Course   Procedures  Labs Reviewed   COMPREHENSIVE METABOLIC PANEL - Abnormal       Result Value    Sodium 142      Potassium 4.1      Chloride 105      CO2 30      Glucose 124 (*)     Blood Urea Nitrogen 20.6 (*)     Creatinine 1.59 (*)     Calcium 9.3      Protein Total 7.9 (*)     Albumin 3.1 (*)     Globulin 4.8 (*)     Albumin/Globulin Ratio 0.6 (*)     Bilirubin Total 0.8            ALT 12      AST 17      eGFR 35      Anion Gap 7.0      BUN/Creatinine Ratio 13     URINALYSIS, REFLEX TO URINE CULTURE - Abnormal    Color, UA Light-Yellow      Appearance, UA Clear      Specific Gravity, UA 1.019      pH, UA 6.0      Protein, UA Trace (*)     Glucose, UA 4+ (*)     Ketones, UA Negative      Blood, UA Negative      Bilirubin, UA Negative      Urobilinogen, UA Normal      Nitrites, UA Negative      Leukocyte Esterase, UA Negative      RBC, UA 0-5      WBC, UA 0-5      Bacteria, UA Trace (*)     Squamous Epithelial Cells, UA Trace (*)      Mucous, UA Trace (*)     Hyaline Casts, UA None Seen     POCT GLUCOSE - Abnormal    POCT Glucose 120 (*)    POCT GLUCOSE - Abnormal    POCT Glucose 112 (*)    EXTRA TUBES    Narrative:     The following orders were created for panel order EXTRA TUBES.  Procedure                               Abnormality         Status                     ---------                               -----------         ------                     Light Blue Top Hold[[id number]]                             In process                 Light Green Top Hold[[id number]]                            In process                 Lavender Top Hold[[id number]]                               In process                 Lavender Top Hold[[id number]]                               In process                 Gold Top Hold[[id number]]                                   In process                   Please view results for these tests on the individual orders.   LIGHT BLUE TOP HOLD   LIGHT GREEN TOP HOLD   LAVENDER TOP HOLD   LAVENDER TOP HOLD   GOLD TOP HOLD   POCT GLUCOSE, HAND-HELD DEVICE   POCT GLUCOSE    POCT Glucose 81            Imaging Results    None          Medications - No data to display  Medical Decision Making  Amount and/or Complexity of Data Reviewed  Independent Historian: EMS     Details: Paramedics gave her D10 (175 ml) and PO glucose.     Labs: ordered. Decision-making details documented in ED Course.    Risk  Prescription drug management.      Additional MDM:   Differential Diagnosis:   Insulin reaction, renal failure, reactive hypoglycemia, poor calorie intake, among others                                      Clinical Impression:  Final diagnoses:  [E16.0, T38.3X5A] Hypoglycemia due to insulin (Primary)  [N18.9] Chronic kidney disease, unspecified CKD stage  [I10] Uncontrolled hypertension          ED Disposition Condition    Discharge Stable          ED Prescriptions       Medication Sig Dispense Start Date End Date Auth. Provider     glucagon 3 mg/actuation Spry 1 spray by Nasal route 1 (one) time if needed (Severe hypoglycemia). 1 each 3/24/2025 -- John Plaza MD          Follow-up Information       Follow up With Specialties Details Why Contact Info    Jeronimo Bird MD Internal Medicine Schedule an appointment as soon as possible for a visit in 2 weeks  [address] [facility] [address]  [phone]      [facility] Emergency Medicine  If symptoms worsen [address] [facility] [id number] [phone]                 [1]   Social History  Tobacco Use    Smoking status: Never    Smokeless tobacco: Never   Substance Use Topics    Alcohol use: Not Currently     Comment: frozen drinks once a month, giovanni    Drug use: Never        John Plaza MD  03/24/25 1533

## 2025-03-27 RX ORDER — DULOXETIN HYDROCHLORIDE 60 MG/1
60 CAPSULE, DELAYED RELEASE ORAL DAILY
Qty: 90 CAPSULE | Refills: 3 | Status: SHIPPED | OUTPATIENT
Start: 2025-03-27 | End: 2026-03-27

## 2025-03-27 RX ORDER — INSULIN GLARGINE 100 [IU]/ML
65 INJECTION, SOLUTION SUBCUTANEOUS NIGHTLY
Qty: 18 ML | Refills: 11 | Status: SHIPPED | OUTPATIENT
Start: 2025-03-27 | End: 2026-03-27

## 2025-03-31 ENCOUNTER — TELEPHONE (OUTPATIENT)
Dept: INTERNAL MEDICINE | Facility: CLINIC | Age: 70
End: 2025-03-31
Payer: MEDICARE

## 2025-03-31 DIAGNOSIS — N18.30 TYPE 2 DIABETES MELLITUS WITH STAGE 3 CHRONIC KIDNEY DISEASE, WITH LONG-TERM CURRENT USE OF INSULIN, UNSPECIFIED WHETHER STAGE 3A OR 3B CKD: ICD-10-CM

## 2025-03-31 DIAGNOSIS — Z79.4 TYPE 2 DIABETES MELLITUS WITH STAGE 3 CHRONIC KIDNEY DISEASE, WITH LONG-TERM CURRENT USE OF INSULIN, UNSPECIFIED WHETHER STAGE 3A OR 3B CKD: ICD-10-CM

## 2025-03-31 DIAGNOSIS — E11.22 TYPE 2 DIABETES MELLITUS WITH STAGE 3 CHRONIC KIDNEY DISEASE, WITH LONG-TERM CURRENT USE OF INSULIN, UNSPECIFIED WHETHER STAGE 3A OR 3B CKD: ICD-10-CM

## 2025-03-31 NOTE — TELEPHONE ENCOUNTER
TELEPHONE VOICE MESSAGE:      MS. EDMUNDO,      MS. KAMI ASKED THAT YOU GIVE HER A CALL.  SHE STATED THAT IT IS VERY IMPORTANT.

## 2025-03-31 NOTE — TELEPHONE ENCOUNTER
Called patient and date of birth verified. Patient states her blood sugar dropped too low and she fell. She states she was sweating and weak. Patient states her blood sugar was low so she took 40 units of novolog instead of 20 because she thought that would bring her blood sugar up. Her blood sugar continued to drop and she had to go to ED. Instructed patient to take medication as prescribed and not to take Novolog if blood sugar is low. She was also told to make sure she eats enough when taking insulin. Instructed patient to call the clinic if she had any questions regarding whether or not she should take insulin. Patient repeated instructions back to this nurse. Patient is requesting refills for the dexcom sensor. Please advise.

## 2025-04-03 ENCOUNTER — ANTI-COAG VISIT (OUTPATIENT)
Dept: CARDIOLOGY | Facility: HOSPITAL | Age: 70
End: 2025-04-03
Payer: MEDICARE

## 2025-04-03 DIAGNOSIS — I26.99 PULMONARY EMBOLISM, OTHER, UNSPECIFIED CHRONICITY, UNSPECIFIED WHETHER ACUTE COR PULMONALE PRESENT: Primary | ICD-10-CM

## 2025-04-03 DIAGNOSIS — I25.10 CORONARY ARTERY DISEASE, UNSPECIFIED VESSEL OR LESION TYPE, UNSPECIFIED WHETHER ANGINA PRESENT, UNSPECIFIED WHETHER NATIVE OR TRANSPLANTED HEART: ICD-10-CM

## 2025-04-03 DIAGNOSIS — Z79.01 ANTICOAGULATION MONITORING, INR RANGE 2-3: ICD-10-CM

## 2025-04-03 LAB
CTP QC/QA: YES
INR PPP: 1.3 (ref 2–3)
PROTHROMBIN TIME, POC: ABNORMAL (ref 2–3)

## 2025-04-03 PROCEDURE — 85610 PROTHROMBIN TIME: CPT

## 2025-04-03 PROCEDURE — 99211 OFF/OP EST MAY X REQ PHY/QHP: CPT

## 2025-04-03 NOTE — PROGRESS NOTES
POC pt/inr performed.  Pt will take 5 mg of warfarin today, 7.5 mg on 4/4/2025, then will resume dosing. Dosing calendar filled out, and given to pt for accuracy.

## 2025-04-09 ENCOUNTER — ANTI-COAG VISIT (OUTPATIENT)
Dept: CARDIOLOGY | Facility: HOSPITAL | Age: 70
End: 2025-04-09
Payer: MEDICARE

## 2025-04-09 DIAGNOSIS — Z79.01 ANTICOAGULATION MONITORING, INR RANGE 2-3: ICD-10-CM

## 2025-04-09 DIAGNOSIS — I25.10 CORONARY ARTERY DISEASE, UNSPECIFIED VESSEL OR LESION TYPE, UNSPECIFIED WHETHER ANGINA PRESENT, UNSPECIFIED WHETHER NATIVE OR TRANSPLANTED HEART: ICD-10-CM

## 2025-04-09 DIAGNOSIS — I26.99 PULMONARY EMBOLISM, OTHER, UNSPECIFIED CHRONICITY, UNSPECIFIED WHETHER ACUTE COR PULMONALE PRESENT: Primary | ICD-10-CM

## 2025-04-09 LAB
CTP QC/QA: YES
INR PPP: 2.6 (ref 2–3)
PROTHROMBIN TIME, POC: NORMAL (ref 2–3)

## 2025-04-09 PROCEDURE — 85610 PROTHROMBIN TIME: CPT

## 2025-04-18 RX ORDER — BLOOD-GLUCOSE SENSOR
EACH MISCELLANEOUS
Qty: 3 EACH | Refills: 4 | Status: SHIPPED | OUTPATIENT
Start: 2025-04-18

## 2025-04-30 ENCOUNTER — ANTI-COAG VISIT (OUTPATIENT)
Dept: CARDIOLOGY | Facility: HOSPITAL | Age: 70
End: 2025-04-30
Payer: MEDICARE

## 2025-04-30 ENCOUNTER — HOSPITAL ENCOUNTER (OUTPATIENT)
Dept: CARDIOLOGY | Facility: HOSPITAL | Age: 70
Discharge: HOME | End: 2025-04-30
Payer: MEDICARE

## 2025-04-30 VITALS
WEIGHT: 290 LBS | HEIGHT: 64 IN | SYSTOLIC BLOOD PRESSURE: 156 MMHG | BODY MASS INDEX: 49.51 KG/M2 | DIASTOLIC BLOOD PRESSURE: 94 MMHG

## 2025-04-30 DIAGNOSIS — I25.10 CORONARY ARTERY DISEASE, UNSPECIFIED VESSEL OR LESION TYPE, UNSPECIFIED WHETHER ANGINA PRESENT, UNSPECIFIED WHETHER NATIVE OR TRANSPLANTED HEART: ICD-10-CM

## 2025-04-30 DIAGNOSIS — I25.10 CORONARY ARTERY DISEASE, UNSPECIFIED VESSEL OR LESION TYPE, UNSPECIFIED WHETHER ANGINA PRESENT, UNSPECIFIED WHETHER NATIVE OR TRANSPLANTED HEART: Primary | ICD-10-CM

## 2025-04-30 DIAGNOSIS — I26.99 PULMONARY EMBOLISM, OTHER, UNSPECIFIED CHRONICITY, UNSPECIFIED WHETHER ACUTE COR PULMONALE PRESENT: ICD-10-CM

## 2025-04-30 DIAGNOSIS — Z79.01 ANTICOAGULATION MONITORING, INR RANGE 2-3: ICD-10-CM

## 2025-04-30 LAB
CTP QC/QA: YES
INR PPP: 3.2 (ref 2–3)
PROTHROMBIN TIME, POC: ABNORMAL (ref 2–3)

## 2025-04-30 PROCEDURE — 93306 TTE W/DOPPLER COMPLETE: CPT

## 2025-04-30 PROCEDURE — 85610 PROTHROMBIN TIME: CPT

## 2025-04-30 NOTE — PROGRESS NOTES
POC pt/inr performed.  Pt instructed to take 2.5 mg of warfarin today, then resume dosing.  Dosing calendar filled out, and given to pt for accuracy.

## 2025-05-01 LAB
APICAL FOUR CHAMBER EJECTION FRACTION: 56 %
AV INDEX (PROSTH): 0.51
AV MEAN GRADIENT: 2 MMHG
AV PEAK GRADIENT: 3 MMHG
AV VALVE AREA BY VELOCITY RATIO: 1.4 CM²
AV VALVE AREA: 1.6 CM²
AV VELOCITY RATIO: 0.44
BSA FOR ECHO PROCEDURE: 2.44 M2
CV ECHO LV RWT: 0.55 CM
DOP CALC AO PEAK VEL: 0.9 M/S
DOP CALC AO VTI: 16.8 CM
DOP CALC LVOT AREA: 3.1 CM2
DOP CALC LVOT DIAMETER: 2 CM
DOP CALC LVOT PEAK VEL: 0.4 M/S
DOP CALC LVOT STROKE VOLUME: 27 CM3
DOP CALC MV VTI: 20 CM
DOP CALCLVOT PEAK VEL VTI: 8.6 CM
E WAVE DECELERATION TIME: 146 MSEC
E/A RATIO: 103
E/E' RATIO: 21 M/S
ECHO LV POSTERIOR WALL: 1.3 CM (ref 0.6–1.1)
FRACTIONAL SHORTENING: 29.8 % (ref 28–44)
INTERVENTRICULAR SEPTUM: 1.4 CM (ref 0.6–1.1)
LEFT ATRIUM SIZE: 4.8 CM
LEFT INTERNAL DIMENSION IN SYSTOLE: 3.3 CM (ref 2.1–4)
LEFT VENTRICLE DIASTOLIC VOLUME INDEX: 44.54 ML/M2
LEFT VENTRICLE DIASTOLIC VOLUME: 102 ML
LEFT VENTRICLE END DIASTOLIC VOLUME APICAL 4 CHAMBER: 61.08 ML
LEFT VENTRICLE MASS INDEX: 109.8 G/M2
LEFT VENTRICLE SYSTOLIC VOLUME INDEX: 19.7 ML/M2
LEFT VENTRICLE SYSTOLIC VOLUME: 45 ML
LEFT VENTRICULAR INTERNAL DIMENSION IN DIASTOLE: 4.7 CM (ref 3.5–6)
LEFT VENTRICULAR MASS: 251.4 G
LV LATERAL E/E' RATIO: 17.2 M/S
LV SEPTAL E/E' RATIO: 25.8 M/S
LVED V (TEICH): 101.95 ML
LVES V (TEICH): 44.65 ML
LVOT MG: 0.43 MMHG
LVOT MV: 0.31 CM/S
MV MEAN GRADIENT: 3 MMHG
MV PEAK A VEL: 0.01 M/S
MV PEAK E VEL: 1.03 M/S
MV PEAK GRADIENT: 7 MMHG
MV STENOSIS PRESSURE HALF TIME: 42.48 MS
MV VALVE AREA BY CONTINUITY EQUATION: 1.35 CM2
MV VALVE AREA P 1/2 METHOD: 5.18 CM2
OHS CV RV/LV RATIO: 0.6 CM
PISA TR MAX VEL: 3 M/S
RA PRESSURE ESTIMATED: 0 MMHG
RIGHT VENTRICLE DIASTOLIC BASEL DIMENSION: 2.8 CM
RIGHT VENTRICULAR END-DIASTOLIC DIMENSION: 2.77 CM
RV TB RVSP: 3 MMHG
TDI LATERAL: 0.06 M/S
TDI SEPTAL: 0.04 M/S
TDI: 0.05 M/S
TR MAX PG: 35 MMHG
TV REST PULMONARY ARTERY PRESSURE: 36 MMHG
Z-SCORE OF LEFT VENTRICULAR DIMENSION IN END DIASTOLE: -6.21
Z-SCORE OF LEFT VENTRICULAR DIMENSION IN END SYSTOLE: -3.72

## 2025-05-02 ENCOUNTER — TELEPHONE (OUTPATIENT)
Dept: INTERNAL MEDICINE | Facility: CLINIC | Age: 70
End: 2025-05-02
Payer: MEDICARE

## 2025-05-14 ENCOUNTER — ANTI-COAG VISIT (OUTPATIENT)
Dept: CARDIOLOGY | Facility: HOSPITAL | Age: 70
End: 2025-05-14
Payer: MEDICARE

## 2025-05-14 DIAGNOSIS — I26.99 PULMONARY EMBOLISM, OTHER, UNSPECIFIED CHRONICITY, UNSPECIFIED WHETHER ACUTE COR PULMONALE PRESENT: Primary | ICD-10-CM

## 2025-05-14 DIAGNOSIS — Z79.01 ANTICOAGULATION MONITORING, INR RANGE 2-3: ICD-10-CM

## 2025-05-14 LAB
CTP QC/QA: YES
INR PPP: 1.1 (ref 2–3)
PROTHROMBIN TIME, POC: ABNORMAL (ref 2–3)

## 2025-05-14 PROCEDURE — 99211 OFF/OP EST MAY X REQ PHY/QHP: CPT

## 2025-05-14 PROCEDURE — 85610 PROTHROMBIN TIME: CPT

## 2025-05-14 NOTE — PROGRESS NOTES
POC pt/inr performed.  Pt will take 7.5 mg of warfarin today, 5 mg on 5/15/2025, 7.5 mg on 5/16/2025, then will resume dosing. Dosing calendar filled out, and given to pt for accuracy.

## 2025-05-16 ENCOUNTER — TELEPHONE (OUTPATIENT)
Dept: INTERNAL MEDICINE | Facility: CLINIC | Age: 70
End: 2025-05-16
Payer: MEDICARE

## 2025-05-21 DIAGNOSIS — J45.909 ASTHMA, UNSPECIFIED ASTHMA SEVERITY, UNSPECIFIED WHETHER COMPLICATED, UNSPECIFIED WHETHER PERSISTENT: ICD-10-CM

## 2025-05-21 DIAGNOSIS — I42.9 CARDIOMYOPATHY, UNSPECIFIED TYPE: ICD-10-CM

## 2025-05-23 RX ORDER — ALBUTEROL SULFATE 90 UG/1
1 INHALANT RESPIRATORY (INHALATION) EVERY 4 HOURS PRN
Qty: 18 G | Refills: 3 | Status: SHIPPED | OUTPATIENT
Start: 2025-05-23

## 2025-05-23 RX ORDER — DILTIAZEM HYDROCHLORIDE 180 MG/1
180 CAPSULE, COATED, EXTENDED RELEASE ORAL EVERY MORNING
Qty: 30 CAPSULE | Refills: 11 | Status: SHIPPED | OUTPATIENT
Start: 2025-05-23 | End: 2026-05-23

## 2025-05-26 ENCOUNTER — TELEPHONE (OUTPATIENT)
Dept: INTERNAL MEDICINE | Facility: CLINIC | Age: 70
End: 2025-05-26
Payer: MEDICARE

## 2025-05-26 NOTE — TELEPHONE ENCOUNTER
[facility] requesting refill on BD Narda 2nd Gen pen needle 32G X 4MM GRN. Med not in med list.  QTY: 100

## 2025-05-29 ENCOUNTER — OFFICE VISIT (OUTPATIENT)
Dept: CARDIOLOGY | Facility: CLINIC | Age: 70
End: 2025-05-29
Payer: MEDICARE

## 2025-05-29 VITALS
OXYGEN SATURATION: 96 % | DIASTOLIC BLOOD PRESSURE: 82 MMHG | WEIGHT: 293 LBS | HEIGHT: 64 IN | RESPIRATION RATE: 20 BRPM | HEART RATE: 97 BPM | BODY MASS INDEX: 50.02 KG/M2 | SYSTOLIC BLOOD PRESSURE: 129 MMHG | TEMPERATURE: 98 F

## 2025-05-29 DIAGNOSIS — I10 PRIMARY HYPERTENSION: ICD-10-CM

## 2025-05-29 DIAGNOSIS — E11.69 HYPERLIPIDEMIA ASSOCIATED WITH TYPE 2 DIABETES MELLITUS: ICD-10-CM

## 2025-05-29 DIAGNOSIS — N18.32 CKD STAGE 3B, GFR 30-44 ML/MIN: ICD-10-CM

## 2025-05-29 DIAGNOSIS — E78.5 HYPERLIPIDEMIA ASSOCIATED WITH TYPE 2 DIABETES MELLITUS: ICD-10-CM

## 2025-05-29 DIAGNOSIS — E66.01 MORBID OBESITY WITH BMI OF 50.0-59.9, ADULT: ICD-10-CM

## 2025-05-29 DIAGNOSIS — I50.32 CHRONIC HEART FAILURE WITH PRESERVED EJECTION FRACTION: Primary | ICD-10-CM

## 2025-05-29 DIAGNOSIS — I48.11 LONGSTANDING PERSISTENT ATRIAL FIBRILLATION: ICD-10-CM

## 2025-05-29 PROCEDURE — 99213 OFFICE O/P EST LOW 20 MIN: CPT | Mod: PBBFAC | Performed by: INTERNAL MEDICINE

## 2025-05-29 NOTE — PROGRESS NOTES
Cardiology clinic Note     CHIEF COMPLAINT:   No chief complaint on file.                  Review of patient's allergies indicates:  No Known Allergies                                       HPI:  Elissa Freeman 69 y.o. female PMHx HTN, HLD, DM, CKD3, A. Fib on warfarin, HFmrEF (EF 45%), stable angina, and hemorrhagic stroke in 2023 who presents to clinic for follow up. Follows coumadin clinic. Patient reports feeling overall well and reports having chest pain 2x per week relieved with nitro.  She reports stable dyspnea on exertion unchanged from baseline.  Patient had recent echo which displayed EF of 55-60%.  She reports compliance with all prescribed medications.                                                                                                                                                                                                                                                                                                                                                                                                                                                                                   CARDIAC TESTING:  No results found for this or any previous visit.    Results for orders placed during the hospital encounter of 01/30/24    Nuclear Stress - Cardiology Interpreted    Interpretation Summary    Normal myocardial perfusion scan. There is no evidence of myocardial ischemia or infarction.    The gated perfusion images showed an ejection fraction of 54% at rest. The gated perfusion images showed an ejection fraction of 59% post stress.    The patient reported no chest pain during the stress test.    The nuclear stress test is  fair quality.  On the nuclear stress test the EF is normal.  If the patient has an echo, the EF on the echo is considered more accurate.    The patient has a low risk nuclear stress test    Nuclear stress test indicates no ischemia.     Results for orders placed  in visit on 09/29/21    CATH LAB PROCEDURE       Patient Active Problem List   Diagnosis    Ischemic heart disease, chronic    Primary hypertension    Hyperlipidemia associated with type 2 diabetes mellitus    Diabetes mellitus type 2 in obese    MOHSEN on CPAP    Renal cell carcinoma of left kidney    Adenocarcinoma of colon    Depression    Morbid obesity with BMI of 50.0-59.9, adult    Neuropathy    Pulmonary hypertension    Personal history of colon cancer    Microcytic anemia    Hypertensive emergency    Subarachnoid hemorrhage    CKD stage 3b, GFR 30-44 ml/min     Past Surgical History:   Procedure Laterality Date    CATARACT EXTRACTION Left 11/28/2022    CHOLECYSTECTOMY      COLON SURGERY      COLONOSCOPY N/A 04/27/2023    Procedure: COLONOSCOPY;  Surgeon: Jose Miguel Rosales MD;  Location: [facility] ENDOSCOPY;  Service: Endoscopy;  Laterality: N/A;    excision of lipoma      EYE SURGERY      HYSTERECTOMY      HYSTERECTOMY, VAGINAL, WITH SALPINGO-OOPHORECTOMY      NEPHRECTOMY Left     TUBAL LIGATION       Social History     Socioeconomic History    Marital status:    Occupational History    Occupation: Disabled   Tobacco Use    Smoking status: Never    Smokeless tobacco: Never   Substance and Sexual Activity    Alcohol use: Not Currently     Comment: frozen drinks once a month, giovanni    Drug use: Never    Sexual activity: Not Currently     Social Drivers of Health     Financial Resource Strain: Medium Risk (11/23/2022)    Overall Financial Resource Strain (CARDIA)     Difficulty of Paying Living Expenses: Somewhat hard   Food Insecurity: Food Insecurity Present (11/23/2022)    Hunger Vital Sign     Worried About Running Out of Food in the Last Year: Sometimes true     Ran Out of Food in the Last Year: Sometimes true   Transportation Needs: No Transportation Needs (3/13/2025)    PRAPARE - Transportation     Lack of Transportation (Medical): No     Lack of Transportation (Non-Medical): No   Physical  Activity: Inactive (3/13/2025)    Exercise Vital Sign     Days of Exercise per Week: 0 days     Minutes of Exercise per Session: 0 min   Stress: Stress Concern Present (3/13/2025)    [employer] - Occupational Stress Questionnaire     Feeling of Stress : Rather much   Housing Stability: Low Risk  (3/13/2025)    Housing Stability Vital Sign     Unable to Pay for Housing in the Last Year: No     Homeless in the Last Year: No        Family History   Problem Relation Name Age of Onset    Hypertension Mother Fawn     Coronary artery disease Mother Fawn     Hyperlipidemia Mother Fawn     Asthma Mother Fawn     Stroke Father Gradnigo     Cancer Brother Anil Bangura          Current Outpatient Medications:     acetaminophen (TYLENOL EXTRA STRENGTH) 500 MG tablet, Take 1,000 mg by mouth every 6 (six) hours as needed for Pain., Disp: , Rfl:     albuterol (PROVENTIL/VENTOLIN HFA) 90 mcg/actuation inhaler, Inhale 1 puff into the lungs every 4 (four) hours as needed for Wheezing., Disp: 18 g, Rfl: 3    atorvastatin (LIPITOR) 80 MG tablet, Take 1 tablet (80 mg total) by mouth once daily., Disp: 90 tablet, Rfl: 3    blood-glucose meter,continuous (DEXCOM G7 ) Misc, To use with your sensor to check blood sugar, Disp: 1 each, Rfl: 0    blood-glucose sensor (DEXCOM G7 SENSOR) Lily, Replace every 10 days, used to check blood sugar, Disp: 3 each, Rfl: 4    brimonidine 0.2% (ALPHAGAN) 0.2 % Drop, Place 1 drop into both eyes 2 (two) times daily., Disp: 10 mL, Rfl: 2    carvediloL (COREG) 3.125 MG tablet, Take 1 tablet (3.125 mg total) by mouth 2 (two) times daily., Disp: 60 tablet, Rfl: 11    cetirizine (ZYRTEC) 10 MG tablet, Take 1 tablet (10 mg total) by mouth once daily., Disp: 30 tablet, Rfl: 0    diltiaZEM (CARDIZEM CD) 180 MG 24 hr capsule, Take 1 capsule (180 mg total) by mouth every morning., Disp: 30 capsule, Rfl: 11    DULoxetine (CYMBALTA) 60 MG capsule, Take 1 capsule (60 mg total)  by mouth once daily. TAKE 1 CAPSULE BY MOUTH DAILY. DO NOT CRUSH OR CHEW, Disp: 90 capsule, Rfl: 3    empagliflozin (JARDIANCE) 25 mg tablet, Take 1 tablet (25 mg total) by mouth once daily., Disp: 90 tablet, Rfl: 3    flash glucose scanning reader (FREESTYLE SMITHA 2 READER) Misc, 1 each by Misc.(Non-Drug; Combo Route) route 4 (four) times daily with meals and nightly. (Patient not taking: Reported on 2/11/2025), Disp: 1 each, Rfl: 0    flash glucose sensor (FREESTYLE SMITHA 2 SENSOR) Kit, 1 each by Misc.(Non-Drug; Combo Route) route 2 hours after meals and at bedtime. (Patient not taking: Reported on 2/11/2025), Disp: 1 kit, Rfl: 0    furosemide (LASIX) 20 MG tablet, Take 20 mg by mouth every morning., Disp: , Rfl:     gabapentin (NEURONTIN) 600 MG tablet, Take 1 tablet (600 mg total) by mouth every morning., Disp: 90 tablet, Rfl: 3    glucagon 3 mg/actuation Spry, 1 spray by Nasal route 1 (one) time if needed (Severe hypoglycemia)., Disp: 1 each, Rfl: 1    hydrALAZINE (APRESOLINE) 50 MG tablet, Take 1 tablet (50 mg total) by mouth every 8 (eight) hours., Disp: 90 tablet, Rfl: 11    insulin aspart U-100 (NOVOLOG FLEXPEN U-100 INSULIN) 100 unit/mL (3 mL) InPn pen, Inject 20 Units into the skin 3 (three) times daily with meals., Disp: 15 mL, Rfl: 11    insulin glargine U-100, Lantus, (LANTUS SOLOSTAR U-100 INSULIN) 100 unit/mL (3 mL) InPn pen, Inject 65 Units into the skin every evening., Disp: 18 mL, Rfl: 11    isosorbide mononitrate (IMDUR) 30 MG 24 hr tablet, Take 1 tablet (30 mg total) by mouth once daily., Disp: 60 tablet, Rfl: 1    latanoprost 0.005 % ophthalmic solution, Place 1 drop into both eyes once daily., Disp: 2.5 mL, Rfl: 1    losartan-hydrochlorothiazide 50-12.5 mg (HYZAAR) 50-12.5 mg per tablet, Take 1 tablet by mouth once daily., Disp: 90 tablet, Rfl: 3    melatonin (MELATIN) 5 mg, Take 1 tablet (5 mg total) by mouth nightly as needed for Insomnia., Disp: 90 tablet, Rfl: 3    nebulizer accessories  Kit, 1 each by Misc.(Non-Drug; Combo Route) route 4 (four) times daily as needed (wheezing)., Disp: 1 kit, Rfl: 0    nitroGLYCERIN (NITROSTAT) 0.4 MG SL tablet, Place 1 tablet (0.4 mg total) under the tongue every 5 (five) minutes as needed for Chest pain., Disp: 30 tablet, Rfl: 0    potassium chloride (K-TAB) 20 mEq, TAKE ONE TABLET BY MOUTH DAILY AT 9 AM (VIAL), Disp: 90 tablet, Rfl: 11    tirzepatide 2.5 mg/0.5 mL PnIj, Inject 2.5 mg into the skin every 7 days., Disp: 6 mL, Rfl: 0    vitamin D (VITAMIN D3) 1000 units Tab, Take 2 tablets (2,000 Units total) by mouth Daily., Disp: 60 tablet, Rfl: 11    warfarin (COUMADIN) 5 MG tablet, Take 0.5 tablets (2.5 mg total) by mouth Daily., Disp: 15 tablet, Rfl: 11     ROS:                                                                                                                                                                             + for chest pain, SOB  - for LE swelling      Vitals:  There were no vitals taken for this visit.       PE:  Physical Exam  Constitutional:       Appearance: Normal appearance.   HENT:      Head: Normocephalic and atraumatic.   Eyes:      Extraocular Movements: Extraocular movements intact.      Conjunctiva/sclera: Conjunctivae normal.      Pupils: Pupils are equal, round, and reactive to light.   Cardiovascular:      Rate and Rhythm: Normal rate and regular rhythm.      Heart sounds: No murmur heard.     No gallop.   Pulmonary:      Breath sounds: Normal breath sounds. No wheezing.   Abdominal:      General: Abdomen is flat. Bowel sounds are normal.      Palpations: Abdomen is soft.   Musculoskeletal:         General: No swelling. Normal range of motion.      Cervical back: Normal range of motion and neck supple.      Right lower leg: No edema.      Left lower leg: No edema.   Skin:     General: Skin is warm and dry.      Capillary Refill: Capillary refill takes less than 2 seconds.   Neurological:      Mental Status: She is alert.          ASSESSMENT/PLAN:    Stable Angina   - [facility] 9/21:  No CAD  - Lexiscan 4/21:  Anterolateral and inferior ischemia  - Lexiscan 1/30/24 - normal myocardial perfusion scan  - Continue Imdur 30 and Nitroglycerin PRN     Atrial Fibrillation  - Diagnosed at recent hospitalization (12/2023) at [facility]  - TSH WNL  - At this time patient rate controlled w/ Coreg 3.125 & Diltiazem 180  - Per PCP, on Warfarin and is scheduled with Coumadin clinic. Maybe can be switched to Eliquis or Xarelto? Will discuss with PCP.     HFmrEF w/ Grade I DD  - TTE (12/2023) w/ findings of LVEF (45%) w/ Grade I Diastolic Dysfunction  - Repeat TTE on 4/30/25 displayed EF of 55-60%  - Not on Spironolactone due to CKD  - Continue current GDMT w/ Coreg 3.125, Jardiance 25, and Hyzaar 50-12.5  - Continue Lasix 20 mg daily    Hypertension, well controlled  - BP today in clinic 129/82  - Continue Coreg 3.125 and losartan-hydrochlorothiazide 50-12.5 qd     Hyperlipidemia  - Lipid panel with LDL below goal of 70  - continue lipitor 80 mg  - Stressed importance of diet and excercise as tolerated     Diabetes mellitus  - Management per PCP     MOHSEN with CPAP  - Management per PCP    RTC: 4 months     Isaac Huitron DO PGY-2  [facility] Cardiology

## 2025-06-10 ENCOUNTER — ANTI-COAG VISIT (OUTPATIENT)
Dept: CARDIOLOGY | Facility: HOSPITAL | Age: 70
End: 2025-06-10
Payer: MEDICARE

## 2025-06-10 DIAGNOSIS — Z86.711 HISTORY OF PULMONARY EMBOLISM: Primary | ICD-10-CM

## 2025-06-10 DIAGNOSIS — Z79.01 ANTICOAGULATION MONITORING, INR RANGE 2-3: ICD-10-CM

## 2025-06-10 LAB
CTP QC/QA: YES
INR PPP: 1.2 (ref 2–3)
PROTHROMBIN TIME, POC: ABNORMAL (ref 2–3)

## 2025-06-10 PROCEDURE — 99211 OFF/OP EST MAY X REQ PHY/QHP: CPT

## 2025-06-10 PROCEDURE — 85610 PROTHROMBIN TIME: CPT

## 2025-06-10 NOTE — PROGRESS NOTES
POC pt/inr performed.  Pt will take 5 mg of warfarin today, 7.5 mg 6/11/2025, then will return to Coumadin Clinic for retest.  Dosing calendar filled out, and given to pt for accuracy.  She was instructed not to eat any greens till retest.

## 2025-06-12 ENCOUNTER — ANTI-COAG VISIT (OUTPATIENT)
Dept: CARDIOLOGY | Facility: HOSPITAL | Age: 70
End: 2025-06-12
Payer: MEDICARE

## 2025-06-12 DIAGNOSIS — Z79.01 ANTICOAGULATION MONITORING, INR RANGE 2-3: ICD-10-CM

## 2025-06-12 DIAGNOSIS — Z86.711 HISTORY OF PULMONARY EMBOLISM: Primary | ICD-10-CM

## 2025-06-12 LAB
CTP QC/QA: YES
INR PPP: 1.4 (ref 2–3)
PROTHROMBIN TIME, POC: ABNORMAL (ref 2–3)

## 2025-06-12 PROCEDURE — 85610 PROTHROMBIN TIME: CPT

## 2025-06-12 NOTE — PROGRESS NOTES
POC pt/inr performed.  Pt instructed to take 5 mg of warfarin today, then resume with increased dosing.  Dosing calendar filled out, and given to pt for accuracy.

## 2025-06-23 ENCOUNTER — LAB VISIT (OUTPATIENT)
Dept: LAB | Facility: HOSPITAL | Age: 70
End: 2025-06-23
Payer: MEDICARE

## 2025-06-23 ENCOUNTER — ANTI-COAG VISIT (OUTPATIENT)
Dept: CARDIOLOGY | Facility: HOSPITAL | Age: 70
End: 2025-06-23
Payer: MEDICARE

## 2025-06-23 DIAGNOSIS — R76.8 POSITIVE ANA (ANTINUCLEAR ANTIBODY): ICD-10-CM

## 2025-06-23 DIAGNOSIS — Z79.4 TYPE 2 DIABETES MELLITUS WITH STAGE 3 CHRONIC KIDNEY DISEASE, WITH LONG-TERM CURRENT USE OF INSULIN, UNSPECIFIED WHETHER STAGE 3A OR 3B CKD: ICD-10-CM

## 2025-06-23 DIAGNOSIS — Z86.711 HISTORY OF PULMONARY EMBOLISM: ICD-10-CM

## 2025-06-23 DIAGNOSIS — E11.22 TYPE 2 DIABETES MELLITUS WITH STAGE 3 CHRONIC KIDNEY DISEASE, WITH LONG-TERM CURRENT USE OF INSULIN, UNSPECIFIED WHETHER STAGE 3A OR 3B CKD: ICD-10-CM

## 2025-06-23 DIAGNOSIS — E11.69 DIABETES MELLITUS TYPE 2 IN OBESE: ICD-10-CM

## 2025-06-23 DIAGNOSIS — Z79.01 ANTICOAGULATION MONITORING, INR RANGE 2-3: Primary | ICD-10-CM

## 2025-06-23 DIAGNOSIS — N18.30 TYPE 2 DIABETES MELLITUS WITH STAGE 3 CHRONIC KIDNEY DISEASE, WITH LONG-TERM CURRENT USE OF INSULIN, UNSPECIFIED WHETHER STAGE 3A OR 3B CKD: ICD-10-CM

## 2025-06-23 DIAGNOSIS — I48.11 LONGSTANDING PERSISTENT ATRIAL FIBRILLATION: ICD-10-CM

## 2025-06-23 DIAGNOSIS — N18.32 CKD STAGE 3B, GFR 30-44 ML/MIN: ICD-10-CM

## 2025-06-23 DIAGNOSIS — E66.9 DIABETES MELLITUS TYPE 2 IN OBESE: ICD-10-CM

## 2025-06-23 LAB
25(OH)D3+25(OH)D2 SERPL-MCNC: 21 NG/ML (ref 30–80)
ALBUMIN SERPL-MCNC: 3.1 G/DL (ref 3.4–4.8)
ALBUMIN/CREAT UR: 527.5 MG/GM CR (ref 0–30)
ALBUMIN/GLOB SERPL: 0.6 RATIO (ref 1.1–2)
ALP SERPL-CCNC: 142 UNIT/L (ref 40–150)
ALT SERPL-CCNC: 17 UNIT/L (ref 0–55)
ANION GAP SERPL CALC-SCNC: 10 MEQ/L
AST SERPL-CCNC: 25 UNIT/L (ref 11–45)
BASOPHILS # BLD AUTO: 0.02 X10(3)/MCL
BASOPHILS NFR BLD AUTO: 0.3 %
BILIRUB SERPL-MCNC: 0.9 MG/DL
BUN SERPL-MCNC: 17.2 MG/DL (ref 9.8–20.1)
CALCIUM SERPL-MCNC: 8.8 MG/DL (ref 8.4–10.2)
CHLORIDE SERPL-SCNC: 104 MMOL/L (ref 98–107)
CO2 SERPL-SCNC: 27 MMOL/L (ref 23–31)
CREAT SERPL-MCNC: 1.4 MG/DL (ref 0.55–1.02)
CREAT UR-MCNC: 44.1 MG/DL (ref 45–106)
CREAT UR-MCNC: 45.4 MG/DL (ref 45–106)
CREAT/UREA NIT SERPL: 12
CTP QC/QA: YES
EOSINOPHIL # BLD AUTO: 0.08 X10(3)/MCL (ref 0–0.9)
EOSINOPHIL NFR BLD AUTO: 1.1 %
ERYTHROCYTE [DISTWIDTH] IN BLOOD BY AUTOMATED COUNT: 15.2 % (ref 11.5–17)
GFR SERPLBLD CREATININE-BSD FMLA CKD-EPI: 41 ML/MIN/1.73/M2
GLOBULIN SER-MCNC: 4.9 GM/DL (ref 2.4–3.5)
GLUCOSE SERPL-MCNC: 258 MG/DL (ref 82–115)
HCT VFR BLD AUTO: 43 % (ref 37–47)
HGB BLD-MCNC: 12.5 G/DL (ref 12–16)
IMM GRANULOCYTES # BLD AUTO: 0.03 X10(3)/MCL (ref 0–0.04)
IMM GRANULOCYTES NFR BLD AUTO: 0.4 %
INR PPP: 3.1 (ref 2–3)
LYMPHOCYTES # BLD AUTO: 1.53 X10(3)/MCL (ref 0.6–4.6)
LYMPHOCYTES NFR BLD AUTO: 20.3 %
MAGNESIUM SERPL-MCNC: 2.2 MG/DL (ref 1.6–2.6)
MCH RBC QN AUTO: 22 PG (ref 27–31)
MCHC RBC AUTO-ENTMCNC: 29.1 G/DL (ref 33–36)
MCV RBC AUTO: 75.6 FL (ref 80–94)
MICROALBUMIN UR-MCNC: 239.5 UG/ML
MONOCYTES # BLD AUTO: 0.65 X10(3)/MCL (ref 0.1–1.3)
MONOCYTES NFR BLD AUTO: 8.6 %
NEUTROPHILS # BLD AUTO: 5.22 X10(3)/MCL (ref 2.1–9.2)
NEUTROPHILS NFR BLD AUTO: 69.3 %
NRBC BLD AUTO-RTO: 0 %
PHOSPHATE SERPL-MCNC: 3.3 MG/DL (ref 2.3–4.7)
PLATELET # BLD AUTO: 271 X10(3)/MCL (ref 130–400)
PMV BLD AUTO: 10.5 FL (ref 7.4–10.4)
POTASSIUM SERPL-SCNC: 4.1 MMOL/L (ref 3.5–5.1)
PROT SERPL-MCNC: 8 GM/DL (ref 5.8–7.6)
PROT UR STRIP-MCNC: 43.2 MG/DL
PROTHROMBIN TIME, POC: ABNORMAL (ref 2–3)
PTH-INTACT SERPL-MCNC: 245.3 PG/ML (ref 8.7–77)
RBC # BLD AUTO: 5.69 X10(6)/MCL (ref 4.2–5.4)
SODIUM SERPL-SCNC: 141 MMOL/L (ref 136–145)
URINE PROTEIN/CREATININE RATIO (OLG): 1
WBC # BLD AUTO: 7.53 X10(3)/MCL (ref 4.5–11.5)

## 2025-06-23 PROCEDURE — 84100 ASSAY OF PHOSPHORUS: CPT

## 2025-06-23 PROCEDURE — 82043 UR ALBUMIN QUANTITATIVE: CPT

## 2025-06-23 PROCEDURE — 85610 PROTHROMBIN TIME: CPT

## 2025-06-23 PROCEDURE — 83735 ASSAY OF MAGNESIUM: CPT

## 2025-06-23 PROCEDURE — 85025 COMPLETE CBC W/AUTO DIFF WBC: CPT

## 2025-06-23 PROCEDURE — 80053 COMPREHEN METABOLIC PANEL: CPT

## 2025-06-23 PROCEDURE — 83970 ASSAY OF PARATHORMONE: CPT

## 2025-06-23 PROCEDURE — 82306 VITAMIN D 25 HYDROXY: CPT

## 2025-06-23 PROCEDURE — 36415 COLL VENOUS BLD VENIPUNCTURE: CPT

## 2025-06-23 PROCEDURE — 84156 ASSAY OF PROTEIN URINE: CPT

## 2025-06-23 PROCEDURE — 86225 DNA ANTIBODY NATIVE: CPT

## 2025-06-23 NOTE — PROGRESS NOTES
POC pt/inr performed.  Pt instructed to continue warfarin dosing with no changes.  Pt will eat a portion of greens today. Dosing calendar filled out, and given to pt for accuracy.

## 2025-06-26 LAB — DSDNA AB QUANT (OHS): 1.2 IU/ML

## 2025-06-27 ENCOUNTER — DOCUMENTATION ONLY (OUTPATIENT)
Dept: NEPHROLOGY | Facility: CLINIC | Age: 70
End: 2025-06-27
Payer: MEDICARE

## 2025-06-27 NOTE — PROGRESS NOTES
Patient was no-show to clinic today. If patient calls back to reschedule, please schedule within 3-4 months with Dr Mckeon.  Thank you

## 2025-07-07 ENCOUNTER — ANTI-COAG VISIT (OUTPATIENT)
Dept: CARDIOLOGY | Facility: HOSPITAL | Age: 70
End: 2025-07-07
Payer: MEDICARE

## 2025-07-07 DIAGNOSIS — Z86.711 HISTORY OF PULMONARY EMBOLISM: ICD-10-CM

## 2025-07-07 DIAGNOSIS — Z79.01 ANTICOAGULATION MONITORING, INR RANGE 2-3: ICD-10-CM

## 2025-07-07 DIAGNOSIS — I48.11 LONGSTANDING PERSISTENT ATRIAL FIBRILLATION: Primary | ICD-10-CM

## 2025-07-07 LAB
CTP QC/QA: YES
INR PPP: 3.5 (ref 2–3)
PROTHROMBIN TIME, POC: ABNORMAL (ref 2–3)

## 2025-07-07 PROCEDURE — 99211 OFF/OP EST MAY X REQ PHY/QHP: CPT

## 2025-07-07 PROCEDURE — 85610 PROTHROMBIN TIME: CPT

## 2025-07-07 NOTE — PROGRESS NOTES
POC pt/inr performed.  Pt instructed to hold warfarin today, then resume decreased dosing. Dosing calendar filled out, and given to pt for accuracy.

## 2025-07-14 DIAGNOSIS — N18.30 TYPE 2 DIABETES MELLITUS WITH STAGE 3 CHRONIC KIDNEY DISEASE, WITH LONG-TERM CURRENT USE OF INSULIN, UNSPECIFIED WHETHER STAGE 3A OR 3B CKD: ICD-10-CM

## 2025-07-14 DIAGNOSIS — Z79.4 TYPE 2 DIABETES MELLITUS WITH STAGE 3 CHRONIC KIDNEY DISEASE, WITH LONG-TERM CURRENT USE OF INSULIN, UNSPECIFIED WHETHER STAGE 3A OR 3B CKD: ICD-10-CM

## 2025-07-14 DIAGNOSIS — E11.22 TYPE 2 DIABETES MELLITUS WITH STAGE 3 CHRONIC KIDNEY DISEASE, WITH LONG-TERM CURRENT USE OF INSULIN, UNSPECIFIED WHETHER STAGE 3A OR 3B CKD: ICD-10-CM

## 2025-07-14 RX ORDER — BLOOD-GLUCOSE SENSOR
EACH MISCELLANEOUS
Qty: 5 EACH | Refills: 99 | Status: SHIPPED | OUTPATIENT
Start: 2025-07-14

## 2025-07-16 DIAGNOSIS — I42.9 CARDIOMYOPATHY, UNSPECIFIED: ICD-10-CM

## 2025-07-16 RX ORDER — FUROSEMIDE 20 MG/1
TABLET ORAL
Qty: 90 TABLET | Refills: 11 | Status: SHIPPED | OUTPATIENT
Start: 2025-07-16

## 2025-07-21 ENCOUNTER — ANTI-COAG VISIT (OUTPATIENT)
Dept: CARDIOLOGY | Facility: HOSPITAL | Age: 70
End: 2025-07-21
Payer: MEDICARE

## 2025-07-21 DIAGNOSIS — Z79.01 ANTICOAGULATION MONITORING, INR RANGE 2-3: ICD-10-CM

## 2025-07-21 DIAGNOSIS — I48.11 LONGSTANDING PERSISTENT ATRIAL FIBRILLATION: Primary | ICD-10-CM

## 2025-07-21 DIAGNOSIS — Z86.711 HISTORY OF PULMONARY EMBOLISM: ICD-10-CM

## 2025-07-21 LAB
CTP QC/QA: YES
INR PPP: 1.4 (ref 2–3)
PROTHROMBIN TIME, POC: ABNORMAL (ref 2–3)

## 2025-07-21 PROCEDURE — 85610 PROTHROMBIN TIME: CPT

## 2025-07-21 NOTE — PROGRESS NOTES
POC pt/inr performed.  Pt will take 7.5 mg of warfarin today, then 5 mg on 7/22/2025, then will resume dosing and retest Thurs. 7/24/2025.

## 2025-07-23 DIAGNOSIS — I27.82 CHRONIC PULMONARY EMBOLISM, UNSPECIFIED PULMONARY EMBOLISM TYPE, UNSPECIFIED WHETHER ACUTE COR PULMONALE PRESENT: ICD-10-CM

## 2025-07-23 RX ORDER — WARFARIN SODIUM 5 MG/1
2.5 TABLET ORAL DAILY
Qty: 15 TABLET | Refills: 11 | Status: SHIPPED | OUTPATIENT
Start: 2025-07-23 | End: 2026-07-23

## 2025-07-24 ENCOUNTER — ANTI-COAG VISIT (OUTPATIENT)
Dept: CARDIOLOGY | Facility: HOSPITAL | Age: 70
End: 2025-07-24
Payer: MEDICARE

## 2025-07-24 DIAGNOSIS — Z79.01 ANTICOAGULATION MONITORING, INR RANGE 2-3: ICD-10-CM

## 2025-07-24 DIAGNOSIS — Z86.711 HISTORY OF PULMONARY EMBOLISM: Primary | ICD-10-CM

## 2025-07-24 LAB
CTP QC/QA: YES
INR PPP: 2.5 (ref 2–3)
PROTHROMBIN TIME, POC: NORMAL (ref 2–3)

## 2025-07-24 PROCEDURE — 85610 PROTHROMBIN TIME: CPT

## 2025-07-24 NOTE — PROGRESS NOTES
POC pt/inr performed.  Pt instructed to continue warfarin dosing with no changes.  Dosing calendar filled out, and given to pt for accuracy.     Introduction Text (Please End With A Colon): The following procedure was deferred:

## 2025-07-28 DIAGNOSIS — G47.00 INSOMNIA, UNSPECIFIED TYPE: ICD-10-CM

## 2025-07-28 RX ORDER — ACETAMINOPHEN 500 MG
5 TABLET ORAL NIGHTLY PRN
Qty: 90 TABLET | Refills: 3 | Status: SHIPPED | OUTPATIENT
Start: 2025-07-28 | End: 2026-07-28

## 2025-07-31 DIAGNOSIS — E11.22 TYPE 2 DIABETES MELLITUS WITH STAGE 3 CHRONIC KIDNEY DISEASE, WITH LONG-TERM CURRENT USE OF INSULIN, UNSPECIFIED WHETHER STAGE 3A OR 3B CKD: ICD-10-CM

## 2025-07-31 DIAGNOSIS — N18.30 TYPE 2 DIABETES MELLITUS WITH STAGE 3 CHRONIC KIDNEY DISEASE, WITH LONG-TERM CURRENT USE OF INSULIN, UNSPECIFIED WHETHER STAGE 3A OR 3B CKD: ICD-10-CM

## 2025-07-31 DIAGNOSIS — Z79.4 TYPE 2 DIABETES MELLITUS WITH STAGE 3 CHRONIC KIDNEY DISEASE, WITH LONG-TERM CURRENT USE OF INSULIN, UNSPECIFIED WHETHER STAGE 3A OR 3B CKD: ICD-10-CM

## 2025-08-04 RX ORDER — BLOOD-GLUCOSE,RECEIVER,CONT
EACH MISCELLANEOUS
Qty: 1 EACH | Refills: 0 | Status: SHIPPED | OUTPATIENT
Start: 2025-08-04

## 2025-08-13 ENCOUNTER — ANTI-COAG VISIT (OUTPATIENT)
Dept: CARDIOLOGY | Facility: HOSPITAL | Age: 70
End: 2025-08-13
Payer: MEDICARE

## 2025-08-13 DIAGNOSIS — Z86.711 HISTORY OF PULMONARY EMBOLISM: Primary | ICD-10-CM

## 2025-08-13 DIAGNOSIS — Z79.01 ANTICOAGULATION MONITORING, INR RANGE 2-3: ICD-10-CM

## 2025-08-13 DIAGNOSIS — I26.99 PULMONARY EMBOLISM, OTHER, UNSPECIFIED CHRONICITY, UNSPECIFIED WHETHER ACUTE COR PULMONALE PRESENT: ICD-10-CM

## 2025-08-13 LAB
CTP QC/QA: YES
INR PPP: 3.9 (ref 2–3)
PROTHROMBIN TIME, POC: ABNORMAL (ref 2–3)

## 2025-08-13 PROCEDURE — 85610 PROTHROMBIN TIME: CPT

## 2025-08-13 PROCEDURE — 99211 OFF/OP EST MAY X REQ PHY/QHP: CPT

## 2025-08-14 DIAGNOSIS — J45.909 ASTHMA, UNSPECIFIED ASTHMA SEVERITY, UNSPECIFIED WHETHER COMPLICATED, UNSPECIFIED WHETHER PERSISTENT: ICD-10-CM

## 2025-08-15 RX ORDER — ALBUTEROL SULFATE 90 UG/1
1 INHALANT RESPIRATORY (INHALATION) EVERY 4 HOURS PRN
Qty: 18 G | Refills: 3 | Status: SHIPPED | OUTPATIENT
Start: 2025-08-15

## 2025-08-19 DIAGNOSIS — Z79.4 TYPE 2 DIABETES MELLITUS WITH STAGE 3 CHRONIC KIDNEY DISEASE, WITH LONG-TERM CURRENT USE OF INSULIN, UNSPECIFIED WHETHER STAGE 3A OR 3B CKD: ICD-10-CM

## 2025-08-19 DIAGNOSIS — E11.22 TYPE 2 DIABETES MELLITUS WITH STAGE 3 CHRONIC KIDNEY DISEASE, WITH LONG-TERM CURRENT USE OF INSULIN, UNSPECIFIED WHETHER STAGE 3A OR 3B CKD: ICD-10-CM

## 2025-08-19 DIAGNOSIS — N18.30 TYPE 2 DIABETES MELLITUS WITH STAGE 3 CHRONIC KIDNEY DISEASE, WITH LONG-TERM CURRENT USE OF INSULIN, UNSPECIFIED WHETHER STAGE 3A OR 3B CKD: ICD-10-CM

## 2025-08-20 RX ORDER — BLOOD-GLUCOSE SENSOR
EACH MISCELLANEOUS
Qty: 5 EACH | Refills: 99 | Status: SHIPPED | OUTPATIENT
Start: 2025-08-20

## 2025-08-27 ENCOUNTER — TELEPHONE (OUTPATIENT)
Dept: INTERNAL MEDICINE | Facility: CLINIC | Age: 70
End: 2025-08-27
Payer: MEDICARE

## 2025-08-28 ENCOUNTER — OFFICE VISIT (OUTPATIENT)
Dept: INTERNAL MEDICINE | Facility: CLINIC | Age: 70
End: 2025-08-28
Payer: MEDICARE

## 2025-08-28 VITALS
HEIGHT: 64 IN | WEIGHT: 293 LBS | HEART RATE: 98 BPM | OXYGEN SATURATION: 98 % | BODY MASS INDEX: 50.02 KG/M2 | RESPIRATION RATE: 18 BRPM | DIASTOLIC BLOOD PRESSURE: 82 MMHG | SYSTOLIC BLOOD PRESSURE: 132 MMHG | TEMPERATURE: 98 F

## 2025-08-28 DIAGNOSIS — I10 PRIMARY HYPERTENSION: ICD-10-CM

## 2025-08-28 DIAGNOSIS — I21.4 NSTEMI (NON-ST ELEVATED MYOCARDIAL INFARCTION): ICD-10-CM

## 2025-08-28 DIAGNOSIS — E11.69 DIABETES MELLITUS TYPE 2 IN OBESE: ICD-10-CM

## 2025-08-28 DIAGNOSIS — I42.9 CARDIOMYOPATHY, UNSPECIFIED TYPE: ICD-10-CM

## 2025-08-28 DIAGNOSIS — N18.30 TYPE 2 DIABETES MELLITUS WITH STAGE 3 CHRONIC KIDNEY DISEASE, WITH LONG-TERM CURRENT USE OF INSULIN, UNSPECIFIED WHETHER STAGE 3A OR 3B CKD: Primary | ICD-10-CM

## 2025-08-28 DIAGNOSIS — I20.89 ANGINA OF EFFORT: ICD-10-CM

## 2025-08-28 DIAGNOSIS — E66.9 DIABETES MELLITUS TYPE 2 IN OBESE: ICD-10-CM

## 2025-08-28 DIAGNOSIS — E11.42 DIABETIC POLYNEUROPATHY ASSOCIATED WITH TYPE 2 DIABETES MELLITUS: ICD-10-CM

## 2025-08-28 DIAGNOSIS — Z79.4 TYPE 2 DIABETES MELLITUS WITH STAGE 3 CHRONIC KIDNEY DISEASE, WITH LONG-TERM CURRENT USE OF INSULIN, UNSPECIFIED WHETHER STAGE 3A OR 3B CKD: Primary | ICD-10-CM

## 2025-08-28 DIAGNOSIS — T78.40XD ALLERGY, SUBSEQUENT ENCOUNTER: ICD-10-CM

## 2025-08-28 DIAGNOSIS — E11.22 TYPE 2 DIABETES MELLITUS WITH STAGE 3 CHRONIC KIDNEY DISEASE, WITH LONG-TERM CURRENT USE OF INSULIN, UNSPECIFIED WHETHER STAGE 3A OR 3B CKD: Primary | ICD-10-CM

## 2025-08-28 DIAGNOSIS — E55.9 VITAMIN D DEFICIENCY: ICD-10-CM

## 2025-08-28 LAB — HBA1C MFR BLD: 9.8 %

## 2025-08-28 PROCEDURE — 83036 HEMOGLOBIN GLYCOSYLATED A1C: CPT | Mod: PBBFAC

## 2025-08-28 PROCEDURE — 99214 OFFICE O/P EST MOD 30 MIN: CPT | Mod: PBBFAC

## 2025-08-28 RX ORDER — BLOOD-GLUCOSE,RECEIVER,CONT
EACH MISCELLANEOUS
Qty: 1 EACH | Refills: 0 | Status: CANCELLED | OUTPATIENT
Start: 2025-08-28

## 2025-08-29 RX ORDER — CARVEDILOL 6.25 MG/1
6.25 TABLET ORAL 2 TIMES DAILY
Qty: 60 TABLET | Refills: 11 | Status: SHIPPED | OUTPATIENT
Start: 2025-08-29 | End: 2026-08-29

## 2025-08-29 RX ORDER — BLOOD-GLUCOSE,RECEIVER,CONT
EACH MISCELLANEOUS
Qty: 1 EACH | Refills: 0 | Status: SHIPPED | OUTPATIENT
Start: 2025-08-29

## 2025-08-29 RX ORDER — TIRZEPATIDE 5 MG/.5ML
5 INJECTION, SOLUTION SUBCUTANEOUS
Qty: 2 ML | Refills: 2 | Status: SHIPPED | OUTPATIENT
Start: 2025-08-29 | End: 2025-11-27

## 2025-08-29 RX ORDER — CHOLECALCIFEROL (VITAMIN D3) 25 MCG
2000 TABLET ORAL DAILY
Qty: 60 TABLET | Refills: 11 | Status: SHIPPED | OUTPATIENT
Start: 2025-08-29 | End: 2026-08-29

## 2025-08-29 RX ORDER — LOSARTAN POTASSIUM AND HYDROCHLOROTHIAZIDE 12.5; 5 MG/1; MG/1
1 TABLET ORAL DAILY
Qty: 90 TABLET | Refills: 3 | Status: SHIPPED | OUTPATIENT
Start: 2025-08-29 | End: 2026-08-29

## 2025-08-29 RX ORDER — GABAPENTIN 600 MG/1
600 TABLET ORAL EVERY MORNING
Qty: 90 TABLET | Refills: 3 | Status: SHIPPED | OUTPATIENT
Start: 2025-08-29 | End: 2026-08-29

## 2025-08-29 RX ORDER — NITROGLYCERIN 0.4 MG/1
0.4 TABLET SUBLINGUAL EVERY 5 MIN PRN
Qty: 30 TABLET | Refills: 0 | Status: SHIPPED | OUTPATIENT
Start: 2025-08-29 | End: 2026-08-29

## 2025-08-29 RX ORDER — BLOOD-GLUCOSE SENSOR
EACH MISCELLANEOUS
Qty: 5 EACH | Refills: 99 | Status: SHIPPED | OUTPATIENT
Start: 2025-08-29

## 2025-08-29 RX ORDER — ISOSORBIDE MONONITRATE 30 MG/1
30 TABLET, EXTENDED RELEASE ORAL DAILY
Qty: 60 TABLET | Refills: 1 | Status: SHIPPED | OUTPATIENT
Start: 2025-08-29 | End: 2025-12-27

## 2025-08-29 RX ORDER — CETIRIZINE HYDROCHLORIDE 10 MG/1
10 TABLET ORAL DAILY
Qty: 30 TABLET | Refills: 0 | Status: SHIPPED | OUTPATIENT
Start: 2025-08-29 | End: 2025-09-28

## 2025-09-02 DIAGNOSIS — N18.30 TYPE 2 DIABETES MELLITUS WITH STAGE 3 CHRONIC KIDNEY DISEASE, WITH LONG-TERM CURRENT USE OF INSULIN, UNSPECIFIED WHETHER STAGE 3A OR 3B CKD: ICD-10-CM

## 2025-09-02 DIAGNOSIS — Z79.4 TYPE 2 DIABETES MELLITUS WITH STAGE 3 CHRONIC KIDNEY DISEASE, WITH LONG-TERM CURRENT USE OF INSULIN, UNSPECIFIED WHETHER STAGE 3A OR 3B CKD: ICD-10-CM

## 2025-09-02 DIAGNOSIS — E11.22 TYPE 2 DIABETES MELLITUS WITH STAGE 3 CHRONIC KIDNEY DISEASE, WITH LONG-TERM CURRENT USE OF INSULIN, UNSPECIFIED WHETHER STAGE 3A OR 3B CKD: ICD-10-CM

## 2025-09-04 RX ORDER — INSULIN ASPART 100 [IU]/ML
20 INJECTION, SOLUTION INTRAVENOUS; SUBCUTANEOUS
Qty: 15 ML | Refills: 11 | Status: SHIPPED | OUTPATIENT
Start: 2025-09-04

## (undated) DEVICE — KIT SURGICAL COLON .25 1.1OZ

## (undated) DEVICE — MANIFOLD 4 PORT